# Patient Record
Sex: MALE | Race: WHITE
[De-identification: names, ages, dates, MRNs, and addresses within clinical notes are randomized per-mention and may not be internally consistent; named-entity substitution may affect disease eponyms.]

---

## 2017-01-20 ENCOUNTER — HOSPITAL ENCOUNTER (EMERGENCY)
Dept: HOSPITAL 47 - EC | Age: 72
Discharge: HOME | End: 2017-01-20
Payer: MEDICARE

## 2017-01-20 VITALS
HEART RATE: 79 BPM | RESPIRATION RATE: 18 BRPM | TEMPERATURE: 98.3 F | SYSTOLIC BLOOD PRESSURE: 143 MMHG | DIASTOLIC BLOOD PRESSURE: 65 MMHG

## 2017-01-20 DIAGNOSIS — J30.2: ICD-10-CM

## 2017-01-20 DIAGNOSIS — J06.9: Primary | ICD-10-CM

## 2017-01-20 DIAGNOSIS — I10: ICD-10-CM

## 2017-01-20 DIAGNOSIS — Z79.84: ICD-10-CM

## 2017-01-20 DIAGNOSIS — K21.9: ICD-10-CM

## 2017-01-20 DIAGNOSIS — Z79.51: ICD-10-CM

## 2017-01-20 DIAGNOSIS — E11.9: ICD-10-CM

## 2017-01-20 DIAGNOSIS — Z79.899: ICD-10-CM

## 2017-01-20 PROCEDURE — 99283 EMERGENCY DEPT VISIT LOW MDM: CPT

## 2017-01-20 NOTE — ED
URI HPI





- General


Chief Complaint: Upper Respiratory Infection


Stated Complaint: Cough,Headache


Time Seen by Provider: 17 13:06


Source: patient, RN notes reviewed


Mode of arrival: ambulatory


Limitations: no limitations





- History of Present Illness


Initial Comments: 





Patient is a 71-year-old male presents to the emergency room for evaluation of 

cough and congestion.  Patient states he's had symptoms since Monday.  Patient 

states that he has not been taking any over-the-counter medications for 

symptoms.  Patient states he has a dry cough.  Patient denies coughing up any 

mucus.  Patient states he has a slightly stuffy nose is having sinus congestion 

mostly on the left side.  Patient denies ear pain.  Patient states he only has 

a headache when he coughs.  Patient states she has throat pain after coughing.  

Patient denies nausea or vomiting, abdominal pain, diarrhea or constipation.  

Patient denies any fevers.  Patient denies smoking. 





- Related Data


 Home Medications











 Medication  Instructions  Recorded  Confirmed


 


Lansoprazole [Prevacid] 15 mg PO DAILY 10/30/14 11/07/16


 


Loratadine [Claritin] 10 mg PO DAILY 10/30/14 11/07/16


 


Lisinopril [Zestril] 2.5 mg PO DAILY 08/05/15 11/07/16


 


Fluticasone Propionate [Flonase 1 spray EA NOSTRIL DAILY PRN 16





Allergy Relief]   


 


Acetaminophen Tab [Tylenol] 650 mg PO Q6H PRN 16


 


Cholecalciferol [Vitamin D3] 5,000 unit PO DAILY 16


 


HYDROcodone/APAP 7.5-325MG [Norco 1 tab PO Q8H PRN 16





7.5-325]   


 


glipiZIDE [Glucotrol] 10 mg PO AC-SUPPER 16








 Previous Rx's











 Medication  Instructions  Recorded


 


Cefuroxime Axetil [Ceftin] 500 mg PO BID #20 tablet 11/10/16


 


Sodium Chloride 0.65% Nasal [Deep 2 spray NASAL QID PRN #0 spray 16





Sea]  


 


traMADol HCl [Ultram] 50 mg PO QID #20 tab 16


 


Fluticasone Nasal Spray [Flonase 2 spr EA NOSTRIL DAILY PRN #1 17





Nasal [place]] bottle 


 


guaiFENesin [Mucinex] 1,200 mg PO BID PRN 12 Days 17











 Allergies











Allergy/AdvReac Type Severity Reaction Status Date / Time


 


amoxicillin [From Augmentin] Allergy  Rash/Hives Verified 17 12:54


 


clavulanic acid Allergy  Rash/Hives Verified 17 12:54





[From Augmentin]     














Review of Systems


ROS Statement: 


Those systems with pertinent positive or pertinent negative responses have been 

documented in the HPI.





ROS Other: All systems not noted in ROS Statement are negative.





Past Medical History


Past Medical History: Cancer, Diabetes Mellitus, GERD/Reflux, Hypertension


Additional Past Medical History / Comment(s): NIDDM type II, 1999 LEUKEMIA (CLL-

remission), sinus problems, seasonal allergies, tinnitis bilaterally, OA hands, 

R wrist carpal tunnel, past fx with L elbow, R rotator cuff tendon problems, L 

rotator cuff tear.


History of Any Multi-Drug Resistant Organisms: None Reported


Past Surgical History: Orthopedic Surgery


Additional Past Surgical History / Comment(s): LEFT HAND thumb tendon repair.


Past Anesthesia/Blood Transfusion Reactions: No Reported Reaction


Past Psychological History: No Psychological Hx Reported


Additional Psychological History / Comment(s): Pt resides with his spouse.  He 

is independent.


Smoking Status: Never smoker


Past Alcohol Use History: Occasional


Past Drug Use History: None Reported





- Past Family History


  ** Father


Family Medical History: CVA/TIA


Additional Family Medical History / Comment(s): Father  at the age of 85yrs.





  ** Mother


Family Medical History: Diabetes Mellitus


Additional Family Medical History / Comment(s): Mother  at the age of 90yrs.





General Exam





- General Exam Comments


Initial Comments: 





Sitting in exam room in no acute distress.


Limitations: no limitations


General appearance: alert, in no apparent distress


Head exam: Present: atraumatic, normocephalic, normal inspection


Eye exam: Present: normal appearance, PERRL, EOMI


Pupils: Present: normal accommodation


ENT exam: Present: normal exam, normal oropharynx, mucous membranes moist, TM's 

normal bilaterally, normal external ear exam


Neck exam: Present: normal inspection, full ROM.  Absent: tenderness, 

lymphadenopathy


Respiratory exam: Present: normal lung sounds bilaterally.  Absent: respiratory 

distress


Cardiovascular Exam: Present: regular rate, normal rhythm, normal heart sounds


Extremities exam: Present: normal inspection


Back exam: Present: normal inspection


Neurological exam: Present: alert, oriented X3, CN II-XII intact, normal gait


Psychiatric exam: Present: normal affect, normal mood


Skin exam: Present: warm, dry, intact, normal color.  Absent: rash





Course


 Vital Signs











  17





  12:49


 


Temperature 98.3 F


 


Pulse Rate 79


 


Respiratory 18





Rate 


 


Blood Pressure 143/65


 


O2 Sat by Pulse 97





Oximetry 














Medical Decision Making





- Medical Decision Making





Patient is a 71-year-old male presents to the emergency room for evaluation 

sinus congestion since Monday.  Patient denies taking anything for his 

symptoms.  Advised patient to take Mucinex and Flonase for symptoms and if they 

do not improve within 1 week to follow-up with his primary care provider.  

Patient states he understands everything that was discussed with him.  Return 

parameters discussed.





Disposition


Clinical Impression: 


 Upper respiratory infection





Disposition: HOME SELF-CARE


Condition: Good


Instructions:  Upper Respiratory Infection (ED)


Additional Instructions: 


Take medications as directed.  Please follow up with primary care provider in 1-

2 days for reevaluation. If any new symptom arises, symptoms worsen or fever 

develops, return to ER as soon as possible.  


Prescriptions: 


Fluticasone Nasal Spray [Flonase Nasal Spray] 2 spr EA NOSTRIL DAILY PRN #1 

bottle


 PRN Reason: Congestion


guaiFENesin [Mucinex] 1,200 mg PO BID PRN 12 Days


 PRN Reason: Congestion


Referrals: 


None,Stated [Primary Care Provider] - 1-2 days


Time of Disposition: 13:13

## 2017-02-13 ENCOUNTER — HOSPITAL ENCOUNTER (EMERGENCY)
Dept: HOSPITAL 47 - EC | Age: 72
Discharge: HOME | End: 2017-02-13
Payer: MEDICARE

## 2017-02-13 VITALS — SYSTOLIC BLOOD PRESSURE: 172 MMHG | DIASTOLIC BLOOD PRESSURE: 77 MMHG

## 2017-02-13 VITALS — TEMPERATURE: 97.6 F | RESPIRATION RATE: 18 BRPM | HEART RATE: 63 BPM

## 2017-02-13 DIAGNOSIS — Z79.899: ICD-10-CM

## 2017-02-13 DIAGNOSIS — E11.9: ICD-10-CM

## 2017-02-13 DIAGNOSIS — I10: ICD-10-CM

## 2017-02-13 DIAGNOSIS — M19.049: ICD-10-CM

## 2017-02-13 DIAGNOSIS — K21.9: ICD-10-CM

## 2017-02-13 DIAGNOSIS — Z88.0: ICD-10-CM
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
## 2017-02-13 DIAGNOSIS — C95.91: ICD-10-CM

## 2017-02-13 DIAGNOSIS — S46.911A: Primary | ICD-10-CM
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
## 2017-02-13 PROCEDURE — 99283 EMERGENCY DEPT VISIT LOW MDM: CPT

## 2017-02-13 NOTE — ED
Extremity Problem HPI





- General


Chief complaint: Extremity Problem,Nontraumatic


Stated complaint: Rt Arm Weakness


Time Seen by Provider: 17 12:53


Source: patient, RN notes reviewed


Mode of arrival: ambulatory


Limitations: no limitations





- History of Present Illness


Initial comments: 





71-year-old male presents emergency Department with chief complaint of right 

shoulder pain.  Patient has chronic right shoulder pain but states that he was 

bowling on Friday and states that he was having increasing pain and states it 

felt very fatigued.  Patient states that is still bothering but states he has 

no weakness.  Patient denies any blurred vision, headache, dizziness, neck pain

, chest pain or shortness of breath.  Patient states he had no other associated 

symptoms.  He states he never lost function of it.  Patient states that he 

needs a work no because of his right shoulder.  Patient has seen a surgeon in 

the past and states that there is nothing that they can do for him.





- Related Data


 Home Medications











 Medication  Instructions  Recorded  Confirmed


 


Lansoprazole [Prevacid] 15 mg PO DAILY 10/30/14 11/07/16


 


Loratadine [Claritin] 10 mg PO DAILY 10/30/14 11/07/16


 


Lisinopril [Zestril] 2.5 mg PO DAILY 08/05/15 11/07/16


 


Fluticasone Propionate [Flonase 1 spray EA NOSTRIL DAILY PRN 16





Allergy Relief]   


 


Acetaminophen Tab [Tylenol] 650 mg PO Q6H PRN 16


 


Cholecalciferol [Vitamin D3] 5,000 unit PO DAILY 16


 


HYDROcodone/APAP 7.5-325MG [Norco 1 tab PO Q8H PRN 16





7.5-325]   


 


glipiZIDE [Glucotrol] 10 mg PO AC-SUPPER 16








 Previous Rx's











 Medication  Instructions  Recorded


 


Cefuroxime Axetil [Ceftin] 500 mg PO BID #20 tablet 11/10/16


 


Sodium Chloride 0.65% Nasal [Deep 2 spray NASAL QID PRN #0 spray 16





Sea]  


 


traMADol HCl [Ultram] 50 mg PO QID #20 tab 16


 


Fluticasone Nasal Spray [Flonase 2 spr EA NOSTRIL DAILY PRN #1 17





Nasal [place]] bottle 


 


guaiFENesin [Mucinex] 1,200 mg PO BID PRN 12 Days 17











 Allergies











Allergy/AdvReac Type Severity Reaction Status Date / Time


 


amoxicillin [From Augmentin] Allergy  Rash/Hives Verified 17 12:42


 


clavulanic acid Allergy  Rash/Hives Verified 17 12:42





[From Augmentin]     














Review of Systems


ROS Statement: 


Those systems with pertinent positive or pertinent negative responses have been 

documented in the HPI.





ROS Other: All systems not noted in ROS Statement are negative.





Past Medical History


Past Medical History: Cancer, Diabetes Mellitus, GERD/Reflux, Hypertension


Additional Past Medical History / Comment(s): NIDDM type II, 1999 LEUKEMIA (CLL-

remission), sinus problems, seasonal allergies, tinnitis bilaterally, OA hands, 

R wrist carpal tunnel, past fx with L elbow, R rotator cuff tendon problems, L 

rotator cuff tear.


History of Any Multi-Drug Resistant Organisms: None Reported


Past Surgical History: Orthopedic Surgery


Additional Past Surgical History / Comment(s): LEFT HAND thumb tendon repair.


Past Anesthesia/Blood Transfusion Reactions: No Reported Reaction


Past Psychological History: No Psychological Hx Reported


Additional Psychological History / Comment(s): Pt resides with his spouse.  He 

is independent.


Smoking Status: Never smoker


Past Alcohol Use History: Occasional


Past Drug Use History: None Reported





- Past Family History


  ** Father


Family Medical History: CVA/TIA


Additional Family Medical History / Comment(s): Father  at the age of 85yrs.





  ** Mother


Family Medical History: Diabetes Mellitus


Additional Family Medical History / Comment(s): Mother  at the age of 90yrs.





General Exam


Limitations: no limitations


General appearance: alert, in no apparent distress


Head exam: Present: atraumatic, normocephalic, normal inspection


Eye exam: Present: normal appearance, PERRL, EOMI.  Absent: scleral icterus, 

conjunctival injection, periorbital swelling


ENT exam: Present: normal exam, normal oropharynx, mucous membranes moist


Neck exam: Present: normal inspection, full ROM.  Absent: tenderness, 

meningismus, lymphadenopathy


Respiratory exam: Present: normal lung sounds bilaterally.  Absent: respiratory 

distress, wheezes, rales, rhonchi, stridor


Cardiovascular Exam: Present: regular rate, normal rhythm, normal heart sounds.

  Absent: systolic murmur, diastolic murmur, rubs, gallop, clicks


Extremities exam: Present: other (Right shoulder there is some limited range of 

motion with extension of the shoulder secondary to rotator cuff injury patient 

has equal  strength pulses are equal bilaterally +2 radial, Refill less 

than 2 seconds patient has normal bicep, tricep tenderness normal reflexes 

lower extremity strength equal bilaterally also)


Back exam: Present: full ROM.  Absent: tenderness


Neurological exam: Present: alert, oriented X3, CN II-XII intact, reflexes 

normal.  Absent: motor sensory deficit


Skin exam: Present: warm, dry, intact, normal color.  Absent: rash





Course


 Vital Signs











  17





  12:38


 


Temperature 97.6 F


 


Pulse Rate 63


 


Respiratory 18





Rate 


 


Blood Pressure 181/74


 


O2 Sat by Pulse 96





Oximetry 














Disposition


Clinical Impression: 


 Right shoulder strain





Disposition: HOME SELF-CARE


Condition: Stable


Instructions:  Shoulder Sprain (ED)


Additional Instructions: 


Please return to the Emergency Department if symptoms worsen or any other 

concerns.


Referrals: 


None,Stated [Primary Care Provider] - 1-2 days


Time of Disposition: 13:08

## 2017-07-13 ENCOUNTER — HOSPITAL ENCOUNTER (OUTPATIENT)
Dept: HOSPITAL 47 - RADCTMAIN | Age: 72
Discharge: HOME | End: 2017-07-13
Payer: MEDICARE

## 2017-07-13 DIAGNOSIS — J32.0: Primary | ICD-10-CM

## 2017-07-13 PROCEDURE — 70486 CT MAXILLOFACIAL W/O DYE: CPT

## 2017-07-13 NOTE — CT
EXAMINATION TYPE: CT sinus wo con

 

DATE OF EXAM: 7/13/2017

 

COMPARISON: 12/4/2009

 

HISTORY: Chronic sinus infections.

 

CT DLP: 599 mGycm.  Automated Exposure Control for Dose Reduction was Utilized.

 

TECHNIQUE: CT scan of the sinuses is performed without contrast, axial images are obtained, coronal r
eformatted images are also reviewed.

 

FINDINGS: The  paranasal sinuses including the frontal, ethmoid, sphenoid, and maxillary sinuses bila
terally are well-aerated with very mild mucosal thickening involving both maxillary sinuses. No air-f
luid levels..  The ostiomeatal complex is patent bilaterally on the coronal images. 

 

Visualized portion of mastoid air cells show no abnormal opacification.  The globes are intact bilate
rally.  

 

There are small aneta bullosa bilaterally.

 

IMPRESSION:

1. Very mild chronic maxillary sinusitis.

## 2017-08-18 ENCOUNTER — HOSPITAL ENCOUNTER (EMERGENCY)
Dept: HOSPITAL 47 - EC | Age: 72
Discharge: HOME | End: 2017-08-18
Payer: MEDICARE

## 2017-08-18 VITALS — DIASTOLIC BLOOD PRESSURE: 68 MMHG | RESPIRATION RATE: 16 BRPM | SYSTOLIC BLOOD PRESSURE: 132 MMHG | TEMPERATURE: 98 F

## 2017-08-18 VITALS — HEART RATE: 54 BPM

## 2017-08-18 DIAGNOSIS — E11.9: ICD-10-CM

## 2017-08-18 DIAGNOSIS — Z79.84: ICD-10-CM

## 2017-08-18 DIAGNOSIS — I10: ICD-10-CM

## 2017-08-18 DIAGNOSIS — Z85.6: ICD-10-CM

## 2017-08-18 DIAGNOSIS — R42: Primary | ICD-10-CM

## 2017-08-18 DIAGNOSIS — K21.9: ICD-10-CM

## 2017-08-18 DIAGNOSIS — Z79.899: ICD-10-CM

## 2017-08-18 DIAGNOSIS — Z88.0: ICD-10-CM

## 2017-08-18 LAB
ALP SERPL-CCNC: 122 U/L (ref 38–126)
ALT SERPL-CCNC: 27 U/L (ref 21–72)
ANION GAP SERPL CALC-SCNC: 11 MMOL/L
APTT BLD: 22.6 SEC (ref 22–30)
AST SERPL-CCNC: 17 U/L (ref 17–59)
BUN SERPL-SCNC: 23 MG/DL (ref 9–20)
CALCIUM SPEC-MCNC: 9.1 MG/DL (ref 8.4–10.2)
CELLS COUNTED: 200
CH: 32.1
CHCM: 36.1
CHLORIDE SERPL-SCNC: 102 MMOL/L (ref 98–107)
CK SERPL-CCNC: 101 U/L (ref 55–170)
CO2 SERPL-SCNC: 23 MMOL/L (ref 22–30)
ERYTHROCYTE [DISTWIDTH] IN BLOOD BY AUTOMATED COUNT: 4.42 M/UL (ref 4.3–5.9)
ERYTHROCYTE [DISTWIDTH] IN BLOOD: 13.6 % (ref 11.5–15.5)
GLUCOSE SERPL-MCNC: 306 MG/DL (ref 74–99)
HCT VFR BLD AUTO: 39.6 % (ref 39–53)
HDW: 3.17
HGB BLD-MCNC: 14.4 GM/DL (ref 13–17.5)
INR PPP: 1.1 (ref ?–1.2)
MAGNESIUM SPEC-SCNC: 1.7 MG/DL (ref 1.6–2.3)
MCH RBC QN AUTO: 32.5 PG (ref 25–35)
MCHC RBC AUTO-ENTMCNC: 36.3 G/DL (ref 31–37)
MCV RBC AUTO: 89.5 FL (ref 80–100)
NON-AFRICAN AMERICAN GFR(MDRD): >60
POTASSIUM SERPL-SCNC: 4.6 MMOL/L (ref 3.5–5.1)
PROT SERPL-MCNC: 6 G/DL (ref 6.3–8.2)
PT BLD: 11.2 SEC (ref 9–12)
SODIUM SERPL-SCNC: 136 MMOL/L (ref 137–145)
TROPONIN I SERPL-MCNC: <0.012 NG/ML (ref 0–0.03)
WBC # BLD AUTO: 37.3 K/UL (ref 3.8–10.6)
WBC (PEROX): 33.62

## 2017-08-18 PROCEDURE — 80053 COMPREHEN METABOLIC PANEL: CPT

## 2017-08-18 PROCEDURE — 84484 ASSAY OF TROPONIN QUANT: CPT

## 2017-08-18 PROCEDURE — 99284 EMERGENCY DEPT VISIT MOD MDM: CPT

## 2017-08-18 PROCEDURE — 85025 COMPLETE CBC W/AUTO DIFF WBC: CPT

## 2017-08-18 PROCEDURE — 83735 ASSAY OF MAGNESIUM: CPT

## 2017-08-18 PROCEDURE — 85730 THROMBOPLASTIN TIME PARTIAL: CPT

## 2017-08-18 PROCEDURE — 71020: CPT

## 2017-08-18 PROCEDURE — 85610 PROTHROMBIN TIME: CPT

## 2017-08-18 PROCEDURE — 70450 CT HEAD/BRAIN W/O DYE: CPT

## 2017-08-18 PROCEDURE — 96360 HYDRATION IV INFUSION INIT: CPT

## 2017-08-18 PROCEDURE — 82550 ASSAY OF CK (CPK): CPT

## 2017-08-18 PROCEDURE — 36415 COLL VENOUS BLD VENIPUNCTURE: CPT

## 2017-08-18 PROCEDURE — 82553 CREATINE MB FRACTION: CPT

## 2017-08-18 PROCEDURE — 93005 ELECTROCARDIOGRAM TRACING: CPT

## 2017-08-18 NOTE — CT
EXAMINATION TYPE: CT brain wo con

 

DATE OF EXAM: 8/18/2017

 

COMPARISON: 3/7/2017

 

INDICATION: Light headed

 

DLP: 1036 mGycm, Automated exposure control for dose reduction was used.

 

CONTRAST: None

 

CT of the brain is performed utilizing 3 mm thick sections through the posterior fossa and 3 mm thick
 sections through the remaining calvarium.  Study is performed within 24 hours of arrival to the hosp
ital. 

 

No abnormal hyperdensity is present to suggest an acute intracranial hemorrhage.

No mass lesion is evident.

No acute infarcts are evident.

Ventricles and sulci are appropriate for the patient age.  

Paranasal sinuses and mastoid air cells within the field-of-view are clear.

 

IMPRESSIONS:

1.   Normal CT Brain

2. Exam is stable from 3/7/2017.

## 2017-08-18 NOTE — ED
General Adult HPI





- General


Chief complaint: Dizziness


Stated complaint: Light headed


Time Seen by Provider: 17 07:05


Source: patient, RN notes reviewed


Mode of arrival: ambulatory


Limitations: no limitations





- History of Present Illness


Initial comments: 





5-year-old male presents emergency department stating that he's been a little 

bit dizzy the last few days.  Patient states she's been fighting some ALLERGIES 

and has been seeing an ENT for those ALLERGIES.  Patient states the last couple 

days he gets the dizziness which makes him feel like he is moving back and 

forth.  Patient states he woke up this morning started to work and got those 

feelings again and he was concerned so he came to the emergency department.  

Patient states his sugar was high yesterday and he recently was on steroids 

which she stopped because his sugars really high when he was on steroids.  

Patient states he got his sugar down last night he was concerned that maybe it 

was up again and he had run out of strips.  Patient denies any chest pain or 

palpitations.  Patient denies any shortness of breath or difficulty breathing.  

Patient denies any diaphoretic episodes.  Patient denies abdominal pain patient 

denies nausea vomiting diarrhea.  Patient denies any near syncopal episode.  

Patient states it's more of an off-balance feeling.  Patient states moving his 

head deftly makes it worse per patient denies any new ringing in his ears or 

new deafness.  Patient denies any recent injury or trauma.





- Related Data


 Home Medications











 Medication  Instructions  Recorded  Confirmed


 


Lansoprazole [Prevacid] 15 mg PO DAILY 10/30/14 08/18/17


 


glipiZIDE [Glucotrol] 10 mg PO BID 16


 


Cetirizine HCl [Zyrtec] 10 mg PO DAILY 17


 


Lisinopril [Zestril] 20 mg PO DAILY 17


 


Montelukast Sodium [Singulair] 10 mg PO HS 17


 


metFORMIN HCL [Glucophage] 500 mg PO DAILY 17








 Previous Rx's











 Medication  Instructions  Recorded


 


Fluticasone Nasal Spray [Flonase 2 spr EA NOSTRIL DAILY PRN #1 17





Nasal Spr[place]] bottle 


 


Meclizine [Antivert] 25 mg PO TID #20 tab 17











 Allergies











Allergy/AdvReac Type Severity Reaction Status Date / Time


 


amoxicillin [From Augmentin] Allergy  Rash/Hives Verified 17 08:13


 


clavulanic acid Allergy  Rash/Hives Verified 17 08:13





[From Augmentin]     














Review of Systems


ROS Statement: 


Those systems with pertinent positive or pertinent negative responses have been 

documented in the HPI.





ROS Other: All systems not noted in ROS Statement are negative.





Past Medical History


Past Medical History: Cancer, Diabetes Mellitus, GERD/Reflux, Hypertension


Additional Past Medical History / Comment(s): NIDDM type II, 1999 LEUKEMIA (CLL-

remission), sinus problems, seasonal allergies, tinnitis bilaterally, OA hands, 

R wrist carpal tunnel, past fx with L elbow, R rotator cuff tendon problems, L 

rotator cuff tear.


History of Any Multi-Drug Resistant Organisms: None Reported


Past Surgical History: Orthopedic Surgery


Additional Past Surgical History / Comment(s): LEFT HAND thumb tendon repair.


Past Anesthesia/Blood Transfusion Reactions: No Reported Reaction


Past Psychological History: No Psychological Hx Reported


Smoking Status: Never smoker


Past Alcohol Use History: Occasional


Past Drug Use History: None Reported





- Past Family History


  ** Father


Family Medical History: CVA/TIA


Additional Family Medical History / Comment(s): Father  at the age of 85yrs.





  ** Mother


Family Medical History: Diabetes Mellitus


Additional Family Medical History / Comment(s): Mother  at the age of 90yrs.





General Exam





- General Exam Comments


Initial Comments: 





GENERAL:


Patient is well-developed and well-nourished.  Patient is nontoxic and well-

hydrated and is in mild distress.





ENT:


Neck is soft and supple.  No significant lymphadenopathy is noted.  Oropharynx 

is clear.  Moist mucous membranes.  Neck has full range of motion without 

eliciting any pain.  





EYES:


The sclera were anicteric and conjunctiva were pink and moist.  Extraocular 

movements were intact and pupils were equal round and reactive to light.  

Eyelids were unremarkable.





PULMONARY:


Unlabored respirations.  Good breath sounds bilaterally.  No audible rales 

rhonchi or wheezing was noted.





CARDIOVASCULAR:


There is a regular rate and rhythm without any murmurs gallops or rubs.  





ABDOMEN:


Soft and nontender with normal bowel sounds.  No palpable organomegaly was 

noted.  There is no palpable pulsatile mass.





SKIN:


Skin is clear with no lesions or rashes and otherwise unremarkable.





NEUROLOGIC:


Patient is alert and oriented x3.  Cranial nerves II through XII are grossly 

intact.  Motor and sensory are also intact.  Normal speech, volume and content.

  Symmetrical smile.  Cerebellar testing finger to nose is normal.





MUSCULOSKELETAL:


Normal extremities with adequate strength and full range of motion.  No lower 

extremity swelling or edema.  No calf tenderness.





LYMPHATICS:


No significant lymphadenopathy is noted





PSYCHIATRIC:


Normal psychiatric evaluation.  Normal interpersonal interactions appears 

functionally intact in deals appropriately with others.  No signs of 

depression.  No signs of anxiety.  


Limitations: no limitations





Course


 Vital Signs











  17





  07:05 08:08 08:53


 


Temperature 97.8 F  97.7 F


 


Pulse Rate 62 56 L 54 L


 


Respiratory 18 20 18





Rate   


 


Blood Pressure 142/66 116/59 114/55


 


O2 Sat by Pulse 97 98 97





Oximetry   














Medical Decision Making





- Medical Decision Making





EKG shows normal sinus rhythm at 61 bpm DE interval is 126 QRS is 90 QT 

interval is 424 QTC is 426 per patient's EKG shows no ST segment elevation or 

depression or T wave normalities are noted. 





Chest x-ray shows no acute normalities.





CT of the brain shows no acute normalities.





Patient was ambulatory around the department he states he felt better but not 

quite to his baseline he still felt as though he had a little bit of dizziness.

  Patient states this is been ongoing intermittently for quite a while but over 

the last week has gotten considerably worse.





- Lab Data


Result diagrams: 


 17 07:45





 17 07:45


 Lab Results











  17 Range/Units





  07:45 07:45 07:45 


 


WBC   37.3 H*   (3.8-10.6)  k/uL


 


RBC   4.42   (4.30-5.90)  m/uL


 


Hgb   14.4   (13.0-17.5)  gm/dL


 


Hct   39.6   (39.0-53.0)  %


 


MCV   89.5   (80.0-100.0)  fL


 


MCH   32.5   (25.0-35.0)  pg


 


MCHC   36.3   (31.0-37.0)  g/dL


 


RDW   13.6   (11.5-15.5)  %


 


Plt Count   150   (150-450)  k/uL


 


Neutrophils % (Manual)   13   %


 


Lymphocytes % (Manual)   86   %


 


Monocytes % (Manual)   1   %


 


Eosinophils % (Manual)   1   %


 


Neutrophils # (Manual)   4.85   (1.3-7.7)  k/uL


 


Lymphocytes # (Manual)   32.08 H   (1.0-4.8)  k/uL


 


Monocytes # (Manual)   0.37   (0-1.0)  k/uL


 


Eosinophils # (Manual)   0.37   (0-0.7)  k/uL


 


Nucleated RBCs   0   (0-0)  /100 WBC


 


Poikilocytosis (manual   Present   


 


Anisocytosis (manual)   Present   


 


PT     (9.0-12.0)  sec


 


INR     (<1.2)  


 


APTT     (22.0-30.0)  sec


 


Sodium    136 L  (137-145)  mmol/L


 


Potassium    4.6  (3.5-5.1)  mmol/L


 


Chloride    102  ()  mmol/L


 


Carbon Dioxide    23  (22-30)  mmol/L


 


Anion Gap    11  mmol/L


 


BUN    23 H  (9-20)  mg/dL


 


Creatinine    0.99  (0.66-1.25)  mg/dL


 


Est GFR (MDRD) Af Amer    >60  (>60 ml/min/1.73 sqM)  


 


Est GFR (MDRD) Non-Af    >60  (>60 ml/min/1.73 sqM)  


 


Glucose    306 H  (74-99)  mg/dL


 


Calcium    9.1  (8.4-10.2)  mg/dL


 


Magnesium    1.7  (1.6-2.3)  mg/dL


 


Total Bilirubin    0.7  (0.2-1.3)  mg/dL


 


AST    17  (17-59)  U/L


 


ALT    27  (21-72)  U/L


 


Alkaline Phosphatase    122  ()  U/L


 


Total Creatine Kinase  101    ()  U/L


 


CK-MB (CK-2)  1.9    (0.0-2.4)  ng/mL


 


CK-MB (CK-2) Rel Index  1.9    


 


Troponin I  <0.012    (0.000-0.034)  ng/mL


 


Total Protein    6.0 L  (6.3-8.2)  g/dL


 


Albumin    3.8  (3.5-5.0)  g/dL














  17 Range/Units





  07:45 


 


WBC   (3.8-10.6)  k/uL


 


RBC   (4.30-5.90)  m/uL


 


Hgb   (13.0-17.5)  gm/dL


 


Hct   (39.0-53.0)  %


 


MCV   (80.0-100.0)  fL


 


MCH   (25.0-35.0)  pg


 


MCHC   (31.0-37.0)  g/dL


 


RDW   (11.5-15.5)  %


 


Plt Count   (150-450)  k/uL


 


Neutrophils % (Manual)   %


 


Lymphocytes % (Manual)   %


 


Monocytes % (Manual)   %


 


Eosinophils % (Manual)   %


 


Neutrophils # (Manual)   (1.3-7.7)  k/uL


 


Lymphocytes # (Manual)   (1.0-4.8)  k/uL


 


Monocytes # (Manual)   (0-1.0)  k/uL


 


Eosinophils # (Manual)   (0-0.7)  k/uL


 


Nucleated RBCs   (0-0)  /100 WBC


 


Poikilocytosis (manual   


 


Anisocytosis (manual)   


 


PT  11.2  (9.0-12.0)  sec


 


INR  1.1  (<1.2)  


 


APTT  22.6  (22.0-30.0)  sec


 


Sodium   (137-145)  mmol/L


 


Potassium   (3.5-5.1)  mmol/L


 


Chloride   ()  mmol/L


 


Carbon Dioxide   (22-30)  mmol/L


 


Anion Gap   mmol/L


 


BUN   (9-20)  mg/dL


 


Creatinine   (0.66-1.25)  mg/dL


 


Est GFR (MDRD) Af Amer   (>60 ml/min/1.73 sqM)  


 


Est GFR (MDRD) Non-Af   (>60 ml/min/1.73 sqM)  


 


Glucose   (74-99)  mg/dL


 


Calcium   (8.4-10.2)  mg/dL


 


Magnesium   (1.6-2.3)  mg/dL


 


Total Bilirubin   (0.2-1.3)  mg/dL


 


AST   (17-59)  U/L


 


ALT   (21-72)  U/L


 


Alkaline Phosphatase   ()  U/L


 


Total Creatine Kinase   ()  U/L


 


CK-MB (CK-2)   (0.0-2.4)  ng/mL


 


CK-MB (CK-2) Rel Index   


 


Troponin I   (0.000-0.034)  ng/mL


 


Total Protein   (6.3-8.2)  g/dL


 


Albumin   (3.5-5.0)  g/dL














Disposition


Clinical Impression: 


 Vertigo





Disposition: HOME SELF-CARE


Condition: Good


Instructions:  Vertigo (ED)


Prescriptions: 


Meclizine [Antivert] 25 mg PO TID #20 tab


Referrals: 


Smith Figueroa DO [Primary Care Provider] - 1-2 days


Time of Disposition: 10:01

## 2017-08-18 NOTE — XR
EXAMINATION TYPE: XR chest 2V

 

DATE OF EXAM: 8/18/2017

 

COMPARISON: NONE

 

HISTORY: Lightheadedness

 

TECHNIQUE:  Frontal and lateral views of the chest are obtained.

 

FINDINGS:  There is no focal air space opacity, pleural effusion, or pneumothorax seen.  The cardiac 
silhouette size is within normal limits.   The osseous structures are intact.

 

IMPRESSION:  No acute cardiopulmonary process.

## 2017-11-13 ENCOUNTER — HOSPITAL ENCOUNTER (EMERGENCY)
Dept: HOSPITAL 47 - EC | Age: 72
Discharge: HOME | End: 2017-11-13
Payer: MEDICARE

## 2017-11-13 VITALS — RESPIRATION RATE: 18 BRPM

## 2017-11-13 VITALS — SYSTOLIC BLOOD PRESSURE: 147 MMHG | HEART RATE: 62 BPM | TEMPERATURE: 97.6 F | DIASTOLIC BLOOD PRESSURE: 72 MMHG

## 2017-11-13 DIAGNOSIS — I10: ICD-10-CM

## 2017-11-13 DIAGNOSIS — S29.012A: Primary | ICD-10-CM
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
## 2017-11-13 DIAGNOSIS — E11.9: ICD-10-CM

## 2017-11-13 DIAGNOSIS — Z85.6: ICD-10-CM

## 2017-11-13 DIAGNOSIS — Z79.899: ICD-10-CM

## 2017-11-13 DIAGNOSIS — Z88.0: ICD-10-CM
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
## 2017-11-13 DIAGNOSIS — J06.9: ICD-10-CM

## 2017-11-13 DIAGNOSIS — Z79.84: ICD-10-CM

## 2017-11-13 DIAGNOSIS — K21.9: ICD-10-CM

## 2017-11-13 DIAGNOSIS — M19.049: ICD-10-CM

## 2017-11-13 PROCEDURE — 99284 EMERGENCY DEPT VISIT MOD MDM: CPT

## 2017-11-13 PROCEDURE — 71020: CPT

## 2017-11-13 NOTE — XR
EXAMINATION TYPE: XR chest 2V

 

DATE OF EXAM: 11/13/2017

 

COMPARISON: 8/18/2017

 

HISTORY: 72-year-old male with pain after fall 4 days ago

 

TECHNIQUE:  PA and lateral views

 

FINDINGS:  

Heart is normal size. Mild elongation of the thoracic aorta. No consolidation, pneumothorax, or pleur
al effusion.

 

 

IMPRESSION:  

No acute cardiopulmonary process.

## 2017-11-13 NOTE — ED
URI HPI





- General


Chief Complaint: Upper Respiratory Infection


Stated Complaint: Fall


Time Seen by Provider: 17 11:16


Source: patient, RN notes reviewed, old records reviewed


Mode of arrival: ambulatory


Limitations: no limitations





- History of Present Illness


Initial Comments: 





72-year-old male presents emergency Department chief complaint of some right-

sided rib pain after he fell while moving his furnace.  He reports that he's 

also had a minor cough.  It's been a dry cough.  No productive sputum.  No 

fevers or chills.  He also reports sinuses been congested.   He states that all 

this occurred after his furnace or not in his house.  He reports he try to fix 

furnace when he fell on his back. He states that the changes in weather has 

made him sick. Denies difficulty breathing, denies chest pain. Denies nausea, 

vomiting, abdominal pain.   





- Related Data


 Home Medications











 Medication  Instructions  Recorded  Confirmed


 


Lansoprazole [Prevacid] 15 mg PO DAILY 10/30/14 11/13/17


 


glipiZIDE [Glucotrol] 10 mg PO BID 16


 


Cetirizine HCl [Zyrtec] 10 mg PO DAILY 17


 


Lisinopril [Zestril] 20 mg PO DAILY 17


 


Montelukast Sodium [Singulair] 10 mg PO HS 17


 


metFORMIN HCL [Glucophage] 500 mg PO DAILY 17








 Previous Rx's











 Medication  Instructions  Recorded


 


Fluticasone Nasal Spray [Flonase 2 spr EA NOSTRIL DAILY PRN #1 17





Nasal [place]] bottle 


 


Azithromycin [Zithromax Z-pack] 250 mg PO AS DIRECTED #6 tab 17


 


Fluticasone Nasal Spray [Flonase 2 spr EA NOSTRIL DAILY #1 bottle 17





Nasal Spray]  


 


Ibuprofen [Motrin] 600 mg PO Q8HR PRN #15 tab 17











 Allergies











Allergy/AdvReac Type Severity Reaction Status Date / Time


 


amoxicillin [From Augmentin] Allergy  Rash/Hives Verified 17 11:36


 


clavulanic acid Allergy  Rash/Hives Verified 17 11:36





[From Augmentin]     














Review of Systems


ROS Statement: 


Those systems with pertinent positive or pertinent negative responses have been 

documented in the HPI.





ROS Other: All systems not noted in ROS Statement are negative.





Past Medical History


Past Medical History: Cancer, Diabetes Mellitus, GERD/Reflux, Hypertension, 

Osteoarthritis (OA)


Additional Past Medical History / Comment(s): NIDDM type II, 1999 LEUKEMIA (CLL-

remission), sinus problems, seasonal allergies, tinnitis bilaterally, OA hands, 

R wrist carpal tunnel, past fx with L elbow, R rotator cuff tendon problems, L 

rotator cuff tear.


History of Any Multi-Drug Resistant Organisms: None Reported


Past Surgical History: Orthopedic Surgery


Additional Past Surgical History / Comment(s): LEFT HAND thumb tendon repair.


Past Anesthesia/Blood Transfusion Reactions: No Reported Reaction


Past Psychological History: No Psychological Hx Reported


Smoking Status: Never smoker


Past Alcohol Use History: Occasional


Past Drug Use History: None Reported





- Past Family History


  ** Father


Family Medical History: CVA/TIA


Additional Family Medical History / Comment(s): Father  at the age of 85yrs.





  ** Mother


Family Medical History: Diabetes Mellitus


Additional Family Medical History / Comment(s): Mother  at the age of 90yrs.





General Exam





- General Exam Comments


Initial Comments: 





72-year-old male.  No distress.


Limitations: no limitations


General appearance: alert, in no apparent distress


Head exam: Present: atraumatic, normocephalic, normal inspection


Eye exam: Present: normal appearance, PERRL, EOMI.  Absent: scleral icterus, 

conjunctival injection, periorbital swelling


ENT exam: Present: normal exam, mucous membranes moist


Neck exam: Present: normal inspection.  Absent: tenderness, meningismus, 

lymphadenopathy


Respiratory exam: Present: normal lung sounds bilaterally, chest wall 

tenderness (Right-sided lower rib tenderness).  Absent: respiratory distress, 

wheezes, rales, rhonchi, stridor


Cardiovascular Exam: Present: regular rate, normal rhythm, normal heart sounds.

  Absent: systolic murmur, diastolic murmur, rubs, gallop, clicks


GI/Abdominal exam: Present: soft, normal bowel sounds.  Absent: distended, 

tenderness, guarding, rebound, rigid


Extremities exam: Present: normal inspection, full ROM, normal capillary 

refill.  Absent: tenderness, pedal edema, joint swelling, calf tenderness


Back exam: Present: normal inspection


Neurological exam: Present: alert, oriented X3, CN II-XII intact


Psychiatric exam: Present: normal affect, normal mood


Skin exam: Present: warm, dry, intact, normal color.  Absent: rash





Course


 Vital Signs











  17





  11:11 11:57 12:38


 


Temperature 97.0 F L  97.6 F


 


Pulse Rate 61  62


 


Respiratory 18 18 18





Rate   


 


Blood Pressure 157/70  147/72


 


O2 Sat by Pulse 98  96





Oximetry   














Medical Decision Making





- Medical Decision Making


72-year-old male presents emergency Department chief complaint of some right-

sided rib pain after he fell while moving his furnace.  He reports that he's 

also had a minor cough.  It's been a dry cough.  Patient has right rib pain. 

Lungs are clear, no distress. does have sinus drainage and pressure. Patient 

CXR is negative for acute process. Patient will be discharged with motrin and 

tyelnol for pain and advised to apply heat and ice to lower rib for contusion 

and muscle pain. Patient also discharged with flonase for sinusitis. Return 

parameters discussed. 








- Radiology Data


Radiology results: report reviewed


Chest x-ray was reviewed and negative for any acute process.





Disposition


Clinical Impression: 


 Upper respiratory infection with cough and congestion, Muscle strain of right 

upper back





Disposition: HOME SELF-CARE


Condition: Good


Instructions:  Upper Respiratory Infection (ED)


Additional Instructions: 


Advised to apply heat and ice to her back.  Patient should take Motrin for 

pain.  Antibiotic prescription as directed.  Also recommended using Nasal 

saline spray.  An over-the-counter decongestions.


Prescriptions: 


Azithromycin [Zithromax Z-pack] 250 mg PO AS DIRECTED #6 tab


Fluticasone Nasal Spray [Flonase Nasal Spray] 2 spr EA NOSTRIL DAILY #1 bottle


Ibuprofen [Motrin] 600 mg PO Q8HR PRN #15 tab


 PRN Reason: Pain


Referrals: 


Smith Figueroa DO [Primary Care Provider] - 1-2 days


Time of Disposition: 12:34

## 2018-02-01 ENCOUNTER — HOSPITAL ENCOUNTER (EMERGENCY)
Dept: HOSPITAL 47 - EC | Age: 73
Discharge: HOME | End: 2018-02-01
Payer: MEDICARE

## 2018-02-01 VITALS
HEART RATE: 67 BPM | DIASTOLIC BLOOD PRESSURE: 71 MMHG | TEMPERATURE: 97.3 F | SYSTOLIC BLOOD PRESSURE: 160 MMHG | RESPIRATION RATE: 16 BRPM

## 2018-02-01 DIAGNOSIS — I10: ICD-10-CM

## 2018-02-01 DIAGNOSIS — C91.11: Primary | ICD-10-CM

## 2018-02-01 DIAGNOSIS — E11.9: ICD-10-CM

## 2018-02-01 DIAGNOSIS — Z79.84: ICD-10-CM

## 2018-02-01 DIAGNOSIS — Z79.899: ICD-10-CM

## 2018-02-01 DIAGNOSIS — Z88.0: ICD-10-CM

## 2018-02-01 DIAGNOSIS — D72.829: ICD-10-CM

## 2018-02-01 LAB
ALBUMIN SERPL-MCNC: 4.5 G/DL (ref 3.5–5)
ALP SERPL-CCNC: 127 U/L (ref 38–126)
ALT SERPL-CCNC: 32 U/L (ref 21–72)
ANION GAP SERPL CALC-SCNC: 13 MMOL/L
AST SERPL-CCNC: 20 U/L (ref 17–59)
BUN SERPL-SCNC: 23 MG/DL (ref 9–20)
CALCIUM SPEC-MCNC: 10 MG/DL (ref 8.4–10.2)
CELLS COUNTED: 200
CHLORIDE SERPL-SCNC: 102 MMOL/L (ref 98–107)
CO2 SERPL-SCNC: 24 MMOL/L (ref 22–30)
EOSINOPHIL # BLD MANUAL: 0.38 K/UL (ref 0–0.7)
ERYTHROCYTE [DISTWIDTH] IN BLOOD BY AUTOMATED COUNT: 4.76 M/UL (ref 4.3–5.9)
ERYTHROCYTE [DISTWIDTH] IN BLOOD: 13.6 % (ref 11.5–15.5)
GLUCOSE SERPL-MCNC: 348 MG/DL (ref 74–99)
GLUCOSE UR QL: (no result)
HCT VFR BLD AUTO: 43 % (ref 39–53)
HGB BLD-MCNC: 14.8 GM/DL (ref 13–17.5)
LYMPHOCYTES # BLD MANUAL: 33.15 K/UL (ref 1–4.8)
MCH RBC QN AUTO: 31.1 PG (ref 25–35)
MCHC RBC AUTO-ENTMCNC: 34.4 G/DL (ref 31–37)
MCV RBC AUTO: 90.4 FL (ref 80–100)
MONOCYTES # BLD MANUAL: 0.76 K/UL (ref 0–1)
NEUTROPHILS NFR BLD MANUAL: 12 %
NEUTS SEG # BLD MANUAL: 4.57 K/UL (ref 1.3–7.7)
PH UR: 5 [PH] (ref 5–8)
PLATELET # BLD AUTO: 137 K/UL (ref 150–450)
POTASSIUM SERPL-SCNC: 5.1 MMOL/L (ref 3.5–5.1)
PROT SERPL-MCNC: 6.7 G/DL (ref 6.3–8.2)
SODIUM SERPL-SCNC: 139 MMOL/L (ref 137–145)
SP GR UR: 1.03 (ref 1–1.03)
UROBILINOGEN UR QL STRIP: <2 MG/DL (ref ?–2)
WBC # BLD AUTO: 38.1 K/UL (ref 3.8–10.6)

## 2018-02-01 PROCEDURE — 86901 BLOOD TYPING SEROLOGIC RH(D): CPT

## 2018-02-01 PROCEDURE — 80053 COMPREHEN METABOLIC PANEL: CPT

## 2018-02-01 PROCEDURE — 36415 COLL VENOUS BLD VENIPUNCTURE: CPT

## 2018-02-01 PROCEDURE — 99284 EMERGENCY DEPT VISIT MOD MDM: CPT

## 2018-02-01 PROCEDURE — 71046 X-RAY EXAM CHEST 2 VIEWS: CPT

## 2018-02-01 PROCEDURE — 81003 URINALYSIS AUTO W/O SCOPE: CPT

## 2018-02-01 PROCEDURE — 85025 COMPLETE CBC W/AUTO DIFF WBC: CPT

## 2018-02-01 PROCEDURE — 86900 BLOOD TYPING SEROLOGIC ABO: CPT

## 2018-02-01 PROCEDURE — 87502 INFLUENZA DNA AMP PROBE: CPT

## 2018-02-01 PROCEDURE — 86850 RBC ANTIBODY SCREEN: CPT

## 2018-02-01 PROCEDURE — 93005 ELECTROCARDIOGRAM TRACING: CPT

## 2018-02-01 PROCEDURE — 87086 URINE CULTURE/COLONY COUNT: CPT

## 2018-02-01 NOTE — XR
EXAMINATION TYPE: XR chest 2V

 

DATE OF EXAM: 2/1/2018

 

COMPARISON: Prior chest x-ray 11/13/2017

 

HISTORY: Pain

 

TECHNIQUE:  Frontal and lateral views of the chest are obtained.

 

FINDINGS:  There is no focal air space opacity, pleural effusion, or pneumothorax seen.  The cardiac 
silhouette size is within normal limits. Arthropathy noted at the acromioclavicular joints.   The oss
eous structures are intact.

 

IMPRESSION:  No acute cardiopulmonary process.

## 2018-02-01 NOTE — ED
General Adult HPI





- General


Chief complaint: Headache


Stated complaint: Headache


Time Seen by Provider: 18 14:00


Source: patient


Mode of arrival: ambulatory


Limitations: no limitations





- History of Present Illness


Initial comments: 





This is a 72-year-old male with a history of diabetes and CLL who presents him 

in [facility] for generalized fatigue, mild headache, cough, generalized body 

aches.  He states is been going on for the last week however seem to worsen 

last night.  He states he felt febrile last night however not today.  He still 

sees felt very fatigued and he states that his legs ache after he gets and 

walking.  He denies any nausea, vomiting, or diarrhea.  No abdominal pain.  He 

states that he's been having intermittent headaches on the top of his head.  It 

sounds like this is been going on for quite some time and is around an area 

where he had something removed from his scalp.  The patient has had this 

evaluated in the past and etiology is unclear.  There is been no dysuria or 

hematuria.  No urinary frequency.  No URI symptoms.  No other acute complaints.





- Related Data


 Home Medications











 Medication  Instructions  Recorded  Confirmed


 


Lansoprazole [Prevacid] 15 mg PO DAILY 10/30/14 02/01/18


 


Cetirizine HCl [Zyrtec] 10 mg PO DAILY 17


 


Lisinopril [Zestril] 20 mg PO DAILY 17


 


Montelukast Sodium [Singulair] 10 mg PO HS 17


 


glipiZIDE [Glucotrol] 5 mg PO AC-BID 18








 Previous Rx's











 Medication  Instructions  Recorded


 


Fluticasone Nasal Spray [Flonase 2 spr EA NOSTRIL DAILY PRN #1 17





Nasal Spray] bottle 











 Allergies











Allergy/AdvReac Type Severity Reaction Status Date / Time


 


amoxicillin [From Augmentin] Allergy  Rash/Hives Verified 18 14:20


 


clavulanic acid Allergy  Rash/Hives Verified 18 14:20





[From Augmentin]     














Review of Systems


ROS Statement: 


Those systems with pertinent positive or pertinent negative responses have been 

documented in the HPI.





ROS Other: All systems not noted in ROS Statement are negative.





Past Medical History


Past Medical History: Cancer, Diabetes Mellitus, GERD/Reflux, Hypertension, 

Osteoarthritis (OA)


Additional Past Medical History / Comment(s): NIDDM type II,  LEUKEMIA (CLL-

remission), sinus problems, seasonal allergies, tinnitis bilaterally, OA hands, 

R wrist carpal tunnel, past fx with L elbow, R rotator cuff tendon problems, L 

rotator cuff tear.


History of Any Multi-Drug Resistant Organisms: None Reported


Past Surgical History: Orthopedic Surgery


Additional Past Surgical History / Comment(s): LEFT HAND thumb tendon repair.


Past Anesthesia/Blood Transfusion Reactions: No Reported Reaction


Past Psychological History: No Psychological Hx Reported


Smoking Status: Never smoker


Past Alcohol Use History: Occasional


Past Drug Use History: None Reported





- Past Family History


  ** Father


Family Medical History: CVA/TIA


Additional Family Medical History / Comment(s): Father  at the age of 85yrs.





  ** Mother


Family Medical History: Diabetes Mellitus


Additional Family Medical History / Comment(s): Mother  at the age of 90yrs.





General Exam





- General Exam Comments


Initial Comments: 





Constitutional: Awake alert Appears comfortable


Head: Normocephalic atraumatic 


Eyes: no conjunctival injection No scleral icterus EOMI, pupils are 4 mm 

reactive bilaterally


ENT: Oropharynx is nonerythematous, TMs clear bilaterally


Neck: No JVD Supple


Heart: Regular rate rhythm normal S1-S2 no murmurs


Lungs: Clear to auscultation bilaterally No wheezing No rales


Abdomen: Soft nondistended nontender


Extremities: Non edematous DP pulses intact Radial pulses intact


Neuro: A&Ox3, cranial nerves II through XII are grossly intact, 5 out of 5 

strength in upper and lower extremities bilaterally, sensation intact to light 

touch in all extremities, normal finger nose and heel to shin testing.  No 

focal neurologic deficits


Psych: Appropriate mood and affect





Limitations: no limitations





Course


 Vital Signs











  18





  12:21 14:33


 


Temperature 97.6 F 


 


Pulse Rate 68 60


 


Respiratory 16 18





Rate  


 


Blood Pressure 175/77 133/62


 


O2 Sat by Pulse 96 96





Oximetry  














EKG Findings





- EKG Comments:


EKG Findings:: EKG showing normal sinus rhythm with a rate of 60.  Normal ST 

segment changes or T-wave inversions.  QTC is 408.  Other vitals normal.  No 

ectopy.





Medical Decision Making





- Medical Decision Making





This is a 72-year-old male came in for generalized weakness and vague 

complaints.  He denied a headache while in the emergency department.  Labwork 

was reviewed and showed a worsening leukocytosis.  He does have a history of 

CLL however he stated he used to be in remission however this appears to be 

that it's been slowly increasing.  I advised him to follow-up with his primary 

doctor to have this evaluated further.  He does have a mild mild achy eye 

however nothing severe.  He needs to drink plenty of fluids and follow-up his 

primary doctor.  He otherwise feels well.  Skin no focal neurologic deficits.  

Is not ill-appearing at all.  At this time feel these okay to go home however 

needs very close follow-up with his primary doctor.  If he develops any 

worsening or new symptoms he needs return the emergency Department.  All 

questions were answered.





- Lab Data


Result diagrams: 


 18 14:20





 18 14:20


 Lab Results











  18 Range/Units





  14:20 14:20 14:20 


 


WBC  38.1 H*    (3.8-10.6)  k/uL


 


RBC  4.76    (4.30-5.90)  m/uL


 


Hgb  14.8    (13.0-17.5)  gm/dL


 


Hct  43.0    (39.0-53.0)  %


 


MCV  90.4    (80.0-100.0)  fL


 


MCH  31.1    (25.0-35.0)  pg


 


MCHC  34.4    (31.0-37.0)  g/dL


 


RDW  13.6    (11.5-15.5)  %


 


Plt Count  137 L    (150-450)  k/uL


 


Neutrophils % (Manual)  12    %


 


Lymphocytes % (Manual)  87    %


 


Monocytes % (Manual)  2    %


 


Eosinophils % (Manual)  1    %


 


Neutrophils # (Manual)  4.57    (1.3-7.7)  k/uL


 


Lymphocytes # (Manual)  33.15 H    (1.0-4.8)  k/uL


 


Monocytes # (Manual)  0.76    (0-1.0)  k/uL


 


Eosinophils # (Manual)  0.38    (0-0.7)  k/uL


 


Nucleated RBCs  0    (0-0)  /100 WBC


 


Poikilocytosis (manual  Present    


 


Anisocytosis (manual)  Present    


 


Sodium   139   (137-145)  mmol/L


 


Potassium   5.1   (3.5-5.1)  mmol/L


 


Chloride   102   ()  mmol/L


 


Carbon Dioxide   24   (22-30)  mmol/L


 


Anion Gap   13   mmol/L


 


BUN   23 H   (9-20)  mg/dL


 


Creatinine   1.27 H   (0.66-1.25)  mg/dL


 


Est GFR (MDRD) Af Amer   >60   (>60 ml/min/1.73 sqM)  


 


Est GFR (MDRD) Non-Af   56   (>60 ml/min/1.73 sqM)  


 


Glucose   348 H   (74-99)  mg/dL


 


Calcium   10.0   (8.4-10.2)  mg/dL


 


Total Bilirubin   0.7   (0.2-1.3)  mg/dL


 


AST   20   (17-59)  U/L


 


ALT   32   (21-72)  U/L


 


Alkaline Phosphatase   127 H   ()  U/L


 


Total Protein   6.7   (6.3-8.2)  g/dL


 


Albumin   4.5   (3.5-5.0)  g/dL


 


Urine Color     


 


Urine Appearance     (Clear)  


 


Urine pH     (5.0-8.0)  


 


Ur Specific Gravity     (1.001-1.035)  


 


Urine Protein     (Negative)  


 


Urine Glucose (UA)     (Negative)  


 


Urine Ketones     (Negative)  


 


Urine Blood     (Negative)  


 


Urine Nitrite     (Negative)  


 


Urine Bilirubin     (Negative)  


 


Urine Urobilinogen     (<2.0)  mg/dL


 


Ur Leukocyte Esterase     (Negative)  


 


Influenza Type A RNA    Not Detected  (Not Detectd)  


 


Influenza Type B (PCR)    Not Detected  (Not Detectd)  


 


Blood Type     


 


Blood Type Recheck     


 


Antibody Screen     


 


Spec Expiration Date     














  18 Range/Units





  14:20 15:10 


 


WBC    (3.8-10.6)  k/uL


 


RBC    (4.30-5.90)  m/uL


 


Hgb    (13.0-17.5)  gm/dL


 


Hct    (39.0-53.0)  %


 


MCV    (80.0-100.0)  fL


 


MCH    (25.0-35.0)  pg


 


MCHC    (31.0-37.0)  g/dL


 


RDW    (11.5-15.5)  %


 


Plt Count    (150-450)  k/uL


 


Neutrophils % (Manual)    %


 


Lymphocytes % (Manual)    %


 


Monocytes % (Manual)    %


 


Eosinophils % (Manual)    %


 


Neutrophils # (Manual)    (1.3-7.7)  k/uL


 


Lymphocytes # (Manual)    (1.0-4.8)  k/uL


 


Monocytes # (Manual)    (0-1.0)  k/uL


 


Eosinophils # (Manual)    (0-0.7)  k/uL


 


Nucleated RBCs    (0-0)  /100 WBC


 


Poikilocytosis (manual    


 


Anisocytosis (manual)    


 


Sodium    (137-145)  mmol/L


 


Potassium    (3.5-5.1)  mmol/L


 


Chloride    ()  mmol/L


 


Carbon Dioxide    (22-30)  mmol/L


 


Anion Gap    mmol/L


 


BUN    (9-20)  mg/dL


 


Creatinine    (0.66-1.25)  mg/dL


 


Est GFR (MDRD) Af Amer    (>60 ml/min/1.73 sqM)  


 


Est GFR (MDRD) Non-Af    (>60 ml/min/1.73 sqM)  


 


Glucose    (74-99)  mg/dL


 


Calcium    (8.4-10.2)  mg/dL


 


Total Bilirubin    (0.2-1.3)  mg/dL


 


AST    (17-59)  U/L


 


ALT    (21-72)  U/L


 


Alkaline Phosphatase    ()  U/L


 


Total Protein    (6.3-8.2)  g/dL


 


Albumin    (3.5-5.0)  g/dL


 


Urine Color   Yellow  


 


Urine Appearance   Clear  (Clear)  


 


Urine pH   5.0  (5.0-8.0)  


 


Ur Specific Gravity   1.026  (1.001-1.035)  


 


Urine Protein   Negative  (Negative)  


 


Urine Glucose (UA)   4+ H  (Negative)  


 


Urine Ketones   Negative  (Negative)  


 


Urine Blood   Negative  (Negative)  


 


Urine Nitrite   Negative  (Negative)  


 


Urine Bilirubin   Negative  (Negative)  


 


Urine Urobilinogen   <2.0  (<2.0)  mg/dL


 


Ur Leukocyte Esterase   Negative  (Negative)  


 


Influenza Type A RNA    (Not Detectd)  


 


Influenza Type B (PCR)    (Not Detectd)  


 


Blood Type  O Negative   


 


Blood Type Recheck  No   


 


Antibody Screen  NEGATIVE   


 


Spec Expiration Date  2018   














Disposition


Clinical Impression: 


 Leukocytosis, CLL (chronic lymphocytic leukemia)





Disposition: HOME SELF-CARE


Condition: Stable


Instructions:  Leukocytosis (ED)


Additional Instructions: 


Call your PCP to make appointment to have your elevated WBC checking into 

further. Return to ED if you develop new or worsening symptoms.


Referrals: 


Smith Figueroa DO [Primary Care Provider] - 1-2 days

## 2018-11-06 ENCOUNTER — HOSPITAL ENCOUNTER (OUTPATIENT)
Dept: HOSPITAL 47 - PROCWHC3 | Age: 73
Discharge: HOME | End: 2018-11-06
Attending: INTERNAL MEDICINE
Payer: MEDICARE

## 2018-11-06 VITALS — TEMPERATURE: 97.6 F | RESPIRATION RATE: 16 BRPM

## 2018-11-06 VITALS — HEART RATE: 54 BPM | DIASTOLIC BLOOD PRESSURE: 73 MMHG | SYSTOLIC BLOOD PRESSURE: 130 MMHG

## 2018-11-06 DIAGNOSIS — Z51.12: Primary | ICD-10-CM

## 2018-11-06 DIAGNOSIS — C91.10: ICD-10-CM

## 2018-11-06 PROCEDURE — 96366 THER/PROPH/DIAG IV INF ADDON: CPT

## 2018-11-06 PROCEDURE — 96365 THER/PROPH/DIAG IV INF INIT: CPT

## 2018-11-06 PROCEDURE — 96375 TX/PRO/DX INJ NEW DRUG ADDON: CPT

## 2020-03-08 ENCOUNTER — HOSPITAL ENCOUNTER (EMERGENCY)
Dept: HOSPITAL 47 - EC | Age: 75
Discharge: HOME | End: 2020-03-08
Payer: MEDICARE

## 2020-03-08 VITALS — HEART RATE: 66 BPM | DIASTOLIC BLOOD PRESSURE: 85 MMHG | SYSTOLIC BLOOD PRESSURE: 169 MMHG

## 2020-03-08 VITALS — RESPIRATION RATE: 18 BRPM

## 2020-03-08 VITALS — TEMPERATURE: 97.5 F

## 2020-03-08 DIAGNOSIS — M19.041: ICD-10-CM

## 2020-03-08 DIAGNOSIS — R53.1: ICD-10-CM

## 2020-03-08 DIAGNOSIS — I10: ICD-10-CM

## 2020-03-08 DIAGNOSIS — Z79.84: ICD-10-CM

## 2020-03-08 DIAGNOSIS — Z79.1: ICD-10-CM

## 2020-03-08 DIAGNOSIS — Z91.048: ICD-10-CM

## 2020-03-08 DIAGNOSIS — M19.042: ICD-10-CM

## 2020-03-08 DIAGNOSIS — C91.11: ICD-10-CM

## 2020-03-08 DIAGNOSIS — Z79.899: ICD-10-CM

## 2020-03-08 DIAGNOSIS — Z88.0: ICD-10-CM

## 2020-03-08 DIAGNOSIS — E86.0: Primary | ICD-10-CM

## 2020-03-08 DIAGNOSIS — K21.9: ICD-10-CM

## 2020-03-08 DIAGNOSIS — E11.9: ICD-10-CM

## 2020-03-08 LAB
ALBUMIN SERPL-MCNC: 4.5 G/DL (ref 3.5–5)
ALP SERPL-CCNC: 82 U/L (ref 38–126)
ALT SERPL-CCNC: 21 U/L (ref 4–49)
ANION GAP SERPL CALC-SCNC: 9 MMOL/L
APTT BLD: 22.2 SEC (ref 22–30)
AST SERPL-CCNC: 20 U/L (ref 17–59)
BASOPHILS # BLD AUTO: 0.2 K/UL (ref 0–0.2)
BASOPHILS NFR BLD AUTO: 1 %
BUN SERPL-SCNC: 22 MG/DL (ref 9–20)
CALCIUM SPEC-MCNC: 9.6 MG/DL (ref 8.4–10.2)
CHLORIDE SERPL-SCNC: 104 MMOL/L (ref 98–107)
CO2 SERPL-SCNC: 25 MMOL/L (ref 22–30)
EOSINOPHIL # BLD AUTO: 0.1 K/UL (ref 0–0.7)
EOSINOPHIL NFR BLD AUTO: 0 %
ERYTHROCYTE [DISTWIDTH] IN BLOOD BY AUTOMATED COUNT: 4.72 M/UL (ref 4.3–5.9)
ERYTHROCYTE [DISTWIDTH] IN BLOOD: 13.3 % (ref 11.5–15.5)
GLUCOSE SERPL-MCNC: 230 MG/DL (ref 74–99)
GLUCOSE UR QL: (no result)
HCT VFR BLD AUTO: 43.5 % (ref 39–53)
HGB BLD-MCNC: 15.1 GM/DL (ref 13–17.5)
INR PPP: 1 (ref ?–1.2)
LYMPHOCYTES # SPEC AUTO: 21.8 K/UL (ref 1–4.8)
LYMPHOCYTES NFR SPEC AUTO: 81 %
MAGNESIUM SPEC-SCNC: 2 MG/DL (ref 1.6–2.3)
MCH RBC QN AUTO: 31.9 PG (ref 25–35)
MCHC RBC AUTO-ENTMCNC: 34.7 G/DL (ref 31–37)
MCV RBC AUTO: 92.1 FL (ref 80–100)
MONOCYTES # BLD AUTO: 0.5 K/UL (ref 0–1)
MONOCYTES NFR BLD AUTO: 2 %
NEUTROPHILS # BLD AUTO: 3.3 K/UL (ref 1.3–7.7)
NEUTROPHILS NFR BLD AUTO: 12 %
PH UR: 5.5 [PH] (ref 5–8)
PLATELET # BLD AUTO: 137 K/UL (ref 150–450)
POTASSIUM SERPL-SCNC: 4.3 MMOL/L (ref 3.5–5.1)
PROT SERPL-MCNC: 7 G/DL (ref 6.3–8.2)
PT BLD: 10.6 SEC (ref 9–12)
SODIUM SERPL-SCNC: 138 MMOL/L (ref 137–145)
SP GR UR: 1.03 (ref 1–1.03)
UROBILINOGEN UR QL STRIP: 2 MG/DL (ref ?–2)
WBC # BLD AUTO: 26.8 K/UL (ref 3.8–10.6)

## 2020-03-08 PROCEDURE — 93005 ELECTROCARDIOGRAM TRACING: CPT

## 2020-03-08 PROCEDURE — 71046 X-RAY EXAM CHEST 2 VIEWS: CPT

## 2020-03-08 PROCEDURE — 83735 ASSAY OF MAGNESIUM: CPT

## 2020-03-08 PROCEDURE — 81003 URINALYSIS AUTO W/O SCOPE: CPT

## 2020-03-08 PROCEDURE — 99285 EMERGENCY DEPT VISIT HI MDM: CPT

## 2020-03-08 PROCEDURE — 85730 THROMBOPLASTIN TIME PARTIAL: CPT

## 2020-03-08 PROCEDURE — 83605 ASSAY OF LACTIC ACID: CPT

## 2020-03-08 PROCEDURE — 36415 COLL VENOUS BLD VENIPUNCTURE: CPT

## 2020-03-08 PROCEDURE — 84443 ASSAY THYROID STIM HORMONE: CPT

## 2020-03-08 PROCEDURE — 85610 PROTHROMBIN TIME: CPT

## 2020-03-08 PROCEDURE — 85025 COMPLETE CBC W/AUTO DIFF WBC: CPT

## 2020-03-08 PROCEDURE — 80053 COMPREHEN METABOLIC PANEL: CPT

## 2020-03-08 PROCEDURE — 96360 HYDRATION IV INFUSION INIT: CPT

## 2020-03-08 PROCEDURE — 84484 ASSAY OF TROPONIN QUANT: CPT

## 2020-03-08 NOTE — ED
General Adult HPI





- General


Chief complaint: Shortness of Breath


Stated complaint: Weakness


Time Seen by Provider: 20 13:00


Source: patient, RN notes reviewed, old records reviewed


Mode of arrival: ambulatory


Limitations: no limitations





- History of Present Illness


Initial comments: 





This is a 74-year-old male with a past medical history significant for CLL.  

Patient states he comes in today because he has a week of being generally weak. 

Patient states he has had occasional where he short of breath but not today.  

Patient denies any dizziness or lightheadedness.  Patient denies any near 

syncopal episode.  Patient denies any chest pain or palpitations.  Patient 

states he had a little nausea patient is not nauseous currently.  Patient denies

any abdominal pain.  Patient denies any fever chills.  Patient states he thinks 

he might be dehydrated because he states seems to be weaker he has been eating 

and drinking normally 





- Related Data


                                Home Medications











 Medication  Instructions  Recorded  Confirmed


 


Lansoprazole [Prevacid] 15 mg PO DAILY 10/30/14 11/06/18


 


Cetirizine HCl [Zyrtec] 10 mg PO DAILY 17


 


Lisinopril [Zestril] 40 mg PO DAILY 17


 


Montelukast Sodium [Singulair] 10 mg PO HS 17


 


Ibuprofen [Motrin] 800 mg PO DAILY PRN 18


 


Pioglitazone [Actos] 45 mg PO DAILY 18








                                  Previous Rx's











 Medication  Instructions  Recorded


 


Fluticasone Nasal Spray [Flonase 2 spr EA NOSTRIL DAILY PRN #1 17





Nasal Spray] bottle 











                                    Allergies











Allergy/AdvReac Type Severity Reaction Status Date / Time


 


amoxicillin [From Augmentin] Allergy  Rash/Hives Verified 20 13:05


 


clavulanic acid Allergy  Rash/Hives Verified 20 13:05





[From Augmentin]     














Review of Systems


ROS Statement: 


Those systems with pertinent positive or pertinent negative responses have been 

documented in the HPI.





ROS Other: All systems not noted in ROS Statement are negative.





Past Medical History


Past Medical History: Cancer, Diabetes Mellitus, GERD/Reflux, Hypertension, 

Osteoarthritis (OA)


Additional Past Medical History / Comment(s): NIDDM type II, 1999 LEUKEMIA (CLL-

remission), sinus problems, seasonal allergies, tinnitis bilaterally, OA hands, 

R wrist carpal tunnel, past fx with L elbow, R rotator cuff tendon problems, L 

rotator cuff tear.


History of Any Multi-Drug Resistant Organisms: None Reported


Past Surgical History: Orthopedic Surgery


Additional Past Surgical History / Comment(s): LEFT HAND thumb tendon repair.


Past Anesthesia/Blood Transfusion Reactions: No Reported Reaction


Past Psychological History: No Psychological Hx Reported


Smoking Status: Never smoker


Past Alcohol Use History: None Reported


Past Drug Use History: None Reported





- Past Family History


  ** Father


Family Medical History: CVA/TIA


Additional Family Medical History / Comment(s): Father  at the age of 85yrs.





  ** Mother


Family Medical History: Diabetes Mellitus


Additional Family Medical History / Comment(s): Mother  at the age of 90yrs.





General Exam





- General Exam Comments


Initial Comments: 





GENERAL:


Patient is well-developed and well-nourished.  Patient is nontoxic and well-

hydrated and is in no acute distress.





ENT:


Neck is soft and supple.  No significant lymphadenopathy is noted.  Oropharynx 

is clear.  Moist mucous membranes.  Neck has full range of motion without 

eliciting any pain.  





EYES:


The sclera were anicteric and conjunctiva were pink and moist.  Extraocular 

movements were intact and pupils were equal round and reactive to light.  Eyelid

s were unremarkable.





PULMONARY:


Unlabored respirations.  Good breath sounds bilaterally.  No audible rales 

rhonchi or wheezing was noted.





CARDIOVASCULAR:


There is a regular rate and rhythm without any murmurs gallops or rubs.  





ABDOMEN:


Soft and nontender with normal bowel sounds.  





SKIN:


Skin is clear with no lesions or rashes and otherwise unremarkable.





NEUROLOGIC:


Patient is alert and oriented x3.  Cranial nerves II through XII are grossly 

intact.  Motor and sensory are also intact.  Normal speech, volume and content. 

Symmetrical smile. 





MUSCULOSKELETAL:


Normal extremities with adequate strength and full range of motion.  No lower 

extremity swelling or edema.  No calf tenderness.





LYMPHATICS:


No significant lymphadenopathy is noted





PSYCHIATRIC:


Normal psychiatric evaluation. 


Limitations: no limitations





Course


                                   Vital Signs











  20





  13:02 13:40 13:42


 


Temperature 97.5 F L  


 


Pulse Rate 80  70


 


Respiratory 22 18 18





Rate   


 


Blood Pressure 172/70  130/76


 


O2 Sat by Pulse 98  99





Oximetry   














  20





  14:10 14:57


 


Temperature  


 


Pulse Rate 71 66


 


Respiratory 18 18





Rate  


 


Blood Pressure 138/72 169/85


 


O2 Sat by Pulse 98 99





Oximetry  














Medical Decision Making





- Medical Decision Making





EKG shows normal sinus rhythm at 74 bpm.  Was 144 QRS is 84 QT interval 384 QTC 

is 426.  EKG shows no ST segment elevation or depression.





Patient got 500 mL of normal saline and is feeling considerably better.  Patient

states he feels good enough to go home at this time.  Patient has no symptoms 

currently.





- Lab Data


Result diagrams: 


                                 20 13:35





                                 20 13:35


                                   Lab Results











  20 Range/Units





  13:35 13:35 13:35 


 


WBC  26.8 H    (3.8-10.6)  k/uL


 


RBC  4.72    (4.30-5.90)  m/uL


 


Hgb  15.1    (13.0-17.5)  gm/dL


 


Hct  43.5    (39.0-53.0)  %


 


MCV  92.1  D    (80.0-100.0)  fL


 


MCH  31.9    (25.0-35.0)  pg


 


MCHC  34.7    (31.0-37.0)  g/dL


 


RDW  13.3    (11.5-15.5)  %


 


Plt Count  137 L    (150-450)  k/uL


 


Neutrophils %  12    %


 


Lymphocytes %  81    %


 


Monocytes %  2    %


 


Eosinophils %  0    %


 


Basophils %  1    %


 


Neutrophils #  3.3    (1.3-7.7)  k/uL


 


Lymphocytes #  21.8 H    (1.0-4.8)  k/uL


 


Monocytes #  0.5    (0-1.0)  k/uL


 


Eosinophils #  0.1    (0-0.7)  k/uL


 


Basophils #  0.2    (0-0.2)  k/uL


 


Manual Slide Review  Performed    


 


PT   10.6   (9.0-12.0)  sec


 


INR   1.0   (<1.2)  


 


APTT   22.2   (22.0-30.0)  sec


 


Sodium    138  (137-145)  mmol/L


 


Potassium    4.3  (3.5-5.1)  mmol/L


 


Chloride    104  ()  mmol/L


 


Carbon Dioxide    25  (22-30)  mmol/L


 


Anion Gap    9  mmol/L


 


BUN    22 H  (9-20)  mg/dL


 


Creatinine    0.79  (0.66-1.25)  mg/dL


 


Est GFR (CKD-EPI)AfAm    >90  (>60 ml/min/1.73 sqM)  


 


Est GFR (CKD-EPI)NonAf    >90  (>60 ml/min/1.73 sqM)  


 


Glucose    230 H  (74-99)  mg/dL


 


Plasma Lactic Acid Robert     (0.7-2.0)  mmol/L


 


Calcium    9.6  (8.4-10.2)  mg/dL


 


Magnesium    2.0  (1.6-2.3)  mg/dL


 


Total Bilirubin    0.7  (0.2-1.3)  mg/dL


 


AST    20  (17-59)  U/L


 


ALT    21  (4-49)  U/L


 


Alkaline Phosphatase    82  ()  U/L


 


Troponin I     (0.000-0.034)  ng/mL


 


Total Protein    7.0  (6.3-8.2)  g/dL


 


Albumin    4.5  (3.5-5.0)  g/dL


 


TSH    1.440  (0.465-4.680)  mIU/L


 


Urine Color     


 


Urine Appearance     (Clear)  


 


Urine pH     (5.0-8.0)  


 


Ur Specific Gravity     (1.001-1.035)  


 


Urine Protein     (Negative)  


 


Urine Glucose (UA)     (Negative)  


 


Urine Ketones     (Negative)  


 


Urine Blood     (Negative)  


 


Urine Nitrite     (Negative)  


 


Urine Bilirubin     (Negative)  


 


Urine Urobilinogen     (<2.0)  mg/dL


 


Ur Leukocyte Esterase     (Negative)  














  20 Range/Units





  13:35 13:35 Unknown 


 


WBC     (3.8-10.6)  k/uL


 


RBC     (4.30-5.90)  m/uL


 


Hgb     (13.0-17.5)  gm/dL


 


Hct     (39.0-53.0)  %


 


MCV     (80.0-100.0)  fL


 


MCH     (25.0-35.0)  pg


 


MCHC     (31.0-37.0)  g/dL


 


RDW     (11.5-15.5)  %


 


Plt Count     (150-450)  k/uL


 


Neutrophils %     %


 


Lymphocytes %     %


 


Monocytes %     %


 


Eosinophils %     %


 


Basophils %     %


 


Neutrophils #     (1.3-7.7)  k/uL


 


Lymphocytes #     (1.0-4.8)  k/uL


 


Monocytes #     (0-1.0)  k/uL


 


Eosinophils #     (0-0.7)  k/uL


 


Basophils #     (0-0.2)  k/uL


 


Manual Slide Review     


 


PT     (9.0-12.0)  sec


 


INR     (<1.2)  


 


APTT     (22.0-30.0)  sec


 


Sodium     (137-145)  mmol/L


 


Potassium     (3.5-5.1)  mmol/L


 


Chloride     ()  mmol/L


 


Carbon Dioxide     (22-30)  mmol/L


 


Anion Gap     mmol/L


 


BUN     (9-20)  mg/dL


 


Creatinine     (0.66-1.25)  mg/dL


 


Est GFR (CKD-EPI)AfAm     (>60 ml/min/1.73 sqM)  


 


Est GFR (CKD-EPI)NonAf     (>60 ml/min/1.73 sqM)  


 


Glucose     (74-99)  mg/dL


 


Plasma Lactic Acid Robert  1.3    (0.7-2.0)  mmol/L


 


Calcium     (8.4-10.2)  mg/dL


 


Magnesium     (1.6-2.3)  mg/dL


 


Total Bilirubin     (0.2-1.3)  mg/dL


 


AST     (17-59)  U/L


 


ALT     (4-49)  U/L


 


Alkaline Phosphatase     ()  U/L


 


Troponin I   <0.012   (0.000-0.034)  ng/mL


 


Total Protein     (6.3-8.2)  g/dL


 


Albumin     (3.5-5.0)  g/dL


 


TSH     (0.465-4.680)  mIU/L


 


Urine Color    Yellow  


 


Urine Appearance    Clear  (Clear)  


 


Urine pH    5.5  (5.0-8.0)  


 


Ur Specific Gravity    1.028  (1.001-1.035)  


 


Urine Protein    Trace H  (Negative)  


 


Urine Glucose (UA)    4+ H  (Negative)  


 


Urine Ketones    Negative  (Negative)  


 


Urine Blood    Negative  (Negative)  


 


Urine Nitrite    Negative  (Negative)  


 


Urine Bilirubin    Negative  (Negative)  


 


Urine Urobilinogen    2.0  (<2.0)  mg/dL


 


Ur Leukocyte Esterase    Negative  (Negative)  














Disposition


Clinical Impression: 


 Weakness generalized, Dehydration





Disposition: HOME SELF-CARE


Condition: Good


Instructions (If sedation given, give patient instructions):  Dehydration (ED), 

Weakness (ED)


Is patient prescribed a controlled substance at d/c from ED?: No


Referrals: 


Smith Figueroa DO [Primary Care Provider] - 1-2 days


Time of Disposition: 15:15

## 2020-03-08 NOTE — XR
EXAMINATION TYPE: XR chest 2V

 

DATE OF EXAM: 3/8/2020

 

COMPARISON: 2/1/2018

 

HISTORY: 74-year-old male weakness

 

TECHNIQUE:  PA and lateral views

 

FINDINGS:  

Heart normal size. Aorta and pulmonary vasculature within normal limits. Mild interstitial prominence
 is unchanged. No consolidation or pleural effusion.

 

 

IMPRESSION:  

Chronic changes without acute cardiopulmonary process.

## 2020-05-28 ENCOUNTER — HOSPITAL ENCOUNTER (OUTPATIENT)
Dept: HOSPITAL 47 - PROCWHC3 | Age: 75
Discharge: HOME | End: 2020-05-28
Attending: INTERNAL MEDICINE
Payer: MEDICARE

## 2020-05-28 VITALS — DIASTOLIC BLOOD PRESSURE: 75 MMHG | HEART RATE: 74 BPM | SYSTOLIC BLOOD PRESSURE: 148 MMHG

## 2020-05-28 VITALS — RESPIRATION RATE: 16 BRPM | TEMPERATURE: 98 F

## 2020-05-28 DIAGNOSIS — D80.1: Primary | ICD-10-CM

## 2020-05-28 PROCEDURE — 96375 TX/PRO/DX INJ NEW DRUG ADDON: CPT

## 2020-05-28 PROCEDURE — 96366 THER/PROPH/DIAG IV INF ADDON: CPT

## 2020-05-28 PROCEDURE — 96365 THER/PROPH/DIAG IV INF INIT: CPT

## 2020-05-29 ENCOUNTER — HOSPITAL ENCOUNTER (OUTPATIENT)
Dept: HOSPITAL 47 - EC | Age: 75
Setting detail: OBSERVATION
LOS: 3 days | Discharge: NURSING, REHAB OR LONG-TERM CARE | End: 2020-06-01
Attending: INTERNAL MEDICINE | Admitting: INTERNAL MEDICINE
Payer: MEDICARE

## 2020-05-29 DIAGNOSIS — R26.9: ICD-10-CM

## 2020-05-29 DIAGNOSIS — M25.562: Primary | ICD-10-CM

## 2020-05-29 DIAGNOSIS — Z88.0: ICD-10-CM
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
## 2020-05-29 DIAGNOSIS — Z91.81: ICD-10-CM

## 2020-05-29 DIAGNOSIS — M19.041: ICD-10-CM

## 2020-05-29 DIAGNOSIS — D69.6: ICD-10-CM

## 2020-05-29 DIAGNOSIS — M16.12: ICD-10-CM

## 2020-05-29 DIAGNOSIS — Z91.048: ICD-10-CM

## 2020-05-29 DIAGNOSIS — M19.042: ICD-10-CM

## 2020-05-29 DIAGNOSIS — H93.13: ICD-10-CM

## 2020-05-29 DIAGNOSIS — E11.65: ICD-10-CM

## 2020-05-29 DIAGNOSIS — M62.562: ICD-10-CM

## 2020-05-29 DIAGNOSIS — Z79.4: ICD-10-CM

## 2020-05-29 DIAGNOSIS — E87.6: ICD-10-CM

## 2020-05-29 DIAGNOSIS — E86.0: ICD-10-CM

## 2020-05-29 DIAGNOSIS — M17.12: ICD-10-CM

## 2020-05-29 DIAGNOSIS — Z79.899: ICD-10-CM

## 2020-05-29 DIAGNOSIS — Z82.3: ICD-10-CM

## 2020-05-29 DIAGNOSIS — I10: ICD-10-CM

## 2020-05-29 DIAGNOSIS — K21.9: ICD-10-CM

## 2020-05-29 DIAGNOSIS — C91.11: ICD-10-CM

## 2020-05-29 DIAGNOSIS — M79.652: ICD-10-CM

## 2020-05-29 DIAGNOSIS — Z03.818: ICD-10-CM

## 2020-05-29 DIAGNOSIS — D63.0: ICD-10-CM

## 2020-05-29 DIAGNOSIS — J30.2: ICD-10-CM

## 2020-05-29 DIAGNOSIS — Z98.890: ICD-10-CM

## 2020-05-29 DIAGNOSIS — Z83.3: ICD-10-CM

## 2020-05-29 DIAGNOSIS — M11.262: ICD-10-CM

## 2020-05-29 LAB
ALBUMIN SERPL-MCNC: 4.1 G/DL (ref 3.5–5)
ALP SERPL-CCNC: 82 U/L (ref 38–126)
ALT SERPL-CCNC: 18 U/L (ref 4–49)
ANION GAP SERPL CALC-SCNC: 7 MMOL/L
AST SERPL-CCNC: 21 U/L (ref 17–59)
BUN SERPL-SCNC: 21 MG/DL (ref 9–20)
CALCIUM SPEC-MCNC: 9.7 MG/DL (ref 8.4–10.2)
CELLS COUNTED: 100
CHLORIDE SERPL-SCNC: 103 MMOL/L (ref 98–107)
CO2 SERPL-SCNC: 27 MMOL/L (ref 22–30)
ERYTHROCYTE [DISTWIDTH] IN BLOOD BY AUTOMATED COUNT: 3.67 M/UL (ref 4.3–5.9)
ERYTHROCYTE [DISTWIDTH] IN BLOOD: 13.9 % (ref 11.5–15.5)
GLUCOSE BLD-MCNC: 104 MG/DL (ref 75–99)
GLUCOSE BLD-MCNC: 139 MG/DL (ref 75–99)
GLUCOSE SERPL-MCNC: 118 MG/DL (ref 74–99)
HCT VFR BLD AUTO: 34.7 % (ref 39–53)
HGB BLD-MCNC: 12.4 GM/DL (ref 13–17.5)
LYMPHOCYTES # BLD MANUAL: 29.41 K/UL (ref 1–4.8)
MCH RBC QN AUTO: 33.8 PG (ref 25–35)
MCHC RBC AUTO-ENTMCNC: 35.8 G/DL (ref 31–37)
MCV RBC AUTO: 94.5 FL (ref 80–100)
MONOCYTES # BLD MANUAL: 0.68 K/UL (ref 0–1)
NEUTROPHILS NFR BLD MANUAL: 12 %
NEUTS SEG # BLD MANUAL: 4.1 K/UL (ref 1.3–7.7)
PLATELET # BLD AUTO: 172 K/UL (ref 150–450)
POTASSIUM SERPL-SCNC: 3.4 MMOL/L (ref 3.5–5.1)
PROT SERPL-MCNC: 7.8 G/DL (ref 6.3–8.2)
SODIUM SERPL-SCNC: 137 MMOL/L (ref 137–145)
URATE SERPL-MCNC: 4.2 MG/DL (ref 3.5–8.5)
WBC # BLD AUTO: 34.2 K/UL (ref 3.8–10.6)

## 2020-05-29 PROCEDURE — 96376 TX/PRO/DX INJ SAME DRUG ADON: CPT

## 2020-05-29 PROCEDURE — 97166 OT EVAL MOD COMPLEX 45 MIN: CPT

## 2020-05-29 PROCEDURE — 72170 X-RAY EXAM OF PELVIS: CPT

## 2020-05-29 PROCEDURE — 96372 THER/PROPH/DIAG INJ SC/IM: CPT

## 2020-05-29 PROCEDURE — 83735 ASSAY OF MAGNESIUM: CPT

## 2020-05-29 PROCEDURE — 85025 COMPLETE CBC W/AUTO DIFF WBC: CPT

## 2020-05-29 PROCEDURE — 96361 HYDRATE IV INFUSION ADD-ON: CPT

## 2020-05-29 PROCEDURE — 80053 COMPREHEN METABOLIC PANEL: CPT

## 2020-05-29 PROCEDURE — 96366 THER/PROPH/DIAG IV INF ADDON: CPT

## 2020-05-29 PROCEDURE — 85652 RBC SED RATE AUTOMATED: CPT

## 2020-05-29 PROCEDURE — 99285 EMERGENCY DEPT VISIT HI MDM: CPT

## 2020-05-29 PROCEDURE — 36415 COLL VENOUS BLD VENIPUNCTURE: CPT

## 2020-05-29 PROCEDURE — 83036 HEMOGLOBIN GLYCOSYLATED A1C: CPT

## 2020-05-29 PROCEDURE — 97161 PT EVAL LOW COMPLEX 20 MIN: CPT

## 2020-05-29 PROCEDURE — 73564 X-RAY EXAM KNEE 4 OR MORE: CPT

## 2020-05-29 PROCEDURE — 96375 TX/PRO/DX INJ NEW DRUG ADDON: CPT

## 2020-05-29 PROCEDURE — 84550 ASSAY OF BLOOD/URIC ACID: CPT

## 2020-05-29 PROCEDURE — 96365 THER/PROPH/DIAG IV INF INIT: CPT

## 2020-05-29 PROCEDURE — 93971 EXTREMITY STUDY: CPT

## 2020-05-29 PROCEDURE — 73700 CT LOWER EXTREMITY W/O DYE: CPT

## 2020-05-29 PROCEDURE — 73552 X-RAY EXAM OF FEMUR 2/>: CPT

## 2020-05-29 PROCEDURE — 80048 BASIC METABOLIC PNL TOTAL CA: CPT

## 2020-05-29 PROCEDURE — 86140 C-REACTIVE PROTEIN: CPT

## 2020-05-29 PROCEDURE — 96374 THER/PROPH/DIAG INJ IV PUSH: CPT

## 2020-05-29 RX ADMIN — METHYLPREDNISOLONE SODIUM SUCCINATE SCH MG: 125 INJECTION, POWDER, FOR SOLUTION INTRAMUSCULAR; INTRAVENOUS at 23:59

## 2020-05-29 RX ADMIN — HEPARIN SODIUM SCH UNIT: 5000 INJECTION, SOLUTION INTRAVENOUS; SUBCUTANEOUS at 20:10

## 2020-05-29 RX ADMIN — METHYLPREDNISOLONE SODIUM SUCCINATE SCH MG: 125 INJECTION, POWDER, FOR SOLUTION INTRAMUSCULAR; INTRAVENOUS at 18:21

## 2020-05-29 RX ADMIN — INSULIN ASPART SCH UNIT: 100 INJECTION, SOLUTION INTRAVENOUS; SUBCUTANEOUS at 20:10

## 2020-05-29 RX ADMIN — CEFAZOLIN SCH MLS/HR: 330 INJECTION, POWDER, FOR SOLUTION INTRAMUSCULAR; INTRAVENOUS at 17:05

## 2020-05-29 RX ADMIN — MONTELUKAST SODIUM SCH MG: 10 TABLET, FILM COATED ORAL at 20:10

## 2020-05-29 NOTE — CT
EXAMINATION TYPE: CT knee LT wo con

 

DATE OF EXAM: 5/29/2020

 

COMPARISON: 1 5/9/2020

 

HISTORY: Fall, pain above left knee.

 

CT DLP: 765.8 mGycm

Automated exposure control for dose reduction was used.

 

TECHNIQUE: Unenhanced CT of the left knee was performed without intravenous contrast, limiting evalua
tion of the vasculature. Three-D reformatted images were obtained in bone algorithm and submitted for
 review. These are created on a separate workstation.

 

FINDINGS: 

There is increased density in the posterior left medial femoral condyle and opposing surface of the m
edial tibial plateau. Small osteophyte is seen of the weightbearing surface of the lateral femoral co
ndyle on coronal image 30. No acute fracture of the left knee is seen. No patellar dislocation. Sever
e atherosclerosis. No lipohemarthrosis seen to suggest fracture. Small popliteal cyst identified.

 

IMPRESSION: 

NO ACUTE FRACTURE OR DISLOCATION OF THE LEFT KNEE HOWEVER THERE IS OPPOSING SURFACE SCLEROSIS OF THE 
MEDIAL FEMORAL CONDYLE AND MEDIAL TIBIAL PLATEAU. OSSEOUS CONTUSION SHOULD BE CONSIDERED AND COULD BE
 CONFIRMED WITH MRI OF THE LEFT KNEE.

## 2020-05-29 NOTE — ED
General Adult HPI





- General


Chief complaint: Extremity Problem,Nontraumatic


Stated complaint: Fall, Leg Pain


Source: family, EMS, RN notes reviewed


Mode of arrival: EMS


Limitations: no limitations





- History of Present Illness


Initial comments: 





74-year-old male with a past medical history of cancer, diabetes mellitus, GERD,

hypertension presents to the emergency department for a chief complaint of fall.

 Patient states about a week ago he fell on the left knee and hit has been 

painful in his leg since that time.  However today patient was going to get in 

his car in his left leg gave out because of the pain and patient slowly lowered 

himself down.  Patient did not hit his head.  Patient is complaining about 8 out

of 10 pain in the left hip femur area.states he cannot walk on it.  Patient 

states he is actually had some weakness of the left leg since February around 

Castillo's Day. Patient has no other complaints at this time including 

shortness of breath, chest pain, abdominal pain, nausea or vomiting, headache, 

or visual changes.





- Related Data


                                Home Medications











 Medication  Instructions  Recorded  Confirmed


 


Lansoprazole [Prevacid] 15 mg PO DAILY 10/30/14 05/28/20


 


Cetirizine HCl [Zyrtec] 10 mg PO DAILY 17


 


Lisinopril [Zestril] 40 mg PO DAILY 17


 


Montelukast Sodium [Singulair] 10 mg PO HS 17


 


Ibuprofen [Motrin] 800 mg PO DAILY PRN 18


 


Pioglitazone [Actos] 45 mg PO DAILY 18








                                  Previous Rx's











 Medication  Instructions  Recorded


 


Fluticasone Nasal Spray [Flonase 2 spr EA NOSTRIL DAILY PRN #1 17





Nasal Spray] bottle 











                                    Allergies











Allergy/AdvReac Type Severity Reaction Status Date / Time


 


amoxicillin [From Augmentin] Allergy  Rash/Hives Verified 20 10:29


 


clavulanic acid Allergy  Rash/Hives Verified 20 10:29





[From Augmentin]     














Review of Systems


ROS Statement: 


Those systems with pertinent positive or pertinent negative responses have been 

documented in the HPI.





ROS Other: All systems not noted in ROS Statement are negative.





Past Medical History


Past Medical History: Cancer, Diabetes Mellitus, GERD/Reflux, Hypertension, 

Osteoarthritis (OA)


Additional Past Medical History / Comment(s): NIDDM type II, 1999 LEUKEMIA 

(CLL-remission), sinus problems, seasonal allergies, tinnitis bilaterally, OA 

hands, R wrist carpal tunnel, past fx with L elbow, R rotator cuff tendon 

problems, L rotator cuff tear.


History of Any Multi-Drug Resistant Organisms: None Reported


Past Surgical History: Orthopedic Surgery


Additional Past Surgical History / Comment(s): LEFT HAND thumb tendon repair.


Past Anesthesia/Blood Transfusion Reactions: No Reported Reaction


Past Psychological History: No Psychological Hx Reported


Smoking Status: Never smoker


Past Alcohol Use History: None Reported


Past Drug Use History: None Reported





- Past Family History


  ** Father


Family Medical History: CVA/TIA


Additional Family Medical History / Comment(s): Father  at the age of 85yrs.





  ** Mother


Family Medical History: Diabetes Mellitus


Additional Family Medical History / Comment(s): Mother  at the age of 90yrs.





General Exam


Limitations: no limitations


General appearance: alert, in no apparent distress


Head exam: Present: atraumatic, normocephalic, normal inspection


Eye exam: Present: normal appearance, PERRL, EOMI.  Absent: scleral icterus, 

conjunctival injection, periorbital swelling


ENT exam: Present: normal exam, mucous membranes moist


Neck exam: Present: normal inspection, full ROM.  Absent: tenderness, 

meningismus, lymphadenopathy


Respiratory exam: Present: normal lung sounds bilaterally.  Absent: respiratory 

distress, wheezes, rales, rhonchi, stridor


Cardiovascular Exam: Present: regular rate, normal rhythm, normal heart sounds. 

Absent: systolic murmur, diastolic murmur, rubs, gallop, clicks


GI/Abdominal exam: Present: soft, normal bowel sounds.  Absent: distended, 

tenderness, guarding, rebound, rigid


Extremities exam: Present: tenderness (Tenderness to the lateral proximal left 

leg.  There is no ecchymosis confusion.  Skin exam is normal.), normal capillary

refill (Capillary refill is less than 2 seconds, PT pulses 2+ on exam.  Feet are

warm bilaterally), other (Sensation is intact and left lower extremity).  

Absent: full ROM (Pain with movement of the left hip.  Range of motion is 

limited by this.), pedal edema, joint swelling, calf tenderness





Course


                                   Vital Signs











  20





  10:22


 


Temperature 98.0 F


 


Pulse Rate 76


 


Respiratory 18





Rate 


 


Blood Pressure 173/76


 


O2 Sat by Pulse 100





Oximetry 














- Reevaluation(s)


Reevaluation #1: 





20 10:59


Patient was placed in a c-collar however this was removed by [facility] criteria.





Medical Decision Making





- Medical Decision Making





X-rays of the pelvis and femur and knee show no fracture or dislocation. CT of 

the left hip showed severe arthropathy without acute fracture.  Patient has 

significant tenderness to the lateral aspect of the leg.  He has pain with 

lifting the leg.  He does have some weakness although I believe this is 

secondary to pain.  He was also evaluated by Dr. Guzman at bedside who agrees. NV 

Assessment is intact in the left lower extremity.  I did attempt initially 

patient twice after 3 different kinds of pain medication and he is unable to him

today because the pain is causing him to feel like his leg is going to give out.

 Patient has a high fall risk.  I discussed this case with Dr. Middleton now who 

will accept this admission and requests ultrasound of the left leg to rule out 

DVT.  [facility] was consulted as patient did have a surgery of the 

shoulder by Dr. Lam in the past couple years.





- Lab Data


Result diagrams: 


                                 20 10:33





                                 20 10:33


                                   Lab Results











  20 Range/Units





  10:33 10:33 10:33 


 


WBC  34.2 H    (3.8-10.6)  k/uL


 


RBC  3.67 L    (4.30-5.90)  m/uL


 


Hgb  12.4 L    (13.0-17.5)  gm/dL


 


Hct  34.7 L    (39.0-53.0)  %


 


MCV  94.5    (80.0-100.0)  fL


 


MCH  33.8    (25.0-35.0)  pg


 


MCHC  35.8    (31.0-37.0)  g/dL


 


RDW  13.9    (11.5-15.5)  %


 


Plt Count  172    (150-450)  k/uL


 


Neutrophils % (Manual)  12    %


 


Lymphocytes % (Manual)  86    %


 


Monocytes % (Manual)  2    %


 


Neutrophils # (Manual)  4.10    (1.3-7.7)  k/uL


 


Lymphocytes # (Manual)  29.41 H    (1.0-4.8)  k/uL


 


Monocytes # (Manual)  0.68    (0-1.0)  k/uL


 


Nucleated RBCs  0    (0-0)  /100 WBC


 


Manual Slide Review  Performed    


 


Poikilocytosis (manual  Present    


 


Sodium   137   (137-145)  mmol/L


 


Potassium   3.4 L   (3.5-5.1)  mmol/L


 


Chloride   103   ()  mmol/L


 


Carbon Dioxide   27   (22-30)  mmol/L


 


Anion Gap   7   mmol/L


 


BUN   21 H   (9-20)  mg/dL


 


Creatinine   0.84   (0.66-1.25)  mg/dL


 


Est GFR (CKD-EPI)AfAm   >90   (>60 ml/min/1.73 sqM)  


 


Est GFR (CKD-EPI)NonAf   88   (>60 ml/min/1.73 sqM)  


 


Glucose   118 H   (74-99)  mg/dL


 


Calcium   9.7   (8.4-10.2)  mg/dL


 


Magnesium    2.0  (1.6-2.3)  mg/dL


 


Total Bilirubin   0.8   (0.2-1.3)  mg/dL


 


AST   21   (17-59)  U/L


 


ALT   18   (4-49)  U/L


 


Alkaline Phosphatase   82   ()  U/L


 


Total Protein   7.8   (6.3-8.2)  g/dL


 


Albumin   4.1   (3.5-5.0)  g/dL














Disposition


Clinical Impression: 


 Left leg pain, Unable to ambulate





Disposition: ADMITTED AS IP TO THIS [facility]


Condition: Fair


Is patient prescribed a controlled substance at d/c from ED?: No


Referrals: 


Smith Figueroa DO [Primary Care Provider] - 1-2 days


Time of Disposition: 15:11

## 2020-05-29 NOTE — CT
EXAMINATION TYPE: CT hip LT wo con

 

DATE OF EXAM: 5/29/2020

 

COMPARISON: X-ray 5/29/2020

 

HISTORY: Fall, Lt hip pain

 

CT DLP: 459.4 mGycm

Automated exposure control for dose reduction was used.

 

FINDINGS: 

There is severe arthropathy of the hip joint. Hypertrophic spurring noted. Cystic geodes are seen inv
olving the femoral head and acetabulum. Correlate for femoral acetabular impingement.

No acute fracture or dislocation. Vascular calcifications are noted.

Prostate gland is enlarged.

 

IMPRESSION: 

SEVERE ARTHROPATHY. NO ACUTE FRACTURE.

## 2020-05-29 NOTE — HP
HISTORY AND PHYSICAL



DATE OF SERVICE:

05/29/2020



CHIEF COMPLAINTS:

Fall and left leg pain and inability to ambulate. Gait dysfunction.



HISTORY OF PRESENT ILLNESS:

This 74-year-old gentleman with a past medical history of diabetes, GERD, 
hypertension,

history of DJD, history of leukemia, CLL in remission, seasonal allergies, being

followed by Dr. iFgueroa in the outpatient setting, apparently fell and  the 
left

knee.  The patient was complaining of pain ever since on the knee and in the 
lower part

of the thigh.  The patient apparently avoided hitting his head on the concrete. 
The

patient came to [facility] and was admitted for further evaluation and

treatment.  The patient has had multiple x-rays, including femur x-ray, and also
hip CT

scan that showed severe arthropathy but no fracture. The femur x-rays also 
showed no

fracture or dislocation.  The patient is being closely monitored.  Orthopedic

evaluation is in progress at this time. There is no history of any fever, rigor 
or

chills. No history of headache, loss of consciousness, seizures.



The patient was recently started on insulin in the outpatient setting.



PAST MEDICAL HISTORY:

History of diabetes mellitus, history of GERD, hypertension, DJD, history of 
leukemia.



HOME MEDICATIONS:

1. Zyrtec 10 daily.

2. Singulair 10 mg daily.

3. Zestril.

4. Fluticasone nasal spray.

5. Insulin.



ALLERGIES:

AUGMENTIN, CLAVULANIC ACID.



FAMILY HISTORY:

History of CVA, TIA in the family.



SOCIAL HISTORY:

No history of smoking.  No history of alcohol intake.



REVIEW OF SYSTEMS:

ENT: No diminished hearing. No diminished vision.

CARDIOVASCULAR SYSTEM: No angina, palpitations.

RESPIRATORY SYSTEM: No cough, hemoptysis.

GI: No nausea, vomiting.

: No dysuria or retention.

NERVOUS SYSTEM: No numbness, weakness.

ALLERGY/IMMUNOLOGY: No asthma, hayfever.

MUSCULOSKELETAL: As mentioned earlier.

HEMATOLOGY/ONCOLOGY: No history of anemia.

ENDOCRINE: As mentioned earlier.

CONSTITUTIONAL: As mentioned earlier.

DERMATOLOGY: Negative.

RHEUMATOLOGY: Negative.

PSYCHIATRY: As mentioned earlier.



PHYSICAL EXAMINATION:

The patient is alert and oriented x3. Pulse 63, blood pressure 160/74, 
respiration 18,

temperature 97.8, pulse ox 97% on room air.

HEENT: Conjunctivae normal.

NECK: No jugular venous distention.

CARDIOVASCULAR SYSTEM:  S1, S2 muffled.

RESPIRATORY SYSTEM: Breath sounds diminished at the bases.  No rhonchi. No 
crackles.

ABDOMEN: Soft, non-tender. No mass palpable.

LEGS: Status post fall as well as some tenderness in the right knee joint as 
well as

right lower part of the thigh.

NERVOUS SYSTEM: Higher functions as mentioned earlier. Moves all 4 limbs. No 
focal

motor or sensory deficit.

LYMPHATICS: No lymph node palpable in neck, axillae or groin.

SKIN: No ulcer, rash, bleeding.

JOINTS: As noted.



LAB:

WBC 34.2, hemoglobin 12.4. Sodium 137, potassium 3.4.



ASSESSMENT:

1. Fall and significant right lower thigh and right knee joint pain with severe 
gait

    dysfunction.

2. 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

3. 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

4. Hypokalemia.

5. Diabetes mellitus, type 2.

6. Gastroesophageal reflux disease.

7. Hypertension.

8. History of degenerative joint disease.

9. FULL CODE.



RECOMMENDATIONS AND DISCUSSION:

In this 74-year-old gentleman who presented with multiple complex medical 
issues, we

will monitor the patient closely, continue the current medications. Initiate  
the home

medications.  Symptomatic treatment for the pain.  Monitor creatinine closely. 
IV

fluids for dehydration correction.  Orthopedic evaluation.  I would also 
recommend a CT

scan of the lower part of the thigh and knee. We will follow the patient 
closely.  A

copy of this dictation is being forwarded to Dr. Figueroa, who is the primary

physician.





JASON / BRANDYN: [id number] / Job#: [id number]

MTDD

## 2020-05-29 NOTE — XR
AP pelvis, left femur, left knee

 

HISTORY: Trauma and pain

 

Single frontal view the pelvis, 4 views of the knee, frontal and lateral views of the left femur on 6
 images

 

Pelvis: Degenerative disc changes are noted in the lower lumbar spine. Bilateral hip show osteoarthri
tic change with joint space loss and marginal spurring. Subchondral sclerosis is noted. There is domi
deling the femoral heads. There are vascular calcifications within the pelvis.

 

The left femur shows no fracture or dislocation. Atherosclerotic vascular calcifications are present 
within the left lower extremity.

 

The left knee shows chondrocalcinosis. Mild spurring present in the medial compartment, there is mild
 tricompartmental joint space loss. No evident joint effusion.

 

IMPRESSION: No fracture or dislocation.

## 2020-05-29 NOTE — US
EXAMINATION TYPE: US venous doppler duplex LE LT

 

DATE OF EXAM: 5/29/2020 3:12 PM

 

COMPARISON: NONE

 

CLINICAL HISTORY: 74-year-old male pain. Left leg pain 

 

SIDE PERFORMED: Left  

 

TECHNIQUE:  The lower extremity deep venous system is examined utilizing real time linear array sonog
efrain with graded compression, doppler sonography and color-flow sonography.

 

FINDINGS:

 

VESSELS IMAGED:

External Iliac Vein (EIV)

Common Femoral Vein

Deep Femoral Vein

Greater Saphenous Vein *

Femoral Vein

Popliteal Vein

Small Saphenous Vein *

Proximal Calf Veins

(* superficial vessels)

 

 

 

Left Leg:  Negative for DVT

 

 

 

IMPRESSION:

No evidence for DVT within the left lower extremity imaged from the groin to the upper calf.

## 2020-05-30 LAB
ANION GAP SERPL CALC-SCNC: 8 MMOL/L
BUN SERPL-SCNC: 20 MG/DL (ref 9–20)
CALCIUM SPEC-MCNC: 8.8 MG/DL (ref 8.4–10.2)
CELLS COUNTED: 100
CHLORIDE SERPL-SCNC: 106 MMOL/L (ref 98–107)
CO2 SERPL-SCNC: 23 MMOL/L (ref 22–30)
ERYTHROCYTE [DISTWIDTH] IN BLOOD BY AUTOMATED COUNT: 3.51 M/UL (ref 4.3–5.9)
ERYTHROCYTE [DISTWIDTH] IN BLOOD: 13.7 % (ref 11.5–15.5)
GLUCOSE BLD-MCNC: 252 MG/DL (ref 75–99)
GLUCOSE BLD-MCNC: 342 MG/DL (ref 75–99)
GLUCOSE BLD-MCNC: 346 MG/DL (ref 75–99)
GLUCOSE BLD-MCNC: 347 MG/DL (ref 75–99)
GLUCOSE SERPL-MCNC: 257 MG/DL (ref 74–99)
HBA1C MFR BLD: 6.1 % (ref 4–6)
HCT VFR BLD AUTO: 34.5 % (ref 39–53)
HGB BLD-MCNC: 11.9 GM/DL (ref 13–17.5)
LYMPHOCYTES # BLD MANUAL: 21.03 K/UL (ref 1–4.8)
MCH RBC QN AUTO: 34 PG (ref 25–35)
MCHC RBC AUTO-ENTMCNC: 34.6 G/DL (ref 31–37)
MCV RBC AUTO: 98.2 FL (ref 80–100)
NEUTROPHILS NFR BLD MANUAL: 12 %
NEUTS SEG # BLD MANUAL: 2.87 K/UL (ref 1.3–7.7)
PLATELET # BLD AUTO: 131 K/UL (ref 150–450)
POTASSIUM SERPL-SCNC: 4.5 MMOL/L (ref 3.5–5.1)
SODIUM SERPL-SCNC: 137 MMOL/L (ref 137–145)
WBC # BLD AUTO: 23.9 K/UL (ref 3.8–10.6)

## 2020-05-30 RX ADMIN — METHYLPREDNISOLONE SODIUM SUCCINATE SCH MG: 125 INJECTION, POWDER, FOR SOLUTION INTRAMUSCULAR; INTRAVENOUS at 12:03

## 2020-05-30 RX ADMIN — CEFAZOLIN SCH MLS/HR: 330 INJECTION, POWDER, FOR SOLUTION INTRAMUSCULAR; INTRAVENOUS at 12:04

## 2020-05-30 RX ADMIN — LISINOPRIL SCH MG: 20 TABLET ORAL at 07:20

## 2020-05-30 RX ADMIN — INSULIN ASPART SCH UNIT: 100 INJECTION, SOLUTION INTRAVENOUS; SUBCUTANEOUS at 17:43

## 2020-05-30 RX ADMIN — METHYLPREDNISOLONE SODIUM SUCCINATE SCH MG: 125 INJECTION, POWDER, FOR SOLUTION INTRAMUSCULAR; INTRAVENOUS at 05:44

## 2020-05-30 RX ADMIN — PANTOPRAZOLE SODIUM SCH MG: 40 TABLET, DELAYED RELEASE ORAL at 07:20

## 2020-05-30 RX ADMIN — CEFAZOLIN SCH: 330 INJECTION, POWDER, FOR SOLUTION INTRAMUSCULAR; INTRAVENOUS at 01:44

## 2020-05-30 RX ADMIN — INSULIN ASPART SCH UNIT: 100 INJECTION, SOLUTION INTRAVENOUS; SUBCUTANEOUS at 20:31

## 2020-05-30 RX ADMIN — HEPARIN SODIUM SCH UNIT: 5000 INJECTION, SOLUTION INTRAVENOUS; SUBCUTANEOUS at 20:30

## 2020-05-30 RX ADMIN — CEFAZOLIN SCH: 330 INJECTION, POWDER, FOR SOLUTION INTRAMUSCULAR; INTRAVENOUS at 10:12

## 2020-05-30 RX ADMIN — INSULIN ASPART SCH UNIT: 100 INJECTION, SOLUTION INTRAVENOUS; SUBCUTANEOUS at 12:04

## 2020-05-30 RX ADMIN — METHYLPREDNISOLONE SODIUM SUCCINATE SCH MG: 125 INJECTION, POWDER, FOR SOLUTION INTRAMUSCULAR; INTRAVENOUS at 17:44

## 2020-05-30 RX ADMIN — HEPARIN SODIUM SCH UNIT: 5000 INJECTION, SOLUTION INTRAVENOUS; SUBCUTANEOUS at 07:19

## 2020-05-30 RX ADMIN — INSULIN ASPART SCH UNIT: 100 INJECTION, SOLUTION INTRAVENOUS; SUBCUTANEOUS at 07:19

## 2020-05-30 RX ADMIN — LORATADINE SCH MG: 10 TABLET ORAL at 07:20

## 2020-05-30 RX ADMIN — INSULIN ASPART SCH UNIT: 100 INJECTION, SUSPENSION SUBCUTANEOUS at 17:42

## 2020-05-30 RX ADMIN — MONTELUKAST SODIUM SCH MG: 10 TABLET, FILM COATED ORAL at 20:32

## 2020-05-30 NOTE — P.CNOR
History of Present Illness





- [facility]


Consult date: 20


Consult reason: joint pain


History of present illness: 





Patient is a 74-year-old male who presented to [facility] 

yesterday with regards to left lower extremity pain and weakness.  Apparently 

the patient had a fall about 2 weeks ago which involved his left knee and left 

hip.  He also had a fall around 's Day to the same side.  Since his 

most recent fall he's noticed discomfort in the left thigh region, also around 

his knee and hip area.  He noticed had a very difficult time getting into his 

car today lifting that leg.





He denies any previous surgery involving the left lower extremity. He has seen 

Dr. Lam in the past with regards to his right shoulder.  He does have a 

history of CLL which is in remission. He statesHe has felt slightly dehydrated 

over the last few days also.  He normally utilizes knee braces when he is 

working around the house.





Currently he denies any headaches, lightheadedness, chest pain, shortness of 

breath, fever or chills, lower extremity paresthesias.  He has no other 

orthopedic complaints.





Review of Systems


Constitutional: Reports as per HPI





Past Medical History


Past Medical History: Cancer, Diabetes Mellitus, GERD/Reflux, Hypertension, 

Osteoarthritis (OA)


Additional Past Medical History / Comment(s): NIDDM type II, 1999 LEUKEMIA (CLL-

remission), sinus problems, seasonal allergies, tinnitis bilaterally, OA hands, 

R wrist carpal tunnel, past fx with L elbow, R rotator cuff tendon problems, L 

rotator cuff tear.


History of Any Multi-Drug Resistant Organisms: None Reported


Past Surgical History: Orthopedic Surgery


Additional Past Surgical History / Comment(s): LEFT HAND thumb tendon repair.


Past Anesthesia/Blood Transfusion Reactions: No Reported Reaction


Past Psychological History: No Psychological Hx Reported


Additional Psychological History / Comment(s): Pt resides with his spouse.  He 

is independent.


Smoking Status: Never smoker


Past Alcohol Use History: None Reported


Past Drug Use History: None Reported





- Past Family History


  ** Father


Family Medical History: CVA/TIA


Additional Family Medical History / Comment(s): Father  at the age of 85yrs.





  ** Mother


Family Medical History: Diabetes Mellitus


Additional Family Medical History / Comment(s): Mother  at the age of 90yrs.





Medications and Allergies


                                Home Medications











 Medication  Instructions  Recorded  Confirmed  Type


 


Fluticasone Nasal Spray [Flonase 2 spr EA NOSTRIL DAILY PRN #1 17

 Rx





Nasal [place]] bottle   


 


Cetirizine HCl [Zyrtec] 10 mg PO DAILY 17 History


 


Lisinopril [Zestril] 40 mg PO DAILY 17 History


 


Montelukast Sodium [Singulair] 10 mg PO HS 17 History


 


ALPRAZolam [Xanax] 0.25 mg PO BID PRN 20 History


 


Ibuprofen 600 mg PO Q8H PRN 20 History


 


Insulin NPH Hum/Reg Insulin Hm 15 units SQ BID 20 History





[Relion Novolin 70-30 Flexpen]    


 


Omeprazole Magnesium [PriLOSEC OTC] 20 mg PO DAILY PRN 20 History








                                    Allergies











Allergy/AdvReac Type Severity Reaction Status Date / Time


 


amoxicillin [From Augmentin] Allergy  Rash/Hives Verified 20 20:06


 


clavulanic acid Allergy  Rash/Hives Verified 20 20:06





[From Augmentin]     














Physical Examination





Left lower extremity:





No obvious effusion surrounding the knee, there is no lesions or areas of erythe

ma or soft tissue swelling


He is able to extend fully and flex past 90 minimal discomfort


No pain with palpation surrounding the medial and lateral joint line


No tenderness with palpation over the patella


Calf is soft, no tenderness with palpation





Logroll maneuver of the hip reproduces no pain, internal and external rotation 

of the hip when flexed reproduces no groin pain.  His range of motion is limited


No significant tenderness with palpation over the greater trochanter





Plantar flexion, dorsiflexion, EHL, FHL are intact


Sensory exam to light touch throughout the extremity is intact





Results





- Labs


Labs: 


                  Abnormal Lab Results - Last 24 Hours (Table)











  20 Range/Units





  10:33 10:33 17:48 


 


WBC     (3.8-10.6)  k/uL


 


RBC     (4.30-5.90)  m/uL


 


Hgb     (13.0-17.5)  gm/dL


 


Hct     (39.0-53.0)  %


 


Plt Count     (150-450)  k/uL


 


Lymphocytes # (Manual)  29.41 H    (1.0-4.8)  k/uL


 


Glucose     (74-99)  mg/dL


 


POC Glucose (mg/dL)    104 H  (75-99)  mg/dL


 


Hemoglobin A1c   6.1 H   (4.0-6.0)  %














  20 Range/Units





  19:54 05:39 05:39 


 


WBC   23.9 H   (3.8-10.6)  k/uL


 


RBC   3.51 L   (4.30-5.90)  m/uL


 


Hgb   11.9 L   (13.0-17.5)  gm/dL


 


Hct   34.5 L   (39.0-53.0)  %


 


Plt Count   131 L   (150-450)  k/uL


 


Lymphocytes # (Manual)   21.03 H   (1.0-4.8)  k/uL


 


Glucose    257 H  (74-99)  mg/dL


 


POC Glucose (mg/dL)  139 H    (75-99)  mg/dL


 


Hemoglobin A1c     (4.0-6.0)  %














  20 Range/Units





  07:08 


 


WBC   (3.8-10.6)  k/uL


 


RBC   (4.30-5.90)  m/uL


 


Hgb   (13.0-17.5)  gm/dL


 


Hct   (39.0-53.0)  %


 


Plt Count   (150-450)  k/uL


 


Lymphocytes # (Manual)   (1.0-4.8)  k/uL


 


Glucose   (74-99)  mg/dL


 


POC Glucose (mg/dL)  252 H  (75-99)  mg/dL


 


Hemoglobin A1c   (4.0-6.0)  %








                                      H & H











  20 Range/Units





  10:33 05:39 


 


Hgb  12.4 L  11.9 L  (13.0-17.5)  gm/dL


 


Hct  34.7 L  34.5 L  (39.0-53.0)  %











Result Diagrams: 


                                 20 05:39





                                 20 05:39





- Diagnostic results


Hip x-ray: report reviewed, image reviewed


Hip CT: report reviewed, image reviewed


Knee x-ray: report reviewed, image reviewed


Knee CT: report reviewed, image reviewed





Assessment and Plan


Assessment: 





Left knee pain





Left knee contusion





Left knee mild osteoarthritis





Left hip severe osteoarthritis











Plan: 





Multiple x-rays and CT images were reviewed along with the reports.  Images of 

the knee and hip demonstrated no acute fractures or dislocations.  There is 

evidence of osteoarthritis mainly along the medial compartment of the knee.  

Severe osteoarthritic changes are noted both on the acetabular and femoral head 

of the left hip.





Plan:





I was able to discuss the case, including with physical exam findings and fang

ging studies might attending Dr. Lam.  No orthopedic surgical intervention

recommended at this time





Likely referred pain in the thigh due to both his hip arthritis and his knee 

arthritic symptoms, most likely both being aggravated from the recent falls





I believe his limited motion and weakness in the left lower extremity is due to 

the severe hip arthritis





Recommended over-the-counter Tylenol and anti-inflammatories as needed





Patient will follow-up in the outpatient setting in the next 2 weeks if symptoms

continue to discuss further treatment options





We'll be available for any further questions regarding this patient

## 2020-05-30 NOTE — PN
PROGRESS NOTE



DATE OF SERVICE:

05/30/2020



This 74-year-old gentleman admitted with significant fall and gait dysfunction also

complained of pain in the hip and knee pain at this time.  Orthopedics evaluated the

patient closely.  No fractures are determined.  CT scan of the knee was also done which

showed some contusion.  Otherwise patient being closely monitored at this time.  No

chest pain.  No palpitations.  No fever.



REVIEW OF SYSTEMS:

CARDIOVASCULAR SYSTEM: No angina or palpitations.

RESPIRATORY SYSTEM: As mentioned earlier.

GI: As mentioned earlier.

 no dysuria.

MUSCULOSKELETAL: As mentioned earlier.



CURRENT MEDICATIONS:

1. Tylenol p.r.n.

2. Norco 5 mg q.6 p.r.n.

3. Heparin.

4. Dilaudid.

5. Zestril.

6. Claritin.

7. Solu-Medrol 60 IV q.6 hours.

8. Singular.

9. Narcan.

10.Protonix.



PHYSICAL EXAM:

Alert and oriented times three.  Pulse 69.  Blood pressure 138/60, respirations 16,

temperature 98 degrees.

HEENT is conjunctivae normal.  Oral mucosa moist.

NECK is no jugular venous distention.  No carotid bruit. No lymph node enlargement.

CARDIOVASCULAR:  S1, S2.

RESPIRATION: Breath sounds diminished in the bases.  Scattered rhonchi.  No crackles.

ABDOMEN:  Soft, nontender.

LEGS:  Significant pain and swelling and limitation on the left hip and left knee

joints.

Some significant wasting of the left thigh muscles also present.

Otherwise no other nervous system abnormality.

SKIN: No ulcer, rash or bleeding.



LABS:

WBC 23.9, hemoglobin 11.9, platelets are 131, glucose 259.



ASSESSMENT:

1. Fall and significant left lower thigh and left knee joint pain as well as severe

    gait dysfunction.

2. Increased WBC secondary to chronic lymphatic leukemia.

3. 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

4. Hypokalemia.

5. Diabetes mellitus type 2.

6. Gastroesophageal reflux disease.

7. Hypertension.

8. Gait dysfunction.

9. History of degenerative joint disease.

10.Anemia.

11.Mild thrombocytopenia.

12.Diabetes mellitus type 2 with hyperglycemia.



RECOMMENDATIONS AND DISCUSSION:

This 74-year-old gentleman who presented with multiple complex medical issues, we will

monitor the patient closely.  Continue the current medications, symptomatic treatment.

Otherwise, at this time, I recommend a small dose of Lantus at this time. Otherwise,

continue the rest of medications.  Guarded prognosis because of multiple complex

medical issues.  Further recommendations to follow.





MMODL / IJN: [id number] / Job#: [id number]

## 2020-05-31 LAB
ANION GAP SERPL CALC-SCNC: 10 MMOL/L
BUN SERPL-SCNC: 21 MG/DL (ref 9–20)
CALCIUM SPEC-MCNC: 9.1 MG/DL (ref 8.4–10.2)
CELLS COUNTED: 100
CHLORIDE SERPL-SCNC: 104 MMOL/L (ref 98–107)
CO2 SERPL-SCNC: 22 MMOL/L (ref 22–30)
ERYTHROCYTE [DISTWIDTH] IN BLOOD BY AUTOMATED COUNT: 3.72 M/UL (ref 4.3–5.9)
ERYTHROCYTE [DISTWIDTH] IN BLOOD: 13.6 % (ref 11.5–15.5)
GLUCOSE BLD-MCNC: 279 MG/DL (ref 75–99)
GLUCOSE BLD-MCNC: 284 MG/DL (ref 75–99)
GLUCOSE BLD-MCNC: 284 MG/DL (ref 75–99)
GLUCOSE BLD-MCNC: 305 MG/DL (ref 75–99)
GLUCOSE SERPL-MCNC: 270 MG/DL (ref 74–99)
HCT VFR BLD AUTO: 36.5 % (ref 39–53)
HGB BLD-MCNC: 12.6 GM/DL (ref 13–17.5)
LYMPHOCYTES # BLD MANUAL: 26.64 K/UL (ref 1–4.8)
MCH RBC QN AUTO: 33.8 PG (ref 25–35)
MCHC RBC AUTO-ENTMCNC: 34.4 G/DL (ref 31–37)
MCV RBC AUTO: 98.2 FL (ref 80–100)
NEUTROPHILS NFR BLD MANUAL: 20 %
NEUTS SEG # BLD MANUAL: 6.66 K/UL (ref 1.3–7.7)
PLATELET # BLD AUTO: 135 K/UL (ref 150–450)
POTASSIUM SERPL-SCNC: 4.1 MMOL/L (ref 3.5–5.1)
SODIUM SERPL-SCNC: 136 MMOL/L (ref 137–145)
WBC # BLD AUTO: 33.3 K/UL (ref 3.8–10.6)

## 2020-05-31 RX ADMIN — MONTELUKAST SODIUM SCH MG: 10 TABLET, FILM COATED ORAL at 20:15

## 2020-05-31 RX ADMIN — INSULIN ASPART SCH UNIT: 100 INJECTION, SOLUTION INTRAVENOUS; SUBCUTANEOUS at 07:45

## 2020-05-31 RX ADMIN — HEPARIN SODIUM SCH UNIT: 5000 INJECTION, SOLUTION INTRAVENOUS; SUBCUTANEOUS at 07:45

## 2020-05-31 RX ADMIN — PANTOPRAZOLE SODIUM SCH MG: 40 TABLET, DELAYED RELEASE ORAL at 07:45

## 2020-05-31 RX ADMIN — METHYLPREDNISOLONE SODIUM SUCCINATE SCH MG: 125 INJECTION, POWDER, FOR SOLUTION INTRAMUSCULAR; INTRAVENOUS at 13:27

## 2020-05-31 RX ADMIN — METHYLPREDNISOLONE SODIUM SUCCINATE SCH MG: 125 INJECTION, POWDER, FOR SOLUTION INTRAMUSCULAR; INTRAVENOUS at 06:15

## 2020-05-31 RX ADMIN — HYDROCODONE BITARTRATE AND ACETAMINOPHEN PRN EACH: 5; 325 TABLET ORAL at 20:14

## 2020-05-31 RX ADMIN — HYDROCODONE BITARTRATE AND ACETAMINOPHEN PRN EACH: 5; 325 TABLET ORAL at 07:54

## 2020-05-31 RX ADMIN — INSULIN ASPART SCH UNIT: 100 INJECTION, SUSPENSION SUBCUTANEOUS at 07:46

## 2020-05-31 RX ADMIN — LISINOPRIL SCH MG: 20 TABLET ORAL at 07:45

## 2020-05-31 RX ADMIN — INSULIN ASPART SCH UNIT: 100 INJECTION, SOLUTION INTRAVENOUS; SUBCUTANEOUS at 17:38

## 2020-05-31 RX ADMIN — LORATADINE SCH MG: 10 TABLET ORAL at 07:45

## 2020-05-31 RX ADMIN — INSULIN ASPART SCH UNIT: 100 INJECTION, SOLUTION INTRAVENOUS; SUBCUTANEOUS at 21:26

## 2020-05-31 RX ADMIN — METHYLPREDNISOLONE SODIUM SUCCINATE SCH MG: 125 INJECTION, POWDER, FOR SOLUTION INTRAMUSCULAR; INTRAVENOUS at 23:29

## 2020-05-31 RX ADMIN — METHYLPREDNISOLONE SODIUM SUCCINATE SCH MG: 125 INJECTION, POWDER, FOR SOLUTION INTRAMUSCULAR; INTRAVENOUS at 00:50

## 2020-05-31 RX ADMIN — HEPARIN SODIUM SCH UNIT: 5000 INJECTION, SOLUTION INTRAVENOUS; SUBCUTANEOUS at 20:14

## 2020-05-31 RX ADMIN — METHYLPREDNISOLONE SODIUM SUCCINATE SCH MG: 125 INJECTION, POWDER, FOR SOLUTION INTRAMUSCULAR; INTRAVENOUS at 17:38

## 2020-05-31 RX ADMIN — INSULIN ASPART SCH UNIT: 100 INJECTION, SOLUTION INTRAVENOUS; SUBCUTANEOUS at 13:26

## 2020-05-31 RX ADMIN — INSULIN ASPART SCH UNIT: 100 INJECTION, SUSPENSION SUBCUTANEOUS at 17:39

## 2020-05-31 NOTE — PN
PROGRESS NOTE



DATE OF SERVICE:

05/31/2020



This 74-year-old gentleman who was admitted with severe gait dysfunction, also had knee

joint difficulties on the left side.  The knee CAT scan did not show any fractures.

Orthopedic Surgery is following the patient closely.  No chest pain.  No palpitations.

No fever.



PHYSICAL EXAM:

Alert and oriented x3.  Pulse 55, blood pressure 140/70, respirations 16, temperature

97.9, pulse ox 96% on room air.

HEENT is conjunctivae normal.

NECK: No JVD.

CARDIOVASCULAR: S1,  S2 muffled.

RESPIRATIONS: Breath sounds diminished in the bases.  No rhonchi.  No crackles.

ABDOMEN:  Soft, nontender.

LEGS:  Left knee joint movements are painful, wasted.

NERVOUS SYSTEM: No focal deficits.



LABS:

WBC 13.2, hemoglobin 12.6.  Uric acid 4.2.



ASSESSMENT:

1. Fall and significant left lower thigh and left knee joint pain as well as severe

    gait dysfunction and wasting.

2. Increased WBC secondary to chronic lymphocytic leukemia.

3. 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

4. Hypokalemia.

5. Diabetes mellitus type 2.

6. History of gastroesophageal reflux disease.

7. Hypertension..

8. History of gait dysfunction.

9. History of degenerative joint disease.

10.History of anemia.

11.History of mild thrombocytopenia.

12.Diabetes mellitus type 2 with hyperglycemia.



RECOMMENDATIONS AND DISCUSSION:

Recommend to continue current medications, symptomatic treatment. PT/OT evaluation.

Guarded prognosis.  Possible ECF rehab.  Further recommendations to follow.





MMODL / IJN: [id number] / Job#: [id number]

## 2020-06-01 VITALS
RESPIRATION RATE: 20 BRPM | HEART RATE: 57 BPM | TEMPERATURE: 97.8 F | SYSTOLIC BLOOD PRESSURE: 132 MMHG | DIASTOLIC BLOOD PRESSURE: 70 MMHG

## 2020-06-01 LAB
ANION GAP SERPL CALC-SCNC: 8 MMOL/L
BUN SERPL-SCNC: 23 MG/DL (ref 9–20)
CALCIUM SPEC-MCNC: 8.8 MG/DL (ref 8.4–10.2)
CELLS COUNTED: 200
CHLORIDE SERPL-SCNC: 103 MMOL/L (ref 98–107)
CO2 SERPL-SCNC: 24 MMOL/L (ref 22–30)
ERYTHROCYTE [DISTWIDTH] IN BLOOD BY AUTOMATED COUNT: 3.77 M/UL (ref 4.3–5.9)
ERYTHROCYTE [DISTWIDTH] IN BLOOD: 13.3 % (ref 11.5–15.5)
GLUCOSE BLD-MCNC: 252 MG/DL (ref 75–99)
GLUCOSE BLD-MCNC: 282 MG/DL (ref 75–99)
GLUCOSE BLD-MCNC: 309 MG/DL (ref 75–99)
GLUCOSE SERPL-MCNC: 288 MG/DL (ref 74–99)
HCT VFR BLD AUTO: 36.9 % (ref 39–53)
HGB BLD-MCNC: 12.2 GM/DL (ref 13–17.5)
LYMPHOCYTES # BLD MANUAL: 24.46 K/UL (ref 1–4.8)
MCH RBC QN AUTO: 32.4 PG (ref 25–35)
MCHC RBC AUTO-ENTMCNC: 33.1 G/DL (ref 31–37)
MCV RBC AUTO: 97.7 FL (ref 80–100)
NEUTROPHILS NFR BLD MANUAL: 19 %
NEUTS SEG # BLD MANUAL: 5.74 K/UL (ref 1.3–7.7)
PLATELET # BLD AUTO: 121 K/UL (ref 150–450)
POTASSIUM SERPL-SCNC: 4.4 MMOL/L (ref 3.5–5.1)
SODIUM SERPL-SCNC: 135 MMOL/L (ref 137–145)
WBC # BLD AUTO: 30.2 K/UL (ref 3.8–10.6)

## 2020-06-01 RX ADMIN — LORATADINE SCH MG: 10 TABLET ORAL at 07:50

## 2020-06-01 RX ADMIN — METHYLPREDNISOLONE SODIUM SUCCINATE SCH MG: 125 INJECTION, POWDER, FOR SOLUTION INTRAMUSCULAR; INTRAVENOUS at 12:45

## 2020-06-01 RX ADMIN — INSULIN ASPART SCH UNIT: 100 INJECTION, SUSPENSION SUBCUTANEOUS at 07:52

## 2020-06-01 RX ADMIN — HEPARIN SODIUM SCH UNIT: 5000 INJECTION, SOLUTION INTRAVENOUS; SUBCUTANEOUS at 07:50

## 2020-06-01 RX ADMIN — INSULIN ASPART SCH UNIT: 100 INJECTION, SOLUTION INTRAVENOUS; SUBCUTANEOUS at 07:52

## 2020-06-01 RX ADMIN — HYDROCODONE BITARTRATE AND ACETAMINOPHEN PRN EACH: 5; 325 TABLET ORAL at 07:57

## 2020-06-01 RX ADMIN — METHYLPREDNISOLONE SODIUM SUCCINATE SCH MG: 125 INJECTION, POWDER, FOR SOLUTION INTRAMUSCULAR; INTRAVENOUS at 05:58

## 2020-06-01 RX ADMIN — PANTOPRAZOLE SODIUM SCH MG: 40 TABLET, DELAYED RELEASE ORAL at 07:50

## 2020-06-01 RX ADMIN — INSULIN ASPART SCH UNIT: 100 INJECTION, SUSPENSION SUBCUTANEOUS at 12:45

## 2020-06-01 RX ADMIN — LISINOPRIL SCH MG: 20 TABLET ORAL at 07:50

## 2020-06-01 RX ADMIN — INSULIN ASPART SCH UNIT: 100 INJECTION, SOLUTION INTRAVENOUS; SUBCUTANEOUS at 12:45

## 2020-06-01 NOTE — P.DS
Providers


Date of admission: 


05/29/20 15:10





Expected date of discharge: 06/01/20


Attending physician: 


Dixie Middleton





Consults: 





                                        





05/29/20 15:08


Consult Physician Routine 


   Consulting Provider: Garry Lam


   Consult Reason/Comments: L Leg pain


   Do you want consulting provider notified?: Yes











Primary care physician: 


[facility]





[facility] Course: 





Final diagnosis


Fall and significant left lower thigh and left knee joint pain as well as severe

gait dysfunction and wasting


Increased WBC secondary to chronic lymphocytic leukemia


Anemia, normocytic secondary leukemia


Hypokalemia


Diabetes mellitus type 2 with hyperglycemia


History of gastroesophageal reflux disease


Hypertension


History of gait dysfunction


History of degenerative joint disease


history of anemia


history of mild thrombocytopenia


Full code





Discharge disposition


Patient is being discharged in a stable condition with guarded prognosis to 

[facility] for continued PT/OT therapy.  Patient will follow-up 

with Dr. Figueroa in the outpatient setting upon discharge.  Patient instructed

follow-up with Dr. Lam in the outpatient setting in 2-3 weeks as needed as

well.  Patient will continue on a prednisone taper upon discharge.  Total time 

taken is 35 minutes.





History of present illness


This is a 74-year-old male who was recently admitted with severe gait 

dysfunction also had knee joint difficulties of the left side and was being 

closely monitored.  Patient underwent CAT scan of the knee which did not show 

any fractures and orthopedic surgery evaluated the patient recommending no belén

gical interventions at this time and to continue with pain management along with

physical therapy.  Patient instructed to follow-up with Dr. Lam in the 

outpatient setting in 2-3 weeks as needed.  Patient will continue on a 

prednisone taper along with pain management in the outpatient setting.  Patient 

does have a history of diabetes mellitus and will need continued blood sugar 

monitoring before meals at bedtime and treat accordingly with sliding scale. 

Recommended repeat labs in 2-3 days.  Patient will be going to [facility] for continued

PT/OT therapy today.  Currently no reports of chest pain, shortness of breath, 

or palpitations.  Patient is afebrile.  No reports of nausea vomiting and 

patient is tolerating diet.





On exam vital signs are stable.  Temp is 97.8F, pulse is 57, respirations are 

20, blood pressure is 132/70, oxygen saturation is 97% on room air.  Cardio S1, 

S2 are muffled.  Respiratory system shows diminished breath sounds at the bases 

with no wheezing or rhonchi noted.  Abdomen is soft and nontender.  Nervous 

system shows diffuse weakness.





Please refer to medication reconciliation sheet for a list of medications.


Patient Condition at Discharge: Stable





Plan - Discharge Summary


Discharge Rx Participant: No


New Discharge Prescriptions: 


New


   INSULIN ASPART (NovoLOG) [NovoLOG (formulary)] 0 unit SQ ACHS  vial


   Acetaminophen Tab [Tylenol] 650 mg PO Q6HR PRN  tab


     PRN Reason: Mild Pain Or Fever > 100.5


   HYDROcodone/APAP 5-325MG [Norco 5-325] 1 each PO Q6HR PRN #6 tab


     PRN Reason: Pain


   predniSONE 10 mg PO AS DIRECTED #30 tab





Continue


   Fluticasone Nasal Spray [Flonase Nasal Spray] 2 spr EA NOSTRIL DAILY PRN #1 

bottle


     PRN Reason: Congestion


   Montelukast Sodium [Singulair] 10 mg PO HS


   Lisinopril [Zestril] 40 mg PO DAILY


   Cetirizine HCl [Zyrtec] 10 mg PO DAILY


   Insulin NPH Hum/Reg Insulin Hm [Relion Novolin 70-30 Flexpen] 15 units SQ BID


   Omeprazole Magnesium [PriLOSEC OTC] 20 mg PO DAILY PRN


     PRN Reason: acid reflux


   ALPRAZolam [Xanax] 0.25 mg PO BID PRN #6 tab


     PRN Reason: Anxiety





Discontinued


   Ibuprofen 600 mg PO Q8H PRN


     PRN Reason: Pain


Discharge Medication List





Fluticasone Nasal Spray [Flonase Nasal Spray] 2 spr EA NOSTRIL DAILY PRN #1 

bottle 01/20/17 [Rx]


Cetirizine HCl [Zyrtec] 10 mg PO DAILY 08/18/17 [History]


Lisinopril [Zestril] 40 mg PO DAILY 08/18/17 [History]


Montelukast Sodium [Singulair] 10 mg PO HS 08/18/17 [History]


Insulin NPH Hum/Reg Insulin Hm [Relion Novolin 70-30 Flexpen] 15 units SQ BID 

05/29/20 [History]


Omeprazole Magnesium [PriLOSEC OTC] 20 mg PO DAILY PRN 05/29/20 [History]


ALPRAZolam [Xanax] 0.25 mg PO BID PRN #6 tab 06/01/20 [Rx]


Acetaminophen Tab [Tylenol] 650 mg PO Q6HR PRN  tab 06/01/20 [Rx]


HYDROcodone/APAP 5-325MG [[place] 5-325] 1 each PO Q6HR PRN #6 tab 06/01/20 [Rx]


INSULIN ASPART (NovoLOG) [NovoLOG (formulary)] 0 unit SQ ACHS  vial 06/01/20 

[Rx]


predniSONE 10 mg PO AS DIRECTED #30 tab 06/01/20 [Rx]








Follow up Appointment(s)/Referral(s): 


Garry Lam DO [Doctor of Osteopathic Medicine] - 2 Weeks


Smith Figueroa DO [Primary Care Provider] - 1-2 days


Ambulatory/Diagnostic Orders: 


Basic Metabolic Panel [LAB.AMB] Time Frame: 2 Days, Location: None Selected


Complete Blood Count w/diff [LAB.AMB] Time Frame: 2 Days, Location: None 

Selected


Patient Instructions/Handouts:  Type 2 Diabetes in Adults: New Diagnosis (DC)


Activity/Diet/Wound Care/Special Instructions: 


Patient is going to [facility]


Activity as tolerated


Continue monitoring blood sugars before meals at bedtime and treat accordingly 

with sliding scale


Continue current diet


Continue working with PT/OT therapy


Follow-up with orthopedic surgery in the outpatient setting


Follow-up with primary care provider upon discharge











Discharge Disposition: TRANSFER TO SNF/F

## 2020-06-13 ENCOUNTER — HOSPITAL ENCOUNTER (EMERGENCY)
Dept: HOSPITAL 47 - EC | Age: 75
LOS: 1 days | Discharge: HOME | End: 2020-06-14
Payer: MEDICARE

## 2020-06-13 VITALS — TEMPERATURE: 98 F | RESPIRATION RATE: 18 BRPM

## 2020-06-13 DIAGNOSIS — Z79.4: ICD-10-CM

## 2020-06-13 DIAGNOSIS — Z98.890: ICD-10-CM

## 2020-06-13 DIAGNOSIS — M19.041: ICD-10-CM

## 2020-06-13 DIAGNOSIS — K21.9: ICD-10-CM

## 2020-06-13 DIAGNOSIS — Z88.1: ICD-10-CM

## 2020-06-13 DIAGNOSIS — Z79.899: ICD-10-CM

## 2020-06-13 DIAGNOSIS — I10: ICD-10-CM

## 2020-06-13 DIAGNOSIS — M25.562: Primary | ICD-10-CM

## 2020-06-13 DIAGNOSIS — E11.9: ICD-10-CM

## 2020-06-13 DIAGNOSIS — Z85.6: ICD-10-CM

## 2020-06-13 DIAGNOSIS — M19.042: ICD-10-CM

## 2020-06-13 PROCEDURE — 99284 EMERGENCY DEPT VISIT MOD MDM: CPT

## 2020-06-13 NOTE — ED
General Adult HPI





- General


Chief complaint: Extremity Problem,Nontraumatic


Stated complaint: Knee Pain


Time Seen by Provider: 20 21:59


Source: patient, EMS, RN notes reviewed, old records reviewed


Mode of arrival: EMS


Limitations: no limitations





- History of Present Illness


Initial comments: 


74-year-old male patient presents with chief complaint of left knee pain.  

Patient reports that he was admitted to the hospital she had a fall 

approximately 2 weeks ago.  Reports a history of having pain in his left knee.  

States that he does have some sinus pain on the proximal aspect of the tibia.  

Reports otherwise in the hospital he did have a fall where he landed on his left

knee.  Patient states that he was discharged from the patient revealed patient 

history of did not give him a prescription for pain medication.  Denies any 

trauma to head or neck.  Denies any other complaints.  States that the pain is 

very front of the knee.





Systemic: Pt denies fatigue, fever/chills, rash. Pt denies weakness, night 

sweats, weight loss. 


Neuro: Pt denies headache, visual disturbances, syncope or pre-syncope.


HEENT: Pt denies ocular discharge or irritation, otalgia, rhinorrhea, 

pharyngitis or notable lymphadenopathy. 


Cardiopulmonary: Pt denies chest pain, SOB, heart palpitations, dyspnea on 

exertion.  


Abdominal/GI: Pt denies abdominal pain, n/v/d. 


: Pt denies dysuria, burning w/ urination, frequency/urgency. Denies new onset

urinary or bowel incontinence.  


MSK: Pt denies loss of strength or function in extremities. 


Neuro: Pt denies new onset weakness, paresthesias. 








- Related Data


                                Home Medications











 Medication  Instructions  Recorded  Confirmed


 


Cetirizine HCl [Zyrtec] 10 mg PO DAILY 17


 


Lisinopril [Zestril] 40 mg PO DAILY 17


 


Montelukast Sodium [Singulair] 10 mg PO HS 17


 


Insulin NPH Hum/Reg Insulin Hm 15 units SQ BID 20





[Relion Novolin 70-30 Flexpen]   


 


Omeprazole Magnesium [PriLOSEC OTC] 20 mg PO DAILY PRN 20








                                  Previous Rx's











 Medication  Instructions  Recorded


 


Fluticasone Nasal Spray [Flonase 2 spr EA NOSTRIL DAILY PRN #1 17





Nasal [place]] bottle 


 


ALPRAZolam [Xanax] 0.25 mg PO BID PRN #6 tab 20


 


Acetaminophen Tab [Tylenol] 650 mg PO Q6HR PRN  tab 20


 


HYDROcodone/APAP 5-325MG [Norco 1 each PO Q6HR PRN #6 tab 20





5-325]  


 


INSULIN ASPART (NovoLOG) [NovoLOG 0 unit SQ ACHS  vial 20





(formulary)]  


 


predniSONE 10 mg PO AS DIRECTED #30 tab 20











                                    Allergies











Allergy/AdvReac Type Severity Reaction Status Date / Time


 


amoxicillin [From Augmentin] Allergy  Rash/Hives Verified 20 20:06


 


clavulanic acid Allergy  Rash/Hives Verified 20 20:06





[From Augmentin]     














Review of Systems


ROS Statement: 


Those systems with pertinent positive or pertinent negative responses have been 

documented in the HPI.





ROS Other: All systems not noted in ROS Statement are negative.





Past Medical History


Past Medical History: Cancer, Diabetes Mellitus, GERD/Reflux, Hypertension, 

Osteoarthritis (OA)


Additional Past Medical History / Comment(s): NIDDM type II, 1999 LEUKEMIA (CLL-

remission), sinus problems, seasonal allergies, tinnitis bilaterally, OA hands, 

R wrist carpal tunnel, past fx with L elbow, R rotator cuff tendon problems, L 

rotator cuff tear, left knee pain


History of Any Multi-Drug Resistant Organisms: None Reported


Past Surgical History: Orthopedic Surgery


Additional Past Surgical History / Comment(s): LEFT HAND thumb tendon repair.


Past Anesthesia/Blood Transfusion Reactions: No Reported Reaction


Past Psychological History: No Psychological Hx Reported


Smoking Status: Never smoker


Past Alcohol Use History: None Reported


Past Drug Use History: None Reported





- Past Family History


  ** Father


Family Medical History: CVA/TIA


Additional Family Medical History / Comment(s): Father  at the age of 85yrs.





  ** Mother


Family Medical History: Diabetes Mellitus


Additional Family Medical History / Comment(s): Mother  at the age of 90yrs.





General Exam





- General Exam Comments


Initial Comments: 








Constitutional: NAD, AOX3, Pt has pleasant affect. 


HEENT: NC/AT, trachea midline, neck supple, no lymphadenopathy. Posterior 

pharynx non erythematous, without exudates. External ears appear normal, without

discharge. Mucous membranes moist. Eyes PERRLA, EOM intact. There is no scleral 

icterus. No pallor noted. 


Cardiopulmonary: RRR, no murmurs, rubs or gallops, no JVD noted. Lungs CTAB in 

anterior and posterior fields. No peripheral edema. 


Abdominal exam: Abdomen soft and non-distended. Abdomen non-tender to palpation 

in all 4 quadrants. Bowel sounds active in LLQ. No hepatosplenomegaly. No 

ecchymosis


Neuro: CN II-XII grossly intact. No nuchal rigidity. No raccon eyes, no desai 

sign, no hemotympanum. No cervical spinal tenderness. 


MSK: Mild amount of tenderness to left anterior knee region.  No posterior 

tenderness.  Full active range of motion intact.  Neurovascularly intact.  No 

skin changes.  No tibial tenderness is noted.  No posterior calf tenderness.  

Homans sign negative.  Bilaterally.  Posterior tibialis pulses intact bilat

erally.  +2.








Limitations: no limitations





Course


                                   Vital Signs











  20





  21:46 00:37


 


Temperature 98 F 


 


Pulse Rate 67 60


 


Respiratory 18 18





Rate  


 


Blood Pressure 152/70 155/75


 


O2 Sat by Pulse 99 100





Oximetry  














Medical Decision Making





- Medical Decision Making





74-year-old male patient presents ED for evaluation of knee pain.  Denies any 

posterior pain.  Denies any other complaints.  Patient will signs are stable, 

afebrile.  Physical exam displayed mild mild tenderness to left anterior knee.  

Plain film of the knee was performed as negative.  Plain film of left tibia-

fibula was obtained did not display any acute fracture.  Discussed with patient 

possibility for mobilization.  Patient believes that if he were immobilized he 

would be at high risk for fall.  Patient be discharged as appointment with 

outpatient orthopedic surgeon and will return to ER if condition worsens.  Case 

discussed with Dr. Aaron. 








Disposition


Clinical Impression: 


 Knee pain





Disposition: HOME SELF-CARE


Condition: Stable


Additional Instructions: 


Follow-up with primary care provider and orthopedic consult tomorrow. Call Dr. Lam and see if you can be seen earlier. If not you may try to call Dr. Beavers who is on call at the hospital today. Return to ER if condition worsens in

any way.


Is patient prescribed a controlled substance at d/c from ED?: No


Referrals: 


Smith Figueroa DO [Primary Care Provider] - 1-2 days


Mikael Beavers MD [STAFF PHYSICIAN] - 1-2 days

## 2020-06-14 VITALS — SYSTOLIC BLOOD PRESSURE: 155 MMHG | HEART RATE: 60 BPM | DIASTOLIC BLOOD PRESSURE: 75 MMHG

## 2020-07-13 ENCOUNTER — HOSPITAL ENCOUNTER (EMERGENCY)
Dept: HOSPITAL 47 - EC | Age: 75
Discharge: HOME | End: 2020-07-13
Payer: MEDICARE

## 2020-07-13 VITALS — SYSTOLIC BLOOD PRESSURE: 167 MMHG | HEART RATE: 66 BPM | TEMPERATURE: 98 F | DIASTOLIC BLOOD PRESSURE: 70 MMHG

## 2020-07-13 VITALS — RESPIRATION RATE: 18 BRPM

## 2020-07-13 DIAGNOSIS — Z88.0: ICD-10-CM

## 2020-07-13 DIAGNOSIS — M79.605: Primary | ICD-10-CM

## 2020-07-13 DIAGNOSIS — E11.9: ICD-10-CM

## 2020-07-13 DIAGNOSIS — Z88.1: ICD-10-CM

## 2020-07-13 DIAGNOSIS — I10: ICD-10-CM

## 2020-07-13 DIAGNOSIS — Z79.899: ICD-10-CM

## 2020-07-13 DIAGNOSIS — M79.89: ICD-10-CM

## 2020-07-13 DIAGNOSIS — K21.9: ICD-10-CM

## 2020-07-13 DIAGNOSIS — Z79.4: ICD-10-CM

## 2020-07-13 PROCEDURE — 99283 EMERGENCY DEPT VISIT LOW MDM: CPT

## 2020-07-13 NOTE — ED
Lower Extremity Injury HPI





- General


Chief Complaint: Extremity Injury, Lower


Stated Complaint: Leg swelling


Time Seen by Provider: 20 13:01


Source: patient, family


Mode of arrival: wheelchair


Limitations: no limitations





- History of Present Illness


Initial Comments: 





Patient is a 75-year-old male presenting to the emergency Department with 

complaints of the left lower extremity pain that has been ongoing for 2 months 

now.  Patient states he had a slip and fall back in early  and has had been 

having pain in his lower extremity since then.  He has had multiple imaging on 

his left knee which has no acute findings.  He states he has been following up 

with orthopedics and is set to have an MRI of his lower back in a few weeks.  He

has been taking pain medicine at home.  He denies recent fever or chills.  He 

denies any history of blood clots.  He is not on a blood thinner.  He has no 

further complaints at this time.  Upon arrival to the ER, his vitals are stable.





- Related Data


                                Home Medications











 Medication  Instructions  Recorded  Confirmed


 


Cetirizine HCl [Zyrtec] 10 mg PO DAILY 17


 


Lisinopril [Zestril] 40 mg PO DAILY 17


 


Montelukast Sodium [Singulair] 10 mg PO HS 17


 


Insulin NPH Hum/Reg Insulin Hm 15 units SQ BID 20





[Relion Novolin 70-30 Flexpen]   


 


Omeprazole Magnesium [PriLOSEC OTC] 20 mg PO DAILY PRN 20








                                  Previous Rx's











 Medication  Instructions  Recorded


 


Fluticasone Nasal Spray [Flonase 2 spr EA NOSTRIL DAILY PRN #1 17





Nasal [place]] bottle 


 


ALPRAZolam [Xanax] 0.25 mg PO BID PRN #6 tab 20


 


Acetaminophen Tab [Tylenol] 650 mg PO Q6HR PRN  tab 20


 


HYDROcodone/APAP 5-325MG [Norco 1 each PO Q6HR PRN #6 tab 20





5-325]  


 


INSULIN ASPART (NovoLOG) [NovoLOG 0 unit SQ ACHS  vial 20





(formulary)]  


 


predniSONE 10 mg PO AS DIRECTED #30 tab 20











                                    Allergies











Allergy/AdvReac Type Severity Reaction Status Date / Time


 


amoxicillin [From Augmentin] Allergy  Rash/Hives Verified 20 20:06


 


clavulanic acid Allergy  Rash/Hives Verified 20 20:06





[From Augmentin]     














Review of Systems


ROS Statement: 


Those systems with pertinent positive or pertinent negative responses have been 

documented in the HPI.





ROS Other: All systems not noted in ROS Statement are negative.





Past Medical History


Past Medical History: Cancer, Diabetes Mellitus, GERD/Reflux, Hypertension, 

Osteoarthritis (OA)


Additional Past Medical History / Comment(s): NIDDM type II,  LEUKEMIA 

(CLL-remission), sinus problems, seasonal allergies, tinnitis bilaterally, OA 

hands, R wrist carpal tunnel, past fx with L elbow, R rotator cuff tendon 

problems, L rotator cuff tear, left knee pain


History of Any Multi-Drug Resistant Organisms: None Reported


Past Surgical History: Orthopedic Surgery


Additional Past Surgical History / Comment(s): LEFT HAND thumb tendon repair.


Past Anesthesia/Blood Transfusion Reactions: No Reported Reaction


Past Psychological History: No Psychological Hx Reported


Smoking Status: Never smoker


Past Alcohol Use History: None Reported


Past Drug Use History: None Reported





- Past Family History


  ** Father


Family Medical History: CVA/TIA


Additional Family Medical History / Comment(s): Father  at the age of 85yrs.





  ** Mother


Family Medical History: Diabetes Mellitus


Additional Family Medical History / Comment(s): Mother  at the age of 90yrs.





General Exam





- General Exam Comments


Initial Comments: 





GENERAL: 


Well-appearing, well-nourished and in no acute distress.





HEAD: 


Atraumatic, normocephalic.





EYES:


Pupils equal round and reactive to light, extraocular movements intact, sclera 

anicteric, conjunctiva are normal.





ENT: 


TMs normal, nares patent, oropharynx clear without exudates.  Moist mucous 

membranes.





NECK: 


Normal range of motion, supple without lymphadenopathy or JVD.





LUNGS:


 Breath sounds clear to auscultation bilaterally and equal.  No wheezes rales or

rhonchi.





HEART:


Regular rate and rhythm without murmurs, rubs or gallops.





ABDOMEN: 


Soft, nontender, normoactive bowel sounds.  No guarding, no rebound.  No masses 

appreciated.





: Deferred 





EXTREMITIES: 


Normal range of motion of bilateral lower extremities.  Sensation is equal and 

bilateral.  Patient is 5 out of 5 strength.  Patient does have some mild 

bilateral lower ankle swelling.  He is neurovascular intact bilaterally.  There 

is no overlying erythema or signs of infection.  No clubbing or cyanosis.





NEUROLOGICAL: 


Cranial nerves II through XII grossly intact.  Normal speech, normal gait.





PSYCH:


Normal mood, normal affect.





SKIN:


 Warm, Dry, normal turgor, no rashes or lesions noted.


Limitations: no limitations





Course


                                   Vital Signs











  20





  12:56


 


Temperature 98.1 F


 


Pulse Rate 62


 


Respiratory 18





Rate 


 


Blood Pressure 174/69


 


O2 Sat by Pulse 99





Oximetry 














Medical Decision Making





- Medical Decision Making





Patient is a 75-year-old male here for left leg pain and is chronic in nature.  

He has slip and fall approximately 2 months ago.  He has had multiple imaging 

done.  He denies history of blood clots.  He continues to have pain.  His vitals

are stable here.  His exam is unremarkable except for some mild bilateral ankle 

swelling.  He states he was a very active person before he fell 2 months ago, 

and now has not been moving around very much.  I did do an ultrasound of his 

left lower extremity which revealed no evidence of DVT.  I discussed with 

patient that the swelling is most likely due to his activity.  I recommended to 

try elevation of his legs above his heart level as well as some ankle pumps 

throughout the day.  Patient is stable for discharge.  He will follow with his 

orthopedic doctor.  Patient is in agreement with this plan of care.  Return 

parameters were discussed with the patient he verbalizes understanding.  Case 

discussed with Dr. Arevalo. 





Disposition


Clinical Impression: 


 Left leg pain, Swelling of both ankles





Disposition: HOME SELF-CARE


Condition: Stable


Instructions (If sedation given, give patient instructions):  Leg Pain (ED)


Additional Instructions: 


Please return to the Emergency Department if symptoms worsen or any other 

concerns.


Follow-up with orthopedic doctor as discussed.  Continue with already prescribed

medications.  


Recommend pillow in between knees when sleeping on the side or rolled up 

underneath the knees is sleeping on the back.  


Try heating pad on the area as well.


Is patient prescribed a controlled substance at d/c from ED?: No


Referrals: 


Smith Figueroa DO [Primary Care Provider] - 1-2 days

## 2020-07-13 NOTE — US
EXAMINATION TYPE: US venous doppler duplex LE LT

 

DATE OF EXAM: 7/13/2020 2:08 PM

 

COMPARISON: NONE

 

CLINICAL HISTORY: pain, swelling. Edema

 

SIDE PERFORMED: Left  

 

TECHNIQUE:  The lower extremity deep venous system is examined utilizing real time linear array sonog
efrain with graded compression, doppler sonography and color-flow sonography.

 

VESSELS IMAGED:

External Iliac Vein (EIV)

Common Femoral Vein

Deep Femoral Vein

Greater Saphenous Vein *

Femoral Vein

Popliteal Vein

Small Saphenous Vein *

Proximal Calf Veins

(* superficial vessels

 

Left Leg:  Negative for DVT

 

 

 

IMPRESSION: No evidence for DVT.

## 2020-07-20 ENCOUNTER — HOSPITAL ENCOUNTER (OUTPATIENT)
Dept: HOSPITAL 47 - EC | Age: 75
Setting detail: OBSERVATION
LOS: 3 days | Discharge: NURSING, REHAB OR LONG-TERM CARE | End: 2020-07-23
Attending: INTERNAL MEDICINE | Admitting: INTERNAL MEDICINE
Payer: MEDICARE

## 2020-07-20 DIAGNOSIS — Z88.1: ICD-10-CM

## 2020-07-20 DIAGNOSIS — Z88.0: ICD-10-CM

## 2020-07-20 DIAGNOSIS — Z79.891: ICD-10-CM

## 2020-07-20 DIAGNOSIS — G56.01: ICD-10-CM

## 2020-07-20 DIAGNOSIS — Z83.3: ICD-10-CM

## 2020-07-20 DIAGNOSIS — R26.2: Primary | ICD-10-CM

## 2020-07-20 DIAGNOSIS — R53.1: ICD-10-CM

## 2020-07-20 DIAGNOSIS — M19.042: ICD-10-CM

## 2020-07-20 DIAGNOSIS — R29.6: ICD-10-CM

## 2020-07-20 DIAGNOSIS — M19.012: ICD-10-CM

## 2020-07-20 DIAGNOSIS — Z79.4: ICD-10-CM

## 2020-07-20 DIAGNOSIS — I10: ICD-10-CM

## 2020-07-20 DIAGNOSIS — M75.102: ICD-10-CM

## 2020-07-20 DIAGNOSIS — M17.12: ICD-10-CM

## 2020-07-20 DIAGNOSIS — K21.9: ICD-10-CM

## 2020-07-20 DIAGNOSIS — Z82.3: ICD-10-CM

## 2020-07-20 DIAGNOSIS — M16.12: ICD-10-CM

## 2020-07-20 DIAGNOSIS — Z98.890: ICD-10-CM

## 2020-07-20 DIAGNOSIS — H93.13: ICD-10-CM

## 2020-07-20 DIAGNOSIS — M19.041: ICD-10-CM

## 2020-07-20 DIAGNOSIS — Z79.899: ICD-10-CM

## 2020-07-20 DIAGNOSIS — M48.061: ICD-10-CM

## 2020-07-20 DIAGNOSIS — J30.2: ICD-10-CM

## 2020-07-20 DIAGNOSIS — Z87.81: ICD-10-CM

## 2020-07-20 DIAGNOSIS — C91.11: ICD-10-CM

## 2020-07-20 DIAGNOSIS — D69.6: ICD-10-CM

## 2020-07-20 DIAGNOSIS — E87.1: ICD-10-CM

## 2020-07-20 DIAGNOSIS — E11.9: ICD-10-CM

## 2020-07-20 DIAGNOSIS — D64.9: ICD-10-CM

## 2020-07-20 DIAGNOSIS — Z79.1: ICD-10-CM

## 2020-07-20 DIAGNOSIS — Z03.818: ICD-10-CM

## 2020-07-20 LAB
ALBUMIN SERPL-MCNC: 4.1 G/DL (ref 3.5–5)
ALP SERPL-CCNC: 69 U/L (ref 38–126)
ALT SERPL-CCNC: 13 U/L (ref 4–49)
ANION GAP SERPL CALC-SCNC: 9 MMOL/L
APTT BLD: 22.2 SEC (ref 22–30)
AST SERPL-CCNC: 19 U/L (ref 17–59)
BUN SERPL-SCNC: 15 MG/DL (ref 9–20)
CALCIUM SPEC-MCNC: 9.3 MG/DL (ref 8.4–10.2)
CELLS COUNTED: 100
CHLORIDE SERPL-SCNC: 102 MMOL/L (ref 98–107)
CK SERPL-CCNC: 50 U/L (ref 55–170)
CO2 SERPL-SCNC: 24 MMOL/L (ref 22–30)
EOSINOPHIL # BLD MANUAL: 0.52 K/UL (ref 0–0.7)
ERYTHROCYTE [DISTWIDTH] IN BLOOD BY AUTOMATED COUNT: 3.86 M/UL (ref 4.3–5.9)
ERYTHROCYTE [DISTWIDTH] IN BLOOD: 13.1 % (ref 11.5–15.5)
GLUCOSE SERPL-MCNC: 170 MG/DL (ref 74–99)
HCT VFR BLD AUTO: 36 % (ref 39–53)
HGB BLD-MCNC: 12.8 GM/DL (ref 13–17.5)
INR PPP: 1.1 (ref ?–1.2)
LYMPHOCYTES # BLD MANUAL: 19.5 K/UL (ref 1–4.8)
MAGNESIUM SPEC-SCNC: 1.9 MG/DL (ref 1.6–2.3)
MCH RBC QN AUTO: 33.2 PG (ref 25–35)
MCHC RBC AUTO-ENTMCNC: 35.5 G/DL (ref 31–37)
MCV RBC AUTO: 93.3 FL (ref 80–100)
MONOCYTES # BLD MANUAL: 0.26 K/UL (ref 0–1)
NEUTROPHILS NFR BLD MANUAL: 22 %
NEUTS SEG # BLD MANUAL: 5.72 K/UL (ref 1.3–7.7)
PLATELET # BLD AUTO: 105 K/UL (ref 150–450)
POTASSIUM SERPL-SCNC: 4.1 MMOL/L (ref 3.5–5.1)
PROT SERPL-MCNC: 6 G/DL (ref 6.3–8.2)
PT BLD: 11.1 SEC (ref 9–12)
SODIUM SERPL-SCNC: 135 MMOL/L (ref 137–145)
WBC # BLD AUTO: 26 K/UL (ref 3.8–10.6)

## 2020-07-20 PROCEDURE — 97166 OT EVAL MOD COMPLEX 45 MIN: CPT

## 2020-07-20 PROCEDURE — 81003 URINALYSIS AUTO W/O SCOPE: CPT

## 2020-07-20 PROCEDURE — 74176 CT ABD & PELVIS W/O CONTRAST: CPT

## 2020-07-20 PROCEDURE — 84153 ASSAY OF PSA TOTAL: CPT

## 2020-07-20 PROCEDURE — 97530 THERAPEUTIC ACTIVITIES: CPT

## 2020-07-20 PROCEDURE — 73030 X-RAY EXAM OF SHOULDER: CPT

## 2020-07-20 PROCEDURE — 93005 ELECTROCARDIOGRAM TRACING: CPT

## 2020-07-20 PROCEDURE — 85652 RBC SED RATE AUTOMATED: CPT

## 2020-07-20 PROCEDURE — 82550 ASSAY OF CK (CPK): CPT

## 2020-07-20 PROCEDURE — 70450 CT HEAD/BRAIN W/O DYE: CPT

## 2020-07-20 PROCEDURE — 36415 COLL VENOUS BLD VENIPUNCTURE: CPT

## 2020-07-20 PROCEDURE — 72131 CT LUMBAR SPINE W/O DYE: CPT

## 2020-07-20 PROCEDURE — 72128 CT CHEST SPINE W/O DYE: CPT

## 2020-07-20 PROCEDURE — 97535 SELF CARE MNGMENT TRAINING: CPT

## 2020-07-20 PROCEDURE — 83735 ASSAY OF MAGNESIUM: CPT

## 2020-07-20 PROCEDURE — 85730 THROMBOPLASTIN TIME PARTIAL: CPT

## 2020-07-20 PROCEDURE — 96376 TX/PRO/DX INJ SAME DRUG ADON: CPT

## 2020-07-20 PROCEDURE — 85025 COMPLETE CBC W/AUTO DIFF WBC: CPT

## 2020-07-20 PROCEDURE — 71250 CT THORAX DX C-: CPT

## 2020-07-20 PROCEDURE — 96374 THER/PROPH/DIAG INJ IV PUSH: CPT

## 2020-07-20 PROCEDURE — 96375 TX/PRO/DX INJ NEW DRUG ADDON: CPT

## 2020-07-20 PROCEDURE — 80053 COMPREHEN METABOLIC PANEL: CPT

## 2020-07-20 PROCEDURE — 86140 C-REACTIVE PROTEIN: CPT

## 2020-07-20 PROCEDURE — 80048 BASIC METABOLIC PNL TOTAL CA: CPT

## 2020-07-20 PROCEDURE — 85610 PROTHROMBIN TIME: CPT

## 2020-07-20 PROCEDURE — 71045 X-RAY EXAM CHEST 1 VIEW: CPT

## 2020-07-20 PROCEDURE — 78306 BONE IMAGING WHOLE BODY: CPT

## 2020-07-20 PROCEDURE — 97162 PT EVAL MOD COMPLEX 30 MIN: CPT

## 2020-07-20 PROCEDURE — 99285 EMERGENCY DEPT VISIT HI MDM: CPT

## 2020-07-20 NOTE — CT
EXAMINATION TYPE: CT thor lumbar spine wo con

 

DATE OF EXAM: 7/20/2020

 

COMPARISON: None

 

HISTORY: Bilateral leg pain and weakness.

 

CT DLP: 807.3 mGycm

Automated exposure control for dose reduction was used.

 

Multiple axial sections were obtained from the level of T1-S3 vertebra without contrast.

 

FINDINGS:

Thoracic and lumbar vertebra have normal alignment. I see no compression fracture. There is mild spur
ring of the endplates. There is no evidence of thoracic or lumbar paraspinal mass. The sacroiliac nelson
nts are intact. There is hypertrophic mild facet arthropathy in the lower lumbar spine. There is no e
vidence of focal bone destruction.

 

There is posterior disc bulging and ligamentum flavum thickening with moderately severe spinal stenos
is at L3-4. There is mild spinal stenosis at L4-5. There is mild lateral recess stenosis at L2-3. The
re is lateral recess stenosis at L1-2.

 

IMPRESSION:

Multilevel spondylotic changes. Moderately severe spinal stenosis at L3-4. Mild relative spinal steno
sis in the remainder of the lumbar spine. No fracture seen.

## 2020-07-20 NOTE — XR
EXAMINATION TYPE: XR shoulder complete LT

 

DATE OF EXAM: 7/20/2020

 

COMPARISON: None

 

HISTORY: Shoulder pain

 

TECHNIQUE: 3 views

 

FINDINGS: There is some spurring at the glenohumeral joint. There is spurring at the AC joint. I see 
no fracture nor dislocation.

 

IMPRESSION: There is some osteoarthritis. No fracture seen.

## 2020-07-20 NOTE — CT
EXAMINATION TYPE: CT brain wo con

 

DATE OF EXAM: 7/20/2020

 

COMPARISON: 8/18/2017

 

HISTORY: Bilateral leg pain and weakness.

 

CT DLP: 1007 mGycm

Automated exposure control for dose reduction was used.

 

Multiple axial sections were obtained of the brain without contrast. There is diffuse cerebral atroph
y. There is no mass effect nor midline shift. There is no sign of intracranial hemorrhage. The calvar
ium is intact. There is no evidence of cerebral edema.

 

IMPRESSION:

Cerebral atrophy. No acute intracranial abnormality. No change.

## 2020-07-21 LAB
GLUCOSE BLD-MCNC: 102 MG/DL (ref 75–99)
GLUCOSE BLD-MCNC: 177 MG/DL (ref 75–99)
GLUCOSE BLD-MCNC: 95 MG/DL (ref 75–99)
PH UR: 7 [PH] (ref 5–8)
SP GR UR: 1.01 (ref 1–1.03)
UROBILINOGEN UR QL STRIP: <2 MG/DL (ref ?–2)

## 2020-07-21 RX ADMIN — THERA TABS SCH EACH: TAB at 14:16

## 2020-07-21 RX ADMIN — PANTOPRAZOLE SODIUM PRN MG: 40 TABLET, DELAYED RELEASE ORAL at 20:05

## 2020-07-21 RX ADMIN — MORPHINE SULFATE PRN MG: 4 INJECTION, SOLUTION INTRAMUSCULAR; INTRAVENOUS at 09:33

## 2020-07-21 RX ADMIN — INSULIN ASPART SCH: 100 INJECTION, SOLUTION INTRAVENOUS; SUBCUTANEOUS at 20:07

## 2020-07-21 RX ADMIN — INSULIN ASPART SCH UNIT: 100 INJECTION, SUSPENSION SUBCUTANEOUS at 14:16

## 2020-07-21 RX ADMIN — MORPHINE SULFATE PRN MG: 4 INJECTION, SOLUTION INTRAMUSCULAR; INTRAVENOUS at 03:39

## 2020-07-21 RX ADMIN — Medication SCH MG: at 14:15

## 2020-07-21 RX ADMIN — IOPAMIDOL PRN ML: 612 INJECTION, SOLUTION INTRAVENOUS at 16:11

## 2020-07-21 RX ADMIN — HYDROCODONE BITARTRATE AND ACETAMINOPHEN PRN EACH: 5; 325 TABLET ORAL at 20:05

## 2020-07-21 RX ADMIN — IOPAMIDOL PRN ML: 612 INJECTION, SOLUTION INTRAVENOUS at 15:05

## 2020-07-21 RX ADMIN — INSULIN ASPART SCH UNIT: 100 INJECTION, SOLUTION INTRAVENOUS; SUBCUTANEOUS at 14:16

## 2020-07-21 RX ADMIN — MONTELUKAST SODIUM SCH MG: 10 TABLET, FILM COATED ORAL at 20:05

## 2020-07-21 RX ADMIN — INSULIN ASPART SCH: 100 INJECTION, SOLUTION INTRAVENOUS; SUBCUTANEOUS at 18:18

## 2020-07-21 RX ADMIN — FOLIC ACID SCH MG: 1 TABLET ORAL at 14:16

## 2020-07-21 NOTE — P.CNOR
History of Present Illness





- hospitals


Consult date: 20


Consult reason: other


History of present illness: 





Patient is a 75-year-old male who was admitted to [facility] yesterday evening with regards to her recent fall and bilateral lower 

extremity weakness.  Patient was evaluated by our orthopedic service 2 months 

ago with regards to the same issue, he was also having some worsening left knee 

and left thigh pain at that time.  X-rays did demonstrate severe arthritis 

involving his left hip and mild arthritis involving his left knee.  Dr. Lam has provided a cortisone shot in his knee, this was done about 3 weeks

ago.  He also has an MRI ordered for his lumbar spine, this is scheduled for 

.  There was concern over some lumbar issues which were leading to it is

likely weakness.





Upon arrival to the hospital, computed tomography scan of the lumbar spine did 

demonstrate severe spinal canal stenosis at L3/L4.  There is also other levels 

of mild spondylitic changes along with mild stenosis present.  He was admitted 

under internal medicine for further workup, orthopedic team was consulted.





At his last admission, patient did end up spending 5 days at my would per rehab.

 He felt he was getting stronger at that time.  He states that his legs have 

continued to be very weak.  He has no groin pain he demonstrates at this time.  

Most of discomfort when he gets it is down the posterior aspects of the legs and

the anterior thighs of both legs.  The left leg seems to be more symptomatic.  

He has no other orthopedic complaints at this time.





Review of Systems


Constitutional: Reports as per hospitals





Past Medical History


Past Medical History: Cancer, Diabetes Mellitus, GERD/Reflux, Hypertension, 

Osteoarthritis (OA)


Additional Past Medical History / Comment(s): NIDDM type II, 1999 LEUKEMIA (CLL-

remission), sinus problems, seasonal allergies, tinnitis bilaterally, OA hands, 

R wrist carpal tunnel, past fx with L elbow, R rotator cuff tendon problems, L 

rotator cuff tear, left knee pain


History of Any Multi-Drug Resistant Organisms: None Reported


Past Surgical History: Orthopedic Surgery


Additional Past Surgical History / Comment(s): LEFT HAND thumb tendon repair.


Past Anesthesia/Blood Transfusion Reactions: No Reported Reaction


Past Psychological History: No Psychological Hx Reported


Additional Psychological History / Comment(s): Pt resides with his spouse.  He 

is independent.


Smoking Status: Never smoker


Past Alcohol Use History: None Reported


Past Drug Use History: None Reported





- Past Family History


  ** Father


Family Medical History: CVA/TIA


Additional Family Medical History / Comment(s): Father  at the age of 85yrs.





  ** Mother


Family Medical History: Diabetes Mellitus


Additional Family Medical History / Comment(s): Mother  at the age of 90yrs.





Medications and Allergies


                                Home Medications











 Medication  Instructions  Recorded  Confirmed  Type


 


Fluticasone Nasal Spray [Flonase 2 spr EA NOSTRIL DAILY PRN #1 17

 Rx





Nasal [place]] bottle   


 


Cetirizine HCl [Zyrtec] 10 mg PO DAILY 17 History


 


Lisinopril [Zestril] 40 mg PO HS 17 History


 


Montelukast Sodium [Singulair] 10 mg PO HS 17 History


 


Insulin NPH Hum/Reg Insulin Hm 17 units SQ QAM 20 History





[Relion Novolin 70-30 Flexpen]    


 


Omeprazole Magnesium [PriLOSEC OTC] 20 mg PO DAILY PRN 20 History


 


HYDROcodone/APAP 5-325MG [Norco 1 each PO Q6HR PRN #6 tab 20 Rx





5-325]    


 


ALPRAZolam [Xanax] 0.25 mg PO BID PRN 20 History


 


Ibuprofen [Advil] 800 mg PO Q4H PRN 20 History








                                    Allergies











Allergy/AdvReac Type Severity Reaction Status Date / Time


 


amoxicillin [From Augmentin] Allergy  Rash/Hives Verified 20 23:19


 


clavulanic acid Allergy  Rash/Hives Verified 20 23:19





[From Augmentin]     














Physical Examination





Left lower extremity:





No open lesions or sores present, no significant areas of erythema or soft 

tissue swelling


Walker the hip reproduces minimal discomfort, there is stiffness noted with 

range of motion


Minimal tenderness with palpation along the medial joint line of the knee, there

is no palpable effusion.  He's stable to varus and valgus


Calf is soft, no tenderness with palpation


Plantar flexion, dorsiflexion, EHL, FHL are intact





There is notable weakness with hip flexion on the left side, also with extension

and flexion at the knee, there is minimal strength deficit with plantar flexion 

and dorsiflexion


Sensation to light touch throughout the extremity is intact











Results





- Labs


Labs: 


                  Abnormal Lab Results - Last 24 Hours (Table)











  20 Range/Units





  22:36 22:36 11:16 


 


WBC  26.0 H    (3.8-10.6)  k/uL


 


RBC  3.86 L    (4.30-5.90)  m/uL


 


Hgb  12.8 L    (13.0-17.5)  gm/dL


 


Hct  36.0 L    (39.0-53.0)  %


 


Plt Count  105 L    (150-450)  k/uL


 


Lymphocytes # (Manual)  19.50 H    (1.0-4.8)  k/uL


 


Sodium   135 L   (137-145)  mmol/L


 


Glucose   170 H   (74-99)  mg/dL


 


POC Glucose (mg/dL)    177 H  (75-99)  mg/dL


 


Creatine Kinase   50 L   ()  U/L


 


Total Protein   6.0 L   (6.3-8.2)  g/dL








                                      H & H











  20 Range/Units





  22:36 


 


Hgb  12.8 L  (13.0-17.5)  gm/dL


 


Hct  36.0 L  (39.0-53.0)  %








                                   Coagulation











  20 Range/Units





  22:36 


 


INR  1.1  (<1.2)  











Result Diagrams: 


                                 20 22:36





                                 20 22:36





- Diagnostic results


Shoulder x-ray: report reviewed, image reviewed


CT Scan - lumbar: report reviewed, image reviewed (Images and reports were 

reviewed of both lumbar spine and the left shoulder.  Images left shoulder 

demonstrate no fractures or dislocations, evidence of AC joint arthritis and 

glenohumeral joint arthritis.  Please see report of lumbar spine CT for further 

description)





Assessment and Plan


Assessment: 





Left hip osteoarthritis





Left knee osteoarthritis





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





Multiple level spinal canal stenosis,  moderate to severe L3-L4











Plan: 





I was able to discuss the case, including with physical exam findings and 

imaging studies my attending Dr. Lam.  No orthopedic surgical intervention

recommended at this time.  We will continue with conservative measures





These measures to include a dose of IV steroids with an oral taper after 

discharge.  Physical therapy evaluation





Discussed with the patient to continue utilizing a walker  into be very careful 

when ambulating





Our plan is to proceed with the MRI of the spine on , he will then be 

scheduled to see our orthopedic spine surgeon and later August when he is 

available.





We'll be available for any further questions regarding this patient, please 

contact us with any questions


Time with Patient: Less than 30

## 2020-07-21 NOTE — HP
HISTORY AND PHYSICAL



CHIEF COMPLAINTS:

Weakness and pain of the left hip as well as left shoulder.



HISTORY OF PRESENT ILLNESS:

This 75-year-old gentleman with a past medical history of multiple medical problems,

including history of diabetes, history of GERD, hypertension, history of DJD, history

of chronic lymphoid leukemia, apparently in remission, history of DJD, being followed

by Dr. Smith Figueroa in the outpatient setting, was recently admitted to [facility] with complaints of severe left lower thigh pain and knee pain.  The patient

had severe gait dysfunction.  The patient was extensively evaluated, including a CT

scan.  Orthopedics saw the patient and no fracture was visualized.  The patient

subsequently went to [facility]. After rehab the patient apparently had multiple ER

visits because of significant pain.  The patient apparently fell, also. The patient

again had difficulty in walking as well as significant pain and difficulty in

ambulation.  The patient also reports that he is unable to care for his wife, which he

was able to do. The patient was admitted for further evaluation and treatment.  There

is no history of any fever, rigor or chills. No history of headache, loss of

consciousness, seizures.



PAST MEDICAL HISTORY:

History of diabetes mellitus, type 2, history of GERD, hypertension, history of DJD,

history of chronic lymphoid leukemia.



HOME MEDICATIONS:

1. Prilosec OTC 20 mg daily p.r.n.

2. Singulair 10 mg at bedtime.

3. Zestril 40 mg at bedtime.

4. Advil 800 mg q.4 p.r.n.

5. Flonase 2 sprays daily.

6. Zyrtec 10 mg daily.

7. Insulin NPH 70/30, 17 units subcutaneously each morning.

8. Xanax 0.25 b.i.d. p.r.n.

9. Norco 5 mg q.6 p.r.n.



ALLERGIES:

AUGMENTIN, CLAVULANIC ACID.



FAMILY HISTORY:

History of CVA, TIA.



SOCIAL HISTORY:

No history of smoking. No history of alcohol intake.



REVIEW OF SYSTEMS:

ENT: Diminished hearing. Diminished vision.

CARDIOVASCULAR SYSTEM: No angina, palpitations.

RESPIRATORY SYSTEM: As mentioned earlier.

GI: No nausea, vomiting.

: No dysuria or retention.

NERVOUS SYSTEM: As mentioned earlier.

ALLERGY/IMMUNOLOGY: No asthma, hayfever.

MUSCULOSKELETAL: As mentioned earlier.

HEMATOLOGY/ONCOLOGY: No history of anemia.

ENDOCRINE: Diabetes mellitus.

CONSTITUTIONAL: As mentioned earlier.

DERMATOLOGY: Negative.

RHEUMATOLOGY: Negative.

PSYCHIATRY: As mentioned earlier.



PHYSICAL EXAMINATION:

Patient alert and oriented x3. Pulse 67, blood pressure 123/72, respirations 16,

temperature 97.9, pulse ox 96% on room air.

HEENT: Conjunctivae normal. Oral mucosa moist.

NECK: No jugular venous distention. No carotid bruit. No lymph node enlargement.

CARDIOVASCULAR SYSTEM: S1, S2 muffled. No S3. No S4.

RESPIRATORY SYSTEM: Breath sounds diminished at the bases.  No rhonchi. No crackles.

ABDOMEN: Soft, non-tender. No mass palpable.

LEGS: Movement of the left leg is painful. Left hip movement is also painful. Left

shoulder movements are also painful. Patient is keeping the left upper limb adducted.

NERVOUS SYSTEM: Higher functions as mentioned earlier. Moves all 4 limbs. No focal

motor or sensory deficit.

LYMPHATICS: No lymph node palpable in neck, axillae or groin.

SKIN: No ulcer, rash, bleeding.

JOINTS: No active deforming arthropathy.



LABS:

WBC 26, hemoglobin 12.8, platelets 105.  Sodium 135.



ASSESSMENT:

1. Severe gait dysfunction, possibly secondary to degenerative joint disease.

2. Severe pain of the left hip and left shoulder.

3. Elevated white count, possibly secondary to chronic lymphoid leukemia.

4. Anemia.

5. Thrombocytopenia.

6. Hyponatremia.

7. Gait dysfunction.

8. History of diabetes mellitus, type 2.

9. Gastroesophageal reflux disease.

10.Hypertension.

11.History of degenerative joint disease.

12.History of seasonal allergies.

13.Gait dysfunction.

14.FULL CODE.



RECOMMENDATIONS AND DISCUSSION:

In this 75-year-old gentleman who presented with multiple complex medical issues, we

will monitor the patient closely, continue the current medications, continue with

symptomatic treatment. Otherwise at this time I recommend to continue with symptomatic

treatment of the pain.  Otherwise, monitor blood sugars closely.  I would also

recommend a sedimentation rate, CRP, and also a bone scan. CT scan of the chest,

abdomen and pelvis also will be ordered to complete the workup as well as PSA testing.

The overall prognosis is guarded because of multiple complex medical issues. Underlying

malignancy is also a possibility. Prognosis guarded.  Will monitor blood sugars

closely.  Further recommendations to follow. A copy of this dictation is being

forwarded to Dr. Figueroa, who is the primary physician.





MMODL / IJN: [id number] / Job#: [id number]

## 2020-07-21 NOTE — CT
EXAMINATION TYPE: CT ChestAbdPelvis wo con

 

DATE OF EXAM: 7/21/2020

 

COMPARISON: None

 

HISTORY: Left hip pain and weakness. History of leukemia.

 

CT DLP: 925.1 mGycm

Automated exposure control for dose reduction was used.

 

Local axial sections were obtained from the thoracic inlet to the floor the pelvis without contrast.

 

FINDINGS:

The lungs are clear of consolidation. There is mild subsegmental atelectasis right lung base. Heart s
ize is normal. There is no pericardial effusion. There is no mediastinal adenopathy. There are no hil
ar masses. There is mild coronary artery calcification.

 

There is normal contrast opacification of the stomach and proximal small bowel. Liver shows no focal 
defect. Spleen is intact. There is no pancreatic mass. Gallbladder has normal size and contour. There
 is no retroperitoneal adenopathy. Kidneys have normal size and contour. There is no hydronephrosis. 
Ureters are not dilated. There is normal-appearing urinary bladder. Bladder distends smoothly. There 
is no inguinal hernia.

 

There is no evidence of a pelvic mass. There is no mesenteric edema. There is no ascites or free air.
 There is no sign of a bowel obstruction.

 

Thoracic and lumbar vertebra have normal alignment. There is no compression fracture. There is spondy
lotic changes in the lumbar spine with vacuum disc and spur formation. The bony pelvis is intact. The
re are some mild degenerative cysts in the acetabulum bilaterally. I see no focal bone destruction. T
here is no evidence of hip fracture. The femoral heads are intact. There is spurring on the femoral h
eduardo. The pelvic ring is intact. Sacroiliac joints appear intact. Shoulder joints appear intact. The 
ribs appear intact.

 

IMPRESSION:

No acute abnormality of the chest abdomen pelvis. Mild osteoarthritic changes in the hip joints. No f
racture. No focal bone destruction. No evidence of osseous metastatic disease.

 

Atherosclerotic vascular disease.

## 2020-07-21 NOTE — XR
EXAMINATION TYPE: XR chest 1V portable

 

DATE OF EXAM: 7/21/2020

 

Comparison: 3/8/2020

 

Clinical History: 75-year-old male with CHF

 

Findings:

Heart upper limits of normal in size. Mild tortuosity of the thoracic aorta. Bony vasculature within 
normal limits. No consolidation or pleural effusion.

 

 

Impression:

 No acute process seen.

## 2020-07-22 LAB
ANION GAP SERPL CALC-SCNC: 8 MMOL/L
BUN SERPL-SCNC: 13 MG/DL (ref 9–20)
CALCIUM SPEC-MCNC: 9.1 MG/DL (ref 8.4–10.2)
CELLS COUNTED: 100
CHLORIDE SERPL-SCNC: 101 MMOL/L (ref 98–107)
CO2 SERPL-SCNC: 27 MMOL/L (ref 22–30)
EOSINOPHIL # BLD MANUAL: 0.24 K/UL (ref 0–0.7)
ERYTHROCYTE [DISTWIDTH] IN BLOOD BY AUTOMATED COUNT: 3.92 M/UL (ref 4.3–5.9)
ERYTHROCYTE [DISTWIDTH] IN BLOOD: 13.1 % (ref 11.5–15.5)
GLUCOSE BLD-MCNC: 124 MG/DL (ref 75–99)
GLUCOSE BLD-MCNC: 133 MG/DL (ref 75–99)
GLUCOSE BLD-MCNC: 133 MG/DL (ref 75–99)
GLUCOSE BLD-MCNC: 199 MG/DL (ref 75–99)
GLUCOSE SERPL-MCNC: 118 MG/DL (ref 74–99)
HCT VFR BLD AUTO: 37.7 % (ref 39–53)
HGB BLD-MCNC: 12.7 GM/DL (ref 13–17.5)
LYMPHOCYTES # BLD MANUAL: 20.9 K/UL (ref 1–4.8)
MCH RBC QN AUTO: 32.5 PG (ref 25–35)
MCHC RBC AUTO-ENTMCNC: 33.7 G/DL (ref 31–37)
MCV RBC AUTO: 96.3 FL (ref 80–100)
MONOCYTES # BLD MANUAL: 0.49 K/UL (ref 0–1)
NEUTROPHILS NFR BLD MANUAL: 11 %
NEUTS SEG # BLD MANUAL: 2.67 K/UL (ref 1.3–7.7)
PLATELET # BLD AUTO: 110 K/UL (ref 150–450)
POTASSIUM SERPL-SCNC: 3.9 MMOL/L (ref 3.5–5.1)
SODIUM SERPL-SCNC: 136 MMOL/L (ref 137–145)
WBC # BLD AUTO: 24.3 K/UL (ref 3.8–10.6)

## 2020-07-22 RX ADMIN — LORATADINE SCH MG: 10 TABLET ORAL at 08:24

## 2020-07-22 RX ADMIN — HYDROCODONE BITARTRATE AND ACETAMINOPHEN PRN EACH: 5; 325 TABLET ORAL at 08:26

## 2020-07-22 RX ADMIN — INSULIN ASPART SCH: 100 INJECTION, SOLUTION INTRAVENOUS; SUBCUTANEOUS at 12:40

## 2020-07-22 RX ADMIN — INSULIN ASPART SCH: 100 INJECTION, SOLUTION INTRAVENOUS; SUBCUTANEOUS at 08:11

## 2020-07-22 RX ADMIN — INSULIN ASPART SCH UNIT: 100 INJECTION, SOLUTION INTRAVENOUS; SUBCUTANEOUS at 20:29

## 2020-07-22 RX ADMIN — METHYLPREDNISOLONE SODIUM SUCCINATE SCH MG: 40 INJECTION, POWDER, FOR SOLUTION INTRAMUSCULAR; INTRAVENOUS at 16:41

## 2020-07-22 RX ADMIN — HYDROCODONE BITARTRATE AND ACETAMINOPHEN PRN EACH: 5; 325 TABLET ORAL at 21:40

## 2020-07-22 RX ADMIN — HYDROCODONE BITARTRATE AND ACETAMINOPHEN PRN EACH: 5; 325 TABLET ORAL at 02:23

## 2020-07-22 RX ADMIN — FOLIC ACID SCH MG: 1 TABLET ORAL at 12:02

## 2020-07-22 RX ADMIN — HYDROCODONE BITARTRATE AND ACETAMINOPHEN PRN EACH: 5; 325 TABLET ORAL at 15:00

## 2020-07-22 RX ADMIN — Medication SCH MG: at 12:03

## 2020-07-22 RX ADMIN — MONTELUKAST SODIUM SCH MG: 10 TABLET, FILM COATED ORAL at 20:29

## 2020-07-22 RX ADMIN — INSULIN ASPART SCH UNIT: 100 INJECTION, SOLUTION INTRAVENOUS; SUBCUTANEOUS at 17:34

## 2020-07-22 RX ADMIN — INSULIN ASPART SCH UNIT: 100 INJECTION, SUSPENSION SUBCUTANEOUS at 08:26

## 2020-07-22 RX ADMIN — THERA TABS SCH EACH: TAB at 12:02

## 2020-07-22 NOTE — NM
EXAMINATION TYPE: NM bone scan whole body

 

DATE OF EXAM: 7/22/2020

 

COMPARISON: CT scan 7/21/2020

 

HISTORY: Leukemia

 

Delayed whole-body scanning was performed following the injection of 22.6 mCi Tc 99m MDP.  Images acq
uired 4 hours post injection.

 

FINDINGS:

Abnormal uptake involving the knees, feet and shoulders likely post arthritic.

 

Faint uptake seen throughout the lower cervical, thoracic spine and lower lumbar spine is nonspecific
 but given the in faint intensity most likely degenerative.

Intense abnormal uptake moderate degree mid sternum nonspecific.

Faint abnormal uptake involving the hip joints bilaterally

 

 

IMPRESSION:

1. No diagnostic evidence of metastases.

2. Abnormal uptake throughout the vertebral, appears to correspond with hypertrophic and degenerative
 changes seen by CT scan.

3. Abnormal uptake involving the sternum appears to correspond with hypertrophic arthropathy of the s
ternal manubrial joint on CT scan.

4. Faint abnormal uptake involving the hips appear to correspond to arthropathy of the hip joints wit
h small subchondral cysts or geodes involving the acetabulum and femoral head bilaterally.

## 2020-07-22 NOTE — PN
PROGRESS NOTE



DATE OF SERVICE:

07/22/2020



This 75-year-old gentleman who was admitted with severe gait dysfunction, possibly

secondary to DJD, is complaining of severe pain. The patient has had multiple

evaluations.  The sodium was 136. The ESR is 7 and the CRP is also negative.  UA is

unremarkable and COVID-19 is also negative.  The patient had multiple evaluations,

including orthopedic evaluation and a CT scan of the chest, abdomen and pelvis which

showed no acute abnormality.  Mild DJD changes were noted.  Other arthrosclerotic

disease was also noted.  A bone scan was also done for the possibility of any focal

metastases, which showed abnormal uptake through the vertebrae, indicating hypertrophic

degenerative changes, and no abnormal uptake suggestive of metastasis. Past medical

history reviewed.



REVIEW OF SYSTEMS:

CARDIOVASCULAR SYSTEM: No angina, palpitations.

RESPIRATORY SYSTEM: As mentioned earlier.

GI: As mentioned earlier.

: No dysuria or retention.

NERVOUS SYSTEM: No numbness, weakness.



CURRENT MEDICATIONS:

Reviewed. They include:

1. Norco 5 mg.

2. Xanax.

3. Flonase.

4. Folic acid.

5. Dilaudid.

6. Advil.

7. NovoLog.

8. Zestril.

9. Claritin.

10.Solu-Medrol.

11.Narcan.

12.Protonix.

13.Vitamin B1.



PHYSICAL EXAMINATION:

Patient alert and oriented x3. Pulse 61, blood pressure 101/63, respirations 18,

temperature 98.1, pulse ox 96% on room air.  No orthostatic changes.

HEENT: Conjunctivae normal. Oral mucosa moist.

NECK: No jugular venous distention. No carotid bruit. No lymph node enlargement.

CARDIOVASCULAR SYSTEM:  S1, S2 muffled.

RESPIRATORY SYSTEM: Breath sounds diminished at the bases.  No rhonchi. No crackles.

ABDOMEN: Soft, non-tender.

LEGS: No edema. No swelling.

NERVOUS SYSTEM: No focal deficit.

Movements of the left shoulder and left hip are painful.



LABS:

WBC 24.3, hemoglobin 12.7. Sodium is 136. Glucose is 138.



ASSESSMENT:

1. Severe gait dysfunction, possibly secondary to severe degenerative joint disease of

    the hip and left shoulder.

2. Severe pain of the left hip and left shoulder.

3. Elevated white count, possibly secondary to chronic lymphoid leukemia.

4. Anemia.

5. Thrombocytopenia.

6. Hyponatremia.

7. Gait dysfunction.

8. History of diabetes mellitus, type 2.

9. Gastroesophageal reflux disease.

10.Hypertension.

11.History of degenerative joint disease.

12.History of seasonal allergies.

13.Gait dysfunction.

14.FULL CODE.



RECOMMENDATIONS AND DISCUSSION:

In this 75-year-old gentleman who presented with multiple complex medical issues, at

this time I recommend continuing with current medications, continue with symptomatic

treatment.  Otherwise, I would recommend empiric steroids.  Monitor blood sugars

closely.  If blood sugars are more than 300, I would  recommend IV insulin drip at this

time. Otherwise, PT/OT evaluation and possible ECF rehab. Orthopedic input appreciated.

Prognosis guarded because of multiple complex medical issues. Further recommendations

to follow.





MMODL / IJN: [id number] / Job#: [id number]

## 2020-07-23 VITALS — RESPIRATION RATE: 16 BRPM | TEMPERATURE: 98 F

## 2020-07-23 VITALS — SYSTOLIC BLOOD PRESSURE: 79 MMHG | DIASTOLIC BLOOD PRESSURE: 56 MMHG | HEART RATE: 104 BPM

## 2020-07-23 LAB
ANION GAP SERPL CALC-SCNC: 10 MMOL/L
BUN SERPL-SCNC: 17 MG/DL (ref 9–20)
CALCIUM SPEC-MCNC: 9.6 MG/DL (ref 8.4–10.2)
CELLS COUNTED: 200
CHLORIDE SERPL-SCNC: 99 MMOL/L (ref 98–107)
CO2 SERPL-SCNC: 24 MMOL/L (ref 22–30)
ERYTHROCYTE [DISTWIDTH] IN BLOOD BY AUTOMATED COUNT: 4.24 M/UL (ref 4.3–5.9)
ERYTHROCYTE [DISTWIDTH] IN BLOOD: 13 % (ref 11.5–15.5)
GLUCOSE BLD-MCNC: 223 MG/DL (ref 75–99)
GLUCOSE BLD-MCNC: 257 MG/DL (ref 75–99)
GLUCOSE SERPL-MCNC: 256 MG/DL (ref 74–99)
HCT VFR BLD AUTO: 40.3 % (ref 39–53)
HGB BLD-MCNC: 13.7 GM/DL (ref 13–17.5)
LYMPHOCYTES # BLD MANUAL: 28.1 K/UL (ref 1–4.8)
MCH RBC QN AUTO: 32.3 PG (ref 25–35)
MCHC RBC AUTO-ENTMCNC: 34 G/DL (ref 31–37)
MCV RBC AUTO: 95 FL (ref 80–100)
MONOCYTES # BLD MANUAL: 0.32 K/UL (ref 0–1)
NEUTROPHILS NFR BLD MANUAL: 13 %
NEUTS SEG # BLD MANUAL: 4.2 K/UL (ref 1.3–7.7)
PLATELET # BLD AUTO: 146 K/UL (ref 150–450)
POTASSIUM SERPL-SCNC: 5.2 MMOL/L (ref 3.5–5.1)
SODIUM SERPL-SCNC: 133 MMOL/L (ref 137–145)
WBC # BLD AUTO: 32.3 K/UL (ref 3.8–10.6)

## 2020-07-23 RX ADMIN — INSULIN ASPART SCH UNIT: 100 INJECTION, SOLUTION INTRAVENOUS; SUBCUTANEOUS at 12:27

## 2020-07-23 RX ADMIN — METHYLPREDNISOLONE SODIUM SUCCINATE SCH MG: 40 INJECTION, POWDER, FOR SOLUTION INTRAMUSCULAR; INTRAVENOUS at 00:07

## 2020-07-23 RX ADMIN — Medication SCH MG: at 12:27

## 2020-07-23 RX ADMIN — FOLIC ACID SCH MG: 1 TABLET ORAL at 12:27

## 2020-07-23 RX ADMIN — PANTOPRAZOLE SODIUM PRN MG: 40 TABLET, DELAYED RELEASE ORAL at 08:15

## 2020-07-23 RX ADMIN — HYDROCODONE BITARTRATE AND ACETAMINOPHEN PRN EACH: 5; 325 TABLET ORAL at 08:04

## 2020-07-23 RX ADMIN — LORATADINE SCH MG: 10 TABLET ORAL at 08:06

## 2020-07-23 RX ADMIN — THERA TABS SCH EACH: TAB at 12:27

## 2020-07-23 RX ADMIN — METHYLPREDNISOLONE SODIUM SUCCINATE SCH MG: 40 INJECTION, POWDER, FOR SOLUTION INTRAMUSCULAR; INTRAVENOUS at 08:04

## 2020-07-23 RX ADMIN — INSULIN ASPART SCH UNIT: 100 INJECTION, SUSPENSION SUBCUTANEOUS at 08:06

## 2020-07-23 RX ADMIN — HYDROCODONE BITARTRATE AND ACETAMINOPHEN PRN EACH: 5; 325 TABLET ORAL at 14:17

## 2020-07-23 RX ADMIN — INSULIN ASPART SCH UNIT: 100 INJECTION, SOLUTION INTRAVENOUS; SUBCUTANEOUS at 08:05

## 2020-07-23 NOTE — P.DS
Providers


Date of admission: 


07/21/20 08:21





Expected date of discharge: 07/23/20


Attending physician: 


Dixie Middleton





Consults: 





                                        





07/20/20 23:57


Consult Physician Urgent 


   Consulting Provider: Garry Lam


   Consult Reason/Comments: left leg weakness, left shoulder pain


   Do you want consulting provider notified?: Yes











Primary care physician: 


Smith Whitmore





[facility] Course: 





Final diagnosis


Severe gait dysfunction, possibly secondary to severe degenerative joint disease

of the hip and left shoulder


Severe pain of the left hip and left shoulder


Covid 19 ruled out, testing was negative


Elevated white count, possibly secondary to chronic lymphoid leukemia


Anemia


Thrombocytopenia


Gait dysfunction


Hyponatremia


History of diabetes mellitus type 2


History of degenerative joint disease


History of seasonal ALLERGIES


gastroesophageal reflux disease


Full code





Discharge disposition


Patient is being Discharged in a stable condition with  guarded prognosis to 

[facility] for continued PT/OT therapy.  Patient will follow-up 

with Dr. Whitmore upon discharge. Patient also instructed to follow-up with Dr. Lam in the outpatient setting. Patient will be continued on prednisone in 

the outpatient setting. Patient has an MRI scheduled and instructed to keep this

appointment. Total time taken is greater than 35 minutes.





History of present illness


This is an 38-year-old male who was recently admitted with Severe gait 

dysfunction, possibly secondary to DJD and was being closely monitored. Patient 

was evaluated by orthopedic surgery and had multiple testings including his CAT 

scan of the chest and abdomen and pelvis which showed no acute abnormality with 

mild degenerative joint disease changes noted and other arthrosclerotic disease 

was noted.  A bone scan was also performed for the possibility of any focal 

metastasis, showing abnormal uptake to the vertebra indicating hypertrophic 

degenerative changes and no abnormal uptake suggestive of metastasis.  Patient 

does follow-up with orthopedic in the outpatient setting along with his primary 

care provider and is scheduled for an MRI of his knee and instructed to keep 

this appointment.  Patient continued to be weak with gait dysfunction and PT/OT 

evaluated the patient recommending subacute rehab.  Patient will be going to [facility]

for rehab today.  During hospitalization patient had covid 19 testing which was 

Negative. Currently no reports of chest pain, shortness of breath, or 

palpitations.  Patient is afebrile.  No reports of nausea or vomiting and 

patient is tolerating diet.  Patient will be transferred to [facility] today for 

continued PT/OT therapy.





On exam vital signs are stable.  Temp is 97.6F, pulse is 104, respirations are 

20, blood pressure is 126/76, oxygen saturation is 97% on Room air.  Cardio S1, 

S2 are muffled.  Respiratory shows diminished breath sounds at the bases with No

wheezing or rhonchi noted.  Abdomen is soft and nontender.  Nervous system shows

mild diffuse weakness.





Please refer to medication reconciliation sheet for a list of medications.





Patient Condition at Discharge: Stable





Plan - Discharge Summary


Discharge Rx Participant: No


New Discharge Prescriptions: 


New


   predniSONE 5 mg PO DAILY 13 Days #36 tab


   Folic Acid 1 mg PO DAILY@1200  tab


   Multivitamins, Thera [Multivitamin (formulary)] 1 each PO DAILY@1200  tab


   INSULIN ASPART (NovoLOG) [NovoLOG (formulary)] 0 unit SQ ACHS  vial


   Thiamine [Vitamin B-1] 100 mg PO DAILY@1200  tab





Continue


   Fluticasone Nasal Spray [Flonase Nasal Spray] 2 spr EA NOSTRIL DAILY PRN #1 

bottle


     PRN Reason: Congestion


   Montelukast Sodium [Singulair] 10 mg PO HS


   lisinopriL [Zestril] 40 mg PO HS


   Cetirizine HCl [Zyrtec] 10 mg PO DAILY


   Insulin NPH Hum/Reg Insulin Hm [Relion Novolin 70-30 Flexpen] 17 units SQ QAM


   Omeprazole Magnesium [PriLOSEC OTC] 20 mg PO DAILY PRN


     PRN Reason: acid reflux


   Ibuprofen [Advil] 800 mg PO Q4H PRN


     PRN Reason: Pain


   HYDROcodone/APAP 5-325MG [Norco 5-325] 1 each PO Q6HR PRN #6 tab


     PRN Reason: Pain


   ALPRAZolam [Xanax] 0.25 mg PO BID PRN #6 tab


     PRN Reason: Anxiety


Discharge Medication List





Fluticasone Nasal Spray [Flonase Nasal Spray] 2 spr EA NOSTRIL DAILY PRN #1 

bottle 01/20/17 [Rx]


Cetirizine HCl [Zyrtec] 10 mg PO DAILY 08/18/17 [History]


Montelukast Sodium [Singulair] 10 mg PO HS 08/18/17 [History]


lisinopriL [Zestril] 40 mg PO HS 08/18/17 [History]


Insulin NPH Hum/Reg Insulin Hm [Relion Novolin 70-30 Flexpen] 17 units SQ QAM 

05/29/20 [History]


Omeprazole Magnesium [PriLOSEC OTC] 20 mg PO DAILY PRN 05/29/20 [History]


Ibuprofen [Advil] 800 mg PO Q4H PRN 07/20/20 [History]


predniSONE 5 mg PO DAILY 13 Days #36 tab 07/21/20 [Rx]


ALPRAZolam [Xanax] 0.25 mg PO BID PRN #6 tab 07/23/20 [Rx]


Folic Acid 1 mg PO DAILY@1200  tab 07/23/20 [Rx]


HYDROcodone/APAP 5-325MG [[place] 5-325] 1 each PO Q6HR PRN #6 tab 07/23/20 [Rx]


INSULIN ASPART (NovoLOG) [NovoLOG (formulary)] 0 unit SQ ACHS  vial 07/23/20 

[Rx]


Multivitamins, Thera [Multivitamin (formulary)] 1 each PO DAILY@1200  tab 

07/23/20 [Rx]


Thiamine [Vitamin B-1] 100 mg PO DAILY@1200  tab 07/23/20 [Rx]








Follow up Appointment(s)/Referral(s): 


Garry Lam DO [Doctor of Osteopathic Medicine] - As Needed


Smith Whitmore DO [Primary Care Provider] - 1-2 days


Activity/Diet/Wound Care/Special Instructions: 


Patient is going to [facility]





Activity as tolerated


Continue current diet


Follow-up with primary care provider upon discharge


Keep your MRI appointment as scheduled


Continue working with physical therapy


Continue monitoring blood sugars before meals at bedtime and treat accordingly 

with sliding scale





Discharge Disposition: TRANSFER TO SNF/ECF

## 2020-08-31 ENCOUNTER — HOSPITAL ENCOUNTER (OUTPATIENT)
Dept: HOSPITAL 47 - RADMRIMAIN | Age: 75
Discharge: HOME | End: 2020-08-31
Attending: FAMILY MEDICINE
Payer: MEDICARE

## 2020-08-31 DIAGNOSIS — M47.816: ICD-10-CM

## 2020-08-31 DIAGNOSIS — M47.817: ICD-10-CM

## 2020-08-31 DIAGNOSIS — M48.061: Primary | ICD-10-CM

## 2020-08-31 DIAGNOSIS — E11.42: ICD-10-CM

## 2020-08-31 DIAGNOSIS — C91.11: ICD-10-CM

## 2020-08-31 DIAGNOSIS — I10: ICD-10-CM

## 2020-08-31 DIAGNOSIS — E11.65: ICD-10-CM

## 2020-08-31 PROCEDURE — 72158 MRI LUMBAR SPINE W/O & W/DYE: CPT

## 2020-09-01 NOTE — MR
EXAMINATION TYPE: MR lumbar spine wo/w con

 

DATE OF EXAM: 8/31/2020

 

COMPARISON:

 

HISTORY: Low back pain into left leg

 

CONTRAST: 

Standard multiplanar, multisequence MRI departmental protocol utilizing 7.5 mL intravenous Gadavist g
adolinium contrast.  

 

Lumbar vertebra have normal alignment. There is degenerative disc space narrowing throughout the lumb
ar spine. There is no compression fracture. There is spurring of the endplates. There is hypertrophic
 facet arthropathy with disc bulging and ligament thickening with resultant moderately severe spinal 
stenosis at L3-4. There is no lumbar paraspinal mass.

 

The posterior elements are intact. The sacroiliac joints appear intact. Lumbar nerve roots appear nor
mal. There is mild narrowing of the lumbar neural foramina bilaterally from L3 to S1 due to disc spac
e narrowing and facet arthropathy.

 

Contrast images show no pathologic enhancement.

 

IMPRESSION:

Multilevel spondylotic changes. Moderately severe spinal stenosis at L3-4 as above. No fracture. Mild
 multilevel neural foraminal impingement.

## 2021-03-26 ENCOUNTER — HOSPITAL ENCOUNTER (OUTPATIENT)
Dept: HOSPITAL 47 - RADMRIMAIN | Age: 76
Discharge: HOME | End: 2021-03-26
Attending: ORTHOPAEDIC SURGERY
Payer: MEDICARE

## 2021-03-26 DIAGNOSIS — M71.22: ICD-10-CM

## 2021-03-26 DIAGNOSIS — S83.242A: Primary | ICD-10-CM

## 2021-03-26 NOTE — MR
EXAMINATION TYPE: MR knee LT wo con

 

DATE OF EXAM: 3/26/2021

 

COMPARISON: None

 

HISTORY: Lt knee pain

 

TECHNIQUE: 

Multiplanar, multisequence images of the knee is performed without IV contrast.

 

FINDINGS:

 

MEDIAL MENISCUS: Macerated tear posterior horn medial meniscus. Anterior horn is intact.

 

LATERAL MENISCUS: Anterior and posterior horns are intact without tear.

 

CRUCIATE LIGAMENTS: The anterior and posterior cruciate ligaments are intact and unremarkable.

 

COLLATERAL LIGAMENTS: The medial collateral ligament and lateral collateral ligament complex are inta
ct and unremarkable. 

 

EXTENSOR MECHANISM: Visualized quadriceps and patellar tendons are intact.

 

EFFUSION:  No significant suprapatellar joint effusion.

 

POPLITEAL CYST:  Popliteal cyst measuring 6.4cm

 

TRICOMPARTMENT SPACES: Mild multicompartment degenerative disc space narrowing.

 

CARTILAGE: Intact

 

BONE MARROW SIGNAL: No focal abnormal marrow signal is appreciated.

 

OTHER:  No additional significant abnormality is appreciated.

 

IMPRESSION:

 

1.Macerated tear posterior horn medial meniscus. 

2.Popliteal cyst measuring 6.4cm

## 2021-04-19 ENCOUNTER — HOSPITAL ENCOUNTER (INPATIENT)
Dept: HOSPITAL 47 - EC | Age: 76
LOS: 3 days | Discharge: HOME | DRG: 287 | End: 2021-04-22
Attending: HOSPITALIST | Admitting: HOSPITALIST
Payer: MEDICARE

## 2021-04-19 DIAGNOSIS — Z20.822: ICD-10-CM

## 2021-04-19 DIAGNOSIS — I10: ICD-10-CM

## 2021-04-19 DIAGNOSIS — Z79.899: ICD-10-CM

## 2021-04-19 DIAGNOSIS — I11.9: ICD-10-CM

## 2021-04-19 DIAGNOSIS — K21.9: ICD-10-CM

## 2021-04-19 DIAGNOSIS — Z82.3: ICD-10-CM

## 2021-04-19 DIAGNOSIS — C91.11: ICD-10-CM

## 2021-04-19 DIAGNOSIS — H93.13: ICD-10-CM

## 2021-04-19 DIAGNOSIS — J98.11: ICD-10-CM

## 2021-04-19 DIAGNOSIS — E87.1: ICD-10-CM

## 2021-04-19 DIAGNOSIS — I25.110: Primary | ICD-10-CM

## 2021-04-19 DIAGNOSIS — J30.2: ICD-10-CM

## 2021-04-19 DIAGNOSIS — M25.562: ICD-10-CM

## 2021-04-19 DIAGNOSIS — Z79.4: ICD-10-CM

## 2021-04-19 DIAGNOSIS — G56.01: ICD-10-CM

## 2021-04-19 DIAGNOSIS — Z83.3: ICD-10-CM

## 2021-04-19 DIAGNOSIS — M19.041: ICD-10-CM

## 2021-04-19 DIAGNOSIS — Z79.82: ICD-10-CM

## 2021-04-19 DIAGNOSIS — M19.042: ICD-10-CM

## 2021-04-19 DIAGNOSIS — Z88.8: ICD-10-CM

## 2021-04-19 DIAGNOSIS — E11.9: ICD-10-CM

## 2021-04-19 DIAGNOSIS — Z88.0: ICD-10-CM

## 2021-04-19 LAB
ALBUMIN SERPL-MCNC: 3.9 G/DL (ref 3.5–5)
ALP SERPL-CCNC: 81 U/L (ref 38–126)
ALT SERPL-CCNC: 21 U/L (ref 4–49)
ANION GAP SERPL CALC-SCNC: 7 MMOL/L
APTT BLD: 22.4 SEC (ref 22–30)
AST SERPL-CCNC: 23 U/L (ref 17–59)
BASOPHILS # BLD MANUAL: 0.28 K/UL (ref 0–0.2)
BUN SERPL-SCNC: 15 MG/DL (ref 9–20)
CALCIUM SPEC-MCNC: 9.1 MG/DL (ref 8.4–10.2)
CELLS COUNTED: 200
CHLORIDE SERPL-SCNC: 99 MMOL/L (ref 98–107)
CO2 SERPL-SCNC: 27 MMOL/L (ref 22–30)
D DIMER PPP FEU-MCNC: 0.43 MG/L FEU (ref ?–0.6)
ERYTHROCYTE [DISTWIDTH] IN BLOOD BY AUTOMATED COUNT: 4.19 M/UL (ref 4.3–5.9)
ERYTHROCYTE [DISTWIDTH] IN BLOOD: 13 % (ref 11.5–15.5)
GLUCOSE SERPL-MCNC: 237 MG/DL (ref 74–99)
HCT VFR BLD AUTO: 37.7 % (ref 39–53)
HGB BLD-MCNC: 13.5 GM/DL (ref 13–17.5)
INR PPP: 1 (ref ?–1.2)
LIPASE SERPL-CCNC: 72 U/L (ref 23–300)
LYMPHOCYTES # BLD MANUAL: 23.16 K/UL (ref 1–4.8)
MAGNESIUM SPEC-SCNC: 1.7 MG/DL (ref 1.6–2.3)
MCH RBC QN AUTO: 32.3 PG (ref 25–35)
MCHC RBC AUTO-ENTMCNC: 35.9 G/DL (ref 31–37)
MCV RBC AUTO: 90 FL (ref 80–100)
MONOCYTES # BLD MANUAL: 0.28 K/UL (ref 0–1)
NEUTROPHILS NFR BLD MANUAL: 16 %
NEUTS SEG # BLD MANUAL: 4.46 K/UL (ref 1.3–7.7)
PLATELET # BLD AUTO: 119 K/UL (ref 150–450)
POTASSIUM SERPL-SCNC: 3.9 MMOL/L (ref 3.5–5.1)
PROT SERPL-MCNC: 5.9 G/DL (ref 6.3–8.2)
PT BLD: 10.8 SEC (ref 9–12)
SODIUM SERPL-SCNC: 133 MMOL/L (ref 137–145)
WBC # BLD AUTO: 27.9 K/UL (ref 3.8–10.6)

## 2021-04-19 PROCEDURE — 93005 ELECTROCARDIOGRAM TRACING: CPT

## 2021-04-19 PROCEDURE — 71046 X-RAY EXAM CHEST 2 VIEWS: CPT

## 2021-04-19 PROCEDURE — 86140 C-REACTIVE PROTEIN: CPT

## 2021-04-19 PROCEDURE — 85025 COMPLETE CBC W/AUTO DIFF WBC: CPT

## 2021-04-19 PROCEDURE — 93017 CV STRESS TEST TRACING ONLY: CPT

## 2021-04-19 PROCEDURE — 84484 ASSAY OF TROPONIN QUANT: CPT

## 2021-04-19 PROCEDURE — 83735 ASSAY OF MAGNESIUM: CPT

## 2021-04-19 PROCEDURE — 96375 TX/PRO/DX INJ NEW DRUG ADDON: CPT

## 2021-04-19 PROCEDURE — 71275 CT ANGIOGRAPHY CHEST: CPT

## 2021-04-19 PROCEDURE — 93458 L HRT ARTERY/VENTRICLE ANGIO: CPT

## 2021-04-19 PROCEDURE — 96372 THER/PROPH/DIAG INJ SC/IM: CPT

## 2021-04-19 PROCEDURE — 85610 PROTHROMBIN TIME: CPT

## 2021-04-19 PROCEDURE — 83690 ASSAY OF LIPASE: CPT

## 2021-04-19 PROCEDURE — 80061 LIPID PANEL: CPT

## 2021-04-19 PROCEDURE — 96374 THER/PROPH/DIAG INJ IV PUSH: CPT

## 2021-04-19 PROCEDURE — 80048 BASIC METABOLIC PNL TOTAL CA: CPT

## 2021-04-19 PROCEDURE — 85730 THROMBOPLASTIN TIME PARTIAL: CPT

## 2021-04-19 PROCEDURE — 83615 LACTATE (LD) (LDH) ENZYME: CPT

## 2021-04-19 PROCEDURE — 83880 ASSAY OF NATRIURETIC PEPTIDE: CPT

## 2021-04-19 PROCEDURE — 93306 TTE W/DOPPLER COMPLETE: CPT

## 2021-04-19 PROCEDURE — 36415 COLL VENOUS BLD VENIPUNCTURE: CPT

## 2021-04-19 PROCEDURE — 81003 URINALYSIS AUTO W/O SCOPE: CPT

## 2021-04-19 PROCEDURE — 80053 COMPREHEN METABOLIC PANEL: CPT

## 2021-04-19 PROCEDURE — 78452 HT MUSCLE IMAGE SPECT MULT: CPT

## 2021-04-19 PROCEDURE — 85379 FIBRIN DEGRADATION QUANT: CPT

## 2021-04-19 PROCEDURE — 99285 EMERGENCY DEPT VISIT HI MDM: CPT

## 2021-04-19 PROCEDURE — 84145 PROCALCITONIN (PCT): CPT

## 2021-04-19 PROCEDURE — 82728 ASSAY OF FERRITIN: CPT

## 2021-04-19 PROCEDURE — 87635 SARS-COV-2 COVID-19 AMP PRB: CPT

## 2021-04-19 RX ADMIN — KETOROLAC TROMETHAMINE SCH DROPS: 5 SOLUTION OPHTHALMIC at 21:57

## 2021-04-19 RX ADMIN — CEFAZOLIN SCH MLS/HR: 330 INJECTION, POWDER, FOR SOLUTION INTRAMUSCULAR; INTRAVENOUS at 15:56

## 2021-04-19 RX ADMIN — PANTOPRAZOLE SODIUM SCH MG: 40 INJECTION, POWDER, FOR SOLUTION INTRAVENOUS at 20:11

## 2021-04-19 RX ADMIN — HYDROCODONE BITARTRATE AND ACETAMINOPHEN PRN EACH: 5; 325 TABLET ORAL at 22:06

## 2021-04-19 RX ADMIN — HEPARIN SODIUM SCH UNIT: 5000 INJECTION INTRAVENOUS; SUBCUTANEOUS at 22:07

## 2021-04-19 NOTE — CT
EXAMINATION TYPE: CT angio chest

 

DATE OF EXAM: 4/19/2021

 

COMPARISON: None

 

HISTORY: SOB

 

CT DLP: 673.9 mGycm

Automated exposure control for dose reduction was used.

 

CONTRAST: 

Performed with IV Contrast, patient injected with 85cc mL of Isovue 370.

 

Images are obtained from the thoracic inlet to the diaphragm with IV contrast. There are 3-D post pro
cessed images.

 

There is posterior pulmonary interstitial infiltrates and subsegmental atelectasis in the mid and low
er lung fields. Heart appears slightly enlarged. There is no pericardial effusion. There is no pleura
l effusion.

 

There are no hilar masses. There is no mediastinal adenopathy. Thoracic aorta is intact. There is no 
dissection. There is borderline aneurysm of the ascending aorta which measures 4 cm.

 

There is normal contrast opacification of the pulmonary arteries. There are no filling defects. There
 is some spurring in the thoracic spine. I see no bony destructive process. Sternum is intact.

 

IMPRESSION:

No evidence of pulmonary embolism.

 

Cardiomegaly with mild pulmonary interstitial infiltrates and atelectasis in the lower lung fields.

## 2021-04-19 NOTE — ED
General Adult HPI





- General


Chief complaint: Chest Pain


Stated complaint: Chest pain


Time Seen by Provider: 21 13:13


Source: patient, EMS, RN notes reviewed, old records reviewed


Mode of arrival: EMS





- History of Present Illness


Initial comments: 





Summary 5-year-old male presenting for evaluation of substernal chest pain.  

Pain is been intermittent over the past several days.  He does report that it is

worse with exertion and relieved by rest.  He has no previous history of CAD.  

He has a history of hypertension.  He is a nonsmoker.  He states that this 

morning the pain became more severe and longer lasting.  He states it is mostly 

resolved at this time.  He denies vomiting.  This is lower chest nonradiating.





- Related Data


                                Home Medications











 Medication  Instructions  Recorded  Confirmed


 


Cetirizine HCl [Zyrtec] 10 mg PO DAILY 17


 


Montelukast Sodium [Singulair] 10 mg PO HS 17


 


lisinopriL [Zestril] 40 mg PO HS 17


 


Insulin NPH Hum/Reg Insulin Hm 17 units SQ QAM 20





[Relion Novolin 70-30 Flexpen]   


 


Omeprazole Magnesium [PriLOSEC OTC] 20 mg PO DAILY PRN 20


 


Ibuprofen [Advil] 800 mg PO Q4H PRN 20








                                  Previous Rx's











 Medication  Instructions  Recorded


 


Fluticasone Nasal Spray [Flonase 2 spr EA NOSTRIL DAILY PRN #1 17





Nasal [place]] bottle 


 


predniSONE 5 mg PO DAILY 13 Days #36 tab 20


 


ALPRAZolam [Xanax] 0.25 mg PO BID PRN #6 tab 20


 


Folic Acid 1 mg PO DAILY@1200  tab 20


 


HYDROcodone/APAP 5-325MG [Norco 1 each PO Q6HR PRN #6 tab 20





5-325]  


 


INSULIN ASPART (NovoLOG) [NovoLOG 0 unit SQ ACHS  vial 20





(formulary)]  


 


Multivitamins, Thera [Multivitamin 1 each PO DAILY@1200  tab 20





(formulary)]  


 


Thiamine [Vitamin B-1] 100 mg PO DAILY@1200  tab 20











                                    Allergies











Allergy/AdvReac Type Severity Reaction Status Date / Time


 


amoxicillin [From Augmentin] Allergy  Rash/Hives Verified 20 23:19


 


clavulanic acid Allergy  Rash/Hives Verified 20 23:19





[From Augmentin]     














Review of Systems


ROS Statement: 


Those systems with pertinent positive or pertinent negative responses have been 

documented in the HPI.





ROS Other: All systems not noted in ROS Statement are negative.





Past Medical History


Past Medical History: Cancer, Diabetes Mellitus, GERD/Reflux, Hypertension, 

Osteoarthritis (OA)


Additional Past Medical History / Comment(s): NIDDM type II, 1999 LEUKEMIA 

(CLL-remission), sinus problems, seasonal allergies, tinnitis bilaterally, OA 

hands, R wrist carpal tunnel, past fx with L elbow, R rotator cuff tendon 

problems, L rotator cuff tear, left knee pain


History of Any Multi-Drug Resistant Organisms: None Reported


Past Surgical History: Orthopedic Surgery


Additional Past Surgical History / Comment(s): LEFT HAND thumb tendon repair.


Past Anesthesia/Blood Transfusion Reactions: No Reported Reaction


Past Psychological History: No Psychological Hx Reported


Smoking Status: Never smoker


Past Alcohol Use History: None Reported


Past Drug Use History: None Reported





- Past Family History


  ** Father


Family Medical History: CVA/TIA


Additional Family Medical History / Comment(s): Father  at the age of 85yrs.





  ** Mother


Family Medical History: Diabetes Mellitus


Additional Family Medical History / Comment(s): Mother  at the age of 90yrs.





General Exam


General appearance: alert, in no apparent distress


Head exam: Present: atraumatic, normocephalic


Eye exam: Present: normal appearance, PERRL


ENT exam: Present: normal exam


Neck exam: Present: normal inspection.  Absent: tenderness, meningismus


Respiratory exam: Present: normal lung sounds bilaterally.  Absent: respiratory 

distress, wheezes


Cardiovascular Exam: Present: regular rate, normal rhythm


GI/Abdominal exam: Present: soft.  Absent: distended, tenderness, guarding


Extremities exam: Present: normal inspection, normal capillary refill.  Absent: 

pedal edema, calf tenderness


Neurological exam: Present: alert, oriented X3, CN II-XII intact.  Absent: motor

sensory deficit


Psychiatric exam: Present: normal affect, normal mood


Skin exam: Present: warm, dry, intact.  Absent: cyanosis, diaphoretic





Course


                                   Vital Signs











  21





  13:27


 


Temperature 97.8 F


 


Pulse Rate 77


 


Respiratory 18





Rate 


 


Blood Pressure 159/98


 


O2 Sat by Pulse 99





Oximetry 














EKG Findings





- EKG Comments:


EKG Findings:: EKG: Sinus rhythm no ST segment elevation, LVH, rate of 88, VA 

interval 154, QRS duration 90, 





Medical Decision Making





- Medical Decision Making





76 yo male presenting with substernal chest pain, intermittent over the past 

several days.  Patient has EKG showing sinus rhythm without ST segment elevation

per chest x-ray negative for focal pneumonia, no acute findings.  Patient has a 

CBC showing cytosis with a history of CLL.  Patient has negative d-dimer, 

negative initial troponin.  He will be kept in observation for serial cardiac 

enzymes, telemetry, cardiology consultation.  Case discussed with Dr. Germain who 

will admit.





- Lab Data


Result diagrams: 


                                 21 13:26





                                 21 13:26


                                   Lab Results











  21 Range/Units





  13:26 13:26 13:26 


 


WBC  27.9 H    (3.8-10.6)  k/uL


 


RBC  4.19 L    (4.30-5.90)  m/uL


 


Hgb  13.5    (13.0-17.5)  gm/dL


 


Hct  37.7 L    (39.0-53.0)  %


 


MCV  90.0    (80.0-100.0)  fL


 


MCH  32.3    (25.0-35.0)  pg


 


MCHC  35.9    (31.0-37.0)  g/dL


 


RDW  13.0    (11.5-15.5)  %


 


Plt Count  119 L    (150-450)  k/uL


 


MPV  7.3    


 


PT   10.8   (9.0-12.0)  sec


 


INR   1.0   (<1.2)  


 


APTT   22.4   (22.0-30.0)  sec


 


D-Dimer   0.43   (<0.60)  mg/L FEU


 


Sodium    133 L  (137-145)  mmol/L


 


Potassium    3.9  (3.5-5.1)  mmol/L


 


Chloride    99  ()  mmol/L


 


Carbon Dioxide    27  (22-30)  mmol/L


 


Anion Gap    7  mmol/L


 


BUN    15  (9-20)  mg/dL


 


Creatinine    0.91  (0.66-1.25)  mg/dL


 


Est GFR (CKD-EPI)AfAm    >90  (>60 ml/min/1.73 sqM)  


 


Est GFR (CKD-EPI)NonAf    83  (>60 ml/min/1.73 sqM)  


 


Glucose    237 H  (74-99)  mg/dL


 


Calcium    9.1  (8.4-10.2)  mg/dL


 


Magnesium    1.7  (1.6-2.3)  mg/dL


 


Total Bilirubin    0.6  (0.2-1.3)  mg/dL


 


AST    23  (17-59)  U/L


 


ALT    21  (4-49)  U/L


 


Alkaline Phosphatase    81  ()  U/L


 


Troponin I     (0.000-0.034)  ng/mL


 


NT-Pro-B Natriuret Pep     pg/mL


 


Total Protein    5.9 L  (6.3-8.2)  g/dL


 


Albumin    3.9  (3.5-5.0)  g/dL


 


Lipase    72  ()  U/L














  21 Range/Units





  13:26 13:26 


 


WBC    (3.8-10.6)  k/uL


 


RBC    (4.30-5.90)  m/uL


 


Hgb    (13.0-17.5)  gm/dL


 


Hct    (39.0-53.0)  %


 


MCV    (80.0-100.0)  fL


 


MCH    (25.0-35.0)  pg


 


MCHC    (31.0-37.0)  g/dL


 


RDW    (11.5-15.5)  %


 


Plt Count    (150-450)  k/uL


 


MPV    


 


PT    (9.0-12.0)  sec


 


INR    (<1.2)  


 


APTT    (22.0-30.0)  sec


 


D-Dimer    (<0.60)  mg/L FEU


 


Sodium    (137-145)  mmol/L


 


Potassium    (3.5-5.1)  mmol/L


 


Chloride    ()  mmol/L


 


Carbon Dioxide    (22-30)  mmol/L


 


Anion Gap    mmol/L


 


BUN    (9-20)  mg/dL


 


Creatinine    (0.66-1.25)  mg/dL


 


Est GFR (CKD-EPI)AfAm    (>60 ml/min/1.73 sqM)  


 


Est GFR (CKD-EPI)NonAf    (>60 ml/min/1.73 sqM)  


 


Glucose    (74-99)  mg/dL


 


Calcium    (8.4-10.2)  mg/dL


 


Magnesium    (1.6-2.3)  mg/dL


 


Total Bilirubin    (0.2-1.3)  mg/dL


 


AST    (17-59)  U/L


 


ALT    (4-49)  U/L


 


Alkaline Phosphatase    ()  U/L


 


Troponin I  <0.012   (0.000-0.034)  ng/mL


 


NT-Pro-B Natriuret Pep   260  pg/mL


 


Total Protein    (6.3-8.2)  g/dL


 


Albumin    (3.5-5.0)  g/dL


 


Lipase    ()  U/L














Disposition


Clinical Impression: 


 Chest pain





Disposition: HOME SELF-CARE


Condition: Stable


Is patient prescribed a controlled substance at d/c from ED?: No


Referrals: 


Smith Figueroa DO [Primary Care Provider] - 1-2 days


Decision to Admit Reason: Admit from EC


Decision Date: 21


Decision Time: 15:00

## 2021-04-19 NOTE — HP
HISTORY AND PHYSICAL



DATE OF SERVICE:

04/19/2021



CHIEF COMPLAINT:

Chest pain.



HISTORY OF PRESENT ILLNESS:

This 75-year-old gentleman with a past medical history of diabetes mellitus, GERD,

hypertension, history of DJD, history of CLL, in remission, being followed by Dr. Fgiueroa and Dr. Finch in the outpatient setting, was complaining of chest pain which

is mostly situated in the substernal area on the left side of the chest. The patient

also had some shortness of breath.  The patient has had intermittent pain over the last

several days.  Patient also points to an area in the epigastrium to the right side of

the stomach as also having some pain previously. The patient's D-dimer is negative.

Patient is being closely monitored at this time.  There is no history of any fever,

rigor or chills. No history of headache, loss of consciousness, seizures.



PAST MEDICAL HISTORY:

Diabetes mellitus, GERD, hypertension, DJD, history of chronic lymphoid leukemia.



HOME MEDICATIONS:

Omeprazole, prednisone, ketorolac, Zestril, Singulair, insulin NPH, hydrocodone,

fluticasone, Zyrtec, Xanax. Doses are reviewed.



ALLERGIES:

AMOXICILLIN.



FAMILY HISTORY:

History of CVA, TIA.



SOCIAL HISTORY:

No history of smoking. No history of alcohol intake.



REVIEW OF SYSTEMS:

ENT: Diminished hearing. Diminished vision.

CARDIOVASCULAR SYSTEM: As mentioned earlier.

RESPIRATORY SYSTEM: As mentioned earlier.

GI: As mentioned earlier.

: No dysuria or retention.

NERVOUS SYSTEM: No numbness, weakness.

ALLERGY/IMMUNOLOGY: As mentioned earlier.

HEMATOLOGY/ONCOLOGY: As mentioned earlier.

ENDOCRINE: As mentioned earlier.

CONSTITUTIONAL: As mentioned earlier.

DERMATOLOGY: Negative.

RHEUMATOLOGY: Negative.

PSYCHIATRY: As mentioned earlier.



PHYSICAL EXAMINATION:

Patient alert and oriented x3. Pulse 59, blood pressure 166/80, respiration 18,

temperature normal, pulse ox 94% on room air.

HEENT: Conjunctivae normal.

NECK: No jugular venous distention.

CARDIOVASCULAR SYSTEM:  S1, S2 muffled.

RESPIRATORY SYSTEM: Breath sounds diminished at the bases.  No rhonchi. No crackles.

ABDOMEN: Soft, non-tender. No mass palpable.

LEGS: No edema. No swelling.

NERVOUS SYSTEM: Higher functions as mentioned earlier. Moves all 4 limbs. No focal

motor or sensory deficit.

LYMPHATICS: No lymph node palpable in neck, axillae or groin.

SKIN: No ulcer, rash, bleeding.

JOINTS: No active deforming arthropathy.



LABS:

WBC 27.9, hemoglobin 13.5. Sodium 133, glucose 237. EKG, which I personally reviewed,

showed nonspecific ST-T changes.



ASSESSMENT:

1. Chest pain, possible unstable angina.

2. Rule out pulmonary embolism. Rule out peripheral opacities in the chest x-ray.

3. Diabetes mellitus, type 2.

4. Gastroesophageal reflux disease.

5. Hypertension.

6. History of degenerative joint disease.

7. History of chronic lymphoid leukemia, in remission.

8. FULL CODE.



RECOMMENDATIONS AND DISCUSSION:

In this 75-year-old gentleman who presented with multiple complex medical issues, we

will monitor the patient closely, continue the current medications.  Continue

symptomatic treatment. Resume the home medications.  CT angio of the chest.  Cardiology

consultation.  Two-D echo with Doppler. Guarded prognosis because of multiple complex

medical issues. Further recommendations to follow. A copy of this dictation is being

forwarded to Dr. Figueroa, who is the primary physician.



MMODL / IJN: [id number] / Job#: [id number]

## 2021-04-19 NOTE — XR
EXAMINATION TYPE: XR chest 2V

 

DATE OF EXAM: 4/19/2021

 

COMPARISON: 7/21/2020

 

HISTORY: 75-year-old male with chest pain and shortness of breath

 

TECHNIQUE:  AP and lateral views

 

FINDINGS:  

The heart is borderline in size. Limitations due to large body habitus. Unable to exclude subtle harman
y peripheral interstitial changes in the lungs. Otherwise, no other consolidation or pleural effusion
.

 

 

 

 

 

 

IMPRESSION:  

 

1. The heart size is mildly accentuated compared to prior exam.

2. Some subtle peripheral densities in both sides may be technical due to large patient body habitus.
 Correlate to exclude subtle early interstitial infiltrates/atypical pneumonia.

## 2021-04-20 LAB
ALBUMIN SERPL-MCNC: 3.5 G/DL (ref 3.5–5)
ALBUMIN/GLOB SERPL: 1.7 {RATIO}
ALP SERPL-CCNC: 73 U/L (ref 38–126)
ALT SERPL-CCNC: 19 U/L (ref 4–49)
ANION GAP SERPL CALC-SCNC: 6 MMOL/L
AST SERPL-CCNC: 24 U/L (ref 17–59)
BASOPHILS # BLD AUTO: 0.2 K/UL (ref 0–0.2)
BASOPHILS # BLD MANUAL: 0.27 K/UL (ref 0–0.2)
BASOPHILS NFR BLD AUTO: 1 %
BUN SERPL-SCNC: 14 MG/DL (ref 9–20)
CALCIUM SPEC-MCNC: 9 MG/DL (ref 8.4–10.2)
CELLS COUNTED: 200
CHLORIDE SERPL-SCNC: 101 MMOL/L (ref 98–107)
CHOLEST SERPL-MCNC: 137 MG/DL (ref 0–200)
CO2 SERPL-SCNC: 29 MMOL/L (ref 22–30)
EOSINOPHIL # BLD AUTO: 0.1 K/UL (ref 0–0.7)
EOSINOPHIL # BLD MANUAL: 0.27 K/UL (ref 0–0.7)
EOSINOPHIL NFR BLD AUTO: 1 %
ERYTHROCYTE [DISTWIDTH] IN BLOOD BY AUTOMATED COUNT: 4.2 M/UL (ref 4.3–5.9)
ERYTHROCYTE [DISTWIDTH] IN BLOOD: 14 % (ref 11.5–15.5)
GLOBULIN SER CALC-MCNC: 2.1 G/DL
GLUCOSE BLD-MCNC: 131 MG/DL (ref 75–99)
GLUCOSE BLD-MCNC: 192 MG/DL (ref 75–99)
GLUCOSE BLD-MCNC: 194 MG/DL (ref 75–99)
GLUCOSE BLD-MCNC: 197 MG/DL (ref 75–99)
GLUCOSE SERPL-MCNC: 134 MG/DL (ref 74–99)
HCT VFR BLD AUTO: 39.1 % (ref 39–53)
HDLC SERPL-MCNC: 28 MG/DL (ref 40–60)
HGB BLD-MCNC: 12.7 GM/DL (ref 13–17.5)
LDH SPEC-CCNC: 474 U/L (ref 313–618)
LDLC SERPL CALC-MCNC: 86.2 MG/DL (ref 0–131)
LYMPHOCYTES # BLD MANUAL: 24.3 K/UL (ref 1–4.8)
LYMPHOCYTES # SPEC AUTO: 21.6 K/UL (ref 1–4.8)
LYMPHOCYTES NFR SPEC AUTO: 81 %
MCH RBC QN AUTO: 30.3 PG (ref 25–35)
MCHC RBC AUTO-ENTMCNC: 32.6 G/DL (ref 31–37)
MCV RBC AUTO: 93 FL (ref 80–100)
MONOCYTES # BLD AUTO: 0.4 K/UL (ref 0–1)
MONOCYTES # BLD MANUAL: 0.27 K/UL (ref 0–1)
MONOCYTES NFR BLD AUTO: 2 %
NEUTROPHILS # BLD AUTO: 3.2 K/UL (ref 1.3–7.7)
NEUTROPHILS NFR BLD AUTO: 12 %
NEUTROPHILS NFR BLD MANUAL: 7 %
NEUTS SEG # BLD MANUAL: 1.87 K/UL (ref 1.3–7.7)
PH UR: 6 [PH] (ref 5–8)
PLATELET # BLD AUTO: 101 K/UL (ref 150–450)
POTASSIUM SERPL-SCNC: 4.5 MMOL/L (ref 3.5–5.1)
PROT SERPL-MCNC: 5.6 G/DL (ref 6.3–8.2)
SODIUM SERPL-SCNC: 136 MMOL/L (ref 137–145)
SP GR UR: 1.01 (ref 1–1.03)
TRIGL SERPL-MCNC: 114 MG/DL (ref 0–149)
UROBILINOGEN UR QL STRIP: <2 MG/DL (ref ?–2)
VLDLC SERPL CALC-MCNC: 22.8 MG/DL (ref 5–40)
WBC # BLD AUTO: 26.7 K/UL (ref 3.8–10.6)

## 2021-04-20 RX ADMIN — MONTELUKAST SODIUM SCH MG: 10 TABLET, FILM COATED ORAL at 09:54

## 2021-04-20 RX ADMIN — INSULIN ASPART SCH UNIT: 100 INJECTION, SUSPENSION SUBCUTANEOUS at 21:14

## 2021-04-20 RX ADMIN — LORATADINE SCH MG: 10 TABLET ORAL at 09:54

## 2021-04-20 RX ADMIN — KETOROLAC TROMETHAMINE SCH DROPS: 5 SOLUTION OPHTHALMIC at 09:56

## 2021-04-20 RX ADMIN — HYDROCODONE BITARTRATE AND ACETAMINOPHEN PRN EACH: 5; 325 TABLET ORAL at 21:15

## 2021-04-20 RX ADMIN — INSULIN ASPART SCH UNIT: 100 INJECTION, SOLUTION INTRAVENOUS; SUBCUTANEOUS at 21:13

## 2021-04-20 RX ADMIN — HYDROCODONE BITARTRATE AND ACETAMINOPHEN PRN EACH: 5; 325 TABLET ORAL at 12:35

## 2021-04-20 RX ADMIN — PREDNISOLONE ACETATE SCH DROPS: 10 SUSPENSION/ DROPS OPHTHALMIC at 09:55

## 2021-04-20 RX ADMIN — HEPARIN SODIUM SCH UNIT: 5000 INJECTION INTRAVENOUS; SUBCUTANEOUS at 21:13

## 2021-04-20 RX ADMIN — PANTOPRAZOLE SODIUM SCH MG: 40 TABLET, DELAYED RELEASE ORAL at 21:13

## 2021-04-20 RX ADMIN — PREDNISOLONE ACETATE SCH: 10 SUSPENSION/ DROPS OPHTHALMIC at 09:56

## 2021-04-20 RX ADMIN — HYDROCODONE BITARTRATE AND ACETAMINOPHEN PRN EACH: 5; 325 TABLET ORAL at 06:33

## 2021-04-20 RX ADMIN — PANTOPRAZOLE SODIUM SCH MG: 40 INJECTION, POWDER, FOR SOLUTION INTRAVENOUS at 09:53

## 2021-04-20 RX ADMIN — KETOROLAC TROMETHAMINE SCH DROPS: 5 SOLUTION OPHTHALMIC at 21:14

## 2021-04-20 RX ADMIN — CEFAZOLIN SCH MLS/HR: 330 INJECTION, POWDER, FOR SOLUTION INTRAMUSCULAR; INTRAVENOUS at 16:35

## 2021-04-20 RX ADMIN — HEPARIN SODIUM SCH UNIT: 5000 INJECTION INTRAVENOUS; SUBCUTANEOUS at 09:54

## 2021-04-20 RX ADMIN — PREDNISOLONE ACETATE SCH DROPS: 10 SUSPENSION/ DROPS OPHTHALMIC at 21:14

## 2021-04-20 RX ADMIN — ATORVASTATIN CALCIUM SCH MG: 40 TABLET, FILM COATED ORAL at 21:12

## 2021-04-20 NOTE — ECHOF
Referral Reason:CP



MEASUREMENTS

--------

HEIGHT: 182.9 cm

WEIGHT: 95.3 kg

BP: 159/98

RVIDd:   3.4 cm     (< 3.3)

IVSd:   1.4 cm     (0.6 - 1.1)

LVIDd:   4.9 cm     (3.9 - 5.3)

LVPWd:   1.7 cm     (0.6 - 1.1)

IVSs:   1.8 cm

LVIDs:   3.4 cm

LVPWs:   2.0 cm

LAESV Index (A-L):   25.24 ml/m

Ao Diam:   3.6 cm     (2.0 - 3.7)

AV Cusp:   2.3 cm     (1.5 - 2.6)

MV EXCURSION:   24.642 mm     (> 18.000)

MV EF SLOPE:   124 mm/s     (70 - 150)

EPSS:   1.1 cm

MV E Olegario:   0.45 m/s

MV DecT:   302 ms

MV A Olegario:   0.91 m/s

MV E/A Ratio:   0.49 

RAP:   5.00 mmHg

RVSP:   24.39 mmHg







FINDINGS

--------

Sinus rhythm.

This was a technically difficult study with suboptimal apical views.

The left ventricular size is normal.   There is moderate concentric left ventricular hypertrophy.   O
verall left ventricular systolic function is normal with, an EF between 55 - 60 %.   The diastolic fi
lling pattern is normal for the age of the patient 8.69.

The right ventricle is mildly enlarged.

Normal LA  size by volume 22+/-6 ml/m2.

The right atrium was not well visualized.

xx ml of Lumason was utilized for enhancement of images.

Interatrial and interventricular septum intact.

The aortic valve is trileaflet and appears structurally normal.   There is no evidence of aortic regu
rgitation.   There is no evidence of aortic stenosis.

There is trace mitral regurgitation.

Mild tricuspid regurgitation present.   There is no evidence of pulmonary hypertension.   The right v
entricular systolic pressure, as measured by Doppler, is 24.39mmHg.

There is no pulmonic regurgitation present.

The aortic root size is normal.

IVC Not well visulized.

There is no pericardial effusion.



CONCLUSIONS

--------

1. The left ventricular size is normal.

2. There is moderate concentric left ventricular hypertrophy.

3. Overall left ventricular systolic function is normal with, an EF between 55 - 60 %.

4. The right ventricle is mildly enlarged.

5. There is trace mitral regurgitation.

6. Mild tricuspid regurgitation present.





SONOGRAPHER: Richa Gross RDCS

## 2021-04-20 NOTE — P.CRDCN
History of Present Illness


History of present illness: 





HISTORY OF PRESENTING ILLNESS


This is a pleasant 75-year-old  male past medical history significant 

for CLL since , diabetes mellitus, hypertension, osteoarthritis and poor 

functional capacity.  He denies prior history of coronary artery disease and 

does not follow in the office with a cardiologist. We have been asked to see in 

consultation for chest pain.  He states the first episode occurred a couple of 

weeks ago while he was up doing work around the kitchen and he felt the 

discomfort in the left precordial region.  Described as a heavy sensation.  The 

episode was brief and subsided on its own.  Then yesterday morning while he was 

having a heated discussion with the family member regarding some family issues 

he had another episode of chest discomfort that radiated to the left shoulder 

minimally.  This was associated with mild shortness of breath.  He denies palpi

tations, nausea, vomiting or diaphoresis.  There is no old cardiac testing to 

review.





DIAGNOSTICS


EKG reveals sinus mechanism with nonspecific T wave abnormalities noted in lead 

V1.


Telemetry tracings indicate sinus mechanism.


Chest xray subtle peripheral densities bilaterally.  Could be early interstitial

infiltrate/atypical pneumonia.


CTA is negative for pulmonary embolism.


Laboratory reviewed, revealed BC 26.7, hemoglobin 12.7, platelets 101, sodium 

136, potassium 4.5, magnesium 1.7, creatinine 0.8, cardiac enzymes negative 3.


Current cardiac medications include lisinopril 40 mg daily. 





REVIEW OF SYSTEMS


At the time of my exam:


CONSTITUTIONAL: Denies fever or chills.


CARDIOVASCULAR: Denies chest pain, shortness of breath, orthopnea, PND or 

palpitations.


RESPIRATORY: Denies cough. 


GASTROINTESTINAL: Denies abdominal pain, diarrhea, constipation, nausea or 

vomiting.


MUSCULOSKELETAL: Denies myalgias.


NEUROLOGIC: Denies numbness, tingling, headacbe or weakness.


ENDOCRINE: Denies fatigue, weight change,  polydipsia or polyurina.


GENITOURINARY: Denies burning, hematuria or urgency with micturation.


HEMATOLOGIC: Denies history of anemia or bleeding. 





PHYSICAL EXAMINATION


Blood pressure 159/60 heart rate 55 afebrile and maintaining oxygen saturation 

on nasal cannula.


CONSTITUTIONAL: No apparent distress. 


HEENT: Head is normocephalic. Pupils are equal, round. Sclerae anicteric. Mucous

membranes of the mouth are moist.  No JVD. No carotid bruit.


CHEST EXAMINATION: Lungs are clear to auscultation. No chest wall tenderness is 

noted on palpation or with deep breathing. 


HEART EXAMINATION: Regular rate and rhythm. S1, S2 heard. No murmurs, gallops or

rub.


ABDOMEN: Soft, nontender. Positive bowel sounds.


EXTREMITIES: 2+ peripheral pulses, no lower extremity edema and no calf t

enderness.


NEUROLOGIC EXAMINATION: Patient is awake, alert and oriented x3. 





ASSESSMENT


Chest pain, atypical. 


Hypertension


Diabetes mellitus


CLL, follows with Dr. Finch


Poor functional capacity due to arthritis





PLAN


An acute coronary event has been ruled out. 


Decrease aspirin to 81 mg daily.


Check lipid profile. 


Echocardiogram has been obtained and will be reviewed. 


Due to his history of diabetes mellitus recommend initiation of atorvastatin 40 

mg daily. Continue lisinopril as previously ordered. 


He has eaten breakfast, so we will order a Lexiscan stress test tomorrow 

morning. NPO after midnight tonight. 


Thank you kindly for this consultation. 








Nurse Practitioner note has been reviewed, I agree with a documented findings 

and plan of care.  Patient was seen and examined.











Past Medical History


Past Medical History: Cancer, Diabetes Mellitus, GERD/Reflux, Hypertension, 

Osteoarthritis (OA)


Additional Past Medical History / Comment(s): NIDDM type II,  LEUKEMIA (CLL-

remission), sinus problems, seasonal allergies, tinnitis bilaterally, OA hands, 

R wrist carpal tunnel, past fx with L elbow, R rotator cuff tendon problems, L 

rotator cuff tear, left knee pain


History of Any Multi-Drug Resistant Organisms: None Reported


Past Surgical History: Orthopedic Surgery


Additional Past Surgical History / Comment(s): LEFT HAND thumb tendon repair.


Past Anesthesia/Blood Transfusion Reactions: No Reported Reaction


Past Psychological History: No Psychological Hx Reported


Smoking Status: Never smoker


Past Alcohol Use History: None Reported


Past Drug Use History: None Reported





- Past Family History


  ** Father


Family Medical History: CVA/TIA


Additional Family Medical History / Comment(s): Father  at the age of 85yrs.





  ** Mother


Family Medical History: Diabetes Mellitus


Additional Family Medical History / Comment(s): Mother  at the age of 90yrs.





Medications and Allergies


                                Home Medications











 Medication  Instructions  Recorded  Confirmed  Type


 


Cetirizine HCl [Zyrtec] 10 mg PO DAILY 17 History


 


Montelukast Sodium [Singulair] 10 mg PO DAILY 17 History


 


lisinopriL [Zestril] 40 mg PO DAILY 17 History


 


Insulin NPH Hum/Reg Insulin Hm 17 units SQ TID PRN 20 History





[Relion Novolin 70-30 Flexpen]    


 


ALPRAZolam [Xanax] 0.25 mg PO BID PRN #6 tab 20 Rx


 


Fluticasone Nasal Spray [Flonase 1 spr EA NOSTRIL DAILY PRN 21 

History





Nasal Erath]    


 


HYDROcodone/APAP 5-325MG [Norco 1 tab PO Q6HR PRN 21 History





5-325]    


 


Ketorolac 0.5% Ophth Soln [Acular] 1 drop BOTH EYES BID 21 

History


 


Omeprazole 20 mg PO BID PRN 21 History


 


Prednisolone Acetate/Pf 1 drop BOTH EYES BID 21 History





[Prednisolone Acet 1% Eye Drop]    








                                    Allergies











Allergy/AdvReac Type Severity Reaction Status Date / Time


 


amoxicillin [From Augmentin] Allergy  Rash/Hives Verified 21 15:57


 


clavulanic acid Allergy  Rash/Hives Verified 21 15:57





[From Augmentin]     














Physical Exam


Vitals: 


                                   Vital Signs











  Temp Pulse Resp BP Pulse Ox


 


 21 06:47      99


 


 21 05:27   74  17  161/73  100


 


 21 04:00   54 L  16  162/98  100


 


 21 21:56   52 L  18  170/82  97


 


 21 18:57   62  18  151/86  96


 


 21 17:31   56 L  18  151/90  97


 


 21 15:48   59 L  18  166/80  95


 


 21 13:27  97.8 F  77  18  159/98  99














Results





                                 21 03:30





                                 21 03:30


                                 Cardiac Enzymes











  21 Range/Units





  13:26 13:26 16:44 


 


AST  23    (17-59)  U/L


 


Troponin I   <0.012  <0.012  (0.000-0.034)  ng/mL














  21 Range/Units





  19:31 03:30 


 


AST   24  (17-59)  U/L


 


Troponin I  <0.012   (0.000-0.034)  ng/mL








                                   Coagulation











  21 Range/Units





  13:26 


 


PT  10.8  (9.0-12.0)  sec


 


APTT  22.4  (22.0-30.0)  sec








                                       CBC











  21 Range/Units





  13:26 03:30 


 


WBC  27.9 H  26.7 H  (3.8-10.6)  k/uL


 


RBC  4.19 L  4.20 L  (4.30-5.90)  m/uL


 


Hgb  13.5  12.7 L  (13.0-17.5)  gm/dL


 


Hct  37.7 L  39.1  (39.0-53.0)  %


 


Plt Count  119 L  101 L  (150-450)  k/uL








                          Comprehensive Metabolic Panel











  21 Range/Units





  13:26 03:30 


 


Sodium  133 L  136 L  (137-145)  mmol/L


 


Potassium  3.9  4.5  (3.5-5.1)  mmol/L


 


Chloride  99  101  ()  mmol/L


 


Carbon Dioxide  27  29  (22-30)  mmol/L


 


BUN  15  14  (9-20)  mg/dL


 


Creatinine  0.91  0.80  (0.66-1.25)  mg/dL


 


Glucose  237 H  134 H  (74-99)  mg/dL


 


Calcium  9.1  9.0  (8.4-10.2)  mg/dL


 


AST  23  24  (17-59)  U/L


 


ALT  21  19  (4-49)  U/L


 


Alkaline Phosphatase  81  73  ()  U/L


 


Total Protein  5.9 L  5.6 L  (6.3-8.2)  g/dL


 


Albumin  3.9  3.5  (3.5-5.0)  g/dL








                               Current Medications











Generic Name Dose Route Start Last Admin





  Trade Name Freq  PRN Reason Stop Dose Admin


 


Hydrocodone Bitart/Acetaminophen  1 each  21 19:47  21 06:33





  Hydrocodone/Apap 5-325mg 1 Each Tab  PO   1 each





  Q6HR PRN   Administration





  Pain  


 


Alprazolam  0.25 mg  21 19:47  21 22:07





  Alprazolam 0.25 Mg Tab  PO   0.25 mg





  BID PRN   Administration





  Anxiety  


 


Aspirin  325 mg  21 09:00 





  Aspirin 325 Mg Tab  PO  





  DAILY [facility]  


 


Fluticasone Propionate  1 spray  21 19:47 





  Fluticasone 50mcg/Spray Nasal 16gm  EA NOSTRIL  





  DAILY PRN  





  Congestion  


 


Heparin Sodium (Porcine)  5,000 unit  21 21:00  21 22:07





  Heparin Sodium,Porcine/Pf 5,000 Unit/0.5 Ml Syringe  SQ   5,000 unit





  Q12HR MAYRA   Administration


 


Sodium Chloride  1,000 mls @ 20 mls/hr  21 15:00  21 15:56





  Saline 0.9%  IV   20 mls/hr





  .Q24H MAYRA   Administration


 


Insulin Aspart  17 unit  21 19:47 





  Insuln Asp Prt/Insulin Aspart 100 Unit/Ml 10 Ml Vial  SQ  





  TID PRN  





  HIGH BLOOD SUGAR  


 


Ketorolac Tromethamine  1 drops  21 21:00  21 21:57





  Ketorolac 0.5% Ophth Drops 5 Ml Btl  BOTH EYES   1 drops





  BID MAYRA   Administration


 


Lisinopril  40 mg  21 09:00 





  Lisinopril 20 Mg Tab  PO  





  DAILY [facility]  


 


Loratadine  10 mg  21 09:00 





  Loratadine 10 Mg Tab  PO  





  DAILY [facility]  


 


Montelukast Sodium  10 mg  21 09:00 





  Montelukast 10 Mg Tab  PO  





  DAILY [facility]  


 


Morphine Sulfate  4 mg  21 14:53  21 15:57





  Morphine Sulfate 4 Mg/Ml Syringe  IV   4 mg





  Q4HR PRN   Administration





  Severe Pain  


 


Naloxone HCl  0.2 mg  21 14:53 





  Naloxone 0.4 Mg/Ml 1 Ml Vial  IV  





  Q2M PRN  





  Opioid Reversal  


 


Pantoprazole Sodium  40 mg  21 21:00  21 20:11





  Pantoprazole 40 Mg/10 Ml Vial  IVP   40 mg





  BID MAYRA   Administration


 


Prednisolone Acetate  1 drops  21 21:00 





  Prednisolone Acetate 1% Ophth Drops 5 Ml Btl  BOTH EYES  





  BID [facility]  


 


Temazepam  15 mg  21 19:48 





  Temazepam 15 Mg Cap  PO  





  HS PRN  





  Insomnia  








                                        





                                 21 03:30 





                                 21 03:30

## 2021-04-20 NOTE — PN
PROGRESS NOTE



DATE OF SERVICE:

04/20/2021



This 75-year-old gentleman was admitted with chest pain, scheduled for stress test

tomorrow by Cardiology. A chest CTA was done to rule out the possibility of acute

pulmonary embolism. CTA showed cardiomegaly with mild pulmonary interstitial

infiltrates and atelectasis.



PHYSICAL EXAMINATION:

On exam, alert and oriented x3.  Pulse is 55, blood pressure 159/60, respiration 16,

temperature 97.8, pulse ox 100% on 2 L.

HEENT:  Conjunctivae normal.

NECK: No jugular venous distention.

CARDIOVASCULAR: S1, S2 muffled.

RESPIRATORY:  Breath sounds diminished at the bases. A few scattered rhonchi and

crackles.

ABDOMEN:  Soft, nontender.

NERVOUS SYSTEM: No focal deficits.



LABS:

WBC 26.7 secondary to leukemia.  Sodium 136.



ASSESSMENT:

1. Chest pain, possible unstable angina.

2. Rule out bilateral interstitial infiltrate.

3. Pulmonary embolism, unlikely per CT scan.

4. Diabetes mellitus type 2.

5. Gastroesophageal reflux disease.

6. History of degenerative joint disease.

7. History of chronic lymphoid leukemia, in remission.

8. Hyponatremia.

9. FULL CODE.



RECOMMENDATIONS AND DISCUSSION:

Recommend to continue current medications, continue symptomatic treatment.  Otherwise

follow closely with Cardiology, stress test.  Otherwise, COVID-19 was negative also.

Recommend inflammatory markers also at this time.  Continue to monitor.





MMODL / IJN: [id number] / Job#: [id number]

## 2021-04-21 VITALS — RESPIRATION RATE: 16 BRPM

## 2021-04-21 LAB
FERRITIN SERPL-MCNC: 61.9 NG/ML (ref 22–322)
GLUCOSE BLD-MCNC: 110 MG/DL (ref 75–99)
GLUCOSE BLD-MCNC: 111 MG/DL (ref 75–99)
GLUCOSE BLD-MCNC: 135 MG/DL (ref 75–99)
GLUCOSE BLD-MCNC: 142 MG/DL (ref 75–99)
GLUCOSE BLD-MCNC: 99 MG/DL (ref 75–99)

## 2021-04-21 PROCEDURE — 4A023N7 MEASUREMENT OF CARDIAC SAMPLING AND PRESSURE, LEFT HEART, PERCUTANEOUS APPROACH: ICD-10-PCS

## 2021-04-21 PROCEDURE — B2111ZZ FLUOROSCOPY OF MULTIPLE CORONARY ARTERIES USING LOW OSMOLAR CONTRAST: ICD-10-PCS

## 2021-04-21 RX ADMIN — MONTELUKAST SODIUM SCH MG: 10 TABLET, FILM COATED ORAL at 07:35

## 2021-04-21 RX ADMIN — CEFAZOLIN SCH: 330 INJECTION, POWDER, FOR SOLUTION INTRAMUSCULAR; INTRAVENOUS at 15:53

## 2021-04-21 RX ADMIN — INSULIN ASPART SCH: 100 INJECTION, SOLUTION INTRAVENOUS; SUBCUTANEOUS at 17:27

## 2021-04-21 RX ADMIN — LORATADINE SCH MG: 10 TABLET ORAL at 07:34

## 2021-04-21 RX ADMIN — HYDROCODONE BITARTRATE AND ACETAMINOPHEN PRN EACH: 5; 325 TABLET ORAL at 20:41

## 2021-04-21 RX ADMIN — PREDNISOLONE ACETATE SCH DROPS: 10 SUSPENSION/ DROPS OPHTHALMIC at 07:35

## 2021-04-21 RX ADMIN — ATORVASTATIN CALCIUM SCH MG: 40 TABLET, FILM COATED ORAL at 20:41

## 2021-04-21 RX ADMIN — HEPARIN SODIUM SCH UNIT: 5000 INJECTION INTRAVENOUS; SUBCUTANEOUS at 20:41

## 2021-04-21 RX ADMIN — PANTOPRAZOLE SODIUM SCH MG: 40 TABLET, DELAYED RELEASE ORAL at 20:41

## 2021-04-21 RX ADMIN — INSULIN ASPART SCH UNIT: 100 INJECTION, SOLUTION INTRAVENOUS; SUBCUTANEOUS at 20:41

## 2021-04-21 RX ADMIN — KETOROLAC TROMETHAMINE SCH DROPS: 5 SOLUTION OPHTHALMIC at 07:35

## 2021-04-21 RX ADMIN — INSULIN ASPART SCH: 100 INJECTION, SOLUTION INTRAVENOUS; SUBCUTANEOUS at 06:50

## 2021-04-21 RX ADMIN — HEPARIN SODIUM SCH UNIT: 5000 INJECTION INTRAVENOUS; SUBCUTANEOUS at 07:34

## 2021-04-21 RX ADMIN — INSULIN ASPART SCH: 100 INJECTION, SUSPENSION SUBCUTANEOUS at 13:25

## 2021-04-21 RX ADMIN — ASPIRIN 81 MG CHEWABLE TABLET SCH MG: 81 TABLET CHEWABLE at 07:34

## 2021-04-21 RX ADMIN — PREDNISOLONE ACETATE SCH DROPS: 10 SUSPENSION/ DROPS OPHTHALMIC at 20:42

## 2021-04-21 RX ADMIN — INSULIN ASPART SCH UNIT: 100 INJECTION, SUSPENSION SUBCUTANEOUS at 17:25

## 2021-04-21 RX ADMIN — HYDROCODONE BITARTRATE AND ACETAMINOPHEN PRN EACH: 5; 325 TABLET ORAL at 06:13

## 2021-04-21 RX ADMIN — INSULIN ASPART SCH: 100 INJECTION, SOLUTION INTRAVENOUS; SUBCUTANEOUS at 13:25

## 2021-04-21 RX ADMIN — KETOROLAC TROMETHAMINE SCH DROPS: 5 SOLUTION OPHTHALMIC at 20:42

## 2021-04-21 RX ADMIN — PANTOPRAZOLE SODIUM SCH MG: 40 TABLET, DELAYED RELEASE ORAL at 07:34

## 2021-04-21 NOTE — P.PCN
Date of Procedure: 04/21/21


Operative Findings: 








CARDIAC CATHETERIZATION





PERFORMING PHYSICIAN: 


Panfilo Diamond MD, RPVI





PROCEDURE PERFORMED:


1.  Selective right and left coronary angiogram


2.  Left heart catheterization





INDICATION:


Chest discomfort in this 75-year-old gentleman who underwent myocardial 

perfusion imaging stress is and that came in to be abnormal





COMPLICATION:


None





APPROACH:


Right radial artery





LEVEL OF SEDATION:


Moderate with a sedation length of 13 minutes





PROCEDURE DESCRIPTION:


After obtaining an informed consent, the patient was brought to cardiac cath 

lab.  Local anesthesia was performed using lidocaine subcutaneously.  The right 

radial artery was cannulated using Seldinger technique, the guidewire passed 

easily, following that we advanced a 5-Montserratian sheath dilator assembly, the wire 

and dilator were removed and sheath was flushed.  





Following that, 2 mg of verapamil along with [id number] unit heparin were given.





Selective right and left coronary angiogram using a 6-Montserratian JR4 and JL 3.5 

catheters.  





Following that we did left heart catheterization using 6-Montserratian pigtail 

catheter.  





The procedure was completed there was no complication.





SELECTIVE CORONARY ANGIOGRAM:


The right coronary artery:


Is a large caliber vessel and a dominant vessel.  The mid RCA has a lesion 

appeared to be in the range of 50%.  Distally bifurcates into PDA and PLV 

branches both appeared to be angiographically normal





Left main:


Is angiographically normal.  Bifurcates into LCx and LAD





The left circumflex:


Is a large caliber vessel and on dominant vessel.  The LCx in the midportion has

mild disease only.





The left anterior descending artery:


Is a large caliber vessel.  The proximal LAD appears to be normal and gives 

rises into the first and second diagonal branches both appeared to be 

angiographically normal in the mid LAD is normal as well and gives rises into 

third diagonal branch which seems to be normal and the LAD distally appears to 

have mild disease only.





HEMODYNAMICS:


The LVEDP was 4 mmHg without significant gradient across aortic valve





CONCLUSION:


1. Intermediate disease involving the mid RCA


2. Mild disease involving the left coronary system





POSTPROCEDURE MANAGEMENT:


Medical treatment

## 2021-04-21 NOTE — PN
PROGRESS NOTE



DATE OF SERVICE:

04/21/2021



This 75-year-old gentleman who was admitted with chest pain scheduled to have a 
stress

test today and the stress test showed findings cannot exclude stress-induced 
ischemia.

Cardiology recommending cardiac catheterization at this time.  The chest x-ray 
also

showed vague infiltrate/atelectasis also.  No chest pain.  No palpitations.  No 
fever.

Inflammatory markers are not elevated at this time.  No chest pain.  No 
palpitations.

No fever.



PHYSICAL EXAMINATION:

Alert and oriented x3. Pulse 53, blood pressure 137/76, respirations 16, 
temperature

97.7, pulse ox 99% on 2 L.

HEENT:  Conjunctivae normal.

NECK: No jugular venous distention.

CARDIOVASCULAR:  S1, S2 muffled.

RESPIRATORY: Breath sounds diminished at the bases. A few scattered rhonchi.

ABDOMEN:  Soft, nontender.

LEGS:  No edema. No swelling.

NERVOUS SYSTEM:  No focal deficits.



LABS:

WBC 26.7. Sodium 136.



ASSESSMENT:

1. Chest pain, possible unstable angina.

2. Indeterminate stress test.

3. Rule out bilateral interstitial infiltrate.

4. Pulmonary embolism unlikely per CAT scan.

5. Diabetes mellitus type 2.

6. Gastroesophageal reflux disease history.

7. History of degenerative joint disease.

8. History of chronic lymphoid leukemia in remission.

9. Hyponatremia.

10.FULL CODE.



RECOMMENDATIONS AND DISCUSSION:

Recommend to continue current medications. Continue symptomatic treatment. 
Otherwise at

this time I recommend procalcitonin. Cardiac Cardiology.  Guarded prognosis.

Continue rest of medications.  Prognosis guarded.  Further recommendations to 
follow.





MMODL / IJN: [id number] / Job#: [id number]

MTDD

## 2021-04-21 NOTE — NM
EXAMINATION TYPE: NM stress lexiscan cardiolite

 

DATE OF EXAM: 4/21/2021

 

COMPARISON: NONE

 

HISTORY: Precordial chest pain and abnormal EKG

 

TECHNIQUE:  After the intravenous administration of 10.2 mCi Tc 99m Sestamibi - Cardiolite resting SP
ECT images acquired 45 minutes post injection. 

 

The patient received 0.4mg Lexiscan, 25.0 mCi Tc 99m Sestamibi - Stress images obtained 60 minutes po
st injection 

 

FINDINGS:  

 

Review of stress and rest SPECT images demonstrates decreased perfusion on stress images along the in
ferior wall. Stress-induced ischemia is difficult Gated analysis shows hypokinesia with an estimated 
left ventricular ejection fraction of 44 %.

 

 

 

IMPRESSION:  

 

I cannot exclude stress-induced ischemia.

## 2021-04-21 NOTE — EST
EXERCISE STRESS



DATE OF SERVICE:



INDICATION:

Chest pain.



AGE:

75



SEX:

M



HT:

6 ft.



WT:

209 lbs.



PROTOCOL:

Lexiscan



STAGE:

N/A



DURATION OF EXERCISE:

N/A



HEART RATE REST:

64



BLOOD PRESSURE REST:

154/83



MAXIMUM HEART RATE ACHIEVED:

83



MAXIMUM BLOOD PRESSURE:

154/83



85% MPHR:

125



100% MPHR:

147



METS:

N/A



STRESS DATA:

Heart rate is 64, pressure is 154/83 mmHg.  Baseline EKG showed sinus mechanism. 0.4 mg

of Lexiscan given over 15 seconds per protocol.  Max heart rate was 83 beats per

minute.  Maximum pressure was 139/76 mmHg. Clinically, the patient did not have any

symptoms and the EKG did not show any significant ST or T-wave abnormalities concerning

for ischemia.



CONCLUSION:

1. Nondiagnostic electrocardiogram stress testing in response to Lexiscan.

2. Please follow up on the Cardiolite portion on a separate report from Radiology

    Department.





MMODL / IJN: [id number] / Job#: [id number]

## 2021-04-21 NOTE — P.PN
Subjective


This is a pleasant 75-year-old  male past medical history significant 

for CLL since 1999, diabetes mellitus, hypertension, osteoarthritis and poor 

functional capacity.  He denies prior history of coronary artery disease and 

does not follow in the office with a cardiologist. We have been asked to see in 

consultation for chest pain.  EKG reveals sinus mechanism with nonspecific T 

wave abnormalities noted in lead V1. Telemetry tracings indicate sinus 

mechanism. Chest xray subtle peripheral densities bilaterally.  Could be early 

interstitial infiltrate/atypical pneumonia. CTA is negative for pulmonary 

embolism.Current cardiac medications include lisinopril 40 mg daily.  

Echocardiogram on 4/19/21 revealed an EF between 5560 percent, RV is mildly 

enlarged, trace MR, mild TR.





4/21/21:


Patient scheduled for Lexiscan stress test today. Patient alert and oriented x 

3. Continues to have chest discomfort radiating to the left shoulder, but it has

improved from yesterday. Results from the stress test revealed decreased 

perfusion stress images along the inferior wall, cannot exclude stress-induced 

ischemia.





PHYSICAL EXAMINATION


Blood pressure 137/76 heart rate 53 afebrile and maintaining oxygen saturation 

on nasal cannula.


CONSTITUTIONAL: No apparent distress. 


HEENT: Head is normocephalic. Pupils are equal, round. Sclerae anicteric. Mucous

membranes of the mouth are moist.  No JVD. No carotid bruit.


CHEST EXAMINATION: Lungs are clear to auscultation. No chest wall tenderness is 

noted on palpation or with deep breathing. 


HEART EXAMINATION: Regular rate and rhythm. S1, S2 heard. No murmurs, gallops or

rub.


ABDOMEN: Soft, nontender. Positive bowel sounds.


EXTREMITIES: 2+ peripheral pulses, no lower extremity edema and no calf 

tenderness.


NEUROLOGIC EXAMINATION: Patient is awake, alert and oriented x3. 





ASSESSMENT


Chest pain, atypical, An acute coronary event has been ruled out. 


Hypertension


Diabetes mellitus


CLL, follows with Dr. Finch


Poor functional capacity due to arthritis





PLAN


We will proceed with Cardiac catheterization with Dr. Diamond today. I have 

discussed the risks, benefits and alternative therapies for the above-mentioned 

procedure and for both sedation/analgesia as well as necessary blood product ad

ministration, if indicated, as they pertain to this patient.  The patient has 

indicated understanding and acceptance of the risks and procedures discussed.  

Questions have been answered appropriately and he is agreeable to move forward 

with the above-stated procedure. 


Due to his history of diabetes mellitus recommend to continue atorvastatin 40 mg

daily. 


Continue lisinopril as previously ordered. 





Nurse Practitioner note has been reviewed, I agree with a documented findings 

and plan of care.  Patient was seen and examined.








Objective





- Vital Signs


Vital signs: 


                                   Vital Signs











Temp  97.7 F   04/21/21 07:00


 


Pulse  53 L  04/21/21 08:00


 


Resp  16   04/21/21 08:00


 


BP  137/76   04/21/21 07:00


 


Pulse Ox  99   04/21/21 07:00








                                 Intake & Output











 04/20/21 04/21/21 04/21/21





 18:59 06:59 18:59


 


Output Total  600 


 


Balance  -600 


 


Weight 95.254 kg  95.25 kg


 


Output:   


 


  Urine  600 


 


Other:   


 


  Voiding Method  Urinal 


 


  # Voids 1  














- Labs


CBC & Chem 7: 


                                 04/20/21 03:30





                                 04/20/21 03:30


Labs: 


                  Abnormal Lab Results - Last 24 Hours (Table)











  04/20/21 04/20/21 04/20/21 Range/Units





  03:30 14:52 15:28 


 


POC Glucose (mg/dL)   192 H   (75-99)  mg/dL


 


HDL Cholesterol  28.0 L    (40.0-60.0)  mg/dL


 


Urine Glucose (UA)    Trace H  (Negative)  














  04/20/21 04/20/21 04/21/21 Range/Units





  16:55 21:00 07:18 


 


POC Glucose (mg/dL)  197 H  194 H  142 H  (75-99)  mg/dL


 


HDL Cholesterol     (40.0-60.0)  mg/dL


 


Urine Glucose (UA)     (Negative)

## 2021-04-22 VITALS — TEMPERATURE: 98 F | HEART RATE: 70 BPM | SYSTOLIC BLOOD PRESSURE: 134 MMHG | DIASTOLIC BLOOD PRESSURE: 75 MMHG

## 2021-04-22 LAB
ANION GAP SERPL CALC-SCNC: 7 MMOL/L
BASOPHILS # BLD AUTO: 0.1 K/UL (ref 0–0.2)
BASOPHILS NFR BLD AUTO: 1 %
BUN SERPL-SCNC: 20 MG/DL (ref 9–20)
CALCIUM SPEC-MCNC: 8.8 MG/DL (ref 8.4–10.2)
CHLORIDE SERPL-SCNC: 102 MMOL/L (ref 98–107)
CO2 SERPL-SCNC: 25 MMOL/L (ref 22–30)
EOSINOPHIL # BLD AUTO: 0.1 K/UL (ref 0–0.7)
EOSINOPHIL NFR BLD AUTO: 0 %
ERYTHROCYTE [DISTWIDTH] IN BLOOD BY AUTOMATED COUNT: 4.07 M/UL (ref 4.3–5.9)
ERYTHROCYTE [DISTWIDTH] IN BLOOD: 13.1 % (ref 11.5–15.5)
GLUCOSE BLD-MCNC: 121 MG/DL (ref 75–99)
GLUCOSE BLD-MCNC: 160 MG/DL (ref 75–99)
GLUCOSE BLD-MCNC: 161 MG/DL (ref 75–99)
GLUCOSE SERPL-MCNC: 112 MG/DL (ref 74–99)
HCT VFR BLD AUTO: 36.7 % (ref 39–53)
HGB BLD-MCNC: 12.9 GM/DL (ref 13–17.5)
LYMPHOCYTES # SPEC AUTO: 17 K/UL (ref 1–4.8)
LYMPHOCYTES NFR SPEC AUTO: 80 %
MCH RBC QN AUTO: 31.7 PG (ref 25–35)
MCHC RBC AUTO-ENTMCNC: 35.2 G/DL (ref 31–37)
MCV RBC AUTO: 90.3 FL (ref 80–100)
MONOCYTES # BLD AUTO: 0.4 K/UL (ref 0–1)
MONOCYTES NFR BLD AUTO: 2 %
NEUTROPHILS # BLD AUTO: 3 K/UL (ref 1.3–7.7)
NEUTROPHILS NFR BLD AUTO: 14 %
PLATELET # BLD AUTO: 95 K/UL (ref 150–450)
POTASSIUM SERPL-SCNC: 4 MMOL/L (ref 3.5–5.1)
SODIUM SERPL-SCNC: 134 MMOL/L (ref 137–145)
WBC # BLD AUTO: 21.2 K/UL (ref 3.8–10.6)

## 2021-04-22 RX ADMIN — MONTELUKAST SODIUM SCH MG: 10 TABLET, FILM COATED ORAL at 08:04

## 2021-04-22 RX ADMIN — KETOROLAC TROMETHAMINE SCH DROPS: 5 SOLUTION OPHTHALMIC at 08:07

## 2021-04-22 RX ADMIN — INSULIN ASPART SCH UNIT: 100 INJECTION, SUSPENSION SUBCUTANEOUS at 00:01

## 2021-04-22 RX ADMIN — PANTOPRAZOLE SODIUM SCH MG: 40 TABLET, DELAYED RELEASE ORAL at 08:04

## 2021-04-22 RX ADMIN — LORATADINE SCH MG: 10 TABLET ORAL at 08:04

## 2021-04-22 RX ADMIN — INSULIN ASPART SCH UNIT: 100 INJECTION, SUSPENSION SUBCUTANEOUS at 09:52

## 2021-04-22 RX ADMIN — HEPARIN SODIUM SCH UNIT: 5000 INJECTION INTRAVENOUS; SUBCUTANEOUS at 08:07

## 2021-04-22 RX ADMIN — INSULIN ASPART SCH: 100 INJECTION, SOLUTION INTRAVENOUS; SUBCUTANEOUS at 08:03

## 2021-04-22 RX ADMIN — ASPIRIN 81 MG CHEWABLE TABLET SCH MG: 81 TABLET CHEWABLE at 08:04

## 2021-04-22 RX ADMIN — HYDROCODONE BITARTRATE AND ACETAMINOPHEN PRN EACH: 5; 325 TABLET ORAL at 01:38

## 2021-04-22 RX ADMIN — HYDROCODONE BITARTRATE AND ACETAMINOPHEN PRN EACH: 5; 325 TABLET ORAL at 08:04

## 2021-04-22 RX ADMIN — PREDNISOLONE ACETATE SCH DROPS: 10 SUSPENSION/ DROPS OPHTHALMIC at 08:06

## 2021-04-22 NOTE — P.PN
Subjective


This is a pleasant 75-year-old  male past medical history significant 

for CLL since 1999, diabetes mellitus, hypertension, osteoarthritis and poor 

functional capacity.  He denies prior history of coronary artery disease and 

does not follow in the office with a cardiologist. We have been asked to see in 

consultation for chest pain.  EKG reveals sinus mechanism with nonspecific T 

wave abnormalities noted in lead V1. Telemetry tracings indicate sinus 

mechanism. Chest xray subtle peripheral densities bilaterally.  Could be early 

interstitial infiltrate/atypical pneumonia. CTA is negative for pulmonary 

embolism.Current cardiac medications include lisinopril 40 mg daily.  

Echocardiogram on 4/19/21 revealed an EF between 5560 percent, RV is mildly 

enlarged, trace MR, mild TR.





4/21/21:  Lexiscan stress test- revealed decreased perfusion stress images along

the inferior wall, cannot exclude stress-induced ischemia.  Patient underwent 

cardiac catheterization with Dr. Diamond which revealed intermediate disease 

involving the mid RCA and mild disease involving the left coronary system 

recommended medical therapy.





4/22/21:


Patient seen and examined at bedside, sitting up in bedside chair.  No apparent 

distress.  Patient denies chest pain shortness of breath palpitations 

lightheadedness or dizziness.  Right radial SITE is clean, dry, open to air, no 

hematoma, 2+ pulses blood pressure 134/75, heart rate 70, afebrile, maintaining 

oxygen saturations 94% on room air laboratory data reviewed sodium 134, 

potassium 4.0, serum creatinine 0.85, BUN 20





PHYSICAL EXAMINATION


CONSTITUTIONAL: No apparent distress. 


HEENT: Head is normocephalic. No JVD. No carotid bruit.


CHEST EXAMINATION: Lungs are clear to auscultation. 


HEART EXAMINATION: Regular rate and rhythm. S1, S2 heard. No murmurs, gallops or

rub.


ABDOMEN: Soft, nontender. Positive bowel sounds.


EXTREMITIES: 2+ peripheral pulses, no lower extremity edema and no calf tenderne

ss.


NEUROLOGIC EXAMINATION: Patient is awake, alert and oriented x3. 





ASSESSMENT


Chest pain, atypical, An acute coronary event has been ruled out. 


Nonobstructive coronary artery disease. 


Hypertension


Diabetes mellitus


CLL, follows with Dr. Finch


Poor functional capacity due to arthritis





PLAN


From cardiology perspective patient is stable for discharge home.  We'll 

continue aspirin 81 mg daily, atorvastatin 40 mg daily and lisinopril 40 mg 

daily


Patient to follow up with Dr. Diamond in the office in one week


Thank you kindly for this consultation





Nurse Practitioner note has been reviewed, I agree with a documented findings 

and plan of care.  Patient was seen and examined.








Objective





- Vital Signs


Vital signs: 


                                   Vital Signs











Temp  98.0 F   04/22/21 07:00


 


Pulse  70   04/22/21 07:00


 


Resp  16   04/22/21 07:00


 


BP  134/75   04/22/21 07:00


 


Pulse Ox  94 L  04/22/21 07:00








                                 Intake & Output











 04/21/21 04/22/21 04/22/21





 18:59 06:59 18:59


 


Intake Total 200  


 


Balance 200  


 


Weight 95.25 kg  


 


Intake:   


 


    


 


Other:   


 


  Voiding Method  Urinal Urinal


 


  # Voids 1 2 














- Labs


CBC & Chem 7: 


                                 04/22/21 06:01





                                 04/22/21 06:01


Labs: 


                  Abnormal Lab Results - Last 24 Hours (Table)











  04/21/21 04/21/21 04/21/21 Range/Units





  11:47 13:22 20:22 


 


WBC     (3.8-10.6)  k/uL


 


RBC     (4.30-5.90)  m/uL


 


Hgb     (13.0-17.5)  gm/dL


 


Hct     (39.0-53.0)  %


 


Plt Count     (150-450)  k/uL


 


Lymphocytes #     (1.0-4.8)  k/uL


 


Sodium     (137-145)  mmol/L


 


Glucose     (74-99)  mg/dL


 


POC Glucose (mg/dL)  111 H  110 H  135 H  (75-99)  mg/dL














  04/22/21 04/22/21 04/22/21 Range/Units





  06:01 06:01 07:15 


 


WBC  21.2 H    (3.8-10.6)  k/uL


 


RBC  4.07 L    (4.30-5.90)  m/uL


 


Hgb  12.9 L    (13.0-17.5)  gm/dL


 


Hct  36.7 L    (39.0-53.0)  %


 


Plt Count  95 L    (150-450)  k/uL


 


Lymphocytes #  17.0 H    (1.0-4.8)  k/uL


 


Sodium   134 L   (137-145)  mmol/L


 


Glucose   112 H   (74-99)  mg/dL


 


POC Glucose (mg/dL)    121 H  (75-99)  mg/dL














  04/22/21 Range/Units





  08:47 


 


WBC   (3.8-10.6)  k/uL


 


RBC   (4.30-5.90)  m/uL


 


Hgb   (13.0-17.5)  gm/dL


 


Hct   (39.0-53.0)  %


 


Plt Count   (150-450)  k/uL


 


Lymphocytes #   (1.0-4.8)  k/uL


 


Sodium   (137-145)  mmol/L


 


Glucose   (74-99)  mg/dL


 


POC Glucose (mg/dL)  160 H  (75-99)  mg/dL

## 2021-05-09 ENCOUNTER — HOSPITAL ENCOUNTER (OUTPATIENT)
Dept: HOSPITAL 47 - EC | Age: 76
Setting detail: OBSERVATION
LOS: 1 days | Discharge: HOME | End: 2021-05-10
Attending: INTERNAL MEDICINE | Admitting: INTERNAL MEDICINE
Payer: MEDICARE

## 2021-05-09 VITALS — RESPIRATION RATE: 18 BRPM

## 2021-05-09 DIAGNOSIS — E66.9: ICD-10-CM

## 2021-05-09 DIAGNOSIS — K21.9: ICD-10-CM

## 2021-05-09 DIAGNOSIS — I11.9: ICD-10-CM

## 2021-05-09 DIAGNOSIS — D69.6: ICD-10-CM

## 2021-05-09 DIAGNOSIS — G56.01: ICD-10-CM

## 2021-05-09 DIAGNOSIS — C91.11: ICD-10-CM

## 2021-05-09 DIAGNOSIS — E11.9: ICD-10-CM

## 2021-05-09 DIAGNOSIS — R00.1: ICD-10-CM

## 2021-05-09 DIAGNOSIS — R07.89: Primary | ICD-10-CM

## 2021-05-09 DIAGNOSIS — Z88.0: ICD-10-CM

## 2021-05-09 DIAGNOSIS — J30.2: ICD-10-CM

## 2021-05-09 DIAGNOSIS — M75.102: ICD-10-CM

## 2021-05-09 DIAGNOSIS — M19.042: ICD-10-CM

## 2021-05-09 DIAGNOSIS — Z79.82: ICD-10-CM

## 2021-05-09 DIAGNOSIS — Z83.3: ICD-10-CM

## 2021-05-09 DIAGNOSIS — E78.5: ICD-10-CM

## 2021-05-09 DIAGNOSIS — H93.13: ICD-10-CM

## 2021-05-09 DIAGNOSIS — Z87.81: ICD-10-CM

## 2021-05-09 DIAGNOSIS — M19.041: ICD-10-CM

## 2021-05-09 DIAGNOSIS — Z79.899: ICD-10-CM

## 2021-05-09 DIAGNOSIS — Z88.8: ICD-10-CM

## 2021-05-09 DIAGNOSIS — Z20.822: ICD-10-CM

## 2021-05-09 DIAGNOSIS — I25.10: ICD-10-CM

## 2021-05-09 DIAGNOSIS — Z79.4: ICD-10-CM

## 2021-05-09 DIAGNOSIS — Z98.890: ICD-10-CM

## 2021-05-09 DIAGNOSIS — Z82.3: ICD-10-CM

## 2021-05-09 DIAGNOSIS — Z79.891: ICD-10-CM

## 2021-05-09 LAB
ALBUMIN SERPL-MCNC: 4.1 G/DL (ref 3.5–5)
ALP SERPL-CCNC: 80 U/L (ref 38–126)
ALT SERPL-CCNC: 20 U/L (ref 4–49)
ANION GAP SERPL CALC-SCNC: 7 MMOL/L
APTT BLD: 22.4 SEC (ref 22–30)
AST SERPL-CCNC: 25 U/L (ref 17–59)
BASOPHILS # BLD AUTO: 0.2 K/UL (ref 0–0.2)
BASOPHILS NFR BLD AUTO: 1 %
BUN SERPL-SCNC: 13 MG/DL (ref 9–20)
CALCIUM SPEC-MCNC: 9.3 MG/DL (ref 8.4–10.2)
CHLORIDE SERPL-SCNC: 101 MMOL/L (ref 98–107)
CO2 SERPL-SCNC: 28 MMOL/L (ref 22–30)
EOSINOPHIL # BLD AUTO: 0.2 K/UL (ref 0–0.7)
EOSINOPHIL NFR BLD AUTO: 1 %
ERYTHROCYTE [DISTWIDTH] IN BLOOD BY AUTOMATED COUNT: 4.35 M/UL (ref 4.3–5.9)
ERYTHROCYTE [DISTWIDTH] IN BLOOD: 13.8 % (ref 11.5–15.5)
GLUCOSE BLD-MCNC: 191 MG/DL (ref 75–99)
GLUCOSE BLD-MCNC: 270 MG/DL (ref 75–99)
GLUCOSE SERPL-MCNC: 240 MG/DL (ref 74–99)
HCT VFR BLD AUTO: 39.6 % (ref 39–53)
HGB BLD-MCNC: 14.1 GM/DL (ref 13–17.5)
INR PPP: 1 (ref ?–1.2)
LYMPHOCYTES # SPEC AUTO: 23.9 K/UL (ref 1–4.8)
LYMPHOCYTES NFR SPEC AUTO: 76 %
MAGNESIUM SPEC-SCNC: 1.9 MG/DL (ref 1.6–2.3)
MCH RBC QN AUTO: 32.4 PG (ref 25–35)
MCHC RBC AUTO-ENTMCNC: 35.6 G/DL (ref 31–37)
MCV RBC AUTO: 90.8 FL (ref 80–100)
MONOCYTES # BLD AUTO: 0.5 K/UL (ref 0–1)
MONOCYTES NFR BLD AUTO: 2 %
NEUTROPHILS # BLD AUTO: 5.4 K/UL (ref 1.3–7.7)
NEUTROPHILS NFR BLD AUTO: 17 %
PLATELET # BLD AUTO: 126 K/UL (ref 150–450)
POTASSIUM SERPL-SCNC: 4.4 MMOL/L (ref 3.5–5.1)
PROT SERPL-MCNC: 6.2 G/DL (ref 6.3–8.2)
PT BLD: 10.6 SEC (ref 9–12)
SODIUM SERPL-SCNC: 136 MMOL/L (ref 137–145)
WBC # BLD AUTO: 31.5 K/UL (ref 3.8–10.6)

## 2021-05-09 PROCEDURE — 85730 THROMBOPLASTIN TIME PARTIAL: CPT

## 2021-05-09 PROCEDURE — 93005 ELECTROCARDIOGRAM TRACING: CPT

## 2021-05-09 PROCEDURE — 80053 COMPREHEN METABOLIC PANEL: CPT

## 2021-05-09 PROCEDURE — 99291 CRITICAL CARE FIRST HOUR: CPT

## 2021-05-09 PROCEDURE — 96366 THER/PROPH/DIAG IV INF ADDON: CPT

## 2021-05-09 PROCEDURE — 83735 ASSAY OF MAGNESIUM: CPT

## 2021-05-09 PROCEDURE — 80048 BASIC METABOLIC PNL TOTAL CA: CPT

## 2021-05-09 PROCEDURE — 85610 PROTHROMBIN TIME: CPT

## 2021-05-09 PROCEDURE — 85025 COMPLETE CBC W/AUTO DIFF WBC: CPT

## 2021-05-09 PROCEDURE — 96365 THER/PROPH/DIAG IV INF INIT: CPT

## 2021-05-09 PROCEDURE — 36415 COLL VENOUS BLD VENIPUNCTURE: CPT

## 2021-05-09 PROCEDURE — 96361 HYDRATE IV INFUSION ADD-ON: CPT

## 2021-05-09 PROCEDURE — 71046 X-RAY EXAM CHEST 2 VIEWS: CPT

## 2021-05-09 PROCEDURE — 87635 SARS-COV-2 COVID-19 AMP PRB: CPT

## 2021-05-09 PROCEDURE — 80061 LIPID PANEL: CPT

## 2021-05-09 PROCEDURE — 84484 ASSAY OF TROPONIN QUANT: CPT

## 2021-05-09 RX ADMIN — NITROGLYCERIN PRN MG: 0.4 TABLET SUBLINGUAL at 15:27

## 2021-05-09 RX ADMIN — NITROGLYCERIN PRN MG: 0.4 TABLET SUBLINGUAL at 15:38

## 2021-05-09 RX ADMIN — NITROGLYCERIN PRN MG: 0.4 TABLET SUBLINGUAL at 15:21

## 2021-05-09 RX ADMIN — INSULIN ASPART SCH UNIT: 100 INJECTION, SOLUTION INTRAVENOUS; SUBCUTANEOUS at 21:30

## 2021-05-09 RX ADMIN — KETOROLAC TROMETHAMINE SCH DROPS: 5 SOLUTION OPHTHALMIC at 21:30

## 2021-05-09 RX ADMIN — PREDNISOLONE ACETATE SCH DROPS: 10 SUSPENSION/ DROPS OPHTHALMIC at 21:30

## 2021-05-09 RX ADMIN — NITROGLYCERIN SCH INCH: 20 OINTMENT TOPICAL at 18:18

## 2021-05-09 RX ADMIN — HYDROCODONE BITARTRATE AND ACETAMINOPHEN PRN EACH: 5; 325 TABLET ORAL at 18:22

## 2021-05-09 NOTE — P.HPIM
History of Present Illness


H&P Date: 21


Chief Complaint: Chest pain 





Mr. Wagoner is a 75-year-old male with a past medical history of coronary artery

disease, hypertension, hyperlipidemia, diabetes mellitus, osteoarthritis, CLL in

remission, who follows with Dr. Figueroa, coming in with a chief complaint of 

chest pain.  Patient states that he had chest pain substernal in nature.  He 

states that he was having an argument with his wife when he started to notice 

that he had the substernal pain associated with some difficulty in breathing.  

Patient denied having any nausea vomiting or diaphoresis with it.  Patient 

denies having any recent travel.  No fever chills or rigors.  No cough or 

difficulty breathing.  Patient was recently admitted 2 weeks back for the same 

complaint and underwent cardiac catheterization which showed intermediate 

disease involving the mid RCA and mild disease involving the left coronary 

system, he was advised to continue with medical management.





In the ER at the time of admission patient's blood pressure was 195/88, 

temperature 97.5, heart rate between 50s to 60s, saturating at 95% on room air. 

On reviewing the labs he had a white count of 31.5, hemoglobin 14.1, platelets 

126.  Sodium 136, potassium 4.4, chloride 101, bicarb 28, BUN 13, creatinine 

0.82.  Troponin less than 0.012.  He had an EKG done showing sinus bradycardia 

with left ventricular hypertrophy and the chest x-ray showing no acute pulmonary

process.  So the patient was admitted for further management.





Review of Systems





REVIEW OF SYSTEMS: 


CONSTITUTIONAL: No weakness and fatigue


HEENT: No recent visual problems or hearing problems. Denied any sore throat. 


CARDIOVASCULAR: as per HPI 


PULMONARY:  no hemoptysis. 


GASTROINTESTINAL: No diarrhea, no nausea, no vomiting, no abdominal pain. 


NEUROLOGICAL: No weakness of the extremities or  slurring of speech  


HEMATOLOGICAL: Denies any bleeding or petechiae. 


GENITOURINARY: Denies any burning micturition, frequency, or urgency. 


MUSCULOSKELETAL/RHEUMATOLOGICAL: Denies any joint pain, swelling, or any muscle 

pain. 


ENDOCRINE: Denies any polyuria or polydipsia. 





The rest of the 14-point review of systems is negative.





Past Medical History


Past Medical History: Coronary Artery Disease (CAD), Cancer, Diabetes Mellitus, 

GERD/Reflux, Hypertension, Osteoarthritis (OA)


Additional Past Medical History / Comment(s): NIDDM type II, 1999 LEUKEMIA (CLL-

remission), sinus problems, seasonal allergies, tinnitis bilaterally, OA hands, 

R wrist carpal tunnel, past fx with L elbow, R rotator cuff tendon problems, L 

rotator cuff tear, left knee pain


History of Any Multi-Drug Resistant Organisms: None Reported


Past Surgical History: Heart Catheterization, Orthopedic Surgery


Additional Past Surgical History / Comment(s): LEFT HAND thumb tendon repair.


Past Anesthesia/Blood Transfusion Reactions: No Reported Reaction


Smoking Status: Never smoker





- Past Family History


  ** Father


Family Medical History: CVA/TIA


Additional Family Medical History / Comment(s): Father  at the age of 85yrs.





  ** Mother


Family Medical History: Diabetes Mellitus


Additional Family Medical History / Comment(s): Mother  at the age of 90yrs.





Medications and Allergies


                                Home Medications











 Medication  Instructions  Recorded  Confirmed  Type


 


Cetirizine HCl [Zyrtec] 10 mg PO DAILY 17 History


 


Montelukast Sodium [Singulair] 10 mg PO DAILY 17 History


 


lisinopriL [Zestril] 40 mg PO DAILY 17 History


 


Insulin NPH Hum/Reg Insulin Hm 17 units SQ BID PRN 20 History





[Relion Novolin 70-30 Flexpen]    


 


Fluticasone Nasal Spray [Flonase 1 spr EA NOSTRIL DAILY PRN 21 

History





Nasal [place]]    


 


HYDROcodone/APAP 5-325MG [Norco 1 tab PO Q6HR PRN 21 History





5-325]    


 


Ketorolac 0.5% Ophth Soln [Acular 1 drop BOTH EYES BID 21 History





0.5%]    


 


Omeprazole 20 mg PO BID PRN 21 History


 


Prednisolone Acetate/Pf 1 drop BOTH EYES BID 21 History





[Prednisolone Acet 1% Eye Drop]    


 


Aspirin 81 mg PO DAILY 30 Days #30 chew 21 Rx


 


Atorvastatin [Lipitor] 40 mg PO HS 30 Days #30 tab 21 Rx


 


ALPRAZolam [Xanax] 0.25 mg PO DAILY PRN 21 History


 


Metoprolol Succinate (ER) [Toprol 25 mg PO DAILY 21 History





Xl]    








                                    Allergies











Allergy/AdvReac Type Severity Reaction Status Date / Time


 


amoxicillin [From Augmentin] Allergy  Rash/Hives Verified 21 12:56


 


clavulanic acid Allergy  Rash/Hives Verified 21 12:56





[From Augmentin]     














Physical Exam


Vitals: 


                                   Vital Signs











  Temp Pulse Pulse Resp BP BP Pulse Ox


 


 21 15:41    61    136/72  97


 


 21 15:29    60    129/67  97


 


 21 15:25    57 L    137/65  98


 


 21 14:11  97.8 F   52 L  18   166/79  98


 


 21 14:00    52 L    


 


 21 13:49  97.8 F      


 


 21 13:42   58 L   16  165/66   97


 


 21 11:46   55 L   20  163/74   95


 


 21 11:37  97.5 F L  59 L   20  195/88   95








                                Intake and Output











 21





 06:59 14:59 22:59


 


Output Total   425


 


Balance   -425


 


Output:   


 


  Urine   425


 


Other:   


 


  Weight  95.254 kg 95.254 kg














PHYSICAL EXAMINATION: 





GENERAL: Elderly male lying in bed appears to be in no acute distress.


HEENT: Pupils are round and equally reacting to light. EOMI. No scleral icterus.

No conjunctival pallor. Normocephalic, atraumatic. No pharyngeal erythema. No 

thyromegaly. 


CARDIOVASCULAR: S1 and S2 present. 


PULMONARY: Bilateral breath sounds are positive.  Few basal crackles.


ABDOMEN: Soft, nontender, nondistended, normoactive bowel sounds. No palpable 

organomegaly. 


MUSCULOSKELETAL: No joint swelling or deformity.


EXTREMITIES: No cyanosis or clubbing, mild  pedal edema. 


NEUROLOGICAL:  patient is alert awake oriented 3 .Gross neurological 

examination did not reveal any focal deficits. 


SKIN: No rashes.





Results


CBC & Chem 7: 


                                 21 11:39





                                 21 11:39


Labs: 


                  Abnormal Lab Results - Last 24 Hours (Table)











  21 Range/Units





  11:39 11:39 16:33 


 


WBC  31.5 H    (3.8-10.6)  k/uL


 


Plt Count  126 L    (150-450)  k/uL


 


Lymphocytes #  23.9 H    (1.0-4.8)  k/uL


 


Sodium   136 L   (137-145)  mmol/L


 


Glucose   240 H   (74-99)  mg/dL


 


POC Glucose (mg/dL)    191 H  (75-99)  mg/dL


 


Total Protein   6.2 L   (6.3-8.2)  g/dL














Thrombosis Risk Factor Assmnt





- Choose All That Apply


Any of the Below Risk Factors Present?: Yes


Each Factor Represents 1 point: Obesity (BMI >25)


Other Risk Factors: Yes


Each Risk Factor Represents 2 Points: Age 61-74 years, Malignancy


Thrombosis Risk Factor Assessment Total Risk Factor Score: 5


Thrombosis Risk Factor Assessment Level: High Risk





Assessment and Plan


Assessment: 





ASSESSMENT 





Chest pain possible unstable angina


Type 2 diabetes mellitus


Coronary artery disease


GERD


Hypertension


Multiple joint osteoarthritis


History of CLL in remission


Thrombocytopenia





PLAN: Patient recently cardiac catheterization done by Dr. Rodriguez on 2021 

showing intermediate disease involving the mid RCA and mild disease involving 

the left coronary system.  Patient coming in with chest pain, will have serial 

troponins and EKGs.  Patient was started on heparin drip which will be 

continued.  Cardiology has been consulted.  Patient's home medications have been

resumed.  Further recommendations to follow depending on the progress of the 

patient.

## 2021-05-09 NOTE — XR
EXAMINATION TYPE: XR chest 2V

 

DATE OF EXAM: 5/9/2021

 

COMPARISON: 4/19/2021

 

INDICATION: Chest pain

 

TECHNIQUE:  Frontal and lateral views of the chest are obtained.

 

FINDINGS:  

The heart size is normal.  

The pulmonary vasculature is normal.

The lungs are clear.

 

IMPRESSION:  

1. No acute pulmonary process.

## 2021-05-09 NOTE — ED
General Adult HPI





- General


Stated complaint: Chest Pain


Time Seen by Provider: 21 11:17


Source: patient, RN notes reviewed, old records reviewed





- History of Present Illness


Initial comments: 





This is a 75-year-old male with past medical history significant for diabetes 

hypertension high cholesterol.  Patient states that he had a cardiac 

catheterization about 3 weeks ago because he was having chest pain.  Patient had

a 50% occlusion on the right coronary artery.  Patient comes in today because 

since last He's been having some shortness of breath and occasional slight chest

pain however last night he started having significant chest pain that radiates 

down the arm and up into the jaw he was very short of breath and diaphoretic and

mildly nauseated.  Patient states that the pain was continuous since last night 

until today and his symptoms EMS gave nitroglycerin the pain was considerably 

better.  Patient denies any recent fever chills or cough.  Patient denies 

abdominal pain patient has nausea vomiting diarrhea.  Patient denies any calf 

pain or leg swelling.





- Related Data


                                Home Medications











 Medication  Instructions  Recorded  Confirmed


 


Cetirizine HCl [Zyrtec] 10 mg PO DAILY 17


 


Montelukast Sodium [Singulair] 10 mg PO DAILY 17


 


lisinopriL [Zestril] 40 mg PO DAILY 17


 


Insulin NPH Hum/Reg Insulin Hm 17 units SQ TID PRN 20





[Relion Novolin 70-30 Flexpen]   


 


Fluticasone Nasal Spray [Flonase 1 spr EA NOSTRIL DAILY PRN 21





Nasal [place]]   


 


HYDROcodone/APAP 5-325MG [Norco 1 tab PO Q6HR PRN 21





5-325]   


 


Ketorolac 0.5% Ophth Soln [Acular 1 drop BOTH EYES BID 21





0.5%]   


 


Omeprazole 20 mg PO BID PRN 21


 


Prednisolone Acetate/Pf 1 drop BOTH EYES BID 21





[Prednisolone Acet 1% Eye Drop]   








                                  Previous Rx's











 Medication  Instructions  Recorded


 


ALPRAZolam [Xanax] 0.25 mg PO BID PRN #6 tab 20


 


Aspirin 81 mg PO DAILY 30 Days #30 chew 21


 


Atorvastatin [Lipitor] 40 mg PO HS 30 Days #30 tab 21











                                    Allergies











Allergy/AdvReac Type Severity Reaction Status Date / Time


 


amoxicillin [From Augmentin] Allergy  Rash/Hives Verified 21 11:41


 


clavulanic acid Allergy  Rash/Hives Verified 21 11:41





[From Augmentin]     














Review of Systems


ROS Statement: 


Those systems with pertinent positive or pertinent negative responses have been 

documented in the HPI.





ROS Other: All systems not noted in ROS Statement are negative.





Past Medical History


Past Medical History: Cancer, Diabetes Mellitus, GERD/Reflux, Hypertension, 

Osteoarthritis (OA)


Additional Past Medical History / Comment(s): NIDDM type II, 1999 LEUKEMIA 

(CLL-remission), sinus problems, seasonal allergies, tinnitis bilaterally, OA 

hands, R wrist carpal tunnel, past fx with L elbow, R rotator cuff tendon 

problems, L rotator cuff tear, left knee pain


History of Any Multi-Drug Resistant Organisms: None Reported


Past Surgical History: Orthopedic Surgery


Additional Past Surgical History / Comment(s): LEFT HAND thumb tendon repair.


Past Anesthesia/Blood Transfusion Reactions: No Reported Reaction


Smoking Status: Never smoker





- Past Family History


  ** Father


Family Medical History: CVA/TIA


Additional Family Medical History / Comment(s): Father  at the age of 85yrs.





  ** Mother


Family Medical History: Diabetes Mellitus


Additional Family Medical History / Comment(s): Mother  at the age of 90yrs.





General Exam





- General Exam Comments


Initial Comments: 





GENERAL:


Patient is well-developed and well-nourished.  Patient is nontoxic and well-

hydrated and is in mild distress.





ENT:


Neck is soft and supple.  No significant lymphadenopathy is noted.  Oropharynx 

is clear.  Moist mucous membranes.  Neck has full range of motion without 

eliciting any pain.  





EYES:


The sclera were anicteric and conjunctiva were pink and moist.  Extraocular 

movements were intact and pupils were equal round and reactive to light.  

Eyelids were unremarkable.





PULMONARY:


Unlabored respirations.  Good breath sounds bilaterally.  No audible rales rhonc

hi or wheezing was noted.





CARDIOVASCULAR:


There is a regular rate and rhythm without any murmurs gallops or rubs.





ABDOMEN:


Soft and nontender with normal bowel sounds.





SKIN:


Skin is clear with no lesions or rashes and otherwise unremarkable.





NEUROLOGIC:


Patient is alert and oriented x3.  Cranial nerves II through XII are grossly 

intact.  Motor and sensory are also intact.  Normal speech, volume and content. 

Symmetrical smile.  





MUSCULOSKELETAL:


Normal extremities with adequate strength and full range of motion.  No lower 

extremity swelling or edema.  No calf tenderness.





LYMPHATICS:


No significant lymphadenopathy is noted





PSYCHIATRIC:


Normal psychiatric evaluation.  





Course


                                   Vital Signs











  21





  11:37 11:46


 


Temperature 97.5 F L 


 


Pulse Rate 59 L 55 L


 


Respiratory 20 20





Rate  


 


Blood Pressure 195/88 163/74


 


O2 Sat by Pulse 95 95





Oximetry  














Medical Decision Making





- Medical Decision Making





EKG shows sinus bradycardia 55 bpm [place] interval is 174 QRS is 94 QT interval 422 

QTC is 43.  Patient's EKG shows no ST segment elevation or depression.





Chest x-ray shows no acute abnormality.





Patient was started on heparin because of the unstable angina picture.





I spoke with [facility] agreed to admit the patient admitted 

the patient wrote admitting orders.  I consulted cardiology and I continued 

heparin Nitropaste and heparin for





- Lab Data


Result diagrams: 


                                 21 11:39





                                 21 11:39


                                   Lab Results











  21 Range/Units





  11:39 11:39 11:39 


 


WBC  31.5 H    (3.8-10.6)  k/uL


 


RBC  4.35    (4.30-5.90)  m/uL


 


Hgb  14.1    (13.0-17.5)  gm/dL


 


Hct  39.6    (39.0-53.0)  %


 


MCV  90.8    (80.0-100.0)  fL


 


MCH  32.4    (25.0-35.0)  pg


 


MCHC  35.6    (31.0-37.0)  g/dL


 


RDW  13.8    (11.5-15.5)  %


 


Plt Count  126 L    (150-450)  k/uL


 


MPV  7.7    


 


Neutrophils %  17    %


 


Lymphocytes %  76    %


 


Monocytes %  2    %


 


Eosinophils %  1    %


 


Basophils %  1    %


 


Neutrophils #  5.4    (1.3-7.7)  k/uL


 


Lymphocytes #  23.9 H    (1.0-4.8)  k/uL


 


Monocytes #  0.5    (0-1.0)  k/uL


 


Eosinophils #  0.2    (0-0.7)  k/uL


 


Basophils #  0.2    (0-0.2)  k/uL


 


Manual Slide Review  Performed    


 


RBC Morphology  Normal    


 


PT   10.6   (9.0-12.0)  sec


 


INR   1.0   (<1.2)  


 


APTT   22.4   (22.0-30.0)  sec


 


Sodium    136 L  (137-145)  mmol/L


 


Potassium    4.4  (3.5-5.1)  mmol/L


 


Chloride    101  ()  mmol/L


 


Carbon Dioxide    28  (22-30)  mmol/L


 


Anion Gap    7  mmol/L


 


BUN    13  (9-20)  mg/dL


 


Creatinine    0.82  (0.66-1.25)  mg/dL


 


Est GFR (CKD-EPI)AfAm    >90  (>60 ml/min/1.73 sqM)  


 


Est GFR (CKD-EPI)NonAf    88  (>60 ml/min/1.73 sqM)  


 


Glucose    240 H  (74-99)  mg/dL


 


Calcium    9.3  (8.4-10.2)  mg/dL


 


Magnesium    1.9  (1.6-2.3)  mg/dL


 


Total Bilirubin    1.0  (0.2-1.3)  mg/dL


 


AST    25  (17-59)  U/L


 


ALT    20  (4-49)  U/L


 


Alkaline Phosphatase    80  ()  U/L


 


Troponin I     (0.000-0.034)  ng/mL


 


Total Protein    6.2 L  (6.3-8.2)  g/dL


 


Albumin    4.1  (3.5-5.0)  g/dL


 


Coronavirus (PCR)     (Not Detectd)  














  21 Range/Units





  11:39 12:04 


 


WBC    (3.8-10.6)  k/uL


 


RBC    (4.30-5.90)  m/uL


 


Hgb    (13.0-17.5)  gm/dL


 


Hct    (39.0-53.0)  %


 


MCV    (80.0-100.0)  fL


 


MCH    (25.0-35.0)  pg


 


MCHC    (31.0-37.0)  g/dL


 


RDW    (11.5-15.5)  %


 


Plt Count    (150-450)  k/uL


 


MPV    


 


Neutrophils %    %


 


Lymphocytes %    %


 


Monocytes %    %


 


Eosinophils %    %


 


Basophils %    %


 


Neutrophils #    (1.3-7.7)  k/uL


 


Lymphocytes #    (1.0-4.8)  k/uL


 


Monocytes #    (0-1.0)  k/uL


 


Eosinophils #    (0-0.7)  k/uL


 


Basophils #    (0-0.2)  k/uL


 


Manual Slide Review    


 


RBC Morphology    


 


PT    (9.0-12.0)  sec


 


INR    (<1.2)  


 


APTT    (22.0-30.0)  sec


 


Sodium    (137-145)  mmol/L


 


Potassium    (3.5-5.1)  mmol/L


 


Chloride    ()  mmol/L


 


Carbon Dioxide    (22-30)  mmol/L


 


Anion Gap    mmol/L


 


BUN    (9-20)  mg/dL


 


Creatinine    (0.66-1.25)  mg/dL


 


Est GFR (CKD-EPI)AfAm    (>60 ml/min/1.73 sqM)  


 


Est GFR (CKD-EPI)NonAf    (>60 ml/min/1.73 sqM)  


 


Glucose    (74-99)  mg/dL


 


Calcium    (8.4-10.2)  mg/dL


 


Magnesium    (1.6-2.3)  mg/dL


 


Total Bilirubin    (0.2-1.3)  mg/dL


 


AST    (17-59)  U/L


 


ALT    (4-49)  U/L


 


Alkaline Phosphatase    ()  U/L


 


Troponin I  <0.012   (0.000-0.034)  ng/mL


 


Total Protein    (6.3-8.2)  g/dL


 


Albumin    (3.5-5.0)  g/dL


 


Coronavirus (PCR)   Not Detected  (Not Detectd)  














Critical Care Time


Critical Care Time: Yes


Total Critical Care Time: 35





Disposition


Clinical Impression: 


 Unstable angina pectoris





Disposition: ADMITTED AS IP TO THIS HOSP


Referrals: 


Smith Figueroa DO [Primary Care Provider] - 1-2 days


Time of Disposition: 12:45

## 2021-05-10 VITALS — SYSTOLIC BLOOD PRESSURE: 157 MMHG | DIASTOLIC BLOOD PRESSURE: 73 MMHG | HEART RATE: 57 BPM | TEMPERATURE: 97.3 F

## 2021-05-10 LAB
ANION GAP SERPL CALC-SCNC: 4 MMOL/L
BASOPHILS # BLD AUTO: 0.2 K/UL (ref 0–0.2)
BASOPHILS NFR BLD AUTO: 1 %
BUN SERPL-SCNC: 15 MG/DL (ref 9–20)
CALCIUM SPEC-MCNC: 8.7 MG/DL (ref 8.4–10.2)
CHLORIDE SERPL-SCNC: 105 MMOL/L (ref 98–107)
CHOLEST SERPL-MCNC: 91 MG/DL (ref ?–200)
CO2 SERPL-SCNC: 27 MMOL/L (ref 22–30)
EOSINOPHIL # BLD AUTO: 0.2 K/UL (ref 0–0.7)
EOSINOPHIL NFR BLD AUTO: 1 %
ERYTHROCYTE [DISTWIDTH] IN BLOOD BY AUTOMATED COUNT: 3.89 M/UL (ref 4.3–5.9)
ERYTHROCYTE [DISTWIDTH] IN BLOOD: 13.8 % (ref 11.5–15.5)
GLUCOSE BLD-MCNC: 194 MG/DL (ref 75–99)
GLUCOSE BLD-MCNC: 239 MG/DL (ref 75–99)
GLUCOSE SERPL-MCNC: 161 MG/DL (ref 74–99)
HCT VFR BLD AUTO: 35.3 % (ref 39–53)
HDLC SERPL-MCNC: 32 MG/DL (ref 40–60)
HGB BLD-MCNC: 12.8 GM/DL (ref 13–17.5)
LDLC SERPL CALC-MCNC: 36 MG/DL (ref 0–99)
LYMPHOCYTES # SPEC AUTO: 21.6 K/UL (ref 1–4.8)
LYMPHOCYTES NFR SPEC AUTO: 77 %
MCH RBC QN AUTO: 32.8 PG (ref 25–35)
MCHC RBC AUTO-ENTMCNC: 36.2 G/DL (ref 31–37)
MCV RBC AUTO: 90.7 FL (ref 80–100)
MONOCYTES # BLD AUTO: 0.4 K/UL (ref 0–1)
MONOCYTES NFR BLD AUTO: 2 %
NEUTROPHILS # BLD AUTO: 4.5 K/UL (ref 1.3–7.7)
NEUTROPHILS NFR BLD AUTO: 16 %
PLATELET # BLD AUTO: 105 K/UL (ref 150–450)
POTASSIUM SERPL-SCNC: 3.9 MMOL/L (ref 3.5–5.1)
SODIUM SERPL-SCNC: 136 MMOL/L (ref 137–145)
TRIGL SERPL-MCNC: 113 MG/DL (ref ?–150)
WBC # BLD AUTO: 28 K/UL (ref 3.8–10.6)

## 2021-05-10 RX ADMIN — NITROGLYCERIN SCH INCH: 20 OINTMENT TOPICAL at 07:05

## 2021-05-10 RX ADMIN — NITROGLYCERIN SCH: 20 OINTMENT TOPICAL at 03:45

## 2021-05-10 RX ADMIN — KETOROLAC TROMETHAMINE SCH DROPS: 5 SOLUTION OPHTHALMIC at 09:16

## 2021-05-10 RX ADMIN — PREDNISOLONE ACETATE SCH DROPS: 10 SUSPENSION/ DROPS OPHTHALMIC at 10:51

## 2021-05-10 RX ADMIN — INSULIN ASPART SCH UNIT: 100 INJECTION, SOLUTION INTRAVENOUS; SUBCUTANEOUS at 07:11

## 2021-05-10 RX ADMIN — HYDROCODONE BITARTRATE AND ACETAMINOPHEN PRN EACH: 5; 325 TABLET ORAL at 03:42

## 2021-05-10 RX ADMIN — INSULIN ASPART SCH UNIT: 100 INJECTION, SOLUTION INTRAVENOUS; SUBCUTANEOUS at 12:46

## 2021-05-10 NOTE — P.DS
Providers


Date of admission: 


05/09/21 13:15





Expected date of discharge: 05/10/21


Attending physician: 


Fabiola Bateman





Consults: 





                                        





05/09/21 13:15


Consult Physician Urgent 


   Consulting Provider: [facility]


   Consult Reason/Comments: Unstable angina


   Do you want consulting provider notified?: Yes











Primary care physician: 


Smith Figueroa





[facility] Course: 





Mr. Wagoner is a 75-year-old male with a past medical history of coronary artery

disease, hypertension, hyperlipidemia, diabetes mellitus, osteoarthritis, CLL in

remission, who follows with Dr. Figueroa, coming in with a chief complaint of 

chest pain.  Patient states that he had chest pain substernal in nature.  He 

states that he was having an argument with his wife when he started to notice 

that he had the substernal pain associated with some difficulty in breathing.  

Patient denied having any nausea vomiting or diaphoresis with it.  Patient 

denies having any recent travel.  No fever chills or rigors.  No cough or 

difficulty breathing.  Patient was recently admitted 2 weeks back for the same 

complaint and underwent cardiac catheterization which showed intermediate 

disease involving the mid RCA and mild disease involving the left coronary 

system, he was advised to continue with medical management.


In the ER at the time of admission patient's blood pressure was 195/88, 

temperature 97.5, heart rate between 50s to 60s, saturating at 95% on room air. 

On reviewing the labs he had a white count of 31.5, hemoglobin 14.1, platelets 

126.  Sodium 136, potassium 4.4, chloride 101, bicarb 28, BUN 13, creatinine 

0.82.  Troponin less than 0.012.  He had an EKG done showing sinus bradycardia 

with left ventricular hypertrophy and the chest x-ray showing no acute pulmonary

process.  So the patient was admitted for further management.





Hospital course - patient was started on a heparin drip and cardiology was 

consulted.  Patient had serial troponins and EKGs that are within normal limits.

 Patient's chest discomfort has resolved.  He was cleared by cardiology for 

discharge.  So patient is being discharged home in a stable condition and 

advised to follow-up with his primary care physician in 1-2 days.  Also advised 

follow-up with cardiology in 1-2 weeks.








                                   Vital Signs











  05/10/21 05/10/21 05/10/21





  08:05 11:30 13:15


 


Temperature 97.8 F 97.3 F L 


 


Pulse Rate [ 96 57 L 





Pulse Oximetery   





]   


 


Respiratory 18 18 





Rate   


 


Blood Pressure 165/73 157/73 





[Right Arm]   


 


O2 Sat by Pulse 96 97 97





Oximetry   











PHYSICAL EXAMINATION: 





GENERAL: Elderly male lying in bed appears to be in no acute distress.


HEENT: Pupils are round and equally reacting to light. EOMI. No scleral icterus.

No conjunctival pallor. Normocephalic, atraumatic. No pharyngeal erythema. No 

thyromegaly. 


CARDIOVASCULAR: S1 and S2 present. 


PULMONARY: Bilateral breath sounds are positive.  Few basal crackles.


ABDOMEN: Soft, nontender, nondistended, normoactive bowel sounds. No palpable 

organomegaly. 


MUSCULOSKELETAL: No joint swelling or deformity.


EXTREMITIES: No cyanosis or clubbing, mild  pedal edema. 


NEUROLOGICAL:  patient is alert awake oriented 3 .Gross neurological 

examination did not reveal any focal deficits. 


SKIN: No rashes.





DISCHARGE DIAGNOSIS








Atypical chest pain


Type 2 diabetes mellitus


Coronary artery disease


GERD


Hypertension


Multiple joint osteoarthritis


History of CLL in remission


Thrombocytopenia








Follow-up: Patient is advised to follow up with his care physician and 2-3 days 

and cardiology 2-3 weeks.





Plan - Discharge Summary


Discharge Rx Participant: No


New Discharge Prescriptions: 


Continue


   Montelukast Sodium [Singulair] 10 mg PO DAILY


   lisinopriL [Zestril] 40 mg PO DAILY


   Cetirizine HCl [Zyrtec] 10 mg PO DAILY


   Insulin NPH Hum/Reg Insulin Hm [Relion Novolin 70-30 Flexpen] 17 units SQ BID

PRN


     PRN Reason: HIGH BLOOD SUGAR


   Prednisolone Acetate/Pf [Prednisolone Acet 1% Eye Drop] 1 drop BOTH EYES BID


   Fluticasone Nasal Spray [Flonase Nasal Spray] 1 spr EA NOSTRIL DAILY PRN


     PRN Reason: Congestion


   Aspirin 81 mg PO DAILY 30 Days #30 chew


   ALPRAZolam [Xanax] 0.25 mg PO DAILY PRN


     PRN Reason: Anxiety


   Ketorolac 0.5% Ophth Soln [Acular 0.5%] 1 drop BOTH EYES BID


   HYDROcodone/APAP 5-325MG [Norco 5-325] 1 tab PO Q6HR PRN


     PRN Reason: Pain


   Omeprazole 20 mg PO BID PRN


     PRN Reason: GERDS


   Atorvastatin [Lipitor] 40 mg PO HS 30 Days #30 tab


   Metoprolol Succinate (ER) [Toprol XL] 25 mg PO DAILY


Discharge Medication List





Cetirizine HCl [Zyrtec] 10 mg PO DAILY 08/18/17 [History]


Montelukast Sodium [Singulair] 10 mg PO DAILY 08/18/17 [History]


lisinopriL [Zestril] 40 mg PO DAILY 08/18/17 [History]


Insulin NPH Hum/Reg Insulin Hm [Relion Novolin 70-30 Flexpen] 17 units SQ BID 

PRN 05/29/20 [History]


Fluticasone Nasal Spray [Flonase Nasal Spray] 1 spr EA NOSTRIL DAILY PRN 

04/19/21 [History]


HYDROcodone/APAP 5-325MG [Norco 5-325] 1 tab PO Q6HR PRN 04/19/21 [History]


Ketorolac 0.5% Ophth Soln [Acular 0.5%] 1 drop BOTH EYES BID 04/19/21 [History]


Omeprazole 20 mg PO BID PRN 04/19/21 [History]


Prednisolone Acetate/Pf [Prednisolone Acet 1% Eye Drop] 1 drop BOTH EYES BID 

04/19/21 [History]


Aspirin 81 mg PO DAILY 30 Days #30 chew 04/21/21 [Rx]


Atorvastatin [Lipitor] 40 mg PO HS 30 Days #30 tab 04/21/21 [Rx]


ALPRAZolam [Xanax] 0.25 mg PO DAILY PRN 05/09/21 [History]


Metoprolol Succinate (ER) [Toprol XL] 25 mg PO DAILY 05/09/21 [History]








Follow up Appointment(s)/Referral(s): 


Panfilo Diamond MD [STAFF PHYSICIAN] - 05/25/21 3:30 pm (TUESDAY)


Smith Figueroa DO [Primary Care Provider] - 05/19/21 2:20 pm


(WEDNESDAY-


IN [place] OFFICE


PLEASE CALL OFFICE WHEN YOU ARE HOME THEY HAVE SOME QUESTIONS)


Patient Instructions/Handouts:  Angina (DC)


Discharge Disposition: HOME SELF-CARE

## 2021-05-10 NOTE — P.CRDCN
History of Present Illness


History of present illness: 





HISTORY OF PRESENTING ILLNESS


This is a pleasant 75-year-old  male past medical history significant 

for CLL since , nonobstructive coronary artery disease with a 50% lesion in 

the mid RCA, diabetes mellitus, hypertension, arthritis and poor functional 

capacity.  He follows in the office with Dr. Diamond. We have been asked to see in 

consultation for chest pain.  He states over the weekend he has been 

experiencing pain in his left arm that were radiating up into the left neck. The

pain was sharp and tingly. It was intermittent and not related to activity or 

exertion. He had the pain when he went to bed Saturday night. When he woke up 

 the pain was still there in his left arm and neck but had also radiated 

into his chest. He has some associated shortness of breath, productive cough and

nausea. He states the pain is worse when he takes a deep breath. The pain is si

milar to how he felt 3 weeks ago but less intense. Three weeks ago when he came 

in he had an abnormal Lexiscan and underwent cardiac catheterization with Dr. Diamond revealing a 50% lesion in the mid-RCA. His medical therapy was optimized at

that time. Echocardiogram obtained at that time revealed preserved LV systolic 

function with EF 55-60%. 





DIAGNOSTICS


EKG reveals sinus bradycardia heart rate 55 with no acute ST or T-wave 

abnormalities. 


Telemetry tracings indicate sinus mechanism with no acute arrhythmia.


Chest xray negative for an acute cardiopulmonary process.


Laboratory reviewed, react enzymes negative 3, WBC 28, hemoglobin 12.8, 

platelets 105, sodium 136, potassium 3.9, creatinine 0.85, LDL 36 and HDL 32.


Current cardiac medications include aspirin 81 mg daily, atorvastatin 40 mg d

aily, lisinopril 40 mg daily and Toprol 25 mg daily. 





REVIEW OF SYSTEMS


At the time of my exam:


CONSTITUTIONAL: Denies fever or chills.


CARDIOVASCULAR: Denies chest pain, shortness of breath, orthopnea, PND or 

palpitations.


RESPIRATORY: Denies cough. 


GASTROINTESTINAL: Denies abdominal pain, diarrhea, constipation, nausea or 

vomiting.


MUSCULOSKELETAL: Denies myalgias.


NEUROLOGIC: Denies numbness, tingling, headacbe or weakness.


ENDOCRINE: Denies fatigue, weight change,  polydipsia or polyurina.


GENITOURINARY: Denies burning, hematuria or urgency with micturation.


HEMATOLOGIC: Denies history of anemia or bleeding. 





PHYSICAL EXAMINATION


Blood pressure 127/73 heart rate 60 afebrile and maintaining oxygen saturation 

on nasal cannula.


CONSTITUTIONAL: No apparent distress. 


HEENT: Head is normocephalic. Pupils are equal, round. Sclerae anicteric. Mucous

membranes of the mouth are moist.  No JVD. No carotid bruit.


CHEST EXAMINATION: Lungs are clear to auscultation. No chest wall tenderness is 

noted on palpation or with deep breathing. 


HEART EXAMINATION: Regular rate and rhythm. S1, S2 heard. No murmurs, gallops or

rub.


ABDOMEN: Soft, nontender. Positive bowel sounds.


EXTREMITIES: 2+ peripheral pulses, no lower extremity edema and no calf 

tenderness.


NEUROLOGIC EXAMINATION: Patient is awake, alert and oriented x3. 





ASSESSMENT


Chest pain, atypical


Nonobstructive coronary artery disease


Hypertension


Dyslipidemia


Arthritis


CLL





PLAN


Pain is atypical for angina. Likely related to some cervical strain. He states 

he had a neck strain injury a year ago and it does act up from time to time. 


An acute coronary event has been ruled out. 


Discontinue IV heparin infusion. 


No further cardiac work-up, can be discharged home. 


Thank you kindly for this consultation.





Nurse Practitioner note has been reviewed, I agree with a documented findings 

and plan of care.  Patient was seen and examined.











Past Medical History


Past Medical History: Coronary Artery Disease (CAD), Cancer, Diabetes Mellitus, 

GERD/Reflux, Hypertension, Osteoarthritis (OA)


Additional Past Medical History / Comment(s): NIDDM type II, 1999 LEUKEMIA (CLL-

remission), sinus problems, seasonal allergies, tinnitis bilaterally, OA hands, 

R wrist carpal tunnel, past fx with L elbow, R rotator cuff tendon problems, L 

rotator cuff tear, left knee pain


History of Any Multi-Drug Resistant Organisms: None Reported


Past Surgical History: Heart Catheterization, Orthopedic Surgery


Additional Past Surgical History / Comment(s): LEFT HAND thumb tendon repair.


Past Anesthesia/Blood Transfusion Reactions: No Reported Reaction


Smoking Status: Never smoker





- Past Family History


  ** Father


Family Medical History: CVA/TIA


Additional Family Medical History / Comment(s): Father  at the age of 85yrs.





  ** Mother


Family Medical History: Diabetes Mellitus


Additional Family Medical History / Comment(s): Mother  at the age of 90yrs.





Medications and Allergies


                                Home Medications











 Medication  Instructions  Recorded  Confirmed  Type


 


Cetirizine HCl [Zyrtec] 10 mg PO DAILY 17 History


 


Montelukast Sodium [Singulair] 10 mg PO DAILY 17 History


 


lisinopriL [Zestril] 40 mg PO DAILY 17 History


 


Insulin NPH Hum/Reg Insulin Hm 17 units SQ BID PRN 20 History





[Relion Novolin 70-30 Flexpen]    


 


Fluticasone Nasal Spray [Flonase 1 spr EA NOSTRIL DAILY PRN 21 

History





Nasal [place]]    


 


HYDROcodone/APAP 5-325MG [Norco 1 tab PO Q6HR PRN 21 History





5-325]    


 


Ketorolac 0.5% Ophth Soln [Acular 1 drop BOTH EYES BID 21 History





0.5%]    


 


Omeprazole 20 mg PO BID PRN 21 History


 


Prednisolone Acetate/Pf 1 drop BOTH EYES BID 21 History





[Prednisolone Acet 1% Eye Drop]    


 


Aspirin 81 mg PO DAILY 30 Days #30 chew 21 Rx


 


Atorvastatin [Lipitor] 40 mg PO HS 30 Days #30 tab 21 Rx


 


ALPRAZolam [Xanax] 0.25 mg PO DAILY PRN 21 History


 


Metoprolol Succinate (ER) [Toprol 25 mg PO DAILY 21 History





Xl]    








                                    Allergies











Allergy/AdvReac Type Severity Reaction Status Date / Time


 


amoxicillin [From Augmentin] Allergy  Rash/Hives Verified 21 12:56


 


clavulanic acid Allergy  Rash/Hives Verified 21 12:56





[From Augmentin]     














Physical Exam


Vitals: 


                                   Vital Signs











  Temp Pulse Pulse Resp BP BP Pulse Ox


 


 05/10/21 04:00  97.7 F   60  18   127/73  97


 


 05/10/21 02:00    51 L  18   


 


 05/10/21 00:00  97.8 F   51 L  18   160/71  97


 


 21 20:00  97.8 F   57 L  18   174/74  97


 


 21 15:41    61    136/72  97


 


 21 15:29    60    129/67  97


 


 21 15:25    57 L    137/65  98


 


 21 14:11  97.8 F   52 L  18   166/79  98


 


 21 14:00    52 L    


 


 21 13:49  97.8 F      


 


 21 13:42   58 L   16  165/66   97


 


 21 11:46   55 L   20  163/74   95


 


 21 11:37  97.5 F L  59 L   20  195/88   95








                                Intake and Output











 05/09/21 05/10/21 05/10/21





 22:59 06:59 14:59


 


Intake Total 304  


 


Output Total 425  


 


Balance -121  


 


Intake:   


 


  Intake, IV Titration 74  





  Amount   


 


    Heparin Sod,Pork in 0.45% 74  





    NaCl 25,000 unit In 0.45   





    % NaCl 1 250ml.bag @ 10.   





    498 UNITS/KG/HR 10 mls/hr   





    IV .Q24H [facility] Rx#:   





    [id number]   


 


  Oral 230  


 


Output:   


 


  Urine 425  


 


Other:   


 


  Weight 95.254 kg 99.7 kg 














Results





                                 05/10/21 03:49





                                 05/10/21 03:49


                                 Cardiac Enzymes











  21 Range/Units





  11:39 11:39 14:39 


 


AST  25    (17-59)  U/L


 


Troponin I   <0.012  <0.012  (0.000-0.034)  ng/mL














  21 Range/Units





  19:42 


 


AST   (17-59)  U/L


 


Troponin I  <0.012  (0.000-0.034)  ng/mL








                                   Coagulation











  05/09/21 05/09/21 05/10/21 Range/Units





  11:39 19:42 03:49 


 


PT  10.6    (9.0-12.0)  sec


 


APTT  22.4  38.8 H  58.7 H  (22.0-30.0)  sec








                                     Lipids











  05/10/21 Range/Units





  03:49 


 


Triglycerides  113  (<150)  mg/dL


 


Cholesterol  91  (<200)  mg/dL


 


HDL Cholesterol  32 L  (40-60)  mg/dL








                                       CBC











  05/09/21 05/10/21 Range/Units





  11:39 03:49 


 


WBC  31.5 H  28.0 H  (3.8-10.6)  k/uL


 


RBC  4.35  3.89 L  (4.30-5.90)  m/uL


 


Hgb  14.1  12.8 L  (13.0-17.5)  gm/dL


 


Hct  39.6  35.3 L  (39.0-53.0)  %


 


Plt Count  126 L  105 L  (150-450)  k/uL








                          Comprehensive Metabolic Panel











  05/09/21 05/10/21 Range/Units





  11:39 03:49 


 


Sodium  136 L  136 L  (137-145)  mmol/L


 


Potassium  4.4  3.9  (3.5-5.1)  mmol/L


 


Chloride  101  105  ()  mmol/L


 


Carbon Dioxide  28  27  (22-30)  mmol/L


 


BUN  13  15  (9-20)  mg/dL


 


Creatinine  0.82  0.85  (0.66-1.25)  mg/dL


 


Glucose  240 H  161 H  (74-99)  mg/dL


 


Calcium  9.3  8.7  (8.4-10.2)  mg/dL


 


AST  25   (17-59)  U/L


 


ALT  20   (4-49)  U/L


 


Alkaline Phosphatase  80   ()  U/L


 


Total Protein  6.2 L   (6.3-8.2)  g/dL


 


Albumin  4.1   (3.5-5.0)  g/dL








                               Current Medications











Generic Name Dose Route Start Last Admin





  Trade Name Freq  PRN Reason Stop Dose Admin


 


Acetaminophen  650 mg  21 21:11 





  Acetaminophen Tab 325 Mg Tab  PO  





  Q6HR PRN  





  Fever and/ or Pain  


 


Hydrocodone Bitart/Acetaminophen  1 each  21 16:11  05/10/21 03:42





  Hydrocodone/Apap 5-325mg 1 Each Tab  PO   1 each





  Q6HR PRN   Administration





  Pain  


 


Alprazolam  0.25 mg  21 16:11 





  Alprazolam 0.25 Mg Tab  PO  





  DAILY PRN  





  Anxiety  


 


Aspirin  81 mg  05/10/21 09:00 





  Aspirin 81 Mg  PO  





  DAILY MAYRA  


 


Atorvastatin Calcium  40 mg  21 21:00  21 21:30





  Atorvastatin 40 Mg Tab  PO   40 mg





  HS MAYRA   Administration


 


Fluticasone Propionate  1 spray  21 16:11 





  Fluticasone 50mcg/Spray Nasal 16gm  EA NOSTRIL  





  DAILY PRN  





  Congestion  


 


Heparin Sodium/Sodium Chloride  250 mls @ 10 mls/hr  21 12:45  21 

21:03





  25,000 unit/ Sodium Chloride  IV   12.6 units/kg/hr





  .Q24H MAYRA   12 mls/hr





    Titration





  Protocol  





  10.498 UNITS/KG/HR  


 


Insulin Aspart  17 unit  05/10/21 07:30 





  Insuln Asp Prt/Insulin Aspart 100 Unit/Ml 10 Ml Vial  SQ  





  AC-BID MAYRA  


 


Insulin Aspart  0 unit  21 21:00  05/10/21 07:11





  Insulin Aspart (Novolog) 100 Unit/Ml Vial  SQ   2 unit





  ACHS MAYRA   Administration





  Protocol  


 


Ketorolac Tromethamine  1 drops  21 21:00  21 21:30





  Ketorolac 0.5% Ophth Drops 5 Ml Btl  BOTH EYES   1 drops





  BID MAYRA   Administration


 


Lisinopril  40 mg  05/10/21 09:00 





  Lisinopril 20 Mg Tab  PO  





  DAILY [facility]  


 


Loratadine  10 mg  05/10/21 09:00 





  Loratadine 10 Mg Tab  PO  





  DAILY [facility]  


 


Metoprolol Succinate  25 mg  05/10/21 09:00 





  Metoprolol Succinate (Er) 25 Mg Tab.Er.24h  PO  





  DAILY [facility]  


 


Montelukast Sodium  10 mg  05/10/21 09:00 





  Montelukast 10 Mg Tab  PO  





  DAILY MAYRA  


 


Nitroglycerin  0.4 mg  21 13:15  21 15:38





  Nitroglycerin Sl Tabs 0.4 Mg Tab  SUBLINGUAL   0.4 mg





  Q5M PRN   Administration





  Chest Pain  


 


Nitroglycerin  1 inch  21 18:00  05/10/21 07:05





  Nitroglycerin Oint 1 Inch/Gm Packet  TOPICAL   1 inch





  Q6HR MAYRA   Administration


 


Pantoprazole Sodium  40 mg  21 16:11 





  Pantoprazole 40 Mg Tablet  PO  





  BID PRN  





  GERDS  


 


Prednisolone Acetate  1 drops  21 21:00  21 21:30





  Prednisolone Acetate 1% Ophth Drops 5 Ml Btl  BOTH EYES   1 drops





  BID MAYRA   Administration








                                Intake and Output











 05/09/21 05/10/21 05/10/21





 22:59 06:59 14:59


 


Intake Total 304  


 


Output Total 425  


 


Balance -121  


 


Intake:   


 


  Intake, IV Titration 74  





  Amount   


 


    Heparin Sod,Pork in 0.45% 74  





    NaCl 25,000 unit In 0.45   





    % NaCl 1 250ml.bag @ 10.   





    498 UNITS/KG/HR 10 mls/hr   





    IV .Q24H [facility] Rx#:   





    [id number]   


 


  Oral 230  


 


Output:   


 


  Urine 425  


 


Other:   


 


  Weight 95.254 kg 99.7 kg 








                                        





                                 05/10/21 03:49 





                                 05/10/21 03:49

## 2021-06-01 ENCOUNTER — HOSPITAL ENCOUNTER (OUTPATIENT)
Dept: HOSPITAL 47 - EC | Age: 76
Setting detail: OBSERVATION
LOS: 2 days | Discharge: HOME | End: 2021-06-03
Attending: INTERNAL MEDICINE | Admitting: INTERNAL MEDICINE
Payer: MEDICARE

## 2021-06-01 DIAGNOSIS — M25.562: ICD-10-CM

## 2021-06-01 DIAGNOSIS — M19.90: ICD-10-CM

## 2021-06-01 DIAGNOSIS — Z88.9: ICD-10-CM

## 2021-06-01 DIAGNOSIS — E11.9: ICD-10-CM

## 2021-06-01 DIAGNOSIS — Z88.0: ICD-10-CM

## 2021-06-01 DIAGNOSIS — I25.110: ICD-10-CM

## 2021-06-01 DIAGNOSIS — Z79.4: ICD-10-CM

## 2021-06-01 DIAGNOSIS — M19.041: ICD-10-CM

## 2021-06-01 DIAGNOSIS — E78.5: ICD-10-CM

## 2021-06-01 DIAGNOSIS — Z82.3: ICD-10-CM

## 2021-06-01 DIAGNOSIS — I10: ICD-10-CM

## 2021-06-01 DIAGNOSIS — Z83.3: ICD-10-CM

## 2021-06-01 DIAGNOSIS — E87.1: ICD-10-CM

## 2021-06-01 DIAGNOSIS — I21.9: Primary | ICD-10-CM

## 2021-06-01 DIAGNOSIS — Z79.82: ICD-10-CM

## 2021-06-01 DIAGNOSIS — M19.042: ICD-10-CM

## 2021-06-01 DIAGNOSIS — K21.9: ICD-10-CM

## 2021-06-01 DIAGNOSIS — D64.9: ICD-10-CM

## 2021-06-01 DIAGNOSIS — D69.6: ICD-10-CM

## 2021-06-01 DIAGNOSIS — Z88.1: ICD-10-CM

## 2021-06-01 DIAGNOSIS — Z79.899: ICD-10-CM

## 2021-06-01 DIAGNOSIS — Z20.822: ICD-10-CM

## 2021-06-01 DIAGNOSIS — C91.11: ICD-10-CM

## 2021-06-01 LAB
ALBUMIN SERPL-MCNC: 3.9 G/DL (ref 3.5–5)
ALP SERPL-CCNC: 71 U/L (ref 38–126)
ALT SERPL-CCNC: 21 U/L (ref 4–49)
ANION GAP SERPL CALC-SCNC: 6 MMOL/L
APTT BLD: 21.4 SEC (ref 22–30)
AST SERPL-CCNC: 25 U/L (ref 17–59)
BUN SERPL-SCNC: 14 MG/DL (ref 9–20)
CALCIUM SPEC-MCNC: 8.9 MG/DL (ref 8.4–10.2)
CELLS COUNTED: 200
CHLORIDE SERPL-SCNC: 103 MMOL/L (ref 98–107)
CK SERPL-CCNC: 189 U/L (ref 55–170)
CO2 SERPL-SCNC: 26 MMOL/L (ref 22–30)
D DIMER PPP FEU-MCNC: 0.47 MG/L FEU (ref ?–0.6)
EOSINOPHIL # BLD MANUAL: 0.27 K/UL (ref 0–0.7)
ERYTHROCYTE [DISTWIDTH] IN BLOOD BY AUTOMATED COUNT: 3.99 M/UL (ref 4.3–5.9)
ERYTHROCYTE [DISTWIDTH] IN BLOOD: 14.2 % (ref 11.5–15.5)
GLUCOSE BLD-MCNC: 147 MG/DL (ref 75–99)
GLUCOSE SERPL-MCNC: 180 MG/DL (ref 74–99)
HCT VFR BLD AUTO: 36.6 % (ref 39–53)
HGB BLD-MCNC: 12.9 GM/DL (ref 13–17.5)
INR PPP: 1 (ref ?–1.2)
LIPASE SERPL-CCNC: 49 U/L (ref 23–300)
LYMPHOCYTES # BLD MANUAL: 21.98 K/UL (ref 1–4.8)
MAGNESIUM SPEC-SCNC: 1.8 MG/DL (ref 1.6–2.3)
MCH RBC QN AUTO: 32.3 PG (ref 25–35)
MCHC RBC AUTO-ENTMCNC: 35.3 G/DL (ref 31–37)
MCV RBC AUTO: 91.5 FL (ref 80–100)
MONOCYTES # BLD MANUAL: 0.54 K/UL (ref 0–1)
NEUTROPHILS NFR BLD MANUAL: 15 %
NEUTS SEG # BLD MANUAL: 4.02 K/UL (ref 1.3–7.7)
PLATELET # BLD AUTO: 109 K/UL (ref 150–450)
POTASSIUM SERPL-SCNC: 4.3 MMOL/L (ref 3.5–5.1)
PROT SERPL-MCNC: 5.8 G/DL (ref 6.3–8.2)
PT BLD: 11 SEC (ref 9–12)
SODIUM SERPL-SCNC: 135 MMOL/L (ref 137–145)
WBC # BLD AUTO: 26.8 K/UL (ref 3.8–10.6)

## 2021-06-01 PROCEDURE — 85025 COMPLETE CBC W/AUTO DIFF WBC: CPT

## 2021-06-01 PROCEDURE — 96365 THER/PROPH/DIAG IV INF INIT: CPT

## 2021-06-01 PROCEDURE — 81003 URINALYSIS AUTO W/O SCOPE: CPT

## 2021-06-01 PROCEDURE — 80048 BASIC METABOLIC PNL TOTAL CA: CPT

## 2021-06-01 PROCEDURE — 83690 ASSAY OF LIPASE: CPT

## 2021-06-01 PROCEDURE — 96366 THER/PROPH/DIAG IV INF ADDON: CPT

## 2021-06-01 PROCEDURE — 80061 LIPID PANEL: CPT

## 2021-06-01 PROCEDURE — 36415 COLL VENOUS BLD VENIPUNCTURE: CPT

## 2021-06-01 PROCEDURE — 83880 ASSAY OF NATRIURETIC PEPTIDE: CPT

## 2021-06-01 PROCEDURE — 83735 ASSAY OF MAGNESIUM: CPT

## 2021-06-01 PROCEDURE — 85379 FIBRIN DEGRADATION QUANT: CPT

## 2021-06-01 PROCEDURE — 82550 ASSAY OF CK (CPK): CPT

## 2021-06-01 PROCEDURE — 99291 CRITICAL CARE FIRST HOUR: CPT

## 2021-06-01 PROCEDURE — 80053 COMPREHEN METABOLIC PANEL: CPT

## 2021-06-01 PROCEDURE — 84484 ASSAY OF TROPONIN QUANT: CPT

## 2021-06-01 PROCEDURE — 87635 SARS-COV-2 COVID-19 AMP PRB: CPT

## 2021-06-01 PROCEDURE — 85730 THROMBOPLASTIN TIME PARTIAL: CPT

## 2021-06-01 PROCEDURE — 93005 ELECTROCARDIOGRAM TRACING: CPT

## 2021-06-01 PROCEDURE — 71046 X-RAY EXAM CHEST 2 VIEWS: CPT

## 2021-06-01 PROCEDURE — 85610 PROTHROMBIN TIME: CPT

## 2021-06-01 RX ADMIN — CEFAZOLIN SCH MLS/HR: 330 INJECTION, POWDER, FOR SOLUTION INTRAMUSCULAR; INTRAVENOUS at 14:25

## 2021-06-01 RX ADMIN — INSULIN ASPART SCH UNIT: 100 INJECTION, SOLUTION INTRAVENOUS; SUBCUTANEOUS at 22:06

## 2021-06-01 RX ADMIN — PREDNISOLONE ACETATE SCH DROPS: 10 SUSPENSION/ DROPS OPHTHALMIC at 22:22

## 2021-06-01 RX ADMIN — KETOROLAC TROMETHAMINE SCH DROPS: 5 SOLUTION OPHTHALMIC at 22:22

## 2021-06-01 RX ADMIN — HYDROCODONE BITARTRATE AND ACETAMINOPHEN PRN EACH: 5; 325 TABLET ORAL at 22:05

## 2021-06-01 RX ADMIN — ATORVASTATIN CALCIUM SCH MG: 80 TABLET, FILM COATED ORAL at 22:05

## 2021-06-01 RX ADMIN — NITROGLYCERIN SCH: 20 OINTMENT TOPICAL at 22:06

## 2021-06-01 NOTE — XR
EXAMINATION TYPE: XR chest 2V

 

DATE OF EXAM: 6/1/2021

 

COMPARISON: Chest x-ray May 9, 2021

 

HISTORY: Left-sided chest pain and shortness of breath.

 

TECHNIQUE:  Frontal and lateral views of the chest are obtained.

 

FINDINGS:  There are reticular interstitial changes bilaterally without suspicious focal air space op
acity, pleural effusion, or pneumothorax seen.  The cardiac silhouette size is stable and upper limit
s of normal.   The osseous structures are intact.

 

IMPRESSION:  No acute process. No significant change from prior.

## 2021-06-01 NOTE — HP
HISTORY AND PHYSICAL



DATE OF SERVICE:

06/01/2021



CHIEF COMPLAINT:

Chest pain.



HISTORY OF PRESENT ILLNESS:

This 75-year-old gentleman with a past medical history of multiple medical problems,

CAD, history of diabetes, GERD, hypertension, DJD, history of chronic lymphoid leukemia

being followed by Dr. Figueroa in the outpatient setting is  complaining of chest

pain.  The patient felt chest pain in the central part of the chest which is radiating

to the left side.  The patient previously had 50% stenosis of the arteries and because

of the increasing chest pain patient came to [facility] and was admitted for

further evaluation and treatment.  The sodium was 135.  Troponins were found to be

negative.  The patient was admitted earlier this year for atypical chest pain.  There

is no history of any fever, rigors, chills at this time.



PAST MEDICAL HISTORY:

History of CAD, history of cardiac catheterization, history of diabetes, GERD,

hypertension, DJD.



MEDICATIONS:

Home medications are nitroglycerin p.r.n., omeprazole, NPH insulin, Lipitor. Xanax,

Zestril, prednisone, Singulair, Toprol-XL, (  ), aspirin.



ALLERGIES:

AMOXICILLIN, CLARINEX, AND CLAVULANIC ACID.



FAMILY HISTORY:

History of CVA, TIA.



SOCIAL HISTORY:

No history of smoking.  No history of alcohol intake.



REVIEW OF SYSTEMS:

ENT No history of diminished hearing or vision.

CARDIOVASCULAR  As mentioned earlier.

RESPIRATORY  As mentioned earlier.

GI No nausea, vomiting, or diarrhea.

 No dysuria or hematuria.

NERVOUS  No numbness or weakness.

ALLERGY/IMMUNOLOGY No asthma or hayfever.

MUSCULOSKELETAL As mentioned earlier.

HEMATOLOGY/ONCOLOGY Negative.

ENDOCRINE No history of diabetes or hypothyroidism.

CONSTITUTIONAL As  mentioned earlier.

DERMATOLOGY  Negative.

RHEUMATOLOGY Negative,

PSYCHIATRY As mentioned earlier.



PHYSICAL EXAMINATION:

Alert and oriented x3.  Pulse 47, blood pressure 162/78 respiration 18, temperature

97.7, pulse ox 99% on 2 L.

HEENT:  Conjunctivae normal. Oral mucosa moist.

NECK:  No jugular venous distention.  No lymph node enlargement.

CARDIOVASCULAR:  S1, S2, muffled.  No S3, no S4,

RESPIRATORY: Diminished breath sounds at the bases. Scattered rhonchi and crackles.

ABDOMEN: Soft, nontender.  No mass palpable.

LEGS: No edema, no swelling.

NERVOUS SYSTEM: Higher functions mentioned earlier. Moves all four limbs. No focal

motor or sensory deficits.

LYMPHATICS: No lymph node in neck or axilla.

SKIN: No rash.

JOINTS: No active deforming arthropathy.



LAB STUDIES:

WBC 10.2, hemoglobin 12.9, sodium 135.



ASSESSMENT:

1. Chest pain, possible unstable angina.

2. Chronic lymphoid leukemia in remission.

3. Increased WBC.

4. Anemia, normocytic.

5. Thrombocytopenia.

6. Hyponatremia.

7. History of previous cardiac catheterization and CAD.

8. Diabetes mellitus type 2.

9. GERD.

10.Hypertension.

11.History of DJD.



RECOMMENDATIONS AND DISCUSSION:

In this 75-year-old gentleman who presented with multiple complex medical issues, we

will monitor the patient closely, continue the current management and symptomatic

treatment, rule out myocardial infarction, cardiology consultation.  Resume the home

medications. (  ) protocol.  Prognosis guarded because of multiple complex medical

issues.  Further recommendations to follow.





MMODL / IJN: [id number] / Job#: [id number]

## 2021-06-01 NOTE — ED
Chest Pain HPI





- General


Stated Complaint: chest pain


Time Seen by Provider: 21 10:18


Source: patient, EMS, RN notes reviewed


Mode of arrival: EMS





- History of Present Illness


Initial Comments: 





This is a 75-year-old male with a recent admission for chest pain in the finding

of a 50% stenosis of one of his arteries he's not sure which one who presents 

today with complaints of sudden onset of sharp midsternal left-sided chest pain 

10/10 severity.  He did call EMS.  After aspirin and nitroglycerin the pain is 

down to about a 5/10.  No shortness breath no nausea vomiting


MD Complaint: chest pain





- Related Data


                                Home Medications











 Medication  Instructions  Recorded  Confirmed


 


Cetirizine HCl [Zyrtec] 10 mg PO DAILY 17


 


Montelukast Sodium [Singulair] 10 mg PO DAILY 17


 


lisinopriL [Zestril] 40 mg PO DAILY 17


 


Insulin NPH Hum/Reg Insulin Hm 17 units SQ DAILY 20





[Relion Novolin 70-30 Flexpen]   


 


Fluticasone Nasal Spray [Flonase 1 spr EA NOSTRIL DAILY PRN 21





Nasal [place]]   


 


HYDROcodone/APAP 5-325MG [Norco 1 tab PO Q6HR PRN 21





5-325]   


 


Ketorolac 0.5% Ophth Soln [Acular 1 drop BOTH EYES BID 21





0.5%]   


 


Omeprazole 20 mg PO BID PRN 21


 


Prednisolone Acetate/Pf 1 drop BOTH EYES BID 21





[Prednisolone Acet 1% Eye Drop]   


 


ALPRAZolam [Xanax] 0.25 mg PO DAILY PRN 21


 


Metoprolol Succinate (ER) [Toprol 25 mg PO DAILY 21





XL]   


 


Atorvastatin [Lipitor] 80 mg PO HS 21


 


Nitroglycerin Sl Tabs [Nitrostat] 0.4 mg SUBLINGUAL Q5M PRN 06/01/21 06/01/21








                                  Previous Rx's











 Medication  Instructions  Recorded


 


Aspirin 81 mg PO DAILY 30 Days #30 chew 21











                                    Allergies











Allergy/AdvReac Type Severity Reaction Status Date / Time


 


amoxicillin [From Augmentin] Allergy  Rash/Hives Verified 21 12:12


 


clavulanic acid Allergy  Rash/Hives Verified 21 12:12





[From Augmentin]     














Review of Systems


ROS Statement: 


Those systems with pertinent positive or pertinent negative responses have been 

documented in the HPI.





ROS Other: All systems not noted in ROS Statement are negative.





EKG Findings





- EKG Results:


EKG: interpreted by NITHIN (Says bradycardia rate of 49.  Interval 182 QRS 

duration 92 QT since /406 moderate voltage criteria for LVH)





Past Medical History


Past Medical History: Coronary Artery Disease (CAD), Cancer, Diabetes Mellitus, 

GERD/Reflux, Hypertension, Osteoarthritis (OA)


Additional Past Medical History / Comment(s): NIDDM type II, 1999 LEUKEMIA (CLL-

remission), sinus problems, seasonal allergies, tinnitis bilaterally, OA hands, 

R wrist carpal tunnel, past fx with L elbow, R rotator cuff tendon problems, L 

rotator cuff tear, left knee pain


History of Any Multi-Drug Resistant Organisms: None Reported


Past Surgical History: Heart Catheterization, Orthopedic Surgery


Additional Past Surgical History / Comment(s): LEFT HAND thumb tendon repair.


Past Anesthesia/Blood Transfusion Reactions: No Reported Reaction


Smoking Status: Never smoker





- Past Family History


  ** Father


Family Medical History: CVA/TIA


Additional Family Medical History / Comment(s): Father  at the age of 85yrs.





  ** Mother


Family Medical History: Diabetes Mellitus


Additional Family Medical History / Comment(s): Mother  at the age of 90yrs.





General Exam





- General Exam Comments


Initial Comments: 





This is a well-developed well-nourished awake alert oriented 3 male


General appearance: alert, in no apparent distress


Head exam: Present: atraumatic, normocephalic, normal inspection


Eye exam: Present: normal appearance, PERRL, EOMI.  Absent: scleral icterus, 

conjunctival injection, periorbital swelling


ENT exam: Present: normal exam, mucous membranes moist


Neck exam: Present: normal inspection, full ROM, other (No stridor JVD or 

bruits).  Absent: tenderness, meningismus, lymphadenopathy


Respiratory exam: Present: normal lung sounds bilaterally.  Absent: respiratory 

distress, wheezes, rales, rhonchi, stridor, chest wall tenderness


Cardiovascular Exam: Present: regular rate, normal rhythm, normal heart sounds. 

Absent: systolic murmur, diastolic murmur, rubs, gallop, clicks


GI/Abdominal exam: Present: soft, normal bowel sounds.  Absent: distended, 

tenderness, guarding, rebound, rigid


Extremities exam: Present: normal inspection, full ROM, normal capillary refill.

 Absent: tenderness, pedal edema, joint swelling, calf tenderness


Back exam: Present: normal inspection


Neurological exam: Present: alert, oriented X3, CN II-XII intact


Psychiatric exam: Present: normal affect, normal mood


Skin exam: Present: warm, dry, intact, normal color.  Absent: rash





Course


                                   Vital Signs











  21





  10:35 11:28 12:19


 


Temperature 97.8 F  


 


Pulse Rate 52 L 48 L 46 L


 


Respiratory 18 15 18





Rate   


 


Blood Pressure 156/78 144/67 141/65


 


O2 Sat by Pulse 95 99 99





Oximetry   














- Reevaluation(s)


Reevaluation #1: 





21 12:31


Reevaluation the patient is feeling improved at this time.





Chest Pain MDM





- MDM





Imaging reviewed no acute findings patient has elevated white count which is 

normal for him.  Patient does have no elevation of December and he will be 

admitted however because of the chest pain which is consistent with unstable 

angina.  Dr. kerr





Critical Care Time


Critical Care Time: Yes


Total Critical Care Time: 31


Critical Care Time: 





31 minutes of critical care time which includes initial presentation with 

history physical labs x-rays multiple reevaluation the patient discussed with 

patient regarding findings discussed with the admitting physician admission or

ders and documentation of the above





Disposition


Clinical Impression: 


 Chest pain, Unstable angina pectoris





Disposition: ADMITTED AS IP TO THIS HOSP


Condition: Fair


Referrals: 


Smith Figueroa DO [Primary Care Provider] - 1-2 days

## 2021-06-02 LAB
ANION GAP SERPL CALC-SCNC: 6 MMOL/L
BUN SERPL-SCNC: 15 MG/DL (ref 9–20)
CALCIUM SPEC-MCNC: 9.4 MG/DL (ref 8.4–10.2)
CELLS COUNTED: 100
CHLORIDE SERPL-SCNC: 105 MMOL/L (ref 98–107)
CHOLEST SERPL-MCNC: 86 MG/DL (ref 0–200)
CO2 SERPL-SCNC: 27 MMOL/L (ref 22–30)
ERYTHROCYTE [DISTWIDTH] IN BLOOD BY AUTOMATED COUNT: 4.11 M/UL (ref 4.3–5.9)
ERYTHROCYTE [DISTWIDTH] IN BLOOD: 14.3 % (ref 11.5–15.5)
GLUCOSE BLD-MCNC: 168 MG/DL (ref 75–99)
GLUCOSE BLD-MCNC: 178 MG/DL (ref 75–99)
GLUCOSE BLD-MCNC: 191 MG/DL (ref 75–99)
GLUCOSE BLD-MCNC: 251 MG/DL (ref 75–99)
GLUCOSE SERPL-MCNC: 197 MG/DL (ref 74–99)
HCT VFR BLD AUTO: 37.9 % (ref 39–53)
HDLC SERPL-MCNC: 18 MG/DL (ref 40–60)
HGB BLD-MCNC: 13.2 GM/DL (ref 13–17.5)
LDLC SERPL CALC-MCNC: 35.6 MG/DL (ref 0–131)
LYMPHOCYTES # BLD MANUAL: 19.43 K/UL (ref 1–4.8)
MCH RBC QN AUTO: 32.2 PG (ref 25–35)
MCHC RBC AUTO-ENTMCNC: 34.9 G/DL (ref 31–37)
MCV RBC AUTO: 92.1 FL (ref 80–100)
NEUTROPHILS NFR BLD MANUAL: 18 %
NEUTS SEG # BLD MANUAL: 4.27 K/UL (ref 1.3–7.7)
PH UR: 7 [PH] (ref 5–8)
PLATELET # BLD AUTO: 104 K/UL (ref 150–450)
POTASSIUM SERPL-SCNC: 4.8 MMOL/L (ref 3.5–5.1)
SODIUM SERPL-SCNC: 138 MMOL/L (ref 137–145)
SP GR UR: 1.01 (ref 1–1.03)
TRIGL SERPL-MCNC: 162 MG/DL (ref 0–149)
UROBILINOGEN UR QL STRIP: 2 MG/DL (ref ?–2)
VLDLC SERPL CALC-MCNC: 32.4 MG/DL (ref 5–40)
WBC # BLD AUTO: 23.7 K/UL (ref 3.8–10.6)

## 2021-06-02 RX ADMIN — ATORVASTATIN CALCIUM SCH MG: 80 TABLET, FILM COATED ORAL at 20:21

## 2021-06-02 RX ADMIN — CEFAZOLIN SCH MLS/HR: 330 INJECTION, POWDER, FOR SOLUTION INTRAMUSCULAR; INTRAVENOUS at 11:43

## 2021-06-02 RX ADMIN — INSULIN ASPART SCH UNIT: 100 INJECTION, SOLUTION INTRAVENOUS; SUBCUTANEOUS at 20:21

## 2021-06-02 RX ADMIN — PREDNISOLONE ACETATE SCH DROPS: 10 SUSPENSION/ DROPS OPHTHALMIC at 09:03

## 2021-06-02 RX ADMIN — KETOROLAC TROMETHAMINE SCH DROPS: 5 SOLUTION OPHTHALMIC at 20:22

## 2021-06-02 RX ADMIN — ISOSORBIDE MONONITRATE SCH MG: 30 TABLET, EXTENDED RELEASE ORAL at 11:42

## 2021-06-02 RX ADMIN — INSULIN ASPART SCH UNIT: 100 INJECTION, SOLUTION INTRAVENOUS; SUBCUTANEOUS at 17:36

## 2021-06-02 RX ADMIN — INSULIN ASPART SCH UNIT: 100 INJECTION, SOLUTION INTRAVENOUS; SUBCUTANEOUS at 09:02

## 2021-06-02 RX ADMIN — LORATADINE SCH MG: 10 TABLET ORAL at 09:02

## 2021-06-02 RX ADMIN — NITROGLYCERIN SCH: 20 OINTMENT TOPICAL at 00:26

## 2021-06-02 RX ADMIN — NITROGLYCERIN SCH: 20 OINTMENT TOPICAL at 05:23

## 2021-06-02 RX ADMIN — HYDROCODONE BITARTRATE AND ACETAMINOPHEN PRN EACH: 5; 325 TABLET ORAL at 20:21

## 2021-06-02 RX ADMIN — PREDNISOLONE ACETATE SCH DROPS: 10 SUSPENSION/ DROPS OPHTHALMIC at 20:22

## 2021-06-02 RX ADMIN — MONTELUKAST SODIUM SCH MG: 10 TABLET, FILM COATED ORAL at 09:02

## 2021-06-02 RX ADMIN — INSULIN ASPART SCH UNIT: 100 INJECTION, SOLUTION INTRAVENOUS; SUBCUTANEOUS at 13:18

## 2021-06-02 RX ADMIN — METOPROLOL SUCCINATE SCH MG: 25 TABLET, EXTENDED RELEASE ORAL at 09:02

## 2021-06-02 RX ADMIN — HYDROCODONE BITARTRATE AND ACETAMINOPHEN PRN EACH: 5; 325 TABLET ORAL at 09:08

## 2021-06-02 RX ADMIN — KETOROLAC TROMETHAMINE SCH DROPS: 5 SOLUTION OPHTHALMIC at 09:03

## 2021-06-02 RX ADMIN — INSULIN ASPART SCH UNIT: 100 INJECTION, SUSPENSION SUBCUTANEOUS at 09:02

## 2021-06-02 NOTE — P.CRDCN
History of Present Illness


History of present illness: 





HISTORY OF PRESENTING ILLNESS


This is a pleasant 75-year-old  male past medical history significant 

for coronary artery disease with a 50% lesion in the mid RCA, hypertension, 

diabetes mellitus, CLL and dyslipidemia. He follows in the office with Dr. Diamond.

We have been asked to see in consultation for chest pain.  He states on Monday 

night around 9:30 he developed a discomfort in his chest in the midsternal 

region described as a heaviness pounding sensation.  This occurred while at 

rest.  It did not radiate or have any associated symptoms.  He did take a 

sublingual nitroglycerin.  Which did seem to alleviate his discomfort somewhat 

however not completely.  He called EMS and on arrival they gave another 

nitroglycerin which did relieve his symptoms entirely.  He has had no further 

symptoms of discomfort since arriving at the hospital.  In 2021 with 

cardiac catheterization revealing a lesion in the mid RCA 50%, left main, LAD 

and circumflex artery free of significant disease.  Echocardiogram obtained at 

that time revealed preserved LV systolic function with ejection fraction 55-60% 

with mild tricuspid regurgitation.





DIAGNOSTICS


EKG reveals sinus bradycardia heart rate of 49 with nonspecific T-wave 

abnormalities inferiorly, consistent with previous EKG.


Telemetry tracings indicate sinus mechanism with no arrhythmia.


Chest xray negative for an acute cardiopulmonary process.


Laboratory reviewed, WBC 23.7, hemoglobin 13.2, platelets 104, sodium 138, 

potassium 4.8, creatinine 1.11, cardiac enzymes negative 3, and T proBNP 308 

and magnesium 1.8.


Current cardiac medications include aspirin 81 mg daily, Toprol 25 mg daily, 

lisinopril 40 mg daily, atorvastatin 80 mg daily. 





REVIEW OF SYSTEMS


At the time of my exam:


CONSTITUTIONAL: Denies fever or chills.


CARDIOVASCULAR: Denies chest pain, shortness of breath, orthopnea, PND or 

palpitations.


RESPIRATORY: Denies cough. 


GASTROINTESTINAL: Denies abdominal pain, diarrhea, constipation, nausea or 

vomiting.


MUSCULOSKELETAL: Denies myalgias.


NEUROLOGIC: Denies numbness, tingling, headacbe or weakness.


ENDOCRINE: Denies fatigue, weight change,  polydipsia or polyurina.


GENITOURINARY: Denies burning, hematuria or urgency with micturation.


HEMATOLOGIC: Denies history of anemia or bleeding. 





PHYSICAL EXAMINATION


Blood pressure 160/61 heart rate 55 afebrile and maintaining oxygen saturation 

on room air.


CONSTITUTIONAL: No apparent distress. 


HEENT: Head is normocephalic. Pupils are equal, round. Sclerae anicteric. Mucous

membranes of the mouth are moist.  No JVD. No carotid bruit.


CHEST EXAMINATION: Lungs are clear to auscultation. No chest wall tenderness is 

noted on palpation or with deep breathing. 


HEART EXAMINATION: Regular rate and rhythm. S1, S2 heard.  Soft systolic 

ejection murmur at the base, no gallops or rub.


ABDOMEN: Soft, nontender. Positive bowel sounds.


EXTREMITIES: 2+ peripheral pulses, no lower extremity edema and no calf 

tenderness.


NEUROLOGIC EXAMINATION: Patient is awake, alert and oriented x3. 





ASSESSMENT


Chest pain, atypical


Coronary artery disease, nonobstructive


Hypertension


Dyslipidemia


Diabetes mellitus


CLL





PLAN


An acute coronary event has been ruled out. 


Discontinue IV heparin. 


Decrease aspirin to 81 mg daily. 


Add imdur 30 mg daily. 


Increase activity and ambulation. 


Follow up with Dr. Diamond upon discharge. 


Thank you kindly for this consultation. 








Nurse Practitioner note has been reviewed, I agree with a documented findings 

and plan of care.  Patient was seen and examined.











Past Medical History


Past Medical History: Coronary Artery Disease (CAD), Cancer, Diabetes Mellitus, 

GERD/Reflux, Hypertension, Osteoarthritis (OA)


Additional Past Medical History / Comment(s): NIDDM type II, 1999 LEUKEMIA (CLL-

remission), sinus problems, seasonal allergies, tinnitis bilaterally, OA hands, 

R wrist carpal tunnel, past fx with L elbow, R rotator cuff tendon problems, L 

rotator cuff tear, left knee pain


History of Any Multi-Drug Resistant Organisms: None Reported


Past Surgical History: Heart Catheterization, Orthopedic Surgery


Additional Past Surgical History / Comment(s): LEFT HAND thumb tendon repair.


Past Anesthesia/Blood Transfusion Reactions: No Reported Reaction


Past Psychological History: No Psychological Hx Reported


Additional Psychological History / Comment(s): Pt resides with his spouse.  He 

is independent.


Smoking Status: Never smoker


Past Alcohol Use History: None Reported


Past Drug Use History: None Reported





- Past Family History


  ** Father


Family Medical History: CVA/TIA


Additional Family Medical History / Comment(s): Father  at the age of 85yrs.





  ** Mother


Family Medical History: Diabetes Mellitus


Additional Family Medical History / Comment(s): Mother  at the age of 90yrs.





Medications and Allergies


                                Home Medications











 Medication  Instructions  Recorded  Confirmed  Type


 


Cetirizine HCl [Zyrtec] 10 mg PO DAILY 17 History


 


Montelukast Sodium [Singulair] 10 mg PO DAILY 17 History


 


lisinopriL [Zestril] 40 mg PO DAILY 17 History


 


Insulin NPH Hum/Reg Insulin Hm 17 units SQ DAILY 20 History





[Relion Novolin 70-30 Flexpen]    


 


Fluticasone Nasal Spray [Flonase 1 spr EA NOSTRIL DAILY PRN 21 

History





Nasal [place]]    


 


HYDROcodone/APAP 5-325MG [Norco 1 tab PO Q6HR PRN 21 History





5-325]    


 


Ketorolac 0.5% Ophth Soln [Acular 1 drop BOTH EYES BID 21 History





0.5%]    


 


Omeprazole 20 mg PO BID PRN 21 History


 


Prednisolone Acetate/Pf 1 drop BOTH EYES BID 21 History





[Prednisolone Acet 1% Eye Drop]    


 


Aspirin 81 mg PO DAILY 30 Days #30 chew 21 Rx


 


ALPRAZolam [Xanax] 0.25 mg PO DAILY PRN 21 History


 


Metoprolol Succinate (ER) [Toprol 25 mg PO DAILY 21 History





XL]    


 


Atorvastatin [Lipitor] 80 mg PO HS 21 History


 


Nitroglycerin Sl Tabs [Nitrostat] 0.4 mg SUBLINGUAL Q5M PRN 21 

History








                                    Allergies











Allergy/AdvReac Type Severity Reaction Status Date / Time


 


amoxicillin [From Augmentin] Allergy  Rash/Hives Verified 21 12:12


 


clavulanic acid Allergy  Rash/Hives Verified 21 12:12





[From Augmentin]     














Physical Exam


Vitals: 


                                   Vital Signs











  Temp Pulse Pulse Resp BP BP Pulse Ox


 


 21 07:00  98.0 F   55 L  19   160/61  94 L


 


 21 02:00     19   


 


 21 00:47  98.4 F   79  20   162/72  96


 


 21 19:00  97.8 F   51 L  19   172/78  97


 


 21 18:45     19   


 


 21 17:50  97.7 F  47 L   18  162/78   99


 


 21 14:24  97.7 F  47 L   18  154/68   99


 


 21 12:19   46 L   18  141/65   99


 


 21 11:28   48 L   15  144/67   99


 


 21 10:35  97.8 F  52 L   18  156/78   95








                                Intake and Output











 21





 22:59 06:59 14:59


 


Intake Total 480 186.5 


 


Balance 480 186.5 


 


Intake:   


 


  Intake, IV Titration  186.5 





  Amount   


 


    Heparin Sod,Pork in 0.45%  186.5 





    NaCl 25,000 unit In 0.45   





    % NaCl 1 250ml.bag @ 10.   





    021 UNITS/KG/HR 10 mls/hr   





    IV .Q24H [facility] Rx#:   





    [id number]   


 


  Oral 480  


 


Other:   


 


  # Voids 1 3 


 


  Weight 99.79 kg  














Results





                                 21 05:54





                                 21 05:54


                                 Cardiac Enzymes











  21 Range/Units





  10:54 10:54 13:50 


 


AST  25    (17-59)  U/L


 


Troponin I   <0.012  <0.012  (0.000-0.034)  ng/mL














  21 Range/Units





  18:02 


 


AST   (17-59)  U/L


 


Troponin I  <0.012  (0.000-0.034)  ng/mL








                                   Coagulation











  21 Range/Units





  10:54 18:15 05:54 


 


PT  11.0    (9.0-12.0)  sec


 


APTT  21.4 L  63.4 H  46.9 H  (22.0-30.0)  sec








                                       CBC











  21 Range/Units





  10:54 05:54 


 


WBC  26.8 H  23.7 H  (3.8-10.6)  k/uL


 


RBC  3.99 L  4.11 L  (4.30-5.90)  m/uL


 


Hgb  12.9 L  13.2  (13.0-17.5)  gm/dL


 


Hct  36.6 L  37.9 L  (39.0-53.0)  %


 


Plt Count  109 L  104 L  (150-450)  k/uL








                          Comprehensive Metabolic Panel











  21 Range/Units





  10:54 05:54 


 


Sodium  135 L  138  (137-145)  mmol/L


 


Potassium  4.3  4.8  (3.5-5.1)  mmol/L


 


Chloride  103  105  ()  mmol/L


 


Carbon Dioxide  26  27  (22-30)  mmol/L


 


BUN  14  15  (9-20)  mg/dL


 


Creatinine  1.06  1.11  (0.66-1.25)  mg/dL


 


Glucose  180 H  197 H  (74-99)  mg/dL


 


Calcium  8.9  9.4  (8.4-10.2)  mg/dL


 


AST  25   (17-59)  U/L


 


ALT  21   (4-49)  U/L


 


Alkaline Phosphatase  71   ()  U/L


 


Total Protein  5.8 L   (6.3-8.2)  g/dL


 


Albumin  3.9   (3.5-5.0)  g/dL








                               Current Medications











Generic Name Dose Route Start Last Admin





  Trade Name Freq  PRN Reason Stop Dose Admin


 


Hydrocodone Bitart/Acetaminophen  1 each  21 12:35  21 22:05





  Hydrocodone/Apap 5-325mg 1 Each Tab  PO   1 each





  Q6HR PRN   Administration





  Pain  


 


Alprazolam  0.25 mg  21 12:35 





  Alprazolam 0.25 Mg Tab  PO  





  DAILY PRN  





  Anxiety  


 


Aspirin  325 mg  21 09:00 





  Aspirin 325 Mg Tab  PO  





  DAILY MAYRA  


 


Atorvastatin Calcium  80 mg  21 21:00  21 22:05





  Atorvastatin 80 Mg Tab  PO   80 mg





  HS MAYRA   Administration


 


Fluticasone Propionate  1 spray  21 12:35 





  Fluticasone 50mcg/Spray Nasal 16gm  EA NOSTRIL  





  DAILY PRN  





  Congestion  


 


Heparin Sodium/Sodium Chloride  250 mls @ 10 mls/hr  21 10:30  21 

06:54





  25,000 unit/ Sodium Chloride  IV   10.021 units/kg/hr





  .Q24H MAYRA   10 mls/hr





    Titration





  Protocol  





  10.021 UNITS/KG/HR  


 


Sodium Chloride  1,000 mls @ 20 mls/hr  21 12:45  21 14:25





  Saline 0.9%  IV   20 mls/hr





  .Q24H MAYRA   Administration


 


Insulin Aspart  17 unit  21 09:00 





  Insuln Asp Prt/Insulin Aspart 100 Unit/Ml 10 Ml Vial  SQ  





  DAILY MAYRA  


 


Insulin Aspart  0 unit  21 21:00  21 22:06





  Insulin Aspart (Novolog) 100 Unit/Ml Vial  SQ   1 unit





  ACHS MAYRA   Administration





  Protocol  


 


Ketorolac Tromethamine  1 drops  21 21:00  21 22:22





  Ketorolac 0.5% Ophth Drops 5 Ml Btl  BOTH EYES   1 drops





  BID [facility]   Administration


 


Lisinopril  40 mg  21 09:00 





  Lisinopril 20 Mg Tab  PO  





  DAILY MAYRA  


 


Loratadine  10 mg  21 09:00 





  Loratadine 10 Mg Tab  PO  





  DAILY [facility]  


 


Metoprolol Succinate  25 mg  21 09:00 





  Metoprolol Succinate (Er) 25 Mg Tab.Er.24h  PO  





  DAILY [facility]  


 


Montelukast Sodium  10 mg  21 09:00 





  Montelukast 10 Mg Tab  PO  





  DAILY MAYRA  


 


Nitroglycerin  0.4 mg  21 12:33 





  Nitroglycerin Sl Tabs 0.4 Mg Tab  SUBLINGUAL  





  Q5M PRN  





  Chest Pain  


 


Nitroglycerin  1 inch  21 18:00  21 05:23





  Nitroglycerin Oint 1 Inch/Gm Packet  TOPICAL   Not Given





  Q6HR [facility]  


 


Pantoprazole Sodium  40 mg  21 07:30 





  Pantoprazole 40 Mg Tablet  PO  





  DAILY PRN  





  GERD  


 


Prednisolone Acetate  1 drops  21 21:00  21 22:22





  Prednisolone Acetate 1% Ophth Drops 5 Ml Btl  BOTH EYES   1 drops





  BID [facility]   Administration


 


Temazepam  15 mg  21 18:53 





  Temazepam 15 Mg Cap  PO  





  HS PRN  





  Insomnia  








                                Intake and Output











 21





 22:59 06:59 14:59


 


Intake Total 480 186.5 


 


Balance 480 186.5 


 


Intake:   


 


  Intake, IV Titration  186.5 





  Amount   


 


    Heparin Sod,Pork in 0.45%  186.5 





    NaCl 25,000 unit In 0.45   





    % NaCl 1 250ml.bag @ 10.   





    021 UNITS/KG/HR 10 mls/hr   





    IV .Q24H [facility] Rx#:   





    [id number]   


 


  Oral 480  


 


Other:   


 


  # Voids 1 3 


 


  Weight 99.79 kg  








                                        





                                 21 05:54 





                                 21 05:54

## 2021-06-02 NOTE — PN
PROGRESS NOTE



DATE OF SERVICE:

06/02/2021



This 75-year-old gentleman who was admitted with chest pain, still has some 
lower part

of anterior pressure type of pain.  No fever.  No cough.



PHYSICAL EXAMINATION:

Alert and oriented x3.

Pulse 55.

Blood pressure 160/61, respiration 19, temp 98 degrees, pulse ox 94% on room 
air.

HEENT: Conjunctivae normal.

NECK is no JVD.

CARDIOVASCULAR: S1, S2 muffled.

RESPIRATION: Breath sounds diminished in the bases. A few scattered rhonchi.

ABDOMEN: Soft.

NERVOUS SYSTEM: No focal deficits.



LABS:

WBC ntd, hemoglobin 13.2, sodium 130, potassium 4.8, glucose is 251, is 18.



ASSESSMENT:

1. Chest pain, possible unstable angina.

2. Chronic lymphatic leukemia.

3. Increased WBC.

4. Anemia, normocytic.

5. Thrombocytopenia.

6. Hyponatremia.

7. History of previous cardiac catheterization, coronary artery disease.

8. History of previous stress test.

9. Diabetes mellitus type 2.

10.Gastroesophageal reflux disease.

11.Hypertension.

12.History of degenerative joint disease.



RECOMMENDATIONS AND DISCUSSION:

I recommend to continue current medications.  Symptomatic treatment.  The 
patient has

continued continued chest pain.  Recommend continue with antiplatelet agents and

Cardiology has seen the patient and recommended increase activity, ambulation 
and add

Imdur also.  Further recommendations to follow.  I would recommend repeat labs. 
The

white count is elevated.





MMODL / IJN: [id number] / Job#: [id number]

MTDD

## 2021-06-03 VITALS
HEART RATE: 55 BPM | DIASTOLIC BLOOD PRESSURE: 69 MMHG | SYSTOLIC BLOOD PRESSURE: 174 MMHG | RESPIRATION RATE: 18 BRPM | TEMPERATURE: 97.5 F

## 2021-06-03 LAB
ANION GAP SERPL CALC-SCNC: 5 MMOL/L
BASOPHILS # BLD AUTO: 0.06 X 10*3/UL (ref 0–0.1)
BASOPHILS NFR BLD AUTO: 0.3 %
BUN SERPL-SCNC: 17 MG/DL (ref 9–20)
CALCIUM SPEC-MCNC: 9.1 MG/DL (ref 8.4–10.2)
CHLORIDE SERPL-SCNC: 103 MMOL/L (ref 98–107)
CO2 SERPL-SCNC: 28 MMOL/L (ref 22–30)
EOSINOPHIL # BLD AUTO: 0.13 X 10*3/UL (ref 0.04–0.35)
EOSINOPHIL NFR BLD AUTO: 0.6 %
ERYTHROCYTE [DISTWIDTH] IN BLOOD BY AUTOMATED COUNT: 3.92 X 10*6/UL (ref 4.4–5.6)
ERYTHROCYTE [DISTWIDTH] IN BLOOD: 14.4 % (ref 11.5–14.5)
GLUCOSE BLD-MCNC: 133 MG/DL (ref 75–99)
GLUCOSE BLD-MCNC: 187 MG/DL (ref 75–99)
GLUCOSE SERPL-MCNC: 141 MG/DL (ref 74–99)
HCT VFR BLD AUTO: 37.3 % (ref 39.6–50)
HGB BLD-MCNC: 12.1 G/DL (ref 13–17)
LYMPHOCYTES # SPEC AUTO: 18.36 X 10*3/UL (ref 0.9–5)
LYMPHOCYTES NFR SPEC AUTO: 80.1 %
MCH RBC QN AUTO: 30.9 PG (ref 27–32)
MCHC RBC AUTO-ENTMCNC: 32.4 G/DL (ref 32–37)
MCV RBC AUTO: 95.2 FL (ref 80–97)
MONOCYTES # BLD AUTO: 1.29 X 10*3/UL (ref 0.2–1)
MONOCYTES NFR BLD AUTO: 5.6 %
NEUTROPHILS # BLD AUTO: 3.05 X 10*3/UL (ref 1.8–7.7)
NEUTROPHILS NFR BLD AUTO: 13.2 %
PLATELET # BLD AUTO: 103 X 10*3/UL (ref 140–440)
POTASSIUM SERPL-SCNC: 4.6 MMOL/L (ref 3.5–5.1)
SODIUM SERPL-SCNC: 136 MMOL/L (ref 137–145)
WBC # BLD AUTO: 22.93 X 10*3/UL (ref 4.5–10)

## 2021-06-03 RX ADMIN — HYDROCODONE BITARTRATE AND ACETAMINOPHEN PRN EACH: 5; 325 TABLET ORAL at 02:58

## 2021-06-03 RX ADMIN — MONTELUKAST SODIUM SCH MG: 10 TABLET, FILM COATED ORAL at 07:41

## 2021-06-03 RX ADMIN — LORATADINE SCH MG: 10 TABLET ORAL at 07:41

## 2021-06-03 RX ADMIN — ISOSORBIDE MONONITRATE SCH MG: 30 TABLET, EXTENDED RELEASE ORAL at 07:40

## 2021-06-03 RX ADMIN — PREDNISOLONE ACETATE SCH DROPS: 10 SUSPENSION/ DROPS OPHTHALMIC at 08:36

## 2021-06-03 RX ADMIN — INSULIN ASPART SCH UNIT: 100 INJECTION, SOLUTION INTRAVENOUS; SUBCUTANEOUS at 08:32

## 2021-06-03 RX ADMIN — CEFAZOLIN SCH: 330 INJECTION, POWDER, FOR SOLUTION INTRAMUSCULAR; INTRAVENOUS at 13:02

## 2021-06-03 RX ADMIN — INSULIN ASPART SCH UNIT: 100 INJECTION, SUSPENSION SUBCUTANEOUS at 08:31

## 2021-06-03 RX ADMIN — METOPROLOL SUCCINATE SCH MG: 25 TABLET, EXTENDED RELEASE ORAL at 07:41

## 2021-06-03 RX ADMIN — KETOROLAC TROMETHAMINE SCH DROPS: 5 SOLUTION OPHTHALMIC at 08:35

## 2021-06-03 RX ADMIN — INSULIN ASPART SCH UNIT: 100 INJECTION, SOLUTION INTRAVENOUS; SUBCUTANEOUS at 13:00

## 2021-06-04 NOTE — DS
DISCHARGE SUMMARY



DATE OF SERVICE:

06/03/2021



FINAL DIAGNOSIS:

1. Chest pain, myocardial infarction, rule out coronary artery disease.

2. Chronic unstable angina.

3. Chronic lymphatic leukemia.

4. Increased WBC.

5. Anemia, normocytic.

6. Thrombocytopenia.

7. Hyponatremia.

8. History of previous cardiac catheterization showing coronary artery disease.

9. History of previous stress test.

10.Diabetes mellitus type 2.

11.Gastroesophageal reflux disease.

12.Hypertension.

13.History of degenerative joint disease.



DISCHARGE DISPOSITION:

The patient will be discharged in stable condition with guarded prognosis.



HISTORY OF PRESENT ILLNESS:

This is a 75-year-old gentleman with a past medical history of multiple medical

problems admitted with chest pain.  MI ruled out.  Cardiology saw the patient.

Conservative line of management was continued.  The patient was followed by Dr. Diamond in

the outpatient setting.  The patient had a mid RCA lesion 50% in April 2021 cardiac

cath and ejection fraction found to 50-60%.



PHYSICAL EXAMINATION:

On exam, vitals are stable. Cardiovascular S1 and S2. Abdomen soft.  Nervous system no

focal deficits.



DISCHARGE ADVICE:

Diet is cardiac. Activity limited until followup. Follow up with Dr. Figueroa in 1-2

days. Follow up with Cardiology as recommended.



MEDICATIONS:

1. Ketoralac as before.

2. Fluticasone as before.

3. Lipitor 80 mg q.h.s.

4. Nitrostat p.r.n.

5. Norco 5 mg q.6 p.r.n.

6. Omeprazole 20 mg b.i.d.

7. Prednisone 1 drop both eyes.

8. NPH 17 units subcu daily.

9. Singulair 10 mg p.o. daily.

10.Toprol-XL 25 mg.

11.Xanax 0.25 daily.

12.Zestril 40 mg p.o. daily.

13.Zyrtec 10 mg daily.

14.Aspirin 81 mg daily.

15.Imdur 30 mg p.o. daily.



Once again the patient will be discharged in stable condition with a guarded prognosis.





MMODL / BRANDYN: [id number] / Job#: [id number]

## 2021-06-24 ENCOUNTER — HOSPITAL ENCOUNTER (OUTPATIENT)
Dept: HOSPITAL 47 - OR | Age: 76
Discharge: HOME | End: 2021-06-24
Attending: ORTHOPAEDIC SURGERY
Payer: MEDICARE

## 2021-06-24 VITALS — HEART RATE: 51 BPM | SYSTOLIC BLOOD PRESSURE: 155 MMHG | DIASTOLIC BLOOD PRESSURE: 77 MMHG

## 2021-06-24 VITALS — RESPIRATION RATE: 16 BRPM

## 2021-06-24 VITALS — TEMPERATURE: 97 F

## 2021-06-24 DIAGNOSIS — M17.12: ICD-10-CM

## 2021-06-24 DIAGNOSIS — M22.42: ICD-10-CM

## 2021-06-24 DIAGNOSIS — J30.2: ICD-10-CM

## 2021-06-24 DIAGNOSIS — M65.862: ICD-10-CM

## 2021-06-24 DIAGNOSIS — Z79.899: ICD-10-CM

## 2021-06-24 DIAGNOSIS — Z88.0: ICD-10-CM

## 2021-06-24 DIAGNOSIS — Z79.52: ICD-10-CM

## 2021-06-24 DIAGNOSIS — Z79.82: ICD-10-CM

## 2021-06-24 DIAGNOSIS — Z85.6: ICD-10-CM

## 2021-06-24 DIAGNOSIS — I10: ICD-10-CM

## 2021-06-24 DIAGNOSIS — K21.9: ICD-10-CM

## 2021-06-24 DIAGNOSIS — M23.204: Primary | ICD-10-CM

## 2021-06-24 DIAGNOSIS — Z79.4: ICD-10-CM

## 2021-06-24 DIAGNOSIS — E11.9: ICD-10-CM

## 2021-06-24 DIAGNOSIS — Z79.891: ICD-10-CM

## 2021-06-24 DIAGNOSIS — H93.19: ICD-10-CM

## 2021-06-24 LAB
GLUCOSE BLD-MCNC: 191 MG/DL (ref 75–99)
GLUCOSE BLD-MCNC: 212 MG/DL (ref 75–99)

## 2021-06-24 PROCEDURE — 29876 ARTHRS KNEE SURG SYNVCT MAJ: CPT

## 2021-06-24 PROCEDURE — 29881 ARTHRS KNE SRG MNISECTMY M/L: CPT

## 2021-06-24 NOTE — P.OP
Date of Procedure: 06/24/21


Preoperative Diagnosis: 


Internal derangement left knee


Postoperative Diagnosis: 


1.  Tear medial meniscus left knee


2.  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


3.  Reactive synovitis medial, lateral and suprapatellar compartments left knee





Procedure(s) Performed: 


1.  Arthroscopic partial medial meniscectomy left knee


2.  Arthroscopic chondroplasty medial femoral condyle left knee


3.  Arthroscopic partial synovectomy medial, lateral and suprapatellar 

compartments left knee


Anesthesia: GETA, local


Surgeon: Garry Lam


Estimated Blood Loss (ml): 7


Pathology: none sent


Condition: stable


Disposition: PACU


Indications for Procedure: 


75-year-old patient seen with progressive left knee pain.  After having 

treatment options discussed, patient elected to proceed with arthroscopy.


Operative Findings: 


See description of procedure


Description of Procedure: 


Patient was taken to the operative suite.  Patient underwent a general 

anesthetic by the department of anesthesia.  Patient was given preoperative a

ntibiotics.  The left lower extremity was placed in a well-padded arthroscopic 

leg acosta.  The left leg was prepped and draped in the normal sterile 

orthopedic fashion.  A lateral parapatellar and suprapatellar incision was made.

 Trochars were inserted.  Arthroscopy was initiated.  Suprapatellar pouch 

revealed diffuse thick reactive synovitis.  The patellofemoral joint appeared to

articulate congruently.  There was grade 2 chondromalacia of the patella with no

significant osteochondral tears present.  The scope was guided into the medial 

gutter.  No loose bodies or plica were identified.  The scope was then guided 

into the medial compartment.  A medial parapatellar incision was made.  Trocar 

inserted followed by probe.  The tear involving the posterior horn medial 

meniscus.  There were grade 2/3 chondromalacia changes of the medial femoral 

condyle with some diffuse osteochondral tears present.  There was thick reactive

synovitis anteriorly.  I performed a partial medial meniscectomy getting down to

stable meniscal tissue.  I performed a chondroplasty of the medial femoral 

condyle getting down to stable osteochondral tissue.  I performed a partial 

synovectomy decompressing the thick reactive synovitis.  The shaver was removed.

 The residual meniscus was stable.  The residual osteochondral surface was 

stable.  There was good decompression of the synovitis.   Scope and probe were 

then guided into the intercondylar notch.  Cruciates were identified, probed and

found to be stable.  The scope and probe were then guided into lateral 

compartment.  The lateral meniscus reveals some mild superficial fraying.  There

were grade 1 chondromalacia changes of the lateral compartment with no 

osteochondral tears.  There was some thick reactive synovitis anteriorly.  I 

introduced a motorized shaver.  I debrided that superficial fraying of the 

lateral meniscus.  I performed a partial synovectomy decompressing the thick 

reactive synovitis.  The shaver was removed.  There was good decompression of 

the synovitis.  The scope was in guided back into the suprapatellar compartment.

 I introduced a motorized shaver into the suprapatellar compartment.  I debrided

some piecemeal fragments of meniscus I encountered.  I performed a partial 

synovectomy decompressing the thick reactive synovitis.  The shaver was removed.

 There was good decompression of the synovitis.  I took one more look on the 

entire knee, no residual debris.  Instruments were now removed from the joint.  

The joint was infiltrated with .25% Marcaine.  Steri-Strips were applied to the 

portal sites.  Sterile dressings were applied.  The patient was placed into a 

DARI hose.  No tourniquet was utilized.  The patient was awakened, transferred to

a bed and taken to recovery stable satisfactory condition.

## 2021-07-12 ENCOUNTER — HOSPITAL ENCOUNTER (OUTPATIENT)
Dept: HOSPITAL 47 - EC | Age: 76
Setting detail: OBSERVATION
LOS: 1 days | Discharge: HOME | End: 2021-07-13
Attending: INTERNAL MEDICINE | Admitting: INTERNAL MEDICINE
Payer: MEDICARE

## 2021-07-12 VITALS — BODY MASS INDEX: 29.8 KG/M2

## 2021-07-12 DIAGNOSIS — M25.562: ICD-10-CM

## 2021-07-12 DIAGNOSIS — M19.041: ICD-10-CM

## 2021-07-12 DIAGNOSIS — E87.1: ICD-10-CM

## 2021-07-12 DIAGNOSIS — E78.5: ICD-10-CM

## 2021-07-12 DIAGNOSIS — Z79.899: ICD-10-CM

## 2021-07-12 DIAGNOSIS — K21.9: ICD-10-CM

## 2021-07-12 DIAGNOSIS — M19.042: ICD-10-CM

## 2021-07-12 DIAGNOSIS — Z88.0: ICD-10-CM

## 2021-07-12 DIAGNOSIS — I10: ICD-10-CM

## 2021-07-12 DIAGNOSIS — Z79.82: ICD-10-CM

## 2021-07-12 DIAGNOSIS — Z83.3: ICD-10-CM

## 2021-07-12 DIAGNOSIS — C91.11: ICD-10-CM

## 2021-07-12 DIAGNOSIS — Z82.3: ICD-10-CM

## 2021-07-12 DIAGNOSIS — I25.10: ICD-10-CM

## 2021-07-12 DIAGNOSIS — J30.2: ICD-10-CM

## 2021-07-12 DIAGNOSIS — R07.89: Primary | ICD-10-CM

## 2021-07-12 DIAGNOSIS — Z79.4: ICD-10-CM

## 2021-07-12 DIAGNOSIS — R00.1: ICD-10-CM

## 2021-07-12 DIAGNOSIS — E11.9: ICD-10-CM

## 2021-07-12 LAB
ALBUMIN SERPL-MCNC: 3.7 G/DL (ref 3.5–5)
ALP SERPL-CCNC: 103 U/L (ref 38–126)
ALT SERPL-CCNC: 20 U/L (ref 4–49)
ANION GAP SERPL CALC-SCNC: 6 MMOL/L
APTT BLD: 22 SEC (ref 22–30)
AST SERPL-CCNC: 29 U/L (ref 17–59)
BUN SERPL-SCNC: 11 MG/DL (ref 9–20)
CALCIUM SPEC-MCNC: 9.1 MG/DL (ref 8.4–10.2)
CELLS COUNTED: 100
CHLORIDE SERPL-SCNC: 104 MMOL/L (ref 98–107)
CO2 SERPL-SCNC: 26 MMOL/L (ref 22–30)
EOSINOPHIL # BLD MANUAL: 0.19 K/UL (ref 0–0.7)
ERYTHROCYTE [DISTWIDTH] IN BLOOD BY AUTOMATED COUNT: 4.03 M/UL (ref 4.3–5.9)
ERYTHROCYTE [DISTWIDTH] IN BLOOD: 13.2 % (ref 11.5–15.5)
GLUCOSE BLD-MCNC: 201 MG/DL (ref 75–99)
GLUCOSE BLD-MCNC: 237 MG/DL (ref 75–99)
GLUCOSE SERPL-MCNC: 231 MG/DL (ref 74–99)
HCT VFR BLD AUTO: 36.6 % (ref 39–53)
HGB BLD-MCNC: 13 GM/DL (ref 13–17.5)
INR PPP: 1.1 (ref ?–1.2)
LIPASE SERPL-CCNC: 62 U/L (ref 23–300)
LYMPHOCYTES # BLD MANUAL: 15.83 K/UL (ref 1–4.8)
MAGNESIUM SPEC-SCNC: 1.7 MG/DL (ref 1.6–2.3)
MCH RBC QN AUTO: 32.1 PG (ref 25–35)
MCHC RBC AUTO-ENTMCNC: 35.4 G/DL (ref 31–37)
MCV RBC AUTO: 90.7 FL (ref 80–100)
MONOCYTES # BLD MANUAL: 0.77 K/UL (ref 0–1)
NEUTROPHILS NFR BLD MANUAL: 13 %
NEUTS SEG # BLD MANUAL: 2.51 K/UL (ref 1.3–7.7)
PLATELET # BLD AUTO: 109 K/UL (ref 150–450)
POTASSIUM SERPL-SCNC: 4.3 MMOL/L (ref 3.5–5.1)
PROT SERPL-MCNC: 5.7 G/DL (ref 6.3–8.2)
PT BLD: 11.2 SEC (ref 9–12)
SODIUM SERPL-SCNC: 136 MMOL/L (ref 137–145)
WBC # BLD AUTO: 19.3 K/UL (ref 3.8–10.6)

## 2021-07-12 PROCEDURE — 99285 EMERGENCY DEPT VISIT HI MDM: CPT

## 2021-07-12 PROCEDURE — 83690 ASSAY OF LIPASE: CPT

## 2021-07-12 PROCEDURE — 80053 COMPREHEN METABOLIC PANEL: CPT

## 2021-07-12 PROCEDURE — 84484 ASSAY OF TROPONIN QUANT: CPT

## 2021-07-12 PROCEDURE — 93005 ELECTROCARDIOGRAM TRACING: CPT

## 2021-07-12 PROCEDURE — 71046 X-RAY EXAM CHEST 2 VIEWS: CPT

## 2021-07-12 PROCEDURE — 85025 COMPLETE CBC W/AUTO DIFF WBC: CPT

## 2021-07-12 PROCEDURE — 36415 COLL VENOUS BLD VENIPUNCTURE: CPT

## 2021-07-12 PROCEDURE — 85610 PROTHROMBIN TIME: CPT

## 2021-07-12 PROCEDURE — 83735 ASSAY OF MAGNESIUM: CPT

## 2021-07-12 PROCEDURE — 96374 THER/PROPH/DIAG INJ IV PUSH: CPT

## 2021-07-12 PROCEDURE — 85730 THROMBOPLASTIN TIME PARTIAL: CPT

## 2021-07-12 PROCEDURE — 80048 BASIC METABOLIC PNL TOTAL CA: CPT

## 2021-07-12 PROCEDURE — 83880 ASSAY OF NATRIURETIC PEPTIDE: CPT

## 2021-07-12 RX ADMIN — KETOROLAC TROMETHAMINE SCH DROPS: 5 SOLUTION OPHTHALMIC at 20:31

## 2021-07-12 RX ADMIN — PREDNISOLONE ACETATE SCH DROPS: 10 SUSPENSION/ DROPS OPHTHALMIC at 20:31

## 2021-07-12 RX ADMIN — ASPIRIN 81 MG CHEWABLE TABLET STA MG: 81 TABLET CHEWABLE at 13:01

## 2021-07-12 RX ADMIN — BACLOFEN SCH: 10 TABLET ORAL at 17:19

## 2021-07-12 RX ADMIN — INSULIN ASPART SCH UNIT: 100 INJECTION, SUSPENSION SUBCUTANEOUS at 17:28

## 2021-07-12 RX ADMIN — MONTELUKAST SODIUM SCH MG: 10 TABLET, FILM COATED ORAL at 12:57

## 2021-07-12 RX ADMIN — ASPIRIN 81 MG CHEWABLE TABLET SCH MG: 81 TABLET CHEWABLE at 17:28

## 2021-07-12 RX ADMIN — ASPIRIN 81 MG CHEWABLE TABLET STA: 81 TABLET CHEWABLE at 12:18

## 2021-07-12 NOTE — HP
HISTORY AND PHYSICAL



DATE OF SERVICE:

07/12/2021.



CHIEF COMPLAINT:

Chest pain.



HISTORY OF PRESENT ILLNESS:

This 76-year-old gentleman with a past medical history of CAD, diabetes, GERD,

hypertension, history of DJD, history of chronic lymphatic leukemia in 
remission, being

followed by Dr. Figueroa in the outpatient setting, was recently admitted to 
[facility] with chest pain.  The patient apparently had a mid RCA lesion 50%
April 21 cardiac catheterization and the ejection fraction found to be 50-60 percent. 
The

patient followed by Dr. Diamond in the outpatient setting.  The patient was given

medications.  Apparently the patient ran out of some other medications and the 
patient

is having chest pain which is felt in the anterior part of the chest, mainly in 
the

epigastrium which is a pressure type of pain which is also radiating upwards.  
The

patient came to [facility] and was admitted for evaluation and 
treatment.  The

white count was found to be 19.3, but the troponins are negative at this time.  
Sugars

at 231.  The cardiology evaluation is in progress.  The EKG shows sinus 
bradycardia

with some ST-T changes.  There is no history of fever, rigors or chills at this 
time.

No headache, loss of consciousness, seizures at this time.



PAST MEDICAL HISTORY:

History of CAD, diabetes, GERD, hypertension, DJD, CLL on remission.



MEDICATIONS:

Home medications are Singulair, Silvadene cream, prednisone, Nitrostat, Toprol-
XL,

 FlexPen, Norco, Zyrtec, Lioresal, Lipitor, aspirin, Xanax, Imdur, Flonase.



ALLERGIES:

AUGMENTIN.



FAMILY HISTORY:

History of CVA/TIA in the family.



SOCIAL HISTORY:

No history of smoking.  No history of alcohol.



REVIEW OF SYSTEMS:

ENT: No diminished vision. No diminished hearing.

CARDIOVASCULAR as mentioned earlier.

RESPIRATORY: As mentioned earlier.

GI: No nausea or vomiting.

: No dysuria.

NERVOUS SYSTEM: No numbness, weakness.

ALLERGY/IMMUNOLOGY: No asthma or hayfever.

MUSCULOSKELETAL as mentioned earlier.

HEMATOLOGY/ONCOLOGY: No history anemia.

ENDOCRINE: No history of diabetes or hypothyroidism.

CONSTITUTIONAL: As mentioned earlier.

DERMATOLOGY: Negative.

RHEUMATOLOGY: Negative.

PSYCHIATRY: As mentioned earlier.



PHYSICAL EXAMINATION:

Alert and oriented times three.

Pulse 51, blood pressure 169/74, respiration 18, temperature 97.2, pulse ox 97% 
on room

air.

HEENT: Conjunctivae normal.

NECK: No JVD.

CARDIOVASCULAR: S1, S2 muffled.

RESPIRATION: Breath sounds diminished in the bases.  No rhonchi. No crackles.

ABDOMEN:  Soft, nontender.  No mass palpable.

LEGS: No edema. No swelling.

NERVOUS SYSTEM: Higher functions as mentioned earlier. Moves all 4 limbs.  No 
focal

motor or sensory deficits.

LYMPHATICS: No lymph nodes palpable in the neck, axilla or groin.

SKIN:  No ulcer, rash or bleeding.

JOINTS: No active deforming arthropathy.



LABS:

At this time shows: WBC 19.2, hemoglobin 13, sodium 130, potassium 4.2.



ASSESSMENT:

1. Chest pain, possible unstable angina.

2. History of previous coronary artery disease with mid RCA 50% disease.

3. History of diabetes type 2.

4. Gastroesophageal reflux disease.

5. Hypertension.

6. History of degenerative joint disease.

7. Hyponatremia.

8. Chronic lymphatic leukemia in remission.

9. Increased WBC.

10.FULL CODE.



RECOMMENDATIONS AND DISCUSSION:

This 76-year-old gentleman who presented with multiple complex medical issues, 
at this

time I recommend to continue the current medications, management and symptomatic

treatment.  Otherwise, resume the home medications.  Other than that, I would 
also

recommend Cardiology consultation.  Monitor blood sugars closely.  Prognosis 
guarded

because of multiple complex medical issues.  Further recommendations to follow.

Unstable angina protocol.  Copy of this dictation being forwarded to Dr. Figueroa who

is the primary physician.





MMODL / BRANDYN: [id number] / Job#: [id number]

MTDD

## 2021-07-12 NOTE — ED
Chest Pain HPI





- General


Chief Complaint: Chest Pain


Stated Complaint: Chest pain


Source: patient, EMS


Mode of arrival: EMS


Limitations: no limitations





- History of Present Illness


Initial Comments: 





76-year-old male with past nuchal history of CLL, hypertension, diabetes 

presents emergency room with reported chest pain.  Patient has been hospitalized

multiple times for similar complaints.  Patient has had a diagnosis of unstable 

angina.  Reports that he woke this morning from sleep with significant 

substernal pain rated 10 out of 10.  He took 2 of his nitro at home however only

had mild improvement in his symptoms.  He called EMS who additionally gave him 

aspirin and nitro.  Normally he has injured to take at home however has been out

of the medication and is awaiting a refill from his primary care office.  He 

denies any shortness of breath.  No ripping or tearing sensation to his back.  

No lower external swelling.  Patient was cathed earlier this year and was found 

to have a 50% occlusion.  He denies fevers, chills or cough.  No abdominal pain.

 No other alleviating, precipitating or modifying factors





- Related Data


                                Home Medications











 Medication  Instructions  Recorded  Confirmed


 


Cetirizine HCl [Zyrtec] 10 mg PO DAILY 17


 


Montelukast Sodium [Singulair] 10 mg PO DAILY 17


 


Insulin NPH Hum/Reg Insulin Hm 17 units SQ AC-BRKFST 20





[Relion Novolin 70-30 Flexpen]   


 


Fluticasone Nasal Spray [Flonase 1 spr EA NOSTRIL DAILY PRN 21





Nasal [place]]   


 


HYDROcodone/APAP 5-325MG [Norco 1 tab PO Q6HR PRN 21





5-325]   


 


Ketorolac 0.5% Ophth Soln [Acular 1 drop BOTH EYES BID 21





0.5%]   


 


Omeprazole 20 mg PO BID PRN 21


 


Prednisolone Acetate/Pf 1 drop BOTH EYES BID 21





[Prednisolone Acet 1% Eye Drop]   


 


ALPRAZolam [Xanax] 0.25 mg PO DAILY PRN 21


 


Metoprolol Succinate (ER) [Toprol 25 mg PO HS 21





XL]   


 


Atorvastatin [Lipitor] 80 mg PO HS 21


 


Nitroglycerin Sl Tabs [Nitrostat] 0.4 mg SUBLINGUAL Q5M PRN 21


 


Insulin NPH Hum/Reg Insulin Hm See Protocol SQ AC-SUPPER PRN 21





[Relion Novolin 70-30 Flexpen]   


 


Isosorbide Mononitrate ER [Imdur] 30 mg PO DAILY PRN 21


 


Baclofen [Lioresal] 10 mg PO DAILY 21


 


SILVER sulfADIAZINE CREAM 1 applic TOPICAL BID 21





[Silvadene Cream]   


 


lisinopriL 40 mg PO DAILY 21








                                  Previous Rx's











 Medication  Instructions  Recorded


 


Aspirin 81 mg PO DAILY 30 Days #30 chew 21











                                    Allergies











Allergy/AdvReac Type Severity Reaction Status Date / Time


 


amoxicillin [From Augmentin] Allergy  Rash/Hives Verified 21 10:08


 


clavulanic acid Allergy  Rash/Hives Verified 21 10:08





[From Augmentin]     














Review of Systems


ROS Statement: 


Those systems with pertinent positive or pertinent negative responses have been 

documented in the HPI.





ROS Other: All systems not noted in ROS Statement are negative.





EKG Findings





- EKG Comments:


EKG Findings:: EKG demonstrates sinus bradycardia with a ventricular rate of 51.

SD interval of 150.  QRS 94.  QTC of 422.  No acute ST segment elevations or 

depressions concerning for ischemic changes





Past Medical History


Past Medical History: Coronary Artery Disease (CAD), Cancer, Diabetes Mellitus, 

GERD/Reflux, Hypertension, Osteoarthritis (OA)


Additional Past Medical History / Comment(s): NIDDM type II, 1999 LEUKEMIA 

(CLL-remission), sinus problems, seasonal allergies, tinnitis bilaterally, OA 

hands, R wrist carpal tunnel, past fx with L elbow, R rotator cuff tendon 

problems, L rotator cuff tear, left knee pain


History of Any Multi-Drug Resistant Organisms: None Reported


Past Surgical History: Heart Catheterization, Orthopedic Surgery


Additional Past Surgical History / Comment(s): LEFT HAND thumb tendon repair.


Past Anesthesia/Blood Transfusion Reactions: No Reported Reaction


Past Psychological History: No Psychological Hx Reported


Smoking Status: Never smoker


Past Alcohol Use History: None Reported


Past Drug Use History: None Reported





- Past Family History


  ** Father


Family Medical History: CVA/TIA


Additional Family Medical History / Comment(s): Father  at the age of 85yrs.





  ** Mother


Family Medical History: Diabetes Mellitus


Additional Family Medical History / Comment(s): Mother  at the age of 90yrs.





General Exam


Limitations: no limitations





Course


                                   Vital Signs











  21





  09:27 10:11 11:00


 


Temperature 97.2 F L  


 


Pulse Rate 48 L 55 L 58 L


 


Respiratory 18 17 20





Rate   


 


Blood Pressure 178/72 183/87 


 


O2 Sat by Pulse 97 97 94 L





Oximetry   














  21





  12:30


 


Temperature 


 


Pulse Rate 51 L


 


Respiratory 18





Rate 


 


Blood Pressure 169/75


 


O2 Sat by Pulse 97





Oximetry 














Chest Pain MDM





- MDM





Upon arrival patient is placed into room 18.  History and physical exam was 

performed.  He is hooked up to continuous pulse ox and cardiac monitoring.  IV 

is established laboratory studies are conducted.  Patient did go for a chest x-

ray.  Laboratory studies are reviewed and a white count of 19.3.  Troponin is 

negative.  Chest x-ray demonstrates no acute cardiopulmonary process.  Patient 

was given 4 mg of morphine and 4 mg of Zofran.  He is reevaluated and continues 

to have chest pain.  Because of continued pain I did discuss the case with Dr. Germain who accepted admission.  Cardiology consult.  Patient remained in stable 

condition awaiting a bed on the floor





Disposition


Clinical Impression: 


 Unstable angina pectoris





Disposition: ADMITTED AS IP TO THIS HOSP


Condition: Stable


Is patient prescribed a controlled substance at d/c from ED?: No


Decision to Admit Reason: Admit from EC


Decision Date: 21


Decision Time: 11:55

## 2021-07-12 NOTE — XR
EXAMINATION TYPE: XR chest 2V

 

DATE OF EXAM: 7/12/2021

 

CLINICAL HISTORY: Chest Pain.

 

TECHNIQUE:  Frontal and lateral view of the chest.

 

COMPARISON: 6/1/2021 

 

FINDINGS:  

Low lung volumes accentuate the cardiac silhouette. The cardiomediastinal silhouette is within normal
 limits for size. Pulmonary vasculature is normal. There is no focal air space opacity.  No pleural e
ffusion. No pneumothorax seen. Degenerative changes of the shoulders.

 

IMPRESSION:  

No acute cardiopulmonary process.

## 2021-07-12 NOTE — P.CRDCN
History of Present Illness


History of present illness: 





HISTORY OF PRESENTING ILLNESS


This is a pleasant 76-year-old  male past medical history significant 

for CLL since , nonobstructive coronary artery disease with a 50% lesion in 

the mid RCA, diabetes mellitus, hypertension, arthritis and poor functional 

capacity. He follows in the office with Dr. Diamond. We have been asked to see in 

consultation for chest pain.  Patient states he had an episode of chest 

discomfort in the midsternal region last night that radiated to the left s

houlder.  The pain is reproducible on palpation and with movement of his left 

arm.  He denies associated shortness of breath, dizziness or palpitations.  EKG 

reveals sinus bradycardia heart rate of 51 with no acute ST or T wave 

abnormalities noted.  Chest x-ray is negative for an acute cardiopulmonary 

process.  Laboratory data reviewed, the BBC 19.3, hemoglobin 13, platelets 109, 

sodium 136, potassium 4.3, creatinine 0.66, magnesium 1.7, cardiac enzymes 

negative 2, NT proBNP 337.  He underwent a cardiac catheterization in April of 

this year revealing a 50% lesion in the mid RCA, maximum medical therapy was 

recommended at that time.  Ejection fraction is 55-60%.  Current daily cardiac 

medications include lisinopril 40 mg daily, Toprol 25 mg daily, atorvastatin 80 

mg daily, aspirin 81 mg daily and Imdur 30 mg daily as needed for chest pain.





REVIEW OF SYSTEMS


At the time of my exam:


CONSTITUTIONAL: Denies fever or chills.


CARDIOVASCULAR: Denies chest pain, shortness of breath, orthopnea, PND or 

palpitations.


RESPIRATORY: Denies cough. 


GASTROINTESTINAL: Denies abdominal pain, diarrhea, constipation, nausea or 

vomiting.


MUSCULOSKELETAL: Denies myalgias.


NEUROLOGIC: Denies numbness, tingling, headacbe or weakness.


ENDOCRINE: Denies fatigue, weight change,  polydipsia or polyurina.


GENITOURINARY: Denies burning, hematuria or urgency with micturation.


HEMATOLOGIC: Denies history of anemia or bleeding. 





PHYSICAL EXAMINATION


Blood pressure 169/75 heart rate 51 afebrile and maintaining oxygen saturation 

on room air.


CONSTITUTIONAL: No apparent distress. 


HEENT: Head is normocephalic. Pupils are equal, round. Sclerae anicteric. Mucous

membranes of the mouth are moist.  No JVD. No carotid bruit.


CHEST EXAMINATION: Lungs are clear to auscultation. No chest wall tenderness is 

noted on palpation or with deep breathing. 


HEART EXAMINATION: Regular rate and rhythm. S1, S2 heard. No murmurs, gallops or

rub.


ABDOMEN: Soft, nontender. Positive bowel sounds.


EXTREMITIES: 2+ peripheral pulses, no lower extremity edema and no calf 

tenderness.


NEUROLOGIC EXAMINATION: Patient is awake, alert and oriented x3. 





ASSESSMENT


Pain, atypical


Nonobstructive coronary artery disease


Hypertension


Dyslipidemia


CLL


Arthritis





PLAN


An acute coronary event has been ruled out.


Pain is atypical for angina, likely related to musculoskeletal strain or 

arthritis.


He has been to the hospital and office multiple times over the last few months 

with similar pains. All thought to be musculoskeletal in nature.  Possibly 

consider IFR with Dr. Diamond, this can be discussed in the office on follow-up.


Thank you kindly for this consultation.





Nurse Practitioner note has been reviewed, I agree with a documented findings 

and plan of care.  Patient was seen and examined.











Past Medical History


Past Medical History: Coronary Artery Disease (CAD), Cancer, Diabetes Mellitus, 

GERD/Reflux, Hypertension, Osteoarthritis (OA)


Additional Past Medical History / Comment(s): NIDDM type II, 1999 LEUKEMIA (CLL-

remission), sinus problems, seasonal allergies, tinnitis bilaterally, OA hands, 

R wrist carpal tunnel, past fx with L elbow, R rotator cuff tendon problems, L 

rotator cuff tear, left knee pain


History of Any Multi-Drug Resistant Organisms: None Reported


Past Surgical History: Heart Catheterization, Orthopedic Surgery


Additional Past Surgical History / Comment(s): LEFT HAND thumb tendon repair.


Past Anesthesia/Blood Transfusion Reactions: No Reported Reaction


Past Psychological History: No Psychological Hx Reported


Smoking Status: Never smoker


Past Alcohol Use History: None Reported


Past Drug Use History: None Reported





- Past Family History


  ** Father


Family Medical History: CVA/TIA


Additional Family Medical History / Comment(s): Father  at the age of 85yrs.





  ** Mother


Family Medical History: Diabetes Mellitus


Additional Family Medical History / Comment(s): Mother  at the age of 90yrs.





Medications and Allergies


                                Home Medications











 Medication  Instructions  Recorded  Confirmed  Type


 


Cetirizine HCl [Zyrtec] 10 mg PO DAILY 17 History


 


Montelukast Sodium [Singulair] 10 mg PO DAILY 17 History


 


Insulin NPH Hum/Reg Insulin Hm 17 units SQ AC-BRKFST 20 History





[Relion Novolin 70-30 Flexpen]    


 


Fluticasone Nasal Spray [Flonase 1 spr EA NOSTRIL DAILY PRN 21 

History





Nasal [place]]    


 


HYDROcodone/APAP 5-325MG [Norco 1 tab PO Q6HR PRN 21 History





5-325]    


 


Ketorolac 0.5% Ophth Soln [Acular 1 drop BOTH EYES BID 21 History





0.5%]    


 


Omeprazole 20 mg PO BID PRN 21 History


 


Prednisolone Acetate/Pf 1 drop BOTH EYES BID 21 History





[Prednisolone Acet 1% Eye Drop]    


 


Aspirin 81 mg PO DAILY 30 Days #30 chew 21 Rx


 


ALPRAZolam [Xanax] 0.25 mg PO DAILY PRN 21 History


 


Metoprolol Succinate (ER) [Toprol 25 mg PO HS 21 History





XL]    


 


Atorvastatin [Lipitor] 80 mg PO HS 21 History


 


Nitroglycerin Sl Tabs [Nitrostat] 0.4 mg SUBLINGUAL Q5M PRN 21 

History


 


Insulin NPH Hum/Reg Insulin Hm See Protocol SQ AC-SUPPER PRN 21 

History





[Relion Novolin 70-30 Flexpen]    


 


Isosorbide Mononitrate ER [Imdur] 30 mg PO DAILY PRN 21 History


 


Baclofen [Lioresal] 10 mg PO DAILY 21 History


 


SILVER sulfADIAZINE CREAM 1 applic TOPICAL BID 21 History





[Silvadene Cream]    


 


lisinopriL 40 mg PO DAILY 21 History








                                    Allergies











Allergy/AdvReac Type Severity Reaction Status Date / Time


 


amoxicillin [From Augmentin] Allergy  Rash/Hives Verified 21 10:08


 


clavulanic acid Allergy  Rash/Hives Verified 21 10:08





[From Augmentin]     














Physical Exam


Vitals: 


                                   Vital Signs











  Temp Pulse Resp BP Pulse Ox


 


 21 12:30   51 L  18  169/75  97


 


 21 11:00   58 L  20   94 L


 


 21 10:11   55 L  17  183/87  97


 


 21 09:27  97.2 F L  48 L  18  178/72  97








                                Intake and Output











 21





 22:59 06:59 14:59


 


Other:   


 


  Weight   99.79 kg














Results





                                 21 09:40





                                 21 09:40


                                 Cardiac Enzymes











  21 Range/Units





  09:40 09:40 13:24 


 


AST  29    (17-59)  U/L


 


Troponin I   <0.012  <0.012  (0.000-0.034)  ng/mL








                                   Coagulation











  21 Range/Units





  09:40 


 


PT  11.2  (9.0-12.0)  sec


 


APTT  22.0  (22.0-30.0)  sec








                                       CBC











  21 Range/Units





  09:40 


 


WBC  19.3 H  (3.8-10.6)  k/uL


 


RBC  4.03 L  (4.30-5.90)  m/uL


 


Hgb  13.0  (13.0-17.5)  gm/dL


 


Hct  36.6 L  (39.0-53.0)  %


 


Plt Count  109 L  (150-450)  k/uL








                          Comprehensive Metabolic Panel











  21 Range/Units





  09:40 


 


Sodium  136 L  (137-145)  mmol/L


 


Potassium  4.3  (3.5-5.1)  mmol/L


 


Chloride  104  ()  mmol/L


 


Carbon Dioxide  26  (22-30)  mmol/L


 


BUN  11  (9-20)  mg/dL


 


Creatinine  0.66  (0.66-1.25)  mg/dL


 


Glucose  231 H  (74-99)  mg/dL


 


Calcium  9.1  (8.4-10.2)  mg/dL


 


AST  29  (17-59)  U/L


 


ALT  20  (4-49)  U/L


 


Alkaline Phosphatase  103  ()  U/L


 


Total Protein  5.7 L  (6.3-8.2)  g/dL


 


Albumin  3.7  (3.5-5.0)  g/dL








                               Current Medications











Generic Name Dose Route Start Last Admin





  Trade Name Ezequiel  PRN Reason Stop Dose Admin


 


Alprazolam  0.25 mg  21 11:59 





  Alprazolam 0.25 Mg Tab  PO  





  DAILY PRN  





  Anxiety  


 


Isosorbide Mononitrate  30 mg  21 11:59 





  Isosorbide Mononitrate Er 30 Mg Tab.Er.24h  PO  





  DAILY PRN  





  CHEST PRESSURE  


 


Lisinopril  40 mg  21 13:00  21 12:57





  Lisinopril 20 Mg Tab  PO   40 mg





  DAILY MAYRA   Administration


 


Metoprolol Succinate  25 mg  21 21:00 





  Metoprolol Succinate (Er) 25 Mg Tab.Er.24h  PO  





  HS MAYRA  


 


Montelukast Sodium  10 mg  21 13:00  21 12:57





  Montelukast 10 Mg Tab  PO   10 mg





  DAILY MAYRA   Administration


 


Naloxone HCl  0.2 mg  21 11:55 





  Naloxone 0.4 Mg/Ml 1 Ml Vial  IV  





  Q2M PRN  





  Opioid Reversal  








                                Intake and Output











 21





 22:59 06:59 14:59


 


Other:   


 


  Weight   99.79 kg








                                 Patient Weight











 21





 06:59


 


Weight 99.79 kg








                                        





                                 21 09:40 





                                 21 09:40

## 2021-07-13 VITALS — HEART RATE: 60 BPM | DIASTOLIC BLOOD PRESSURE: 58 MMHG | SYSTOLIC BLOOD PRESSURE: 162 MMHG

## 2021-07-13 VITALS — RESPIRATION RATE: 16 BRPM | TEMPERATURE: 97.7 F

## 2021-07-13 LAB
ANION GAP SERPL CALC-SCNC: 10.8 MMOL/L (ref 4–12)
BASOPHILS # BLD AUTO: 0.09 X 10*3/UL (ref 0–0.1)
BASOPHILS NFR BLD AUTO: 0.4 %
BUN SERPL-SCNC: 14 MG/DL (ref 9–27)
BUN/CREAT SERPL: 17.5 RATIO (ref 12–20)
BURR CELLS BLD QL SMEAR: (no result)
CALCIUM SPEC-MCNC: 9 MG/DL (ref 8.7–10.3)
CHLORIDE SERPL-SCNC: 101 MMOL/L (ref 96–109)
CO2 SERPL-SCNC: 25.2 MMOL/L (ref 21.6–31.8)
EOSINOPHIL # BLD AUTO: 0.15 X 10*3/UL (ref 0.04–0.35)
EOSINOPHIL NFR BLD AUTO: 0.7 %
ERYTHROCYTE [DISTWIDTH] IN BLOOD BY AUTOMATED COUNT: 4.62 X 10*6/UL (ref 4.4–5.6)
ERYTHROCYTE [DISTWIDTH] IN BLOOD: 13.2 % (ref 11.5–14.5)
GLUCOSE BLD-MCNC: 216 MG/DL (ref 75–99)
GLUCOSE BLD-MCNC: 220 MG/DL (ref 75–99)
GLUCOSE SERPL-MCNC: 206 MG/DL (ref 70–110)
HCT VFR BLD AUTO: 43.2 % (ref 39.6–50)
HGB BLD-MCNC: 14.4 G/DL (ref 13–17)
LYMPHOCYTES # SPEC AUTO: 16.92 X 10*3/UL (ref 0.9–5)
LYMPHOCYTES NFR SPEC AUTO: 80.5 %
MCH RBC QN AUTO: 31.2 PG (ref 27–32)
MCHC RBC AUTO-ENTMCNC: 33.3 G/DL (ref 32–37)
MCV RBC AUTO: 93.5 FL (ref 80–97)
MONOCYTES # BLD AUTO: 0.45 X 10*3/UL (ref 0.2–1)
MONOCYTES NFR BLD AUTO: 2.1 %
NEUTROPHILS # BLD AUTO: 3.38 X 10*3/UL (ref 1.8–7.7)
NEUTROPHILS NFR BLD AUTO: 16.2 %
PLATELET # BLD AUTO: 105 X 10*3/UL (ref 140–440)
POTASSIUM SERPL-SCNC: 4.2 MMOL/L (ref 3.5–5.5)
SODIUM SERPL-SCNC: 137 MMOL/L (ref 135–145)
WBC # BLD AUTO: 21.02 X 10*3/UL (ref 4.5–10)

## 2021-07-13 RX ADMIN — ASPIRIN 81 MG CHEWABLE TABLET SCH MG: 81 TABLET CHEWABLE at 08:01

## 2021-07-13 RX ADMIN — PREDNISOLONE ACETATE SCH DROPS: 10 SUSPENSION/ DROPS OPHTHALMIC at 08:15

## 2021-07-13 RX ADMIN — INSULIN ASPART SCH UNIT: 100 INJECTION, SUSPENSION SUBCUTANEOUS at 08:00

## 2021-07-13 RX ADMIN — BACLOFEN SCH MG: 10 TABLET ORAL at 08:01

## 2021-07-13 RX ADMIN — MONTELUKAST SODIUM SCH MG: 10 TABLET, FILM COATED ORAL at 08:01

## 2021-07-13 RX ADMIN — KETOROLAC TROMETHAMINE SCH DROPS: 5 SOLUTION OPHTHALMIC at 08:01

## 2021-07-13 NOTE — DS
DISCHARGE SUMMARY



DATE OF SERVICE:

07/13/2021



FINAL DIAGNOSES:

1. Chest pain possible unstable angina.  Myocardial infarction ruled out.

2. History of previous coronary artery  disease and mild RCA disease, 50% disease.

3. History of diabetes type 2.

4. Gastroesophageal reflux disease.

5. Hypertension.

6. History of degenerative joint disease.

7. Hyponatremia.

8. Chronic lymphoid leukemia in remission.

9. Increased WBC.

10.FULL CODE.



DISCHARGE DISPOSITION:

The patient being discharged in stable condition with guarded prognosis. Cardiology

cleared the patient for discharge.



HISTORY OF PRESENT ILLNESS:

This 76-year-old gentleman with a past medical history of multiple medical problems as

mentioned earlier, being followed by Dr. Smith Figueroa in the outpatient setting,

having chest pain, myocardial infarction ruled out.  The patient treated

symptomatically.  Cardiology saw the patient and recommended outpatient followup with

Dr. Diamond.



On exam, vitals are stable.  Cardiovascular: S1, S2.  Abdomen soft.  Nervous system: No

focal deficits.



DISCHARGE ADVICE AND MEDICATIONS:

1. Diet is cardiac diet.

2. Activity limited until followup.

3. Follow up with Dr. Figueroa 2-3 days.

4. Follow up with Dr. Diamond as recommended.



DISCHARGE MEDICATIONS AS FOLLOWS:

1. Ketoralac ophthalmic solution.

2. Fluticasone nasal spray as before.

3. Lioresal 10 mg p.o. daily.

4. Lipitor 80 mg q.h.s.

5. Lisinopril 40 mg p.o. daily.

6. Norco 5 mg q.6 p.r.n.

7. Omeprazole 20 mg b.i.d.

8. Prednisone eyedrops.

9. Insulin NPH 70 units subcu a.c. breakfast.

10.NPH as before.

11.Silver sulfadiazine cream as before.

12.Singulair 10 mg p.o. daily.

13.Toprol-XL 25 mg p.o. q.h.s.

14.Xanax 0.25 mg daily p.r.n.

15.Zetia 10 mg p.o. daily.

16.Aspirin 81 mg p.o. daily.

17.Imdur 30 mg p.o. daily.

18.Nitrostat 0.4 sublingually p.r.n.

Once again, the patient discharged in stable condition with guarded prognosis.





MMODL / IJN: [id number] / Job#: [id number]

## 2021-10-15 ENCOUNTER — HOSPITAL ENCOUNTER (OUTPATIENT)
Dept: HOSPITAL 47 - RADCTMAIN | Age: 76
Discharge: HOME | End: 2021-10-15
Attending: FAMILY MEDICINE
Payer: MEDICARE

## 2021-10-15 DIAGNOSIS — J32.9: Primary | ICD-10-CM

## 2021-10-15 PROCEDURE — 70486 CT MAXILLOFACIAL W/O DYE: CPT

## 2021-10-15 NOTE — CT
EXAMINATION TYPE: CT facial bones wo con

 

DATE OF EXAM: 10/15/2021

 

COMPARISON: None

 

HISTORY: Chronic sinusitis

 

CT DLP: 506 mGycm

Automated exposure control for dose reduction was used.

 

TECHNIQUE: CT scan of the sinuses is performed without contrast, axial images are obtained, coronal r
eformatted images are also reviewed.

 

FINDINGS: The  paranasal sinuses including the frontal, ethmoid, sphenoid, and maxillary sinuses bila
terally are well-aerated without abnormal opacification.  The ostiomeatal complex is patent bilateral
ly on the coronal images. Small bilateral aneta bullosa.

 

Visualized portion of mastoid air cells show no abnormal opacification.  The globes are intact bilate
rally.  Degenerative change of the spine with low-attenuation the white matter suggestive of a nonspe
cific remote ischemic white matter change.

 

IMPRESSION: The sinuses are clear and the ostiomeatal complex is patent bilaterally.

## 2021-10-20 ENCOUNTER — HOSPITAL ENCOUNTER (OUTPATIENT)
Dept: HOSPITAL 47 - EC | Age: 76
Setting detail: OBSERVATION
LOS: 2 days | Discharge: HOME | End: 2021-10-22
Attending: HOSPITALIST | Admitting: HOSPITALIST
Payer: MEDICARE

## 2021-10-20 DIAGNOSIS — R11.0: ICD-10-CM

## 2021-10-20 DIAGNOSIS — M19.90: ICD-10-CM

## 2021-10-20 DIAGNOSIS — R00.1: ICD-10-CM

## 2021-10-20 DIAGNOSIS — M19.041: ICD-10-CM

## 2021-10-20 DIAGNOSIS — I25.110: ICD-10-CM

## 2021-10-20 DIAGNOSIS — K21.9: ICD-10-CM

## 2021-10-20 DIAGNOSIS — Z88.0: ICD-10-CM

## 2021-10-20 DIAGNOSIS — Z20.822: ICD-10-CM

## 2021-10-20 DIAGNOSIS — Z82.3: ICD-10-CM

## 2021-10-20 DIAGNOSIS — H93.13: ICD-10-CM

## 2021-10-20 DIAGNOSIS — Z79.82: ICD-10-CM

## 2021-10-20 DIAGNOSIS — E78.5: ICD-10-CM

## 2021-10-20 DIAGNOSIS — C91.11: ICD-10-CM

## 2021-10-20 DIAGNOSIS — J30.2: ICD-10-CM

## 2021-10-20 DIAGNOSIS — E66.9: ICD-10-CM

## 2021-10-20 DIAGNOSIS — D64.9: ICD-10-CM

## 2021-10-20 DIAGNOSIS — R07.2: Primary | ICD-10-CM

## 2021-10-20 DIAGNOSIS — M19.042: ICD-10-CM

## 2021-10-20 DIAGNOSIS — E11.9: ICD-10-CM

## 2021-10-20 DIAGNOSIS — Z79.4: ICD-10-CM

## 2021-10-20 DIAGNOSIS — Z83.3: ICD-10-CM

## 2021-10-20 DIAGNOSIS — Z79.899: ICD-10-CM

## 2021-10-20 DIAGNOSIS — Z88.1: ICD-10-CM

## 2021-10-20 DIAGNOSIS — I11.9: ICD-10-CM

## 2021-10-20 LAB
ALBUMIN SERPL-MCNC: 3.8 G/DL (ref 3.5–5)
ALP SERPL-CCNC: 101 U/L (ref 38–126)
ALT SERPL-CCNC: 21 U/L (ref 4–49)
ANION GAP SERPL CALC-SCNC: 9 MMOL/L
APTT BLD: 23 SEC (ref 22–30)
AST SERPL-CCNC: 28 U/L (ref 17–59)
BASOPHILS # BLD AUTO: 0.1 K/UL (ref 0–0.2)
BASOPHILS NFR BLD AUTO: 1 %
BUN SERPL-SCNC: 12 MG/DL (ref 9–20)
CALCIUM SPEC-MCNC: 9 MG/DL (ref 8.4–10.2)
CHLORIDE SERPL-SCNC: 103 MMOL/L (ref 98–107)
CO2 SERPL-SCNC: 23 MMOL/L (ref 22–30)
EOSINOPHIL # BLD AUTO: 0.3 K/UL (ref 0–0.7)
EOSINOPHIL NFR BLD AUTO: 1 %
ERYTHROCYTE [DISTWIDTH] IN BLOOD BY AUTOMATED COUNT: 4.2 M/UL (ref 4.3–5.9)
ERYTHROCYTE [DISTWIDTH] IN BLOOD: 14.5 % (ref 11.5–15.5)
GLUCOSE SERPL-MCNC: 202 MG/DL (ref 74–99)
HCT VFR BLD AUTO: 37.7 % (ref 39–53)
HGB BLD-MCNC: 12.9 GM/DL (ref 13–17.5)
INR PPP: 1.1 (ref ?–1.2)
LYMPHOCYTES # SPEC AUTO: 15.8 K/UL (ref 1–4.8)
LYMPHOCYTES NFR SPEC AUTO: 72 %
MAGNESIUM SPEC-SCNC: 1.7 MG/DL (ref 1.6–2.3)
MCH RBC QN AUTO: 30.9 PG (ref 25–35)
MCHC RBC AUTO-ENTMCNC: 34.4 G/DL (ref 31–37)
MCV RBC AUTO: 89.8 FL (ref 80–100)
MONOCYTES # BLD AUTO: 0.4 K/UL (ref 0–1)
MONOCYTES NFR BLD AUTO: 2 %
NEUTROPHILS # BLD AUTO: 4.5 K/UL (ref 1.3–7.7)
NEUTROPHILS NFR BLD AUTO: 21 %
PLATELET # BLD AUTO: 117 K/UL (ref 150–450)
POTASSIUM SERPL-SCNC: 4.1 MMOL/L (ref 3.5–5.1)
PROT SERPL-MCNC: 6.4 G/DL (ref 6.3–8.2)
PT BLD: 11.4 SEC (ref 9–12)
SODIUM SERPL-SCNC: 135 MMOL/L (ref 137–145)
WBC # BLD AUTO: 22 K/UL (ref 3.8–10.6)

## 2021-10-20 PROCEDURE — 85730 THROMBOPLASTIN TIME PARTIAL: CPT

## 2021-10-20 PROCEDURE — 84484 ASSAY OF TROPONIN QUANT: CPT

## 2021-10-20 PROCEDURE — 85610 PROTHROMBIN TIME: CPT

## 2021-10-20 PROCEDURE — 36415 COLL VENOUS BLD VENIPUNCTURE: CPT

## 2021-10-20 PROCEDURE — 93005 ELECTROCARDIOGRAM TRACING: CPT

## 2021-10-20 PROCEDURE — 96366 THER/PROPH/DIAG IV INF ADDON: CPT

## 2021-10-20 PROCEDURE — 93306 TTE W/DOPPLER COMPLETE: CPT

## 2021-10-20 PROCEDURE — 71275 CT ANGIOGRAPHY CHEST: CPT

## 2021-10-20 PROCEDURE — 93970 EXTREMITY STUDY: CPT

## 2021-10-20 PROCEDURE — 80048 BASIC METABOLIC PNL TOTAL CA: CPT

## 2021-10-20 PROCEDURE — 96376 TX/PRO/DX INJ SAME DRUG ADON: CPT

## 2021-10-20 PROCEDURE — 87635 SARS-COV-2 COVID-19 AMP PRB: CPT

## 2021-10-20 PROCEDURE — 71046 X-RAY EXAM CHEST 2 VIEWS: CPT

## 2021-10-20 PROCEDURE — 83880 ASSAY OF NATRIURETIC PEPTIDE: CPT

## 2021-10-20 PROCEDURE — 85379 FIBRIN DEGRADATION QUANT: CPT

## 2021-10-20 PROCEDURE — 99285 EMERGENCY DEPT VISIT HI MDM: CPT

## 2021-10-20 PROCEDURE — 83735 ASSAY OF MAGNESIUM: CPT

## 2021-10-20 PROCEDURE — 80053 COMPREHEN METABOLIC PANEL: CPT

## 2021-10-20 PROCEDURE — 96365 THER/PROPH/DIAG IV INF INIT: CPT

## 2021-10-20 PROCEDURE — 93458 L HRT ARTERY/VENTRICLE ANGIO: CPT

## 2021-10-20 PROCEDURE — 85025 COMPLETE CBC W/AUTO DIFF WBC: CPT

## 2021-10-20 PROCEDURE — 96375 TX/PRO/DX INJ NEW DRUG ADDON: CPT

## 2021-10-20 RX ADMIN — HYDROCODONE BITARTRATE AND ACETAMINOPHEN PRN EACH: 5; 325 TABLET ORAL at 23:21

## 2021-10-20 RX ADMIN — MORPHINE SULFATE PRN MG: 4 INJECTION, SOLUTION INTRAMUSCULAR; INTRAVENOUS at 17:31

## 2021-10-20 RX ADMIN — ATORVASTATIN CALCIUM SCH MG: 80 TABLET, FILM COATED ORAL at 23:21

## 2021-10-20 RX ADMIN — GABAPENTIN SCH MG: 300 CAPSULE ORAL at 23:21

## 2021-10-20 NOTE — ED
General Adult HPI





- General


Chief complaint: Chest Pain


Stated complaint: Chest pain


Time Seen by Provider: 10/20/21 15:33


Source: patient, EMS, RN notes reviewed, old records reviewed


Mode of arrival: EMS


Limitations: no limitations





- History of Present Illness


Initial comments: 





Patient is a 76-year-old male with past medical history remarkable for CAD, 

diabetes, GERD, hypertension, diabetes, leukemia is in remission since in the 

department complaining of chest pain.  Patient received a stress test today at 

Dr. Diamond's office.  He states ascended around noon.  He went home at about 1 PM 

he began having substernal chest pain that felt like a pressure sensation 

approximately 9 out of 10.  Did not radiate.  With across both chest.  He called

EMS to [place] emergency department for evaluation.  EMS provided him with a 

total 324 mg of aspirin, as well as a nitro sublingual tablet which did improve 

his chest pain from a 9 to approximate 6 where he is at now.  He states that he 

had mild shortness of breath but this is been an ongoing issue secondary to 

sinus problems.  He was not vaccinated for Covid.  Denies any lower extremity 

edema.  Denies any headache, lightheadedness.  Denies any current abdominal pain

but states he did feel slightly nauseous when the chest pain started.  This is 

since resolved.  Denies any urinary complaints at this time.  Denies any fever, 

chills, sick contacts.  His no other acute complaints at this time.  Patient 

presents over concern for his chest pain in the setting of recent stress test 

earlier today.  Stress test is is medication induced.





- Related Data


                                Home Medications











 Medication  Instructions  Recorded  Confirmed


 


Cetirizine HCl [Zyrtec] 10 mg PO DAILY 08/18/17 10/20/21


 


Montelukast Sodium [Singulair] 10 mg PO DAILY 08/18/17 10/20/21


 


Insulin NPH Hum/Reg Insulin Hm 17 units SQ AC-BRKFST 05/29/20 10/20/21





[Relion Novolin 70-30 Flexpen]   


 


Fluticasone Nasal Spray [Flonase 1 spr EA NOSTRIL DAILY 04/19/21 10/20/21





Nasal [place]]   


 


HYDROcodone/APAP 5-325MG [Norco 1 tab PO Q6HR PRN 04/19/21 10/20/21





5-325]   


 


Ketorolac 0.5% Ophth Soln [Acular 1 drop BOTH EYES DAILY 04/19/21 10/20/21





0.5%]   


 


Prednisolone Acetate/Pf 1 drop BOTH EYES DAILY 04/19/21 10/20/21





[Prednisolone Acet 1% Eye Drop]   


 


ALPRAZolam [Xanax] 0.25 mg PO DAILY PRN 05/09/21 10/20/21


 


Metoprolol Succinate (ER) [Toprol 25 mg PO DAILY 05/09/21 10/20/21





XL]   


 


Atorvastatin [Lipitor] 80 mg PO HS 06/01/21 10/20/21


 


Insulin NPH Hum/Reg Insulin Hm See Protocol SQ AC-SUPPER PRN 06/22/21 10/20/21





[Relion Novolin 70-30 Flexpen]   


 


Baclofen [Lioresal] 10 mg PO DAILY 07/12/21 10/20/21


 


SILVER sulfADIAZINE CREAM 1 applic TOPICAL BID 07/12/21 10/20/21





[Silvadene Cream]   


 


Gabapentin 300 mg PO HS 10/20/21 10/20/21


 


Omeprazole 40 mg PO DAILY 10/20/21 10/20/21


 


lisinopriL [Zestril] 20 mg PO DAILY 10/20/21 10/20/21








                                  Previous Rx's











 Medication  Instructions  Recorded


 


Isosorbide Mononitrate ER [Imdur] 30 mg PO DAILY #30 tab 21


 


Nitroglycerin Sl Tabs [Nitrostat] 0.4 mg SUBLINGUAL Q5M PRN #20 tab 21











                                    Allergies











Allergy/AdvReac Type Severity Reaction Status Date / Time


 


amoxicillin [From Augmentin] Allergy  Rash/Hives Verified 10/20/21 17:12


 


clavulanic acid Allergy  Rash/Hives Verified 10/20/21 17:12





[From Augmentin]     














Review of Systems


ROS Statement: 


Those systems with pertinent positive or pertinent negative responses have been 

documented in the HPI.


Review of Systems:


CONST: Denies fever 


EYES: Denies blurry vision 


ENT: Denies nasal congestion  


C/V: Endorses chest pain


RESP: Denies shortness of breath 


GI: Denies abdominal pain 


: Denies dysuria  


SKIN: Denies rash.


MSK: Denies joint pain.


NEURO: Denies headache 


ROS Other: All systems not noted in ROS Statement are negative.





Past Medical History


Past Medical History: Coronary Artery Disease (CAD), Cancer, Diabetes Mellitus, 

GERD/Reflux, Hypertension, Osteoarthritis (OA)


Additional Past Medical History / Comment(s): NIDDM type II, 1999 LEUKEMIA (CLL-

remission), sinus problems, seasonal allergies, tinnitis bilaterally, OA hands, 

R wrist carpal tunnel, past fx with L elbow, R rotator cuff tendon problems, L 

rotator cuff tear, left knee pain


History of Any Multi-Drug Resistant Organisms: None Reported


Past Surgical History: Heart Catheterization, Orthopedic Surgery


Additional Past Surgical History / Comment(s): LEFT HAND thumb tendon repair.


Past Anesthesia/Blood Transfusion Reactions: No Reported Reaction


Past Psychological History: No Psychological Hx Reported


Smoking Status: Never smoker


Past Alcohol Use History: None Reported


Past Drug Use History: None Reported





- Past Family History


  ** Father


Family Medical History: CVA/TIA


Additional Family Medical History / Comment(s): Father  at the age of 85yrs.





  ** Mother


Family Medical History: Diabetes Mellitus


Additional Family Medical History / Comment(s): Mother  at the age of 90yrs.





General Exam





- General Exam Comments


Initial Comments: 





General: Appears in no acute distress.


HEAD:  Normal with no signs of head trauma.


EYES:  PERRLA, EOMI, conjunctiva normal, no discharge.


ENT:  Hearing grossly intact, normal oropharynx.


RESPIRATORY:  Clear breath sounds bilaterally.  No wheezes, rales, or rhonchi.  


C/V:  Regular rate and rhythm. S1 and S2 auscultated, no edema, peripheral 

pulses 2+ and intact throughout


ABD:  Abd is soft, nontender, nondistended


EXT: Normal range of motion, no obvious deformity


SKIN:  No rashes or lesions observed on exposed skin.


NEURO: Alert and oriented 4.  No focal sensory strength deficits.


Limitations: no limitations





Course


                                   Vital Signs











  10/20/21 10/20/21 10/20/21





  15:33 16:15 20:00


 


Temperature 97.7 F  


 


Pulse Rate 57 L 57 L 


 


Pulse Rate [  57 L 





Cardiac Monitor   





]   


 


Respiratory 18 16 18





Rate   


 


Blood Pressure 195/95 161/64 


 


O2 Sat by Pulse 98 96 





Oximetry   














  10/20/21





  20:27


 


Temperature 


 


Pulse Rate 51 L


 


Pulse Rate [ 





Cardiac Monitor 





] 


 


Respiratory 16





Rate 


 


Blood Pressure 161/60


 


O2 Sat by Pulse 98





Oximetry 














Medical Decision Making





- Medical Decision Making





Based on the patient's presentation and physical exam, I'm concerned for cardiac

etiology for his current chest pain, especially with some recent stress test.  

Therefore we will obtain cardiac laboratory studies.  He already received 325 mg

of aspirin by EMS.  He will be given an additional sublingual nitro glycerin 

tablet.  EKG and chest x-ray will be obtained.  He will be connected to 

continuous cardiac monitoring.  Patient was in agreement this plan.





EKG shows no acute signs of ischemia and no acute change when compared to prior 

EKGs. Chest x-ray shows no acute cardiopulmonary process.  There is 

cardiomegaly.  Laboratory studies are remarkable for a leukocytosis of 22, 

however this is chronic as the patient does have a history of leukemia this is 

actually improved from prior levels.  This is a normocytic anemia with a 

hemoglobin of 12.9.  Troponin is negative.  Covid is negative.





On reevaluation after multiple nitro tablets, patient's chest pain is improved 

to approximately a 3 or 4.  Patient's blood pressure is also improved at this 

time to 160's systolic.  I discussed with him the results of his laboratory 

studies and imaging.  I would like to admit him to the hospital due to his 

continued chest pain.  He was in agreement this plan.  He'll be started on a 

heparin drip.  I did contact Dr. Diamond who was in agreement this plan.  We will 

trend the troponins.  Echo was ordered by myself.  I spoke with the admitting 

team LIVIA Thrasher who accepted the patient.  Patient was admitted in serious 

condition to observation telemetry under Dr. Germain. Cardiology is consulted.





- Lab Data


Result diagrams: 


                                 10/20/21 15:34





                                 10/20/21 15:34


                                   Lab Results











  10/20/21 10/20/21 10/20/21 Range/Units





  15:34 15:34 15:34 


 


WBC  22.0 H    (3.8-10.6)  k/uL


 


RBC  4.20 L    (4.30-5.90)  m/uL


 


Hgb  12.9 L    (13.0-17.5)  gm/dL


 


Hct  37.7 L    (39.0-53.0)  %


 


MCV  89.8    (80.0-100.0)  fL


 


MCH  30.9    (25.0-35.0)  pg


 


MCHC  34.4    (31.0-37.0)  g/dL


 


RDW  14.5    (11.5-15.5)  %


 


Plt Count  117 L    (150-450)  k/uL


 


MPV  8.1    


 


Neutrophils %  21    %


 


Lymphocytes %  72    %


 


Monocytes %  2    %


 


Eosinophils %  1    %


 


Basophils %  1    %


 


Neutrophils #  4.5    (1.3-7.7)  k/uL


 


Lymphocytes #  15.8 H    (1.0-4.8)  k/uL


 


Monocytes #  0.4    (0-1.0)  k/uL


 


Eosinophils #  0.3    (0-0.7)  k/uL


 


Basophils #  0.1    (0-0.2)  k/uL


 


Manual Slide Review  Performed    


 


PT   11.4   (9.0-12.0)  sec


 


INR   1.1   (<1.2)  


 


APTT   23.0   (22.0-30.0)  sec


 


Sodium    135 L  (137-145)  mmol/L


 


Potassium    4.1  (3.5-5.1)  mmol/L


 


Chloride    103  ()  mmol/L


 


Carbon Dioxide    23  (22-30)  mmol/L


 


Anion Gap    9  mmol/L


 


BUN    12  (9-20)  mg/dL


 


Creatinine    0.76  (0.66-1.25)  mg/dL


 


Est GFR (CKD-EPI)AfAm    >90  (>60 ml/min/1.73 sqM)  


 


Est GFR (CKD-EPI)NonAf    >90  (>60 ml/min/1.73 sqM)  


 


Glucose    202 H  (74-99)  mg/dL


 


Calcium    9.0  (8.4-10.2)  mg/dL


 


Magnesium    1.7  (1.6-2.3)  mg/dL


 


Total Bilirubin    0.7  (0.2-1.3)  mg/dL


 


AST    28  (17-59)  U/L


 


ALT    21  (4-49)  U/L


 


Alkaline Phosphatase    101  ()  U/L


 


Troponin I     (0.000-0.034)  ng/mL


 


NT-Pro-B Natriuret Pep     pg/mL


 


Total Protein    6.4  (6.3-8.2)  g/dL


 


Albumin    3.8  (3.5-5.0)  g/dL


 


Coronavirus (PCR)     (Not Detectd)  














  10/20/21 10/20/21 10/20/21 Range/Units





  15:34 15:34 15:35 


 


WBC     (3.8-10.6)  k/uL


 


RBC     (4.30-5.90)  m/uL


 


Hgb     (13.0-17.5)  gm/dL


 


Hct     (39.0-53.0)  %


 


MCV     (80.0-100.0)  fL


 


MCH     (25.0-35.0)  pg


 


MCHC     (31.0-37.0)  g/dL


 


RDW     (11.5-15.5)  %


 


Plt Count     (150-450)  k/uL


 


MPV     


 


Neutrophils %     %


 


Lymphocytes %     %


 


Monocytes %     %


 


Eosinophils %     %


 


Basophils %     %


 


Neutrophils #     (1.3-7.7)  k/uL


 


Lymphocytes #     (1.0-4.8)  k/uL


 


Monocytes #     (0-1.0)  k/uL


 


Eosinophils #     (0-0.7)  k/uL


 


Basophils #     (0-0.2)  k/uL


 


Manual Slide Review     


 


PT     (9.0-12.0)  sec


 


INR     (<1.2)  


 


APTT     (22.0-30.0)  sec


 


Sodium     (137-145)  mmol/L


 


Potassium     (3.5-5.1)  mmol/L


 


Chloride     ()  mmol/L


 


Carbon Dioxide     (22-30)  mmol/L


 


Anion Gap     mmol/L


 


BUN     (9-20)  mg/dL


 


Creatinine     (0.66-1.25)  mg/dL


 


Est GFR (CKD-EPI)AfAm     (>60 ml/min/1.73 sqM)  


 


Est GFR (CKD-EPI)NonAf     (>60 ml/min/1.73 sqM)  


 


Glucose     (74-99)  mg/dL


 


Calcium     (8.4-10.2)  mg/dL


 


Magnesium     (1.6-2.3)  mg/dL


 


Total Bilirubin     (0.2-1.3)  mg/dL


 


AST     (17-59)  U/L


 


ALT     (4-49)  U/L


 


Alkaline Phosphatase     ()  U/L


 


Troponin I  <0.012    (0.000-0.034)  ng/mL


 


NT-Pro-B Natriuret Pep   239   pg/mL


 


Total Protein     (6.3-8.2)  g/dL


 


Albumin     (3.5-5.0)  g/dL


 


Coronavirus (PCR)    Not Detected  (Not Detectd)  














- EKG Data


-: EKG Interpreted by Me


EKG Comments: 





12-lead Electrocardiogram Interpretation Note





EKG was reviewed and interpreted by myself. 12-lead ECG performed at 1530 is 

interpreted by me as revealing sinus bradycardia at a rate of 59 beats per 

minute.  Axis is normal.  SC interval is 170 ms, QRS duration is 90 ms, QTc is 

421 ms..  There were no ST or T wave abnormalities to suggest myocardial 

ischemia or injury. R wave progression across the precordium was satisfactory. 

By my interpretation this EKG is non-diagnostic for acute ischemia.  When comp

ared to prior EKGs, there is no acute change.  There is actually improvement in 

the T-wave inversion seen on prior EKG from 2021 in lead III.





Disposition


Clinical Impression: 


 Chest pain, History of leukemia, Leukocytosis, Normocytic anemia, Sinus 

bradycardia





Disposition: ADMITTED AS IP TO THIS HOSP


Condition: Serious

## 2021-10-20 NOTE — XR
EXAMINATION TYPE: XR chest 2V

 

DATE OF EXAM: 10/20/2021

 

COMPARISON: 7/12/2021

 

TECHNIQUE: PA and lateral views submitted.

 

HISTORY: Shortness of breath

 

FINDINGS:

No pneumothorax or pleural effusion. There is subsegmental changes in the left upper lobe..  Heart is
 enlarged and there is coarsened interstitium with arthropathy of the shoulders. No sizable pleural e
ffusion.

 

IMPRESSION: 

1. Cardiomegaly correlate for interstitial pneumonitis or early venous congestion. Subsegmental infil
trate in the left upper lobe suggested..

## 2021-10-21 LAB
BASOPHILS # BLD AUTO: 0.08 X 10*3/UL (ref 0–0.1)
BASOPHILS NFR BLD AUTO: 0.4 %
EOSINOPHIL # BLD AUTO: 0.24 X 10*3/UL (ref 0.04–0.35)
EOSINOPHIL NFR BLD AUTO: 1.1 %
ERYTHROCYTE [DISTWIDTH] IN BLOOD BY AUTOMATED COUNT: 4.08 X 10*6/UL (ref 4.4–5.6)
ERYTHROCYTE [DISTWIDTH] IN BLOOD: 13.9 % (ref 11.5–14.5)
GLUCOSE BLD-MCNC: 174 MG/DL (ref 75–99)
GLUCOSE BLD-MCNC: 205 MG/DL (ref 75–99)
GLUCOSE BLD-MCNC: 209 MG/DL (ref 75–99)
HCT VFR BLD AUTO: 37.2 % (ref 39.6–50)
HGB BLD-MCNC: 12.1 G/DL (ref 13–17)
INR PPP: 1.2 (ref ?–1.2)
LYMPHOCYTES # SPEC AUTO: 18.69 X 10*3/UL (ref 0.9–5)
LYMPHOCYTES NFR SPEC AUTO: 82.4 %
MCH RBC QN AUTO: 29.7 PG (ref 27–32)
MCHC RBC AUTO-ENTMCNC: 32.5 G/DL (ref 32–37)
MCV RBC AUTO: 91.2 FL (ref 80–97)
MONOCYTES # BLD AUTO: 0.92 X 10*3/UL (ref 0.2–1)
MONOCYTES NFR BLD AUTO: 4.1 %
NEUTROPHILS # BLD AUTO: 2.71 X 10*3/UL (ref 1.8–7.7)
NEUTROPHILS NFR BLD AUTO: 11.9 %
PLATELET # BLD AUTO: 110 X 10*3/UL (ref 140–440)
PT BLD: 12.1 SEC (ref 9–12)
WBC # BLD AUTO: 22.67 X 10*3/UL (ref 4.5–10)

## 2021-10-21 RX ADMIN — KETOROLAC TROMETHAMINE SCH DROPS: 5 SOLUTION OPHTHALMIC at 09:07

## 2021-10-21 RX ADMIN — PREDNISOLONE ACETATE SCH DROPS: 10 SUSPENSION/ DROPS OPHTHALMIC at 09:06

## 2021-10-21 RX ADMIN — INSULIN ASPART SCH UNIT: 100 INJECTION, SOLUTION INTRAVENOUS; SUBCUTANEOUS at 17:34

## 2021-10-21 RX ADMIN — ATORVASTATIN CALCIUM SCH MG: 80 TABLET, FILM COATED ORAL at 20:23

## 2021-10-21 RX ADMIN — INSULIN ASPART SCH UNIT: 100 INJECTION, SOLUTION INTRAVENOUS; SUBCUTANEOUS at 13:07

## 2021-10-21 RX ADMIN — HYDROCODONE BITARTRATE AND ACETAMINOPHEN PRN EACH: 5; 325 TABLET ORAL at 14:01

## 2021-10-21 RX ADMIN — GABAPENTIN SCH MG: 300 CAPSULE ORAL at 20:23

## 2021-10-21 RX ADMIN — ISOSORBIDE MONONITRATE SCH MG: 30 TABLET, EXTENDED RELEASE ORAL at 09:05

## 2021-10-21 RX ADMIN — FLUTICASONE PROPIONATE SCH SPRAY: 50 SPRAY, METERED NASAL at 09:06

## 2021-10-21 RX ADMIN — CEFAZOLIN SCH: 330 INJECTION, POWDER, FOR SOLUTION INTRAMUSCULAR; INTRAVENOUS at 20:31

## 2021-10-21 RX ADMIN — CEFAZOLIN SCH MLS/HR: 330 INJECTION, POWDER, FOR SOLUTION INTRAMUSCULAR; INTRAVENOUS at 09:50

## 2021-10-21 RX ADMIN — MORPHINE SULFATE PRN MG: 4 INJECTION, SOLUTION INTRAMUSCULAR; INTRAVENOUS at 02:22

## 2021-10-21 RX ADMIN — INSULIN ASPART SCH UNIT: 100 INJECTION, SOLUTION INTRAVENOUS; SUBCUTANEOUS at 20:23

## 2021-10-21 RX ADMIN — FAMOTIDINE SCH MG: 20 TABLET, FILM COATED ORAL at 20:23

## 2021-10-21 RX ADMIN — MONTELUKAST SODIUM SCH MG: 10 TABLET, FILM COATED ORAL at 09:05

## 2021-10-21 RX ADMIN — HYDROCODONE BITARTRATE AND ACETAMINOPHEN PRN EACH: 5; 325 TABLET ORAL at 20:26

## 2021-10-21 RX ADMIN — VERAPAMIL HYDROCHLORIDE ONE ML: 2.5 INJECTION, SOLUTION INTRAVENOUS at 11:25

## 2021-10-21 RX ADMIN — BACLOFEN SCH MG: 10 TABLET ORAL at 09:05

## 2021-10-21 RX ADMIN — METOPROLOL SUCCINATE SCH MG: 25 TABLET, EXTENDED RELEASE ORAL at 09:05

## 2021-10-21 RX ADMIN — VERAPAMIL HYDROCHLORIDE ONE ML: 2.5 INJECTION, SOLUTION INTRAVENOUS at 11:17

## 2021-10-21 NOTE — CC
CARDIAC CATHETERIZATION REPORT



DATE OF SERVICE:

10/21/2021



PERFORMING PHYSICIAN:

Panfilo Diamond MD.



PROCEDURE PERFORMED:

1. Selective right and left coronary angiogram.

2. Left heart catheterization.



INDICATION:

This is a 76-year-old gentleman with coronary artery disease as well as hypertension

and dyslipidemia who was experiencing symptoms of chest discomfort.  He underwent

myocardial perfusion imaging stress test and that revealed lateral ischemia and because

of that, a heart catheterization was advised.



APPROACH:

Right radial artery.



COMPLICATION:

None.



LEVEL OF SEDATION:

Moderate with sedation length of 13 minutes.



PROCEDURE DESCRIPTION:

After obtaining informed consent, the patient was brought to the cardiac cath lab.  The

right radial artery was cannulated using micropuncture technique, the micropuncture

wire passed easily then I placed a 6-[place] sheath at the right radial artery.



I gave the patient 2 mg of verapamil IA and 10,000 units of heparin IV.



Selective right and left coronary angiogram performed with JR4 and JL3.5 catheters.

Left heart catheterization was performed using 5-[place] pigtail catheter.



The procedure was completed without any complication.



SELECTIVE CORONARY ANGIOGRAM:

1. The right coronary artery is a large caliber vessel. It is a dominant vessel.  The

    RCA has intermediate lesion in the midportion, appeared to be in the range of 40%

    to 50%.

2. The left main is angiographically normal. It bifurcates into left circumflex and

    left anterior descending artery.

3. The left circumflex is a large caliber vessel.  It is a nondominant vessel.  The

    left circumflex is angiographically normal.  The midportion has mild disease only,

    appeared to be in the range of 20% to 30%.

4. The LAD is a large caliber vessel.  The LAD appeared to have mild disease distally.

    It gives rise into three diagonal branches, all appeared to be angiographically

    normal.



HEMODYNAMICS:

The LVEDP was about 4-6 mmHg without significant gradient across aortic valve.



CONCLUSION:

1. Intermediate nonobstructive disease involving the mid RCA.

2. Low left side filling pressure.





MMODL / IJN: [id number] / Job#: [id number]

## 2021-10-21 NOTE — P.HPIM
History of Present Illness





This is a pleasant 76 years old male with past medical history of Coronary 

Artery Disease,  LEUKEMIA (CLL-remission), Diabetes Mellitus, GERD, 

Hypertension, Osteoarthritis, tinnitis bilaterally, R rotator cuff tendon 

problems.


Presents because of chest pain since yesterday, in the middle of his chest.  

Nonspecific, nonradiating.


No dyspnea.  No diarrhea.  No dysuria.  No dizziness or weakness in one arm or 

leg.





Vitals are stable, slightly bradycardic with heart rate in 50s.


show leukocytosis of 22k, which is chronic his baseline WBC is in the 20s and 

30sk


INR is 1.2.  BMP and liver enzymes are unremarkable


Troponin 3 are negative the standard 0.012.


ProBNP is 239.  Coronavirus not detected.


EKG showed sinus bradycardia at 59 with  and no significant ST-T changes


Chest x-ray: Cardiomegaly correlate for interstitial pneumonitis or earlier 

venous congestion.  Subsegmental infiltrate in the left upper lobe suggested


Cardiology was consulted from emergency room.  Also patient was started on 

heparin drip 




















Review of Systems





CONSTITUTIONAL: No fever, no malaise, no fatigue. 


HEENT: No recent visual problems or hearing problems. Denied any sore throat. 


CARDIOVASCULAR: No  orthopnea, PND, no palpitations, no syncope. 


PULMONARY: No shortness of breath, no cough, no hemoptysis. 


GASTROINTESTINAL: No diarrhea, no nausea, no vomiting, no abdominal pain. 

Normoactive bowel sounds. 


NEUROLOGICAL: No headaches, no weakness, no numbness. 


HEMATOLOGICAL: Denies any bleeding or petechiae. 


GENITOURINARY: Denies any burning micturition, frequency, or urgency. 


MUSCULOSKELETAL/RHEUMATOLOGICAL: Denies any joint pain, swelling, or any muscle 

pain. 


ENDOCRINE: Denies any polyuria or polydipsia.











Past Medical History


Past Medical History: Coronary Artery Disease (CAD), Cancer, Diabetes Mellitus, 

GERD/Reflux, Hypertension, Osteoarthritis (OA)


Additional Past Medical History / Comment(s): NIDDM type II,  LEUKEMIA (CLL-

remission), sinus problems, seasonal allergies, tinnitis bilaterally, OA hands, 

R wrist carpal tunnel, past fx with L elbow, R rotator cuff tendon problems, L 

rotator cuff tear, left knee pain


History of Any Multi-Drug Resistant Organisms: None Reported


Past Surgical History: Heart Catheterization, Orthopedic Surgery


Additional Past Surgical History / Comment(s): LEFT HAND thumb tendon repair.


Past Anesthesia/Blood Transfusion Reactions: No Reported Reaction


Past Psychological History: No Psychological Hx Reported


Smoking Status: Never smoker


Past Alcohol Use History: None Reported


Past Drug Use History: None Reported





- Past Family History


  ** Father


Family Medical History: CVA/TIA


Additional Family Medical History / Comment(s): Father  at the age of 85yrs.





  ** Mother


Family Medical History: Diabetes Mellitus


Additional Family Medical History / Comment(s): Mother  at the age of 90yrs.





Medications and Allergies


                                Home Medications











 Medication  Instructions  Recorded  Confirmed  Type


 


Cetirizine HCl [Zyrtec] 10 mg PO DAILY 08/18/17 10/20/21 History


 


Montelukast Sodium [Singulair] 10 mg PO DAILY 08/18/17 10/20/21 History


 


Insulin NPH Hum/Reg Insulin Hm 17 units SQ AC-BRKFST 05/29/20 10/20/21 History





[Relion Novolin 70-30 Flexpen]    


 


Fluticasone Nasal Spray [Flonase 1 spr EA NOSTRIL DAILY 04/19/21 10/20/21 

History





Nasal [place]]    


 


HYDROcodone/APAP 5-325MG [Norco 1 tab PO Q6HR PRN 04/19/21 10/20/21 History





5-325]    


 


Ketorolac 0.5% Ophth Soln [Acular 1 drop BOTH EYES DAILY 04/19/21 10/20/21 

History





0.5%]    


 


Prednisolone Acetate/Pf 1 drop BOTH EYES DAILY 04/19/21 10/20/21 History





[Prednisolone Acet 1% Eye Drop]    


 


ALPRAZolam [Xanax] 0.25 mg PO DAILY PRN 05/09/21 10/20/21 History


 


Metoprolol Succinate (ER) [Toprol 25 mg PO DAILY 05/09/21 10/20/21 History





XL]    


 


Atorvastatin [Lipitor] 80 mg PO HS 06/01/21 10/20/21 History


 


Insulin NPH Hum/Reg Insulin Hm See Protocol SQ AC-SUPPER PRN 06/22/21 10/20/21 

History





[Relion Novolin 70-30 Flexpen]    


 


Baclofen [Lioresal] 10 mg PO DAILY 07/12/21 10/20/21 History


 


SILVER sulfADIAZINE CREAM 1 applic TOPICAL BID 07/12/21 10/20/21 History





[Silvadene Cream]    


 


Isosorbide Mononitrate ER [Imdur] 30 mg PO DAILY #30 tab 07/13/21 10/20/21 Rx


 


Nitroglycerin Sl Tabs [Nitrostat] 0.4 mg SUBLINGUAL Q5M PRN #20 tab 07/13/21 

10/20/21 Rx


 


Gabapentin 300 mg PO HS 10/20/21 10/20/21 History


 


Omeprazole 40 mg PO DAILY 10/20/21 10/20/21 History


 


lisinopriL [Zestril] 20 mg PO DAILY 10/20/21 10/20/21 History








                                    Allergies











Allergy/AdvReac Type Severity Reaction Status Date / Time


 


amoxicillin [From Augmentin] Allergy  Rash/Hives Verified 10/20/21 17:12


 


clavulanic acid Allergy  Rash/Hives Verified 10/20/21 17:12





[From Augmentin]     














Physical Exam


Vitals: 


                                   Vital Signs











  Temp Pulse Pulse Pulse Resp BP BP


 


 10/21/21 01:38  97.4 F L    51 L  16   146/79


 


 10/20/21 21:20  98.1 F    50 L  16   191/84


 


 10/20/21 20:27   51 L    16  161/60 


 


 10/20/21 20:00      18  


 


 10/20/21 16:15   57 L  57 L   16  161/64 


 


 10/20/21 15:33  97.7 F  57 L    18  195/95 














  Pulse Ox


 


 10/21/21 01:38  94 L


 


 10/20/21 21:20  96


 


 10/20/21 20:27  98


 


 10/20/21 20:00 


 


 10/20/21 16:15  96


 


 10/20/21 15:33  98








                                Intake and Output











 10/20/21 10/21/21 10/21/21





 22:59 06:59 14:59


 


Intake Total 200  


 


Balance 200  


 


Intake:   


 


  Oral 200  


 


Other:   


 


  # Voids 1 1 


 


  Weight 86.183 kg  














GENERAL: The patient is alert and oriented x3, not in any acute distress. Well 

developed, well nourished. 


HEENT: Pupils are round and equally reacting to light. EOMI. No scleral icterus.

No conjunctival pallor. Normocephalic, atraumatic. No pharyngeal erythema. No 

thyromegaly. 


CARDIOVASCULAR: S1 and S2 present. No murmurs, rubs, or gallops. 


PULMONARY: Chest is clear to auscultation, no wheezing or crackles. 


ABDOMEN: Soft, nontender, nondistended, normoactive bowel sounds. No palpable 

organomegaly. 


MUSCULOSKELETAL: No joint swelling or deformity. 


EXTREMITIES: No cyanosis, clubbing, or pedal edema. 


NEUROLOGICAL: Gross neurological examination did not reveal any focal deficits. 


SKIN: No rashes. No petechiae











Results


CBC & Chem 7: 


                                 10/20/21 15:34





                                 10/20/21 15:34


Labs: 


                  Abnormal Lab Results - Last 24 Hours (Table)











  10/20/21 10/20/21 10/20/21 Range/Units





  15:34 15:34 22:56 


 


WBC  22.0 H    (3.8-10.6)  k/uL


 


RBC  4.20 L    (4.30-5.90)  m/uL


 


Hgb  12.9 L    (13.0-17.5)  gm/dL


 


Hct  37.7 L    (39.0-53.0)  %


 


Plt Count  117 L    (150-450)  k/uL


 


Lymphocytes #  15.8 H    (1.0-4.8)  k/uL


 


PT     (9.0-12.0)  sec


 


INR     (<1.2)  


 


APTT    59.0 H  (22.0-30.0)  sec


 


Sodium   135 L   (137-145)  mmol/L


 


Glucose   202 H   (74-99)  mg/dL














  10/21/21 Range/Units





  05:44 


 


WBC   (3.8-10.6)  k/uL


 


RBC   (4.30-5.90)  m/uL


 


Hgb   (13.0-17.5)  gm/dL


 


Hct   (39.0-53.0)  %


 


Plt Count   (150-450)  k/uL


 


Lymphocytes #   (1.0-4.8)  k/uL


 


PT  12.1 H  (9.0-12.0)  sec


 


INR  1.2 H  (<1.2)  


 


APTT   (22.0-30.0)  sec


 


Sodium   (137-145)  mmol/L


 


Glucose   (74-99)  mg/dL














Thrombosis Risk Factor Assmnt





- Choose All That Apply


Each Factor Represents 1 point: Obesity (BMI >25)


Each Risk Factor Represents 2 Points: Age 61-74 years


Thrombosis Risk Factor Assessment Total Risk Factor Score: 3


Thrombosis Risk Factor Assessment Level: Moderate Risk





Assessment and Plan


Assessment: 





Chest pain, rule out cardiac causes


Diabetes mellitus


Hypertension


Chronic leukocytosis, mostly related to his hematological malignancy


History of GERD


History of coronary artery disease


History of leukemia, CLL


History of tinnitus bilaterally


History of right rotator cuff














Plan: 








This is a pleasant 76 years old male who presents with chest pain


continue with heparin drip, 


Cardiology consult, and patient is going for cardiac cath on 10/21


Labs and medication were reviewed..  Continue same treatment.  Continue with 

symptomatic treatment.  Resume home medication.  Monitor lytes and vitals.  DVT 

and GI prophylaxis.  Further recommendations depends on the clinical course of 

the patient


DVT prophylaxis: heparin


GI Prophylaxis: Pepcid


Prognosis is guarded

## 2021-10-21 NOTE — P.CRDCN
History of Present Illness


History of present illness: 





This is Dr. Cadena dictating a consult on this patient


The patient was interviewed and examined 





IMPRESSION / ASSESSMENT: 


Chest discomfort a few hours after Lexiscan cardiac stress test


Lexiscan cardiac stress test shows lateral wall ischemia according to Dr. Rodriguez


Hypertension, diabetes, leukemia in remission


No evidence for acute myocardial infarction





PLAN:


Discussed with Dr. Diamond and we will proceed with coronary angiography today


Continue cardiac medications





HPI


Patient started experiencing chest discomfort which is quite severe, after 

completion of the Lexiscan Cardiolite stress test, a few hours later


He came to the hospital


EKGs did not show any evidence for ischemia


Cardiac enzymes are normal





ROS: 


No fever chills or rigors, 


no cough, phlegm or expectoration, 


no nausea, vomiting or diarrhea, 


no hematuria, dysuria, 


no musculoskeletal complaints, 


no strokes or seizures, 


no skin lesions.





EXAMINATION: Heart rate in the 50s, blood pressure 161/70 mmHg


Breath sounds are clear no rhonchi no crackles


Heart sounds S1 and S2 are normal


Extremity warm no edema





REVIEW OF LABS, ECG & MEDICAL DATA


White count 22,000, hemoglobin 12


Sodium 135, potassium 4.1


BUN 12 and creatinine 0.76


Normal liver function


NT proBNP 239





















































Past Medical History


Past Medical History: Coronary Artery Disease (CAD), Cancer, Diabetes Mellitus, 

GERD/Reflux, Hypertension, Osteoarthritis (OA)


Additional Past Medical History / Comment(s): NIDDM type II, 1999 LEUKEMIA (CLL-

remission), sinus problems, seasonal allergies, tinnitis bilaterally, OA hands, 

R wrist carpal tunnel, past fx with L elbow, R rotator cuff tendon problems, L 

rotator cuff tear, left knee pain


History of Any Multi-Drug Resistant Organisms: None Reported


Past Surgical History: Heart Catheterization, Orthopedic Surgery


Additional Past Surgical History / Comment(s): LEFT HAND thumb tendon repair.


Past Anesthesia/Blood Transfusion Reactions: No Reported Reaction


Past Psychological History: No Psychological Hx Reported


Smoking Status: Never smoker


Past Alcohol Use History: None Reported


Past Drug Use History: None Reported





- Past Family History


  ** Father


Family Medical History: CVA/TIA


Additional Family Medical History / Comment(s): Father  at the age of 85yrs.





  ** Mother


Family Medical History: Diabetes Mellitus


Additional Family Medical History / Comment(s): Mother  at the age of 90yrs.





Medications and Allergies


                                Home Medications











 Medication  Instructions  Recorded  Confirmed  Type


 


Cetirizine HCl [Zyrtec] 10 mg PO DAILY 08/18/17 10/20/21 History


 


Montelukast Sodium [Singulair] 10 mg PO DAILY 08/18/17 10/20/21 History


 


Insulin NPH Hum/Reg Insulin Hm 17 units SQ AC-BRKFST 05/29/20 10/20/21 History





[Relion Novolin 70-30 Flexpen]    


 


Fluticasone Nasal Spray [Flonase 1 spr EA NOSTRIL DAILY 04/19/21 10/20/21 

History





Nasal [place]]    


 


HYDROcodone/APAP 5-325MG [Norco 1 tab PO Q6HR PRN 04/19/21 10/20/21 History





5-325]    


 


Ketorolac 0.5% Ophth Soln [Acular 1 drop BOTH EYES DAILY 04/19/21 10/20/21 

History





0.5%]    


 


Prednisolone Acetate/Pf 1 drop BOTH EYES DAILY 04/19/21 10/20/21 History





[Prednisolone Acet 1% Eye Drop]    


 


ALPRAZolam [Xanax] 0.25 mg PO DAILY PRN 05/09/21 10/20/21 History


 


Metoprolol Succinate (ER) [Toprol 25 mg PO DAILY 05/09/21 10/20/21 History





XL]    


 


Atorvastatin [Lipitor] 80 mg PO HS 06/01/21 10/20/21 History


 


Insulin NPH Hum/Reg Insulin Hm See Protocol SQ AC-SUPPER PRN 06/22/21 10/20/21 

History





[Relion Novolin 70-30 Flexpen]    


 


Baclofen [Lioresal] 10 mg PO DAILY 07/12/21 10/20/21 History


 


SILVER sulfADIAZINE CREAM 1 applic TOPICAL BID 07/12/21 10/20/21 History





[Silvadene Cream]    


 


Isosorbide Mononitrate ER [Imdur] 30 mg PO DAILY #30 tab 07/13/21 10/20/21 Rx


 


Nitroglycerin Sl Tabs [Nitrostat] 0.4 mg SUBLINGUAL Q5M PRN #20 tab 07/13/21 

10/20/21 Rx


 


Gabapentin 300 mg PO HS 10/20/21 10/20/21 History


 


Omeprazole 40 mg PO DAILY 10/20/21 10/20/21 History


 


lisinopriL [Zestril] 20 mg PO DAILY 10/20/21 10/20/21 History


 


Aspirin 81 mg PO DAILY  tab 10/21/21  Rx








                                    Allergies











Allergy/AdvReac Type Severity Reaction Status Date / Time


 


amoxicillin [From Augmentin] Allergy  Rash/Hives Verified 10/20/21 17:12


 


clavulanic acid Allergy  Rash/Hives Verified 10/20/21 17:12





[From Augmentin]     














Physical Exam


Vitals: 


                                   Vital Signs











  Temp Pulse Pulse Pulse Resp BP BP


 


 10/21/21 14:18     50 L    174/76


 


 10/21/21 13:44     53 L    161/70


 


 10/21/21 13:13     50 L    169/76


 


 10/21/21 12:44     55 L    181/66


 


 10/21/21 12:28     51 L    148/79


 


 10/21/21 12:13     54 L    171/75


 


 10/21/21 11:59     57 L    135/61


 


 10/21/21 11:43  98.6 F    56 L  18   155/75


 


 10/21/21 07:00  97.5 F L    50 L  18   148/65


 


 10/21/21 01:38  97.4 F L    51 L  16   146/79


 


 10/20/21 21:20  98.1 F    50 L  16   191/84


 


 10/20/21 20:27   51 L    16  161/60 


 


 10/20/21 20:00      18  


 


 10/20/21 16:15   57 L  57 L   16  161/64 


 


 10/20/21 15:33  97.7 F  57 L    18  195/95 














  Pulse Ox


 


 10/21/21 14:18  98


 


 10/21/21 13:44  97


 


 10/21/21 13:13  96


 


 10/21/21 12:44  98


 


 10/21/21 12:28  97


 


 10/21/21 12:13  95


 


 10/21/21 11:59  97


 


 10/21/21 11:43  96


 


 10/21/21 07:00  95


 


 10/21/21 01:38  94 L


 


 10/20/21 21:20  96


 


 10/20/21 20:27  98


 


 10/20/21 20:00 


 


 10/20/21 16:15  96


 


 10/20/21 15:33  98








                                Intake and Output











 10/21/21 10/21/21 10/21/21





 06:59 14:59 22:59


 


Intake Total  100 


 


Output Total  600 


 


Balance  -500 


 


Intake:   


 


  IV  100 


 


Output:   


 


  Urine  600 


 


Other:   


 


  # Voids 1 2 














Results





                                 10/21/21 05:44





                                 10/20/21 15:34


                                 Cardiac Enzymes











  10/20/21 10/20/21 10/20/21 Range/Units





  15:34 15:34 18:17 


 


AST  28    (17-59)  U/L


 


Troponin I   <0.012  <0.012  (0.000-0.034)  ng/mL














  10/20/21 Range/Units





  21:14 


 


AST   (17-59)  U/L


 


Troponin I  <0.012  (0.000-0.034)  ng/mL








                                   Coagulation











  10/20/21 10/20/21 10/21/21 Range/Units





  15:34 22:56 05:44 


 


PT  11.4   12.1 H  (9.0-12.0)  sec


 


APTT  23.0  59.0 H   (22.0-30.0)  sec








                                       CBC











  10/20/21 10/21/21 Range/Units





  15:34 05:44 


 


WBC  22.0 H  22.67 H  (3.8-10.6)  k/uL


 


RBC  4.20 L  4.08 L  (4.30-5.90)  m/uL


 


Hgb  12.9 L  12.1 L  (13.0-17.5)  gm/dL


 


Hct  37.7 L  37.2 L  (39.0-53.0)  %


 


Plt Count  117 L  110 L  (150-450)  k/uL








                          Comprehensive Metabolic Panel











  10/20/21 Range/Units





  15:34 


 


Sodium  135 L  (137-145)  mmol/L


 


Potassium  4.1  (3.5-5.1)  mmol/L


 


Chloride  103  ()  mmol/L


 


Carbon Dioxide  23  (22-30)  mmol/L


 


BUN  12  (9-20)  mg/dL


 


Creatinine  0.76  (0.66-1.25)  mg/dL


 


Glucose  202 H  (74-99)  mg/dL


 


Calcium  9.0  (8.4-10.2)  mg/dL


 


AST  28  (17-59)  U/L


 


ALT  21  (4-49)  U/L


 


Alkaline Phosphatase  101  ()  U/L


 


Total Protein  6.4  (6.3-8.2)  g/dL


 


Albumin  3.8  (3.5-5.0)  g/dL








                               Current Medications











Generic Name Dose Route Start Last Admin





  Trade Name Freq  PRN Reason Stop Dose Admin


 


Hydrocodone Bitart/Acetaminophen  1 each  10/20/21 22:57  10/21/21 14:01





  Hydrocodone/Apap 5-325mg 1 Each Tab  PO   1 each





  Q6HR PRN   Administration





  Pain  


 


Alprazolam  0.25 mg  10/21/21 07:56 





  Alprazolam 0.25 Mg Tab  PO  





  DAILY PRN  





  Anxiety  


 


Aspirin  81 mg  10/22/21 09:00 





  Aspirin 81 Mg  PO  





  DAILY MAYRA  


 


Atorvastatin Calcium  80 mg  10/20/21 23:00  10/20/21 23:21





  Atorvastatin 80 Mg Tab  PO   80 mg





  HS MAYRA   Administration


 


Baclofen  10 mg  10/21/21 09:00  10/21/21 09:05





  Baclofen 10 Mg Tab  PO   10 mg





  DAILY MAYRA   Administration


 


Famotidine  20 mg  10/21/21 21:00 





  Famotidine 20 Mg Tab  PO  





  Q12HR MAYRA  


 


Fluticasone Propionate  1 spray  10/21/21 09:00  10/21/21 09:06





  Fluticasone 50mcg/Spray Nasal 16gm  EA NOSTRIL   1 spray





  DAILY MAYRA   Administration


 


Gabapentin  300 mg  10/20/21 23:00  10/20/21 23:21





  Gabapentin 300 Mg Cap  PO   300 mg





  HS MAYRA   Administration


 


Heparin Sodium (Porcine)  0 unit  10/20/21 16:47 





  Heparin Sodium 1,000 Un/Ml (10ml Vl)  IV  





  PER PROTOCOL PRN  





  Low PTT  





  Protocol  


 


Sodium Chloride 1,000 ml/ IV  1,000 mls @ 86.183 mls/hr  10/21/21 09:30  

10/21/21 09:50





  Solution  IV   86.183 mls/hr





  [id number] MAYRA   Administration





  1 ML/KG/HR  


 


Heparin Sodium (Porcine) 10,  1,001 mls @ 999 mls/hr  10/22/21 07:00 





  000 unit/ Sodium Chloride  IRRIGATION  10/22/21 23:00 





  ONCE PRN  





  INTRA-OP  


 


Heparin Sodium (Porcine) 2,500  250.5 mls @ 250 mls/hr  10/22/21 07:00 





  unit/ Sodium Chloride  IRRIGATION  10/22/21 23:00 





  ONCE PRN  





  INTRA-OP  


 


Sodium Chloride  1,000 mls @ 75 mls/hr  10/21/21 11:45  10/21/21 12:12





  Saline 0.9%  IV  10/21/21 16:46  75 mls/hr





  [id number] MAYRA   Administration


 


Insulin Aspart  0 unit  10/21/21 12:30  10/21/21 13:07





  Insulin Aspart (Novolog) 100 Unit/Ml Vial  SQ   2 unit





  ACHS MAYRA   Administration





  Protocol  


 


Isosorbide Mononitrate  30 mg  10/21/21 09:00  10/21/21 09:05





  Isosorbide Mononitrate Er 30 Mg Tab.Er.24h  PO   30 mg





  DAILY MAYRA   Administration


 


Ketorolac Tromethamine  1 drops  10/21/21 09:00  10/21/21 09:07





  Ketorolac 0.5% Ophth Drops 5 Ml Btl  BOTH EYES   1 drops





  DAILY MAYRA   Administration


 


Lisinopril  20 mg  10/21/21 09:00  10/21/21 09:05





  Lisinopril 20 Mg Tab  PO   20 mg





  DAILY MAYRA   Administration


 


Metoprolol Succinate  25 mg  10/21/21 09:00  10/21/21 09:05





  Metoprolol Succinate (Er) 25 Mg Tab.Er.24h  PO   25 mg





  DAILY MAYRA   Administration


 


Miscellaneous Information  1 each  10/21/21 11:37 





  Rx Info: Iv Contrast Was Given 1 Each Misc  MISCELLANE  10/23/21 11:37 





  DAILY PRN  





  Per Protocol  


 


Montelukast Sodium  10 mg  10/21/21 09:00  10/21/21 09:05





  Montelukast 10 Mg Tab  PO   10 mg





  DAILY MAYRA   Administration


 


Morphine Sulfate  4 mg  10/20/21 16:57  10/21/21 02:22





  Morphine Sulfate 4 Mg/Ml Syringe  IV   4 mg





  Q4HR PRN   Administration





  Severe Pain  


 


Naloxone HCl  0.2 mg  10/20/21 16:57 





  Naloxone 0.4 Mg/Ml 1 Ml Vial  IV  





  Q2M PRN  





  Opioid Reversal  


 


Ondansetron HCl  4 mg  10/20/21 16:57 





  Ondansetron 4 Mg/2 Ml Vial  IVP  





  Q8HR PRN  





  Nausea And Vomiting  


 


Prednisolone Acetate  1 drops  10/21/21 09:00  10/21/21 09:06





  Prednisolone Acetate 1% Ophth Drops 5 Ml Btl  BOTH EYES   1 drops





  DAILY MAYRA   Administration








                                Intake and Output











 10/21/21 10/21/21 10/21/21





 06:59 14:59 22:59


 


Intake Total  100 


 


Output Total  600 


 


Balance  -500 


 


Intake:   


 


  IV  100 


 


Output:   


 


  Urine  600 


 


Other:   


 


  # Voids 1 2 








                                        





                                 10/21/21 05:44 





                                 10/20/21 15:34

## 2021-10-22 VITALS
TEMPERATURE: 97.3 F | SYSTOLIC BLOOD PRESSURE: 162 MMHG | DIASTOLIC BLOOD PRESSURE: 75 MMHG | RESPIRATION RATE: 20 BRPM | HEART RATE: 52 BPM

## 2021-10-22 LAB
ANION GAP SERPL CALC-SCNC: 7 MMOL/L
BUN SERPL-SCNC: 14 MG/DL (ref 9–20)
CALCIUM SPEC-MCNC: 9.2 MG/DL (ref 8.4–10.2)
CELLS COUNTED: 200
CHLORIDE SERPL-SCNC: 102 MMOL/L (ref 98–107)
CO2 SERPL-SCNC: 24 MMOL/L (ref 22–30)
ERYTHROCYTE [DISTWIDTH] IN BLOOD BY AUTOMATED COUNT: 4.23 M/UL (ref 4.3–5.9)
ERYTHROCYTE [DISTWIDTH] IN BLOOD: 14 % (ref 11.5–15.5)
GLUCOSE BLD-MCNC: 171 MG/DL (ref 75–99)
GLUCOSE BLD-MCNC: 229 MG/DL (ref 75–99)
GLUCOSE BLD-MCNC: 240 MG/DL (ref 75–99)
GLUCOSE SERPL-MCNC: 167 MG/DL (ref 74–99)
HCT VFR BLD AUTO: 39.2 % (ref 39–53)
HGB BLD-MCNC: 12.8 GM/DL (ref 13–17.5)
LYMPHOCYTES # BLD MANUAL: 16.6 K/UL (ref 1–4.8)
MCH RBC QN AUTO: 30.3 PG (ref 25–35)
MCHC RBC AUTO-ENTMCNC: 32.7 G/DL (ref 31–37)
MCV RBC AUTO: 92.6 FL (ref 80–100)
MONOCYTES # BLD MANUAL: 0.39 K/UL (ref 0–1)
NEUTROPHILS NFR BLD MANUAL: 12 %
NEUTS SEG # BLD MANUAL: 2.32 K/UL (ref 1.3–7.7)
PLATELET # BLD AUTO: 120 K/UL (ref 150–450)
POTASSIUM SERPL-SCNC: 4.5 MMOL/L (ref 3.5–5.1)
SODIUM SERPL-SCNC: 133 MMOL/L (ref 137–145)
WBC # BLD AUTO: 19.3 K/UL (ref 3.8–10.6)

## 2021-10-22 RX ADMIN — ISOSORBIDE MONONITRATE SCH MG: 30 TABLET, EXTENDED RELEASE ORAL at 09:17

## 2021-10-22 RX ADMIN — CEFAZOLIN SCH: 330 INJECTION, POWDER, FOR SOLUTION INTRAMUSCULAR; INTRAVENOUS at 11:50

## 2021-10-22 RX ADMIN — FAMOTIDINE SCH MG: 20 TABLET, FILM COATED ORAL at 09:17

## 2021-10-22 RX ADMIN — INSULIN ASPART SCH UNIT: 100 INJECTION, SOLUTION INTRAVENOUS; SUBCUTANEOUS at 13:16

## 2021-10-22 RX ADMIN — KETOROLAC TROMETHAMINE SCH DROPS: 5 SOLUTION OPHTHALMIC at 09:25

## 2021-10-22 RX ADMIN — INSULIN ASPART SCH UNIT: 100 INJECTION, SOLUTION INTRAVENOUS; SUBCUTANEOUS at 18:18

## 2021-10-22 RX ADMIN — PREDNISOLONE ACETATE SCH DROPS: 10 SUSPENSION/ DROPS OPHTHALMIC at 09:25

## 2021-10-22 RX ADMIN — HYDROCODONE BITARTRATE AND ACETAMINOPHEN PRN EACH: 5; 325 TABLET ORAL at 09:16

## 2021-10-22 RX ADMIN — BACLOFEN SCH MG: 10 TABLET ORAL at 09:17

## 2021-10-22 RX ADMIN — HYDROCODONE BITARTRATE AND ACETAMINOPHEN PRN EACH: 5; 325 TABLET ORAL at 02:34

## 2021-10-22 RX ADMIN — METOPROLOL SUCCINATE SCH MG: 25 TABLET, EXTENDED RELEASE ORAL at 09:16

## 2021-10-22 RX ADMIN — MONTELUKAST SODIUM SCH MG: 10 TABLET, FILM COATED ORAL at 09:17

## 2021-10-22 RX ADMIN — INSULIN ASPART SCH UNIT: 100 INJECTION, SOLUTION INTRAVENOUS; SUBCUTANEOUS at 09:18

## 2021-10-22 RX ADMIN — FLUTICASONE PROPIONATE SCH SPRAY: 50 SPRAY, METERED NASAL at 09:24

## 2021-10-22 NOTE — US
EXAMINATION TYPE: US venous doppler duplex LE 

 

DATE OF EXAM: 10/22/2021 2:46 PM

 

COMPARISON: NONE

 

CLINICAL HISTORY: leg swelling. bilateral leg edema

 

SIDE PERFORMED: Bilateral  

 

TECHNIQUE:  The lower extremity deep venous system is examined utilizing real time linear array sonog
efrain with graded compression, doppler sonography and color-flow sonography.

 

VESSELS IMAGED:

Common Femoral Vein

Deep Femoral Vein

Greater Saphenous Vein *

Femoral Vein

Popliteal Vein

Small Saphenous Vein *

Proximal Calf Veins

(* superficial vessels)

 

 

 

Right Leg:  no evidence of DVT

 

Left Leg:  no evidence of DVT. complex anechoic area left popliteal fossa = 5.1 x 2.4 x 3.8cm, Baker'
s cyst 

 

 

 

IMPRESSION: No evidence for DVT at this time.

## 2021-10-22 NOTE — CT
EXAMINATION TYPE: CT angio chest

 

DATE OF EXAM: 10/22/2021

 

COMPARISON: 1/4/1921

 

HISTORY: r/o PE

 

CT DLP: 366.9 mGycm

 

CONTRAST: 

CT chest with contrast and 3D reconstruction with MIP imaging is performed with IV Contrast, patient 
injected with 100 mL of Isovue 370.

 

Contrast-enhanced CT of the chest was performed through the course of the pulmonary arteries with puneet
g and mediastinal window settings submitted.  3D reconstruction with MIP imaging was also performed.

 

PULMONARY ARTERIES:  The pulmonary arteries and their major tributaries are patent.  I do not see bill
dence for sizable filling defect to suggest pulmonary embolic process. 

 

LUNGS: The lungs are clear and free of infiltrate. No evidence for atelectasis.   No pulmonary nodule
 or mass is detected.  No pleural effusion.  

 

MEDIASTINUM:  Thoracic aorta is of normal caliber,however, evaluation is limited given timing of the 
contrast bolus.  If there is concern for thoracic aortic pathology consider MARAL.  Correlate clinicall
y . Persistent cardiomegaly. No evidence for mediastinal mass.  No mediastinal lymph nodes greater th
an 1cm.

 

HILAR STRUCTURES: No evidence for mass.  No hilar lymph nodes greater than 1 cm.

 

UPPER ABDOMEN:  No significant abnormality is seen.

 

IMPRESSION:

1.  No evidence for Pulmonary embolism at this time.

## 2021-11-10 ENCOUNTER — HOSPITAL ENCOUNTER (OUTPATIENT)
Dept: HOSPITAL 47 - PNWHC3 | Age: 76
End: 2021-11-10
Attending: STUDENT IN AN ORGANIZED HEALTH CARE EDUCATION/TRAINING PROGRAM
Payer: MEDICARE

## 2021-11-10 VITALS — RESPIRATION RATE: 18 BRPM | HEART RATE: 55 BPM | SYSTOLIC BLOOD PRESSURE: 171 MMHG | DIASTOLIC BLOOD PRESSURE: 69 MMHG

## 2021-11-10 DIAGNOSIS — Z79.4: ICD-10-CM

## 2021-11-10 DIAGNOSIS — K21.9: ICD-10-CM

## 2021-11-10 DIAGNOSIS — I25.10: ICD-10-CM

## 2021-11-10 DIAGNOSIS — M47.816: ICD-10-CM

## 2021-11-10 DIAGNOSIS — Z79.891: ICD-10-CM

## 2021-11-10 DIAGNOSIS — M19.90: ICD-10-CM

## 2021-11-10 DIAGNOSIS — I10: ICD-10-CM

## 2021-11-10 DIAGNOSIS — E11.9: ICD-10-CM

## 2021-11-10 DIAGNOSIS — Z79.899: ICD-10-CM

## 2021-11-10 DIAGNOSIS — M51.36: Primary | ICD-10-CM

## 2021-11-10 DIAGNOSIS — G89.29: ICD-10-CM

## 2021-11-10 DIAGNOSIS — Z88.1: ICD-10-CM

## 2021-11-10 PROCEDURE — 99211 OFF/OP EST MAY X REQ PHY/QHP: CPT

## 2021-11-10 NOTE — P.PAINCN
History of Present Illness





- Reason for Consult


Consult date: 11/10/21





- Chief Complaint


Low back pain





- History of Present Illness





Wade is a 76-year-old male presenting to clinic for initial evaluation.  He is 

a referral from Dr. Walker's office.  He reports that his back pain happened

about a year ago when he fell out of his jeep in the yard.  His pain is mostly 

in his lower back more on the left side however pain does radiate to the right 

intermittently.  This is been going on since  of last year.  He describes 

his pain as a dull, sharp, achy sensation.  Pain is made worse with certain 

positions, excessive standing, walking long distances.  His pain is made better 

with rest, ice, heat and medications.  He has been in physical therapy however 

he had knee surgery which he had to stop physical therapy however he is going to

return soon.  He does attempt stretching at home however that activity is too 

painful for him.  He currently takes Norco as needed to get this medication from

his primary care provider.  At his best he rates his pain as 4 out of 10.  At 

worst his pain is 10 out of 10 on a 0-to-10 scale.  Eyes any saddle anesthesia, 

bowel or bladder dysfunction, or any other red flag symptoms.  He denies any 

adverse reaction or palpitations from the medication and is taking.





Past Medical History


Past Medical History: Coronary Artery Disease (CAD), Cancer, Diabetes Mellitus, 

GERD/Reflux, Hypertension, Osteoarthritis (OA)


Additional Past Medical History / Comment(s): NIDDM type II, 1999 LEUKEMIA (CLL-

remission), sinus problems, seasonal allergies, tinnitis bilaterally, OA hands, 

R wrist carpal tunnel, past fx with L elbow, R rotator cuff tendon problems, L 

rotator cuff tear, left knee pain


History of Any Multi-Drug Resistant Organisms: None Reported


Past Surgical History: Heart Catheterization, Orthopedic Surgery


Additional Past Surgical History / Comment(s): LEFT HAND thumb tendon repair.


Past Anesthesia/Blood Transfusion Reactions: No Reported Reaction


Past Psychological History: No Psychological Hx Reported


Smoking Status: Never smoker


Past Alcohol Use History: None Reported


Past Drug Use History: None Reported





- Past Family History


  ** Father


Family Medical History: CVA/TIA


Additional Family Medical History / Comment(s): Father  at the age of 85yrs.





  ** Mother


Family Medical History: Diabetes Mellitus


Additional Family Medical History / Comment(s): Mother  at the age of 90yrs.





Medications and Allergies


                                Home Medications











 Medication  Instructions  Recorded  Confirmed  Type


 


Montelukast Sodium [Singulair] 10 mg PO DAILY 08/18/17 11/10/21 History


 


Insulin NPH Hum/Reg Insulin Hm 17 units SQ AC-BRKFST 05/29/20 11/10/21 History





[Relion Novolin 70-30 Flexpen]    


 


Fluticasone Nasal Spray [Flonase 1 spr EA NOSTRIL DAILY 04/19/21 11/10/21 

History





Nasal [place]]    


 


HYDROcodone/APAP 5-325MG [Norco 1 tab PO Q6HR PRN 04/19/21 11/10/21 History





5-325]    


 


Ketorolac 0.5% Ophth Soln [Acular 1 drop BOTH EYES DAILY 04/19/21 11/10/21 

History





0.5%]    


 


Prednisolone Acetate/Pf 1 drop BOTH EYES DAILY 04/19/21 11/10/21 History





[Prednisolone Acet 1% Eye Drop]    


 


Metoprolol Succinate (ER) [Toprol 25 mg PO DAILY 05/09/21 11/10/21 History





XL]    


 


Atorvastatin [Lipitor] 80 mg PO HS 06/01/21 11/10/21 History


 


Baclofen [Lioresal] 10 mg PO DAILY 07/12/21 11/10/21 History


 


SILVER sulfADIAZINE CREAM 1 applic TOPICAL BID 07/12/21 11/10/21 History





[Silvadene Cream]    


 


Isosorbide Mononitrate ER [Imdur] 30 mg PO DAILY #30 tab 07/13/21 11/10/21 Rx


 


Nitroglycerin Sl Tabs [Nitrostat] 0.4 mg SUBLINGUAL Q5M PRN #20 tab 07/13/21 

11/10/21 Rx


 


Gabapentin 300 mg PO HS 10/20/21 11/10/21 History


 


Omeprazole 40 mg PO DAILY 10/20/21 11/10/21 History


 


lisinopriL [Zestril] 20 mg PO DAILY 10/20/21 11/10/21 History


 


Aspirin 81 mg PO DAILY  tab 10/21/21 11/10/21 Rx








                                    Allergies











Allergy/AdvReac Type Severity Reaction Status Date / Time


 


amoxicillin [From Augmentin] Allergy  Rash/Hives Verified 11/10/21 08:28


 


clavulanic acid Allergy  Rash/Hives Verified 11/10/21 08:28





[From Augmentin]     














Physical Exam








REVIEW OF ORGAN SYSTEMS:


                     CONSTITUTIONAL:  No fevers or chills. No recent weight 

loss.


                     EYES: denies troubles with vision. 


                     HEENT:  No difficulties with hearing. No nosebleeds.  No 

difficulty swallowing. 


                     RESPIRATORY:  Denies any troubles with breathing or dyspnea

on exertion. 


                     CARDIOVASCULAR:  Denies any chest pain, palpitations, or 

recent heart attacks. 


                     GASTROINTESTINAL: Denies fatty food intolerance.  Has 

change in bowel habits and gas bloat.  


                     GENITOURINARY:  Denies any blood in urine.  Has increased 

urinary frequency.  


                      NEUROLOGICAL: +  numbness and tingling along the distal 

extremities. No seizure disorders or headaches.


                      MUSCULOSKELETAL: Has back pain. 


                      SKIN:no  skin cancer. No rash.


                      PSYCHIATRIC:  Denies current depression or suicidal 

thoughts.


                      ENDOCRINE:  Denies current thyroid disorders. Denies any 

blood sugar glucose intolerance.


                      HEME/LYMPHATIC: Denies any lumps and bumps around the 

neck.  History of deep venous thrombosis.


                      ALLERGY/IMMUNOLOGY:  No immunoglobulin therapy. No immune 

deficiencies.


                      BREAST: Denies current breast lumps, pain or nipple 

discharge.


    


   Physical Examinations  :


                Constitutiona       : Cooperative , not in acute distress, walks

with cane


                 HEENT               :  nech :  supple ,  no Lymphadenopathy  , 

normal  thyroid  size .


                                         :   eyes   no ptosis , no icterus,  no 

photophobia .  


                                         :     ENT  normal   of hearing  , 

normal  oropharynx     , no Thrush .  


                 Respiratory        : Chest clear to auscultations Bilaterally  

,  no wheezing   , no Rhonchi   .  


                 Cardiovascula    : regular rate and rhythem , S1 ,  S2  ,   no 

S3 ,  no  S4.


                 Gastrointestina   :  abdomen soft  no tenderness , bowel sounds

 , no organomegally  .


                 Genitourinary     :   Defferred .                              

                                                                                

                                                                                

                                                                                

                                                                                

                 


                 neurologic         :   Cranial nerve II   to  XII  intact ,  no

  focal neurological deffecit  .


                 psychatric          : alert ,  oriented  X 3  ,   appropriate 

affect   , intact judgment  and insight  .  


                 Lymphatic          :    no Lymphadenopathy .


                 musculoskeltal    :     


                                 Cervical Spine 


                                         motor  stregnth in the deltoid and 

biceps,   normal   right side    ,  normal  Left side


                                         motor  stregnth biceps and the wrist 

extensors   normal right side ,normal left side .


                                         motor stregnth in the triceps muscle . 

normal  Right side , normal  Left side  


                                         deep tendon reflexes=  normal   at the 

biceps ,   normal  at Brachioradialis , normal  at triceps.


                                         cervical facet loading test: Positive 

Bilaterally


                                         Spurling test= positive bilaterally.


                                         Neck distraction test= positive 

bilaterally.


                                         Rachel sign= negative


                                   Lumber spine


                                         moter stegnth lower extremities ,thigh 

and legs  5/5 Right side ,  5/5  Left side 


                                         deep tendon reflexes :   normal  Knee 

Jerk    , normal   ankle Jerk  


                                         lumber facet Loading Test= positive 

Right  , positive Left  


                                         Range of motion of the lumbar spine  

Flexion  30 degrees,   extension   10 degrees


                                         strait leg raising test  , positive at 

 20   degree   left


                                         Fabere test= negative Right ,    and  

positive  left  .


                                         Sever tenderness over the  Sacroiliac 

joint  on the  Left  sides   


                                         [place]  test= positive left


                                         Seated flexion test= positive left





Assessment and Plan


Assessment: 





Assessment and plan=


   chronic low back pain secondary to lumbar degenerative disc disease  , lumbar

spondylosis with lumbar facet arthropathy .


   chronic and current  use of high-risk medication (opioids)


   Patient denies any side effects of the current pain medication  and the 

current treatment/medication helping the  patient to do activity of daily living

,


   Diagnoses, prognosis, treatment options, including but not limited to 

physical therapy, medication management, interventional therapies, and surgery, 

were discussed with the patient


   All the questions answered


   The narcotic consent was not signed.





   MAPS Reviwed and it was apropriate .


   Medication managements= no medications from us





Plan:





Patient could benefit from left sacroiliac joint injection #1 of up to 3.


Scheduled follow-up appointment in 4 weeks to evaluate procedures effectiveness








Dr. Basurto was available by phone for consultation during his visit





I spent extensive time with the patient and review of his records.  Medications 

seem to be offering good relief.  There is no signs of any misuse or diversion. 

I discussed with the patient that these medications need to be used as 

prescribed as misuse of medication can cause significant dysfunction and 

possibly death.





I have spent 50 minutes on patient care today. The time was used to review the 

medical records including relevant urine studies and Prescription history 

(MAPs), review of the available imaging, evaluation and examination of the 

patient, coordination of care with the medical staff and if applicable referring

physicians, as well as creation of the medical record.








Time with Patient: Greater than 30





PQRS Measure Charge Sheet





- Pain Location


  ** Left Lower Back


Non-Pharmacological Interventions: Heat, Ice, Inactivity, Position/Reposition, 

Sitting, Stretching


Pharmacological Interventions: PRN Medication


PQRS Narrative: 


                                        





Smoking Status                   Never smoker








Home Medications: 


Ambulatory Orders





Montelukast Sodium [Singulair] 10 mg PO DAILY 17 


Insulin NPH Hum/Reg Insulin Hm [Relion Novolin 70-30 Flexpen] 17 units SQ AC-

BRKFST 20 


Fluticasone Nasal Spray [Flonase Nasal Spray] 1 spr EA NOSTRIL DAILY 21 


HYDROcodone/APAP 5-325MG [Norco 5-325] 1 tab PO Q6HR PRN 21 


Ketorolac 0.5% Ophth Soln [Acular 0.5%] 1 drop BOTH EYES DAILY 21 


Prednisolone Acetate/Pf [Prednisolone Acet 1% Eye Drop] 1 drop BOTH EYES DAILY 

21 


Metoprolol Succinate (ER) [Toprol XL] 25 mg PO DAILY 21 


Atorvastatin [Lipitor] 80 mg PO HS 21 


Baclofen [Lioresal] 10 mg PO DAILY 21 


SILVER sulfADIAZINE CREAM [Silvadene Cream] 1 applic TOPICAL BID 21 


Isosorbide Mononitrate ER [Imdur] 30 mg PO DAILY #30 tab 21 


Nitroglycerin Sl Tabs [Nitrostat] 0.4 mg SUBLINGUAL Q5M PRN #20 tab 21 


Gabapentin 300 mg PO HS 10/20/21 


Omeprazole 40 mg PO DAILY 10/20/21 


lisinopriL [Zestril] 20 mg PO DAILY 10/20/21 


Aspirin 81 mg PO DAILY  tab 10/21/21

## 2021-11-30 ENCOUNTER — HOSPITAL ENCOUNTER (OUTPATIENT)
Dept: HOSPITAL 47 - EC | Age: 76
Setting detail: OBSERVATION
LOS: 2 days | Discharge: HOME | End: 2021-12-02
Attending: INTERNAL MEDICINE | Admitting: INTERNAL MEDICINE
Payer: MEDICARE

## 2021-11-30 DIAGNOSIS — I11.9: ICD-10-CM

## 2021-11-30 DIAGNOSIS — C91.11: ICD-10-CM

## 2021-11-30 DIAGNOSIS — I25.10: ICD-10-CM

## 2021-11-30 DIAGNOSIS — M25.562: ICD-10-CM

## 2021-11-30 DIAGNOSIS — R10.12: ICD-10-CM

## 2021-11-30 DIAGNOSIS — Z88.0: ICD-10-CM

## 2021-11-30 DIAGNOSIS — Z79.899: ICD-10-CM

## 2021-11-30 DIAGNOSIS — Z88.8: ICD-10-CM

## 2021-11-30 DIAGNOSIS — H93.13: ICD-10-CM

## 2021-11-30 DIAGNOSIS — R07.89: Primary | ICD-10-CM

## 2021-11-30 DIAGNOSIS — Z98.890: ICD-10-CM

## 2021-11-30 DIAGNOSIS — E11.65: ICD-10-CM

## 2021-11-30 DIAGNOSIS — R10.13: ICD-10-CM

## 2021-11-30 DIAGNOSIS — H91.90: ICD-10-CM

## 2021-11-30 DIAGNOSIS — E66.9: ICD-10-CM

## 2021-11-30 DIAGNOSIS — K21.9: ICD-10-CM

## 2021-11-30 DIAGNOSIS — R07.81: ICD-10-CM

## 2021-11-30 DIAGNOSIS — Z87.81: ICD-10-CM

## 2021-11-30 DIAGNOSIS — Z83.3: ICD-10-CM

## 2021-11-30 DIAGNOSIS — Z20.822: ICD-10-CM

## 2021-11-30 DIAGNOSIS — J30.2: ICD-10-CM

## 2021-11-30 DIAGNOSIS — E78.5: ICD-10-CM

## 2021-11-30 DIAGNOSIS — Z87.01: ICD-10-CM

## 2021-11-30 DIAGNOSIS — Z98.42: ICD-10-CM

## 2021-11-30 DIAGNOSIS — Z79.4: ICD-10-CM

## 2021-11-30 DIAGNOSIS — Z79.82: ICD-10-CM

## 2021-11-30 DIAGNOSIS — M19.042: ICD-10-CM

## 2021-11-30 DIAGNOSIS — M19.041: ICD-10-CM

## 2021-11-30 DIAGNOSIS — Z82.3: ICD-10-CM

## 2021-11-30 DIAGNOSIS — Z87.39: ICD-10-CM

## 2021-11-30 DIAGNOSIS — G89.29: ICD-10-CM

## 2021-11-30 DIAGNOSIS — R00.1: ICD-10-CM

## 2021-11-30 DIAGNOSIS — Z98.41: ICD-10-CM

## 2021-11-30 LAB
ALBUMIN SERPL-MCNC: 4 G/DL (ref 3.5–5)
ALP SERPL-CCNC: 89 U/L (ref 38–126)
ALT SERPL-CCNC: 21 U/L (ref 4–49)
ANION GAP SERPL CALC-SCNC: 9 MMOL/L
APTT BLD: 20.6 SEC (ref 22–30)
AST SERPL-CCNC: 25 U/L (ref 17–59)
BUN SERPL-SCNC: 14 MG/DL (ref 9–20)
CALCIUM SPEC-MCNC: 9 MG/DL (ref 8.4–10.2)
CELLS COUNTED: 200
CHLORIDE SERPL-SCNC: 104 MMOL/L (ref 98–107)
CO2 SERPL-SCNC: 24 MMOL/L (ref 22–30)
EOSINOPHIL # BLD MANUAL: 0.22 K/UL (ref 0–0.7)
ERYTHROCYTE [DISTWIDTH] IN BLOOD BY AUTOMATED COUNT: 4.23 M/UL (ref 4.3–5.9)
ERYTHROCYTE [DISTWIDTH] IN BLOOD: 14 % (ref 11.5–15.5)
GLUCOSE BLD-MCNC: 118 MG/DL (ref 75–99)
GLUCOSE BLD-MCNC: 144 MG/DL (ref 75–99)
GLUCOSE BLD-MCNC: 148 MG/DL (ref 75–99)
GLUCOSE SERPL-MCNC: 241 MG/DL (ref 74–99)
HCT VFR BLD AUTO: 37.8 % (ref 39–53)
HGB BLD-MCNC: 13.1 GM/DL (ref 13–17.5)
INR PPP: 1 (ref ?–1.2)
LYMPHOCYTES # BLD MANUAL: 18.14 K/UL (ref 1–4.8)
MAGNESIUM SPEC-SCNC: 1.8 MG/DL (ref 1.6–2.3)
MCH RBC QN AUTO: 31.1 PG (ref 25–35)
MCHC RBC AUTO-ENTMCNC: 34.8 G/DL (ref 31–37)
MCV RBC AUTO: 89.4 FL (ref 80–100)
MONOCYTES # BLD MANUAL: 0.67 K/UL (ref 0–1)
NEUTROPHILS NFR BLD MANUAL: 16 %
NEUTS SEG # BLD MANUAL: 3.58 K/UL (ref 1.3–7.7)
PLATELET # BLD AUTO: 89 K/UL (ref 150–450)
POTASSIUM SERPL-SCNC: 4.1 MMOL/L (ref 3.5–5.1)
PROT SERPL-MCNC: 6.3 G/DL (ref 6.3–8.2)
PT BLD: 11 SEC (ref 9–12)
SODIUM SERPL-SCNC: 137 MMOL/L (ref 137–145)
WBC # BLD AUTO: 22.4 K/UL (ref 3.8–10.6)

## 2021-11-30 PROCEDURE — 84145 PROCALCITONIN (PCT): CPT

## 2021-11-30 PROCEDURE — 71046 X-RAY EXAM CHEST 2 VIEWS: CPT

## 2021-11-30 PROCEDURE — 94760 N-INVAS EAR/PLS OXIMETRY 1: CPT

## 2021-11-30 PROCEDURE — 85730 THROMBOPLASTIN TIME PARTIAL: CPT

## 2021-11-30 PROCEDURE — 84484 ASSAY OF TROPONIN QUANT: CPT

## 2021-11-30 PROCEDURE — 83735 ASSAY OF MAGNESIUM: CPT

## 2021-11-30 PROCEDURE — 94640 AIRWAY INHALATION TREATMENT: CPT

## 2021-11-30 PROCEDURE — 99285 EMERGENCY DEPT VISIT HI MDM: CPT

## 2021-11-30 PROCEDURE — 87635 SARS-COV-2 COVID-19 AMP PRB: CPT

## 2021-11-30 PROCEDURE — 87070 CULTURE OTHR SPECIMN AEROBIC: CPT

## 2021-11-30 PROCEDURE — 96365 THER/PROPH/DIAG IV INF INIT: CPT

## 2021-11-30 PROCEDURE — 36415 COLL VENOUS BLD VENIPUNCTURE: CPT

## 2021-11-30 PROCEDURE — 96372 THER/PROPH/DIAG INJ SC/IM: CPT

## 2021-11-30 PROCEDURE — 96366 THER/PROPH/DIAG IV INF ADDON: CPT

## 2021-11-30 PROCEDURE — 93005 ELECTROCARDIOGRAM TRACING: CPT

## 2021-11-30 PROCEDURE — 87205 SMEAR GRAM STAIN: CPT

## 2021-11-30 PROCEDURE — 85610 PROTHROMBIN TIME: CPT

## 2021-11-30 PROCEDURE — 80053 COMPREHEN METABOLIC PANEL: CPT

## 2021-11-30 PROCEDURE — 87040 BLOOD CULTURE FOR BACTERIA: CPT

## 2021-11-30 PROCEDURE — 85025 COMPLETE CBC W/AUTO DIFF WBC: CPT

## 2021-11-30 RX ADMIN — ATORVASTATIN CALCIUM SCH MG: 80 TABLET, FILM COATED ORAL at 21:12

## 2021-11-30 RX ADMIN — INSULIN ASPART SCH: 100 INJECTION, SOLUTION INTRAVENOUS; SUBCUTANEOUS at 13:49

## 2021-11-30 RX ADMIN — HEPARIN SODIUM SCH UNIT: 5000 INJECTION INTRAVENOUS; SUBCUTANEOUS at 21:12

## 2021-11-30 RX ADMIN — PANTOPRAZOLE SODIUM SCH MG: 40 TABLET, DELAYED RELEASE ORAL at 11:36

## 2021-11-30 RX ADMIN — AZITHROMYCIN SCH MG: 500 TABLET, FILM COATED ORAL at 09:24

## 2021-11-30 RX ADMIN — CEFAZOLIN SCH MLS/HR: 330 INJECTION, POWDER, FOR SOLUTION INTRAMUSCULAR; INTRAVENOUS at 09:28

## 2021-11-30 RX ADMIN — GABAPENTIN SCH MG: 300 CAPSULE ORAL at 21:12

## 2021-11-30 RX ADMIN — INSULIN ASPART SCH UNIT: 100 INJECTION, SUSPENSION SUBCUTANEOUS at 11:36

## 2021-11-30 RX ADMIN — HEPARIN SODIUM SCH UNIT: 5000 INJECTION INTRAVENOUS; SUBCUTANEOUS at 15:48

## 2021-11-30 RX ADMIN — TIMOLOL MALEATE SCH DROPS: 5 SOLUTION OPHTHALMIC at 21:14

## 2021-11-30 RX ADMIN — INSULIN ASPART SCH UNIT: 100 INJECTION, SOLUTION INTRAVENOUS; SUBCUTANEOUS at 21:18

## 2021-11-30 RX ADMIN — LATANOPROST SCH DROPS: 50 SOLUTION OPHTHALMIC at 21:14

## 2021-11-30 RX ADMIN — ISOSORBIDE MONONITRATE SCH MG: 30 TABLET, EXTENDED RELEASE ORAL at 11:36

## 2021-11-30 RX ADMIN — INSULIN ASPART SCH: 100 INJECTION, SOLUTION INTRAVENOUS; SUBCUTANEOUS at 17:53

## 2021-11-30 RX ADMIN — BRIMONIDINE TARTRATE SCH DROPS: 2 SOLUTION/ DROPS OPHTHALMIC at 21:14

## 2021-11-30 RX ADMIN — HYDROCODONE BITARTRATE AND ACETAMINOPHEN PRN EACH: 5; 325 TABLET ORAL at 18:34

## 2021-11-30 NOTE — ED
Chest Pain HPI





- General


Stated Complaint: Chest Pain


Time Seen by Provider: 21 05:03





- History of Present Illness


MD Complaint: chest pain


Onset/Timin


-: hour(s)


Onset: during rest


Pain Location: substernal


Pain Radiation: none


Severity: moderate


Quality: heaviness


Consistency: constant


Improves With: nothing


Worsens With: nothing


Anginal Symptoms: dyspnea





- Related Data


                                Home Medications











 Medication  Instructions  Recorded  Confirmed


 


Montelukast Sodium [Singulair] 10 mg PO DAILY 17


 


Insulin NPH Hum/Reg Insulin Hm 17 units SQ AC-BRKFST 20





[Relion Novolin 70-30 Flexpen]   


 


Fluticasone Nasal Spray [Flonase 1 spr EA NOSTRIL DAILY 21





Nasal [place]]   


 


HYDROcodone/APAP 5-325MG [Norco 1 tab PO Q6HR PRN 21





5-325]   


 


Ketorolac 0.5% Ophth Soln [Acular 1 drop RIGHT EYE BID 21





0.5%]   


 


Atorvastatin [Lipitor] 80 mg PO HS 21


 


Baclofen [Lioresal] 10 mg PO DAILY 21


 


SILVER sulfADIAZINE CREAM 1 applic TOPICAL BID 21





[Silvadene Cream]   


 


Gabapentin 300 mg PO HS 10/20/21 11/30/21


 


Omeprazole 40 mg PO DAILY 10/20/21 11/30/21


 


lisinopriL [Zestril] 20 mg PO DAILY 10/20/21 11/30/21


 


ALPRAZolam [Xanax] 0.25 mg PO DAILY PRN 21


 


Brimonidine Tartrate/Timolol 1 drop BOTH EYES BID 21





[Combigan 0.2%-0.5% Eye Drops]   


 


Latanoprost/Pf [Latanoprost 0.005% 1 drop BOTH EYES HS 21





Eye Drop]   


 


Nitroglycerin Sl Tabs [Nitrostat] 0.4 mg SL Q5M PRN 21








                                  Previous Rx's











 Medication  Instructions  Recorded


 


Isosorbide Mononitrate ER [Imdur] 30 mg PO DAILY #30 tab 21


 


Aspirin 81 mg PO DAILY  tab 10/21/21


 


Azithromycin [Zithromax Tri-Quentin (3 500 mg PO DAILY 3 Days #3 tab 21





tabs)]  


 


amLODIPine [Norvasc] 2.5 mg PO DAILY #30 tab 21











                                    Allergies











Allergy/AdvReac Type Severity Reaction Status Date / Time


 


amoxicillin [From Augmentin] Allergy  Rash/Hives Verified 21 09:44


 


clavulanic acid Allergy  Rash/Hives Verified 21 09:44





[From Augmentin]     














Review of Systems


ROS Statement: 


Those systems with pertinent positive or pertinent negative responses have been 

documented in the HPI.





ROS Other: All systems not noted in ROS Statement are negative.


Constitutional: Denies: fever, chills, weakness


Respiratory: Reports: dyspnea.  Denies: cough, wheezes, hemoptysis


Cardiovascular: Reports: chest pain.  Denies: palpitations, orthopnea, edema, 

syncope


Gastrointestinal: Denies: abdominal pain, vomiting, diarrhea


Genitourinary: Denies: dysuria, hematuria


Musculoskeletal: Denies: back pain


Skin: Denies: rash


Neurological: Denies: headache, weakness





EKG Findings





- EKG Results:


EKG: interpreted by NITHIN, sinus rhythm, normal ST/T, not changed from: 

(10/20/2021)


EKG shows: bradycardia (Rate 50 bpm)





- Blocks, Axis, Hypertrophy, ST Abn:


Chamber hypertrophy or enlargement: only voltage criteria for left ventricular 

hypertrophy





Past Medical History


Past Medical History: Coronary Artery Disease (CAD), Cancer, Diabetes Mellitus, 

GERD/Reflux, Hypertension, Osteoarthritis (OA)


Additional Past Medical History / Comment(s): NIDDM type II, 1999 LEUKEMIA (CLL-

remission), sinus problems, seasonal allergies, tinnitis bilaterally, OA hands, 

R wrist carpal tunnel, past fx with L elbow, R rotator cuff tendon problems, L 

rotator cuff tear, left knee pain


History of Any Multi-Drug Resistant Organisms: None Reported


Past Surgical History: Heart Catheterization, Orthopedic Surgery


Additional Past Surgical History / Comment(s): LEFT HAND thumb tendon repair.


Past Anesthesia/Blood Transfusion Reactions: No Reported Reaction


Past Psychological History: No Psychological Hx Reported


Smoking Status: Never smoker


Past Alcohol Use History: None Reported


Past Drug Use History: None Reported





- Past Family History


  ** Father


Family Medical History: CVA/TIA


Additional Family Medical History / Comment(s): Father  at the age of 85yrs.





  ** Mother


Family Medical History: Diabetes Mellitus


Additional Family Medical History / Comment(s): Mother  at the age of 90yrs.





General Exam


General appearance: alert, in no apparent distress


Head exam: Present: atraumatic, normocephalic


Eye exam: Present: normal appearance.  Absent: scleral icterus, conjunctival 

injection


ENT exam: Present: normal oropharynx


Neck exam: Present: normal inspection


Respiratory exam: Present: normal lung sounds bilaterally.  Absent: respiratory 

distress, wheezes, rales, rhonchi, stridor


Cardiovascular Exam: Present: regular rate, normal rhythm, normal heart sounds. 

Absent: systolic murmur, diastolic murmur, rubs, gallop


GI/Abdominal exam: Present: soft.  Absent: distended, tenderness, guarding, 

rebound, rigid, mass


Extremities exam: Present: normal inspection, normal capillary refill.  Absent: 

pedal edema, calf tenderness


Back exam: Present: normal inspection.  Absent: CVA tenderness (R), CVA 

tenderness (L)


Neurological exam: Present: alert


Skin exam: Present: warm, dry, intact, normal color.  Absent: rash





Course


                                   Vital Signs











  21





  04:54 05:10 05:30


 


Temperature 98.0 F  


 


Pulse Rate 50 L  49 L


 


Pulse Rate [  50 L 





Cardiac Monitor   





]   


 


Pulse Rate [   





Left]   


 


Respiratory 18  18





Rate   


 


Blood Pressure 185/75  185/75


 


Blood Pressure   





[Right Arm]   


 


O2 Sat by Pulse 97  97





Oximetry   














  21





  06:09 09:33 11:01


 


Temperature   97.6 F


 


Pulse Rate 46 L 52 L 


 


Pulse Rate [   





Cardiac Monitor   





]   


 


Pulse Rate [   59 L





Left]   


 


Respiratory 18 18 18





Rate   


 


Blood Pressure 186/79 202/89 


 


Blood Pressure   223/98





[Right Arm]   


 


O2 Sat by Pulse 96 97 97





Oximetry   














Disposition


Clinical Impression: 


 Chest pain





Disposition: ADMITTED AS IP TO THIS HOSP


Condition: Fair


Is patient prescribed a controlled substance at d/c from ED?: No

## 2021-11-30 NOTE — XR
EXAMINATION TYPE: XR chest 2V

 

DATE OF EXAM: 11/30/2021

 

COMPARISON: 10/20/2021

 

INDICATION: Chest pain

 

TECHNIQUE:  Frontal and lateral views of the chest are obtained.

 

FINDINGS:  

The heart size is moderately prominent.  

The pulmonary vasculature is normal.

Some minimal increased lung markings may be in the mid left lung. Lungs otherwise appear clear.

 

IMPRESSION:  

1. Moderate cardiomegaly.

2. Minimal infiltrate may be within the periphery of the left mid lung. Correlate for atelectasis or 
atypical pneumonia. Follow-up can be performed as clinically indicated.

## 2021-12-01 LAB
GLUCOSE BLD-MCNC: 143 MG/DL (ref 75–99)
GLUCOSE BLD-MCNC: 163 MG/DL (ref 75–99)
GLUCOSE BLD-MCNC: 219 MG/DL (ref 75–99)
GLUCOSE BLD-MCNC: 287 MG/DL (ref 75–99)

## 2021-12-01 RX ADMIN — PANTOPRAZOLE SODIUM SCH MG: 40 TABLET, DELAYED RELEASE ORAL at 08:03

## 2021-12-01 RX ADMIN — INSULIN ASPART SCH UNIT: 100 INJECTION, SOLUTION INTRAVENOUS; SUBCUTANEOUS at 17:34

## 2021-12-01 RX ADMIN — MONTELUKAST SODIUM SCH MG: 10 TABLET, FILM COATED ORAL at 08:03

## 2021-12-01 RX ADMIN — ATORVASTATIN CALCIUM SCH MG: 80 TABLET, FILM COATED ORAL at 21:33

## 2021-12-01 RX ADMIN — ISODIUM CHLORIDE PRN MG: 0.03 SOLUTION RESPIRATORY (INHALATION) at 19:27

## 2021-12-01 RX ADMIN — INSULIN ASPART SCH UNIT: 100 INJECTION, SOLUTION INTRAVENOUS; SUBCUTANEOUS at 08:11

## 2021-12-01 RX ADMIN — GABAPENTIN SCH MG: 300 CAPSULE ORAL at 21:33

## 2021-12-01 RX ADMIN — FLUTICASONE PROPIONATE SCH: 50 SPRAY, METERED NASAL at 08:29

## 2021-12-01 RX ADMIN — HYDROCODONE BITARTRATE AND ACETAMINOPHEN PRN EACH: 5; 325 TABLET ORAL at 01:34

## 2021-12-01 RX ADMIN — ISOSORBIDE MONONITRATE SCH MG: 30 TABLET, EXTENDED RELEASE ORAL at 08:03

## 2021-12-01 RX ADMIN — ISODIUM CHLORIDE PRN MG: 0.03 SOLUTION RESPIRATORY (INHALATION) at 15:36

## 2021-12-01 RX ADMIN — TIMOLOL MALEATE SCH: 5 SOLUTION OPHTHALMIC at 21:34

## 2021-12-01 RX ADMIN — HYDROCODONE BITARTRATE AND ACETAMINOPHEN PRN EACH: 5; 325 TABLET ORAL at 12:48

## 2021-12-01 RX ADMIN — HYDROCODONE BITARTRATE AND ACETAMINOPHEN PRN EACH: 5; 325 TABLET ORAL at 21:31

## 2021-12-01 RX ADMIN — TIMOLOL MALEATE SCH DROPS: 5 SOLUTION OPHTHALMIC at 08:11

## 2021-12-01 RX ADMIN — CEFAZOLIN SCH: 330 INJECTION, POWDER, FOR SOLUTION INTRAMUSCULAR; INTRAVENOUS at 10:05

## 2021-12-01 RX ADMIN — ISODIUM CHLORIDE PRN MG: 0.03 SOLUTION RESPIRATORY (INHALATION) at 07:29

## 2021-12-01 RX ADMIN — INSULIN ASPART SCH UNIT: 100 INJECTION, SUSPENSION SUBCUTANEOUS at 08:10

## 2021-12-01 RX ADMIN — INSULIN ASPART SCH UNIT: 100 INJECTION, SOLUTION INTRAVENOUS; SUBCUTANEOUS at 21:32

## 2021-12-01 RX ADMIN — AZITHROMYCIN SCH MG: 500 TABLET, FILM COATED ORAL at 08:03

## 2021-12-01 RX ADMIN — ASPIRIN 81 MG CHEWABLE TABLET SCH MG: 81 TABLET CHEWABLE at 08:03

## 2021-12-01 RX ADMIN — INSULIN ASPART SCH UNIT: 100 INJECTION, SOLUTION INTRAVENOUS; SUBCUTANEOUS at 12:48

## 2021-12-01 RX ADMIN — LATANOPROST SCH DROPS: 50 SOLUTION OPHTHALMIC at 21:34

## 2021-12-01 RX ADMIN — ISODIUM CHLORIDE PRN MG: 0.03 SOLUTION RESPIRATORY (INHALATION) at 11:31

## 2021-12-01 RX ADMIN — BRIMONIDINE TARTRATE SCH DROPS: 2 SOLUTION/ DROPS OPHTHALMIC at 08:04

## 2021-12-01 RX ADMIN — BRIMONIDINE TARTRATE SCH DROPS: 2 SOLUTION/ DROPS OPHTHALMIC at 21:34

## 2021-12-01 RX ADMIN — HEPARIN SODIUM SCH UNIT: 5000 INJECTION INTRAVENOUS; SUBCUTANEOUS at 08:03

## 2021-12-01 RX ADMIN — HEPARIN SODIUM SCH UNIT: 5000 INJECTION INTRAVENOUS; SUBCUTANEOUS at 17:34

## 2021-12-01 NOTE — P.HPIM
History of Present Illness


H&P Date: 21


Chief Complaint: Shortness of breath





Patient is a 76-year-old male with a known history of hypertension, 

hyperlipidemia, coronary artery disease with prior cardiac catheterization, 

diabetes type 2 insulin-dependent, history of leukemia, sinus ALLERGIES 

osteoarthritis and hearing disorder/deafness presents to ER with complaints of 

left lower chest pain.  Patient also having cough without any sputum production.

 Patient states that he had similar symptoms when he had pneumonia previously.  

Patient was recently discharged from the hospital on 10/20/2021 and was admitted

to the hospital with chest pain.  Cardiology was consulted and cardiac cath 

showed intermediate nonobstructive disease involving mid RCA.  Lower left-sided 

filling pressure.  CTA chest showed no evidence of PE.


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


Chest x-ray showed moderate cardiomegaly.  Minimal infiltrate may be within the 

periphery of left mid lung.  Correlate for atelectasis or atypical pneumonia.  

COVID-19 PCR not detected.


Patient was started on antibiotics in the form of ceftriaxone and azithromycin.


EKG showed sinus bradycardia next laboratory data showed WBC 22.4 hemoglobin 

13.1 and platelets 89 and lymphocytes 18.1


Sodium 137 production 4.1 chloride 104 BUN 14 and creatinine 0.84 and blood 

sugar is 241 admission


Troponin 0.012 and COVID-19 PCR not detected.











Review of Systems





Constitutional: Patient denies any fever or chills .  No generalized weakness or

weight loss.  


Abdomen: Patient denied nausea vomiting and diarrhea and abdominal pain.


Cardiovascular: Patient complains of chest pain.  Minimal short of breath no 

palpitations.


Respiratory: patient denied any cough is from production.  Does have shortness 

of breath


Neurologic: Patient denied any numbness or tingling headache.


Musculoskeletal: Patient denies any complaints of joint swelling or deformity.


Skin: Negative


Psychiatric: Negative


Endocrine: No heat or cold intolerance.  No recent weight gain.


Genitourinary: No dysuria or hematuria.


All other 14 point ROS negative except the above





Past Medical History


Past Medical History: Coronary Artery Disease (CAD), Cancer, Diabetes Mellitus, 

Eye Disorder, GERD/Reflux, Hearing Disorder / Deafness, Hyperlipidemia, 

Hypertension, Osteoarthritis (OA)


Additional Past Medical History / Comment(s): NIDDM type II,  LEUKEMIA (CLL-

remission), sinus problems, seasonal allergies, tinnitis bilaterally, OA mul

tiple joints, L/R wrist carpal tunnel, past fx with L elbow, R rotator cuff  

problems, chronic left knee pain d/t injury, increased eye pressure.


History of Any Multi-Drug Resistant Organisms: None Reported


Past Surgical History: Heart Catheterization, Orthopedic Surgery


Additional Past Surgical History / Comment(s): L hand thumb tendon repair, L 

knee arthroscopy, bilateral cataract removals


Past Anesthesia/Blood Transfusion Reactions: No Reported Reaction


Smoking Status: Never smoker





- Past Family History


  ** Father


Family Medical History: CVA/TIA


Additional Family Medical History / Comment(s): Father  at the age of 85yrs.





  ** Mother


Family Medical History: Diabetes Mellitus


Additional Family Medical History / Comment(s): Mother  at the age of 90yrs.





Medications and Allergies


                                Home Medications











 Medication  Instructions  Recorded  Confirmed  Type


 


Montelukast Sodium [Singulair] 10 mg PO DAILY 17 History


 


Insulin NPH Hum/Reg Insulin Hm 17 units SQ AC-BRKFST 20 History





[Relion Novolin 70-30 Flexpen]    


 


Fluticasone Nasal Spray [Flonase 1 spr EA NOSTRIL DAILY 21 

History





Nasal [place]]    


 


HYDROcodone/APAP 5-325MG [Norco 1 tab PO Q6HR PRN 21 History





5-325]    


 


Ketorolac 0.5% Ophth Soln [Acular 1 drop RIGHT EYE BID 21 History





0.5%]    


 


Metoprolol Succinate (ER) [Toprol 25 mg PO DAILY 21 History





XL]    


 


Atorvastatin [Lipitor] 80 mg PO HS 21 History


 


Baclofen [Lioresal] 10 mg PO DAILY 21 History


 


SILVER sulfADIAZINE CREAM 1 applic TOPICAL BID 21 History





[Silvadene Cream]    


 


Isosorbide Mononitrate ER [Imdur] 30 mg PO DAILY #30 tab 21 Rx


 


Gabapentin 300 mg PO HS 10/20/21 11/30/21 History


 


Omeprazole 40 mg PO DAILY 10/20/21 11/30/21 History


 


lisinopriL [Zestril] 20 mg PO DAILY 10/20/21 11/30/21 History


 


Aspirin 81 mg PO DAILY  tab 10/21/21 11/30/21 Rx


 


ALPRAZolam [Xanax] 0.25 mg PO DAILY PRN 21 History


 


Brimonidine Tartrate/Timolol 1 drop BOTH EYES BID 21 History





[Combigan 0.2%-0.5% Eye Drops]    


 


Latanoprost/Pf [Latanoprost 0.005% 1 drop BOTH EYES HS 21 History





Eye Drop]    


 


Nitroglycerin Sl Tabs [Nitrostat] 0.4 mg SL Q5M PRN 21 History








                                    Allergies











Allergy/AdvReac Type Severity Reaction Status Date / Time


 


amoxicillin [From Augmentin] Allergy  Rash/Hives Verified 21 09:44


 


clavulanic acid Allergy  Rash/Hives Verified 21 09:44





[From Augmentin]     














Physical Exam


Vitals: 


                                   Vital Signs











  Temp Pulse Pulse Pulse Resp BP BP


 


 21 07:40   50 L     


 


 21 07:31   54 L     


 


 21 06:40  97.6 F    54 L  18   166/77


 


 21 04:35  97.8 F    54 L  16   155/80


 


 21 02:00  97.9 F    48 L  16   114/66


 


 21 20:00    50 L  76  17  


 


 21 18:10  98 F    76  17   131/73


 


 21 16:23     80  16  


 


 21 15:19  97.7 F    80  18   160/87


 


 21 11:01  97.6 F    59 L  18  


 


 21 09:33   52 L    18  202/89 














  BP Pulse Ox


 


 21 07:40  


 


 21 07:31   97


 


 21 06:40  


 


 21 04:35   96


 


 21 02:00   95


 


 21 20:00  


 


 21 18:10  


 


 21 16:23  


 


 21 15:19  


 


 21 11:01  223/98  97


 


 21 09:33   97








                                Intake and Output











 21





 22:59 06:59 14:59


 


Other:   


 


  Voiding Method Toilet  


 


  # Voids  2 














PHYSICAL EXAMINATION: 


Patient is lying in the bed comfortably, no acute distress, awake alert and 

oriented.. 


HEENT: Normocephalic. Neck is supple. Pupils reactive. Nostrils clear. Oral 

cavity is moist. 


Neck reveals no JVD, carotid bruits, or thyromegaly. 


CHEST EXAMINATION: Trachea is central. Symmetrical expansion.  Scattered coarse 

sounds.  No wheezing or rhonchi.  Nonlabored breathing.. 


CARDIAC: Normal S1, S2 with no gallops. No murmurs 


ABDOMEN: Soft. Bowel sounds normal. No organomegaly. No abdominal bruits. 


Extremities: reveal no edema.  No clubbing or cyanosis


Neurologically awake, alert, oriented x3 with well-coordinated movements.  No f

ocal deficits noted


Skin: No rash or skin lesions. 


Psychiatric: Cooperative.  Nonsuicidal


Musculoskeletal: No joint swelling or deformity.  Normal range of motion.








Results


CBC & Chem 7: 


                                 21 05:30





                                 21 05:30


Labs: 


                  Abnormal Lab Results - Last 24 Hours (Table)











  21 Range/Units





  05:30 11:05 17:22 


 


POC Glucose (mg/dL)   144 H  118 H  (75-99)  mg/dL


 


Procalcitonin  <0.02 L    (0.02-0.09)  ng/mL














  21 Range/Units





  20:55 07:26 


 


POC Glucose (mg/dL)  148 H  143 H  (75-99)  mg/dL


 


Procalcitonin    (0.02-0.09)  ng/mL














Thrombosis Risk Factor Assmnt





- DVT/VTE Prophylaxis


DVT/VTE Prophylaxis: Pharmacologic Prophylaxis ordered





- Choose All That Apply


Any of the Below Risk Factors Present?: Yes


Each Factor Represents 1 point: Obesity (BMI >25)


Other Risk Factors: Yes


Each Risk Factor Represents 2 Points: Age 61-74 years, Malignancy


Other congenital or acquired thrombophilia - If yes, enter type in comment: No


Thrombosis Risk Factor Assessment Total Risk Factor Score: 5


Thrombosis Risk Factor Assessment Level: High Risk





Assessment and Plan


Assessment: 





Left-sided chest pain.  Ruled out ACS.  Likely pleuritic.  Recent cardiac 

catheterization showed intermediate CAD and mid RCA


Possible pneumonia/atelectasis


Chronically elevated leukocytosis due to history of CLL


Hyperglycemia with uncontrolled diabetes type 2


Uncontrolled hypertension on admission


Coronary artery disease.  Status post cardiac catheterization in 2021.  

No PCI


Osteoarthritis


Chronic sinus ALLERGIES


GERD


DVT prophylaxis with heparin subcu





Plan:


Patient will be continued on telemetry monitoring.  Serial EKG and troponin 1 

negative.  Encourage incentive spirometry.


Continue with antibiotics in the form of ceftriaxone and azithromycin.  Follow-

up pro-calcitonin level.  COVID-19 PCR is negative.


Continue with symptomatic management and repeat CBC and BMP tomorrow.


Time with Patient: Greater than 30

## 2021-12-02 VITALS
SYSTOLIC BLOOD PRESSURE: 165 MMHG | RESPIRATION RATE: 18 BRPM | TEMPERATURE: 98.2 F | HEART RATE: 56 BPM | DIASTOLIC BLOOD PRESSURE: 80 MMHG

## 2021-12-02 LAB
GLUCOSE BLD-MCNC: 152 MG/DL (ref 75–99)
GLUCOSE BLD-MCNC: 181 MG/DL (ref 75–99)

## 2021-12-02 PROCEDURE — 3E0234Z INTRODUCTION OF SERUM, TOXOID AND VACCINE INTO MUSCLE, PERCUTANEOUS APPROACH: ICD-10-PCS

## 2021-12-02 RX ADMIN — BRIMONIDINE TARTRATE SCH DROPS: 2 SOLUTION/ DROPS OPHTHALMIC at 07:46

## 2021-12-02 RX ADMIN — HEPARIN SODIUM SCH UNIT: 5000 INJECTION INTRAVENOUS; SUBCUTANEOUS at 00:47

## 2021-12-02 RX ADMIN — INSULIN ASPART SCH UNIT: 100 INJECTION, SOLUTION INTRAVENOUS; SUBCUTANEOUS at 09:02

## 2021-12-02 RX ADMIN — ASPIRIN 81 MG CHEWABLE TABLET SCH MG: 81 TABLET CHEWABLE at 07:45

## 2021-12-02 RX ADMIN — HEPARIN SODIUM SCH UNIT: 5000 INJECTION INTRAVENOUS; SUBCUTANEOUS at 09:03

## 2021-12-02 RX ADMIN — CEFAZOLIN SCH: 330 INJECTION, POWDER, FOR SOLUTION INTRAMUSCULAR; INTRAVENOUS at 11:36

## 2021-12-02 RX ADMIN — ISODIUM CHLORIDE PRN MG: 0.03 SOLUTION RESPIRATORY (INHALATION) at 08:45

## 2021-12-02 RX ADMIN — PANTOPRAZOLE SODIUM SCH MG: 40 TABLET, DELAYED RELEASE ORAL at 07:45

## 2021-12-02 RX ADMIN — ISOSORBIDE MONONITRATE SCH MG: 30 TABLET, EXTENDED RELEASE ORAL at 07:45

## 2021-12-02 RX ADMIN — INSULIN ASPART SCH UNIT: 100 INJECTION, SOLUTION INTRAVENOUS; SUBCUTANEOUS at 13:13

## 2021-12-02 RX ADMIN — INSULIN ASPART SCH UNIT: 100 INJECTION, SUSPENSION SUBCUTANEOUS at 09:01

## 2021-12-02 RX ADMIN — MONTELUKAST SODIUM SCH MG: 10 TABLET, FILM COATED ORAL at 07:45

## 2021-12-02 RX ADMIN — FLUTICASONE PROPIONATE SCH SPRAY: 50 SPRAY, METERED NASAL at 07:46

## 2021-12-02 RX ADMIN — TIMOLOL MALEATE SCH DROPS: 5 SOLUTION OPHTHALMIC at 07:46

## 2021-12-02 NOTE — P.CONS
History of Present Illness





- Reason for Consult


Consult date: 21


Upper abdominal pain


Requesting physician: Abhi Shields





- Chief Complaint


chest pain, left arm pain





- History of Present Illness





This is a white male who presented to the emergency department with complaints 

of chest pain and left upper extremity pain.  Patient states he had pain in the 

left upper arm which also create was in the left chest wall and radiating across

to his chest which he thought was his heart.he has a past medical history 

including GERD, coronary artery disease, cancer, diabetes mellitus, hypertension

and osteoarthritis.  The patient was recently admitted to the hospital with 

similar complaints and had a full cardiac workup.  Gastroenterology was 

consulted his primary medicine suspected possible upper abdominal pain.  Patient

denies any abdominal pain, nausea, or vomiting.  He states bowel movements are 

regular.  States his acid reflux is been well controlled and takes omeprazole 

regularly at home.  He states that he believes this is more muscular, however at

the time he was concerned it could be his heart.admitting labs WBC 22.4 hemog

lobin 13 platelet count 89,000 and INR 1.0.  Chemistry unremarkable.  Patient 

denies any previous EGD or colonoscopy.





Review of Systems





REVIEW OF SYSTEMS:


CARDIOPULMONARY: chest wall pain without shortness of breath. 


Gastrointestinal: No abdominal pain.   No nausea or vomiting.  No hematemesis, 

coffee-ground emesis.  No rectal bleeding, or melena.


GENITOURINARY:  No dysuria or hematuria. 


MUSCULOSKELETAL: Reports normal range of motion.,  Joint pain.


SKIN: No rashes.  No jaundice.


ENDOCRINE: No chills, fevers.  No excessive weight gain or loss.  No polydipsia 

or polyuria.


PSYCHIATRIC: Unremarkable. 


NEUROLOGY: No change in mental status.  Denies dizziness, headache.


ENT: Vision unremarkable. 


CONSTITUTIONAL: No recent weight loss. No fever, chills, night sweats. 





Past Medical History


Past Medical History: Coronary Artery Disease (CAD), Cancer, Diabetes Mellitus, 

Eye Disorder, GERD/Reflux, Hearing Disorder / Deafness, Hyperlipidemia, 

Hypertension, Osteoarthritis (OA)


Additional Past Medical History / Comment(s): NIDDM type II, 1999 LEUKEMIA (CLL-

remission), sinus problems, seasonal allergies, tinnitis bilaterally, OA 

multiple joints, L/R wrist carpal tunnel, past fx with L elbow, R rotator cuff  

problems, chronic left knee pain d/t injury, increased eye pressure.


History of Any Multi-Drug Resistant Organisms: None Reported


Past Surgical History: Heart Catheterization, Orthopedic Surgery


Additional Past Surgical History / Comment(s): L hand thumb tendon repair, L 

knee arthroscopy, bilateral cataract removals


Past Anesthesia/Blood Transfusion Reactions: No Reported Reaction


Smoking Status: Never smoker





- Past Family History


  ** Father


Family Medical History: CVA/TIA


Additional Family Medical History / Comment(s): Father  at the age of 85yrs.





  ** Mother


Family Medical History: Diabetes Mellitus


Additional Family Medical History / Comment(s): Mother  at the age of 90yrs.





Medications and Allergies


                                Home Medications











 Medication  Instructions  Recorded  Confirmed  Type


 


Montelukast Sodium [Singulair] 10 mg PO DAILY 17 History


 


Insulin NPH Hum/Reg Insulin Hm 17 units SQ AC-BRKFST 20 History





[Relion Novolin 70-30 Flexpen]    


 


Fluticasone Nasal Spray [Flonase 1 spr EA NOSTRIL DAILY 21 

History





Nasal [place]]    


 


HYDROcodone/APAP 5-325MG [Norco 1 tab PO Q6HR PRN 21 History





5-325]    


 


Ketorolac 0.5% Ophth Soln [Acular 1 drop RIGHT EYE BID 21 History





0.5%]    


 


Metoprolol Succinate (ER) [Toprol 25 mg PO DAILY 21 History





XL]    


 


Atorvastatin [Lipitor] 80 mg PO HS 21 History


 


Baclofen [Lioresal] 10 mg PO DAILY 21 History


 


SILVER sulfADIAZINE CREAM 1 applic TOPICAL BID 21 History





[Silvadene Cream]    


 


Isosorbide Mononitrate ER [Imdur] 30 mg PO DAILY #30 tab 21 Rx


 


Gabapentin 300 mg PO HS 10/20/21 11/30/21 History


 


Omeprazole 40 mg PO DAILY 10/20/21 11/30/21 History


 


lisinopriL [Zestril] 20 mg PO DAILY 10/20/21 11/30/21 History


 


Aspirin 81 mg PO DAILY  tab 10/21/21 11/30/21 Rx


 


ALPRAZolam [Xanax] 0.25 mg PO DAILY PRN 21 History


 


Brimonidine Tartrate/Timolol 1 drop BOTH EYES BID 21 History





[Combigan 0.2%-0.5% Eye Drops]    


 


Latanoprost/Pf [Latanoprost 0.005% 1 drop BOTH EYES HS 21 History





Eye Drop]    


 


Nitroglycerin Sl Tabs [Nitrostat] 0.4 mg SL Q5M PRN 21 History








                                    Allergies











Allergy/AdvReac Type Severity Reaction Status Date / Time


 


amoxicillin [From Augmentin] Allergy  Rash/Hives Verified 21 09:44


 


clavulanic acid Allergy  Rash/Hives Verified 21 09:44





[From Augmentin]     














Physical Exam


Vitals: 


                                   Vital Signs











  Temp Pulse Pulse Pulse Pulse Resp BP


 


 21 08:58   60     


 


 21 08:45   64     


 


 21 07:50      57 L  


 


 21 07:20  98.3 F    57 L   16 


 


 21 04:58  97.4 F L    52 L   20 


 


 21 19:38   58 L     


 


 21 19:30  97.6 F    52 L   20 


 


 21 19:27   56 L     


 


 21 15:47   53 L     


 


 21 15:38   55 L     


 


 21 12:20  98.1 F   57 L    18  156/67


 


 21 11:40   55 L     


 


 21 11:33   53 L     














  BP Pulse Ox


 


 21 08:58  


 


 21 08:45  


 


 21 07:50  183/75 


 


 21 07:20  182/70  97


 


 21 04:58  162/65  97


 


 21 19:38  


 


 21 19:30  167/64  95


 


 21 19:27  


 


 21 15:47  


 


 21 15:38  


 


 21 12:20   93 L


 


 21 11:40  


 


 21 11:33  








                                Intake and Output











 21





 22:59 06:59 14:59


 


Intake Total 490 100 


 


Balance 490 100 


 


Intake:   


 


  Intake, IV Titration 290  





  Amount   


 


    Sodium Chloride 0.9% 1, 240  





    000 ml @ 20 mls/hr IV .   





    Q24H MAYRA Rx#:[id number]   


 


    cefTRIAXone 2 gm In 50  





    Sodium Chloride 0.9% 50   





    ml @ 100 mls/hr IVPB   





    Q24HR MAYRA Rx#:[id number]   


 


  Oral 200 100 


 


Other:   


 


  # Voids  1 














General appearance: The patient is alert, oriented, appears in no acute 

distress.


HET: Head is normocephalic and atraumatic.  Conjunctiva pink.  Sclera anicteric.


Neck: Supple without lymphadenopathy.  Trachea midline.


Heart: S1 S2.  Regular rate and rhythm.


Lungs: Clear to auscultation.


Chest: Normal expansion.  Left Chest wall TTP.


Abdomen: Soft, diffuse tenderness, greatest in the epigastric region, 

nondistended with  bowel sounds.  No guarding or rigidity.


Skin:  No rashes. No jaundice.


Extremities: Normal skin color and turgor.  No pedal edema.


Neurological: No focal deficits.  Alert and oriented x3.





Results


CBC & Chem 7: 


                                 21 05:30





                                 21 05:30


Labs: 


                  Abnormal Lab Results - Last 24 Hours (Table)











  21 Range/Units





  11:15 17:24 20:31 


 


POC Glucose (mg/dL)  287 H  219 H  163 H  (75-99)  mg/dL














  21 Range/Units





  07:46 


 


POC Glucose (mg/dL)  181 H  (75-99)  mg/dL








                      Microbiology - Last 24 Hours (Table)











 21 19:51 Gram Stain - Preliminary





 Sputum Sputum Culture - Preliminary


 


 21 08:23 Blood Culture - Preliminary





 Blood    No Growth after 24 hours


 


 21 08:10 Blood Culture - Preliminary





 Blood    No Growth after 24 hours











Chest x-ray: report reviewed (moderate cardiomegaly.  Minimal infiltrate may be 

within the periphery of the left midlung.  Correlate for atelectasis or atypical

pneumonia.  Follow-up can be performed as clinically indicated.)





Assessment and Plan


(1) Chest pain


Narrative/Plan: 


76-year-old male who presented to the emergency department with complaints of 

chest pain had a chest x-ray showing moderate cardiomegaly with minimal 

infiltrate that may be within the periphery of left midlung.  Correlate for 

atelectasis or atypical pneumonia.  Gastroenterology was consulted for possible 

upper quadrant abdominal pain.  Patient was seen and evaluated with a nonacute 

abdomen.  Patient denies any abdominal pain, nausea, or vomiting.  He does state

he has a history of gastric reflux disease however it is well controlled with 

the omeprazole.  He has had no previous EGD or colonoscopy.  He states that he 

had had a upper respiratory infection and was treated with Bactrim and was 

coughing for some time.  He then was experiencing pain in his left arm as well 

as left chest wall that seemed to radiate across.  The pain has improved and he 

now believes it is more musculoskeletal however he was concerned that it was his

heart.  There is no plans for endoscopic evaluation.  Abdomen is benign.HEENT is

likely musculoskeletal/inflammation.


Current Visit: No   Status: Acute   Code(s): R07.9 - CHEST PAIN, UNSPECIFIED   

SNOMED Code(s): 56180774


   


Plan: 





1.  Continue symptomatic and supportive care


2.  Continue Protonix 40 mg daily


3.  Patient with benign abdomen.  No plans on endoscopic evaluation.  Likely 

related to musculoskeletal/inflammation possibly related to recent URI.


4.  Patient is cleared for discharge by gastroenterology.  If patient has 

further symptoms related to possible GERD/peptic ulcer disease he can follow-up 

as needed outpatient.


Thank you for this consultation, gastroenterology will sign off.








Dr. ANDREA Hansen


I agree with the dictator's note, documented as a scribe by Elsi LEWIS.

## 2021-12-02 NOTE — P.PN
Subjective


Progress Note Date: 12/01/21





Patient is a 76-year-old male with a known history of hypertension, 

hyperlipidemia, coronary artery disease with prior cardiac catheterization, 

diabetes type 2 insulin-dependent, history of leukemia, sinus ALLERGIES 

osteoarthritis and hearing disorder/deafness presents to ER with complaints of 

left lower chest pain.  Patient also having cough without any sputum production.

 Patient states that he had similar symptoms when he had pneumonia previously.  

Patient was recently discharged from the hospital on 10/20/2021 and was admitted

to the hospital with chest pain.  Cardiology was consulted and cardiac cath 

showed intermediate nonobstructive disease involving mid RCA.  Lower left-sided 

filling pressure.  CTA chest showed no evidence of PE.


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


Chest x-ray showed moderate cardiomegaly.  Minimal infiltrate may be within the 

periphery of left mid lung.  Correlate for atelectasis or atypical pneumonia.  

COVID-19 PCR not detected.


Patient was started on antibiotics in the form of ceftriaxone and azithromycin.


EKG showed sinus bradycardia next laboratory data showed WBC 22.4 hemoglobin 

13.1 and platelets 89 and lymphocytes 18.1


Sodium 137 production 4.1 chloride 104 BUN 14 and creatinine 0.84 and blood 

sugar is 241 admission


Troponin 0.012 and COVID-19 PCR not detected.





12/01/2021


Patient is currently lying in the bed.  Awake alert and oriented 3.  

Complaining of epigastric and left upper abdominal pain.  Patient is on 

antibiotics for possible pneumonia.  Pro-calcitonin level is not elevated.  

Possible atelectasis versus gastritis/peptic ulcer disease is being considered. 

GI will be be consulted for further evaluation.


Otherwise patient has been afebrile.  Saturating at 96% on room air.  Blood 

pressure is still elevated.


Laboratory data available at this time.  Patient is being continued on Protonix 

40 mg daily.





Current medications reviewed.





Objective





- Vital Signs


Vital signs: 


                                   Vital Signs











Temp  97.6 F   12/01/21 19:30


 


Pulse  58 L  12/01/21 19:38


 


Resp  20   12/01/21 19:30


 


BP  167/64   12/01/21 19:30


 


Pulse Ox  95   12/01/21 19:30








                                 Intake & Output











 12/01/21 12/01/21 12/02/21





 06:59 18:59 06:59


 


Intake Total  290 200


 


Balance  290 200


 


Intake:   


 


  Intake, IV Titration  290 





  Amount   


 


    Sodium Chloride 0.9% 1,  240 





    000 ml @ 20 mls/hr IV .   





    Q24H MAYRA Rx#:[id number]   


 


    cefTRIAXone 2 gm In  50 





    Sodium Chloride 0.9% 50   





    ml @ 100 mls/hr IVPB   





    Q24HR MAYRA Rx#:[id number]   


 


  Oral   200


 


Other:   


 


  Voiding Method Toilet  


 


  # Voids 2  














- Exam





PHYSICAL EXAMINATION: 


Patient is lying in the bed comfortably, no acute distress, awake alert and 

oriented.. 


HEENT: Normocephalic. Neck is supple. Pupils reactive. Nostrils clear. Oral 

cavity is moist. 


Neck reveals no JVD, carotid bruits, or thyromegaly. 


CHEST EXAMINATION: Trachea is central. Symmetrical expansion.  Scattered coarse 

sounds.  No wheezing or rhonchi.  Nonlabored breathing.. 


CARDIAC: Normal S1, S2 with no gallops. No murmurs 


ABDOMEN: Soft. Bowel sounds normal. No organomegaly. No abdominal bruits. 


Extremities: reveal no edema.  No clubbing or cyanosis


Neurologically awake, alert, oriented x3 with well-coordinated movements.  No 

focal deficits noted


Skin: No rash or skin lesions. 


Psychiatric: Cooperative.  Nonsuicidal


Musculoskeletal: No joint swelling or deformity.  Normal range of motion.





- Labs


CBC & Chem 7: 


                                 11/30/21 05:30





                                 11/30/21 05:30


Labs: 


                  Abnormal Lab Results - Last 24 Hours (Table)











  12/01/21 12/01/21 12/01/21 Range/Units





  07:26 11:15 17:24 


 


POC Glucose (mg/dL)  143 H  287 H  219 H  (75-99)  mg/dL














  12/01/21 Range/Units





  20:31 


 


POC Glucose (mg/dL)  163 H  (75-99)  mg/dL








                      Microbiology - Last 24 Hours (Table)











 11/30/21 08:23 Blood Culture - Preliminary





 Blood    No Growth after 24 hours


 


 11/30/21 08:10 Blood Culture - Preliminary





 Blood    No Growth after 24 hours














Assessment and Plan


Assessment: 





Left-sided chest pain.  Ruled out ACS.  Likely pleuritic.  Recent cardiac 

catheterization showed intermediate CAD and mid RCA


Epigastric and left upper quadrant abdominal pain.


Possible pneumonia/atelectasis.  Low suspicion for pneumonia.


Chronically elevated leukocytosis due to history of CLL


Hyperglycemia with uncontrolled diabetes type 2


Uncontrolled hypertension on admission


Coronary artery disease.  Status post cardiac catheterization in October 2021.  

No PCI


Osteoarthritis


Chronic sinus ALLERGIES


GERD


DVT prophylaxis with heparin subcu





Plan:


Patient will be continued on telemetry monitoring.  Serial EKG and troponin 1 

negative.  Encourage incentive spirometry.


Continue with antibiotics in the form of ceftriaxone and azithromycin.  Continue

PPI, GI evaluation.


Not elevated pro-calcitonin level.  COVID-19 PCR is negative.


Beta blockers on hold due to sinus bradycardia.  Heart rate 56.


Continue with symptomatic management and repeat CBC and BMP tomorrow.


Time with Patient: Greater than 30

## 2021-12-30 ENCOUNTER — HOSPITAL ENCOUNTER (OUTPATIENT)
Dept: HOSPITAL 47 - EC | Age: 76
Setting detail: OBSERVATION
LOS: 2 days | Discharge: HOME | End: 2022-01-01
Attending: HOSPITALIST | Admitting: HOSPITALIST
Payer: MEDICARE

## 2021-12-30 DIAGNOSIS — K21.9: ICD-10-CM

## 2021-12-30 DIAGNOSIS — I10: ICD-10-CM

## 2021-12-30 DIAGNOSIS — G56.01: ICD-10-CM

## 2021-12-30 DIAGNOSIS — E11.65: ICD-10-CM

## 2021-12-30 DIAGNOSIS — I25.10: ICD-10-CM

## 2021-12-30 DIAGNOSIS — Z88.0: ICD-10-CM

## 2021-12-30 DIAGNOSIS — D72.829: ICD-10-CM

## 2021-12-30 DIAGNOSIS — Z20.822: ICD-10-CM

## 2021-12-30 DIAGNOSIS — Z83.3: ICD-10-CM

## 2021-12-30 DIAGNOSIS — Z88.8: ICD-10-CM

## 2021-12-30 DIAGNOSIS — Z79.899: ICD-10-CM

## 2021-12-30 DIAGNOSIS — H93.13: ICD-10-CM

## 2021-12-30 DIAGNOSIS — Z98.890: ICD-10-CM

## 2021-12-30 DIAGNOSIS — M19.042: ICD-10-CM

## 2021-12-30 DIAGNOSIS — Z80.1: ICD-10-CM

## 2021-12-30 DIAGNOSIS — Z79.82: ICD-10-CM

## 2021-12-30 DIAGNOSIS — R07.89: Primary | ICD-10-CM

## 2021-12-30 DIAGNOSIS — F43.9: ICD-10-CM

## 2021-12-30 DIAGNOSIS — Z79.4: ICD-10-CM

## 2021-12-30 DIAGNOSIS — R06.02: ICD-10-CM

## 2021-12-30 DIAGNOSIS — J30.2: ICD-10-CM

## 2021-12-30 DIAGNOSIS — R00.1: ICD-10-CM

## 2021-12-30 DIAGNOSIS — Z82.3: ICD-10-CM

## 2021-12-30 DIAGNOSIS — Z85.6: ICD-10-CM

## 2021-12-30 DIAGNOSIS — K52.9: ICD-10-CM

## 2021-12-30 DIAGNOSIS — Z87.39: ICD-10-CM

## 2021-12-30 DIAGNOSIS — M19.041: ICD-10-CM

## 2021-12-30 LAB
ALBUMIN SERPL-MCNC: 4 G/DL (ref 3.5–5)
ALP SERPL-CCNC: 100 U/L (ref 38–126)
ALT SERPL-CCNC: 19 U/L (ref 4–49)
ANION GAP SERPL CALC-SCNC: 9 MMOL/L
APTT BLD: 22.9 SEC (ref 22–30)
AST SERPL-CCNC: 26 U/L (ref 17–59)
BASOPHILS # BLD AUTO: 0.1 K/UL (ref 0–0.2)
BASOPHILS NFR BLD AUTO: 1 %
BUN SERPL-SCNC: 15 MG/DL (ref 9–20)
CALCIUM SPEC-MCNC: 9 MG/DL (ref 8.4–10.2)
CHLORIDE SERPL-SCNC: 101 MMOL/L (ref 98–107)
CO2 SERPL-SCNC: 23 MMOL/L (ref 22–30)
EOSINOPHIL # BLD AUTO: 0.1 K/UL (ref 0–0.7)
EOSINOPHIL NFR BLD AUTO: 1 %
ERYTHROCYTE [DISTWIDTH] IN BLOOD BY AUTOMATED COUNT: 4.26 M/UL (ref 4.3–5.9)
ERYTHROCYTE [DISTWIDTH] IN BLOOD: 14.1 % (ref 11.5–15.5)
GLUCOSE SERPL-MCNC: 233 MG/DL (ref 74–99)
HCT VFR BLD AUTO: 39.1 % (ref 39–53)
HGB BLD-MCNC: 13.1 GM/DL (ref 13–17.5)
INR PPP: 1.1 (ref ?–1.2)
LIPASE SERPL-CCNC: 44 U/L (ref 23–300)
LYMPHOCYTES # SPEC AUTO: 17.2 K/UL (ref 1–4.8)
LYMPHOCYTES NFR SPEC AUTO: 75 %
MAGNESIUM SPEC-SCNC: 1.6 MG/DL (ref 1.6–2.3)
MCH RBC QN AUTO: 30.9 PG (ref 25–35)
MCHC RBC AUTO-ENTMCNC: 33.6 G/DL (ref 31–37)
MCV RBC AUTO: 91.8 FL (ref 80–100)
MONOCYTES # BLD AUTO: 0.4 K/UL (ref 0–1)
MONOCYTES NFR BLD AUTO: 2 %
NEUTROPHILS # BLD AUTO: 4.2 K/UL (ref 1.3–7.7)
NEUTROPHILS NFR BLD AUTO: 18 %
PLATELET # BLD AUTO: 102 K/UL (ref 150–450)
POTASSIUM SERPL-SCNC: 4 MMOL/L (ref 3.5–5.1)
PROT SERPL-MCNC: 6.4 G/DL (ref 6.3–8.2)
PT BLD: 11.5 SEC (ref 9–12)
SODIUM SERPL-SCNC: 133 MMOL/L (ref 137–145)
WBC # BLD AUTO: 23 K/UL (ref 3.8–10.6)

## 2021-12-30 PROCEDURE — 80048 BASIC METABOLIC PNL TOTAL CA: CPT

## 2021-12-30 PROCEDURE — 96375 TX/PRO/DX INJ NEW DRUG ADDON: CPT

## 2021-12-30 PROCEDURE — 83690 ASSAY OF LIPASE: CPT

## 2021-12-30 PROCEDURE — 80053 COMPREHEN METABOLIC PANEL: CPT

## 2021-12-30 PROCEDURE — 93308 TTE F-UP OR LMTD: CPT

## 2021-12-30 PROCEDURE — 96376 TX/PRO/DX INJ SAME DRUG ADON: CPT

## 2021-12-30 PROCEDURE — 85025 COMPLETE CBC W/AUTO DIFF WBC: CPT

## 2021-12-30 PROCEDURE — 87635 SARS-COV-2 COVID-19 AMP PRB: CPT

## 2021-12-30 PROCEDURE — 85610 PROTHROMBIN TIME: CPT

## 2021-12-30 PROCEDURE — 71046 X-RAY EXAM CHEST 2 VIEWS: CPT

## 2021-12-30 PROCEDURE — 96374 THER/PROPH/DIAG INJ IV PUSH: CPT

## 2021-12-30 PROCEDURE — 36415 COLL VENOUS BLD VENIPUNCTURE: CPT

## 2021-12-30 PROCEDURE — 93005 ELECTROCARDIOGRAM TRACING: CPT

## 2021-12-30 PROCEDURE — 99285 EMERGENCY DEPT VISIT HI MDM: CPT

## 2021-12-30 PROCEDURE — 83735 ASSAY OF MAGNESIUM: CPT

## 2021-12-30 PROCEDURE — 85730 THROMBOPLASTIN TIME PARTIAL: CPT

## 2021-12-30 PROCEDURE — 84484 ASSAY OF TROPONIN QUANT: CPT

## 2021-12-30 RX ADMIN — MORPHINE SULFATE PRN MG: 4 INJECTION, SOLUTION INTRAMUSCULAR; INTRAVENOUS at 22:34

## 2021-12-30 NOTE — XR
EXAMINATION TYPE: XR chest 2V

 

DATE OF EXAM: 12/30/2021

 

COMPARISON: 11/30/2021

 

HISTORY: 76 years Male.

STUDY INDICATION GIVEN: Chest Pain . 

 

TECHNIQUE: Frontal and lateral chest radiographs.

 

IMPRESSION: 

 

No focal airspace disease, pneumothorax or pleural effusion.

 

Chronic interstitial opacities with no significant change.

 

The cardiomediastinal silhouette is normal in appearance.

 

No acute osseous abnormalities seen. Bilateral acromioclavicular joint osteoarthrosis worsened in the
 interval greater on the right.

## 2021-12-30 NOTE — ED
General Adult HPI





- General


Chief complaint: Chest Pain


Stated complaint: Chest Pain


Time Seen by Provider: 21 18:35


Source: patient, EMS


Mode of arrival: EMS


Limitations: no limitations





- History of Present Illness


Initial comments: 


76 year-old female patient presents to the emergency department for evaluation 

of midepigastric pain and chest pain. Patient states it started a couple of 

hours ago. He reports some shortness of breath. Denies radiation of the pain to 

his back, arms, or jaw. Denies vomiting. States he did have an admission with 

heart catheterization in October which showed a 50% blockage to one of his 

coronary arteries. States he is here today visiting his son who is dying of 

cancer, he is unsure if this is contributing to his symptoms. He did receive 

aspirin and nitro in ambulance. States nitro improved his pain somewhat.   

Patient denies any recent rash, fever, chills, cough, abdominal pain, diarrhea, 

constipation, back pain, numbness, tingling, dizziness, weakness, hematuria, 

dysuria, urinary urgency, urinary frequency, headache, visual changes, or any 

other complaints.








- Related Data


                                Home Medications











 Medication  Instructions  Recorded  Confirmed


 


Montelukast Sodium [Singulair] 10 mg PO DAILY 17


 


Insulin NPH Hum/Reg Insulin Hm 17 units SQ AC-BRKFST 20





[Relion Novolin 70-30 Flexpen]   


 


Fluticasone Nasal Spray [Flonase 1 spr EA NOSTRIL DAILY 21





Nasal [place]]   


 


HYDROcodone/APAP 5-325MG [Norco 1 tab PO Q6HR PRN 21





5-325]   


 


Ketorolac 0.5% Ophth Soln [Acular 1 drop BOTH EYES BID 21





0.5%]   


 


Atorvastatin [Lipitor] 80 mg PO HS 21


 


Baclofen [Lioresal] 10 mg PO DAILY 21


 


SILVER sulfADIAZINE CREAM 1 applic TOPICAL BID 21





[Silvadene Cream]   


 


Gabapentin 300 mg PO HS 10/20/21 12/30/21


 


Omeprazole 40 mg PO DAILY 10/20/21 12/30/21


 


lisinopriL [Zestril] 20 mg PO DAILY 10/20/21 12/30/21


 


ALPRAZolam [Xanax] 0.25 mg PO DAILY PRN 21


 


Brimonidine Tartrate/Timolol 1 drop BOTH EYES BID 21





[Combigan 0.2%-0.5% Eye Drops]   


 


Latanoprost/Pf [Latanoprost 0.005% 1 drop BOTH EYES HS 21





Eye Drop]   


 


Nitroglycerin Sl Tabs [Nitrostat] 0.4 mg SL Q5M PRN 21








                                  Previous Rx's











 Medication  Instructions  Recorded


 


Isosorbide Mononitrate ER [Imdur] 30 mg PO DAILY #30 tab 21


 


Aspirin 81 mg PO DAILY  tab 10/21/21


 


amLODIPine [Norvasc] 2.5 mg PO DAILY #30 tab 21











                                    Allergies











Allergy/AdvReac Type Severity Reaction Status Date / Time


 


amoxicillin [From Augmentin] Allergy  Rash/Hives Verified 21 20:28


 


clavulanic acid Allergy  Rash/Hives Verified 21 20:28





[From Augmentin]     














Review of Systems


ROS Statement: 


Those systems with pertinent positive or pertinent negative responses have been 

documented in the HPI.





ROS Other: All systems not noted in ROS Statement are negative.





Past Medical History


Past Medical History: Coronary Artery Disease (CAD), Cancer, Diabetes Mellitus, 

GERD/Reflux, Hypertension, Osteoarthritis (OA)


Additional Past Medical History / Comment(s): NIDDM type II, 1999 LEUKEMIA (CLL-

remission), sinus problems, seasonal allergies, tinnitis bilaterally, OA hands, 

R wrist carpal tunnel, past fx with L elbow, R rotator cuff tendon problems, L 

rotator cuff tear, left knee pain


History of Any Multi-Drug Resistant Organisms: None Reported


Past Surgical History: Heart Catheterization, Orthopedic Surgery


Additional Past Surgical History / Comment(s): LEFT HAND thumb tendon repair.


Past Anesthesia/Blood Transfusion Reactions: No Reported Reaction


Past Psychological History: No Psychological Hx Reported


Smoking Status: Never smoker


Past Alcohol Use History: None Reported


Past Drug Use History: None Reported





- Past Family History


  ** Father


Family Medical History: CVA/TIA


Additional Family Medical History / Comment(s): Father  at the age of 85yrs.





  ** Mother


Family Medical History: Diabetes Mellitus


Additional Family Medical History / Comment(s): Mother  at the age of 90yrs.





General Exam


Limitations: no limitations


General appearance: alert, in no apparent distress, other (This is a well-

developed, well-nourished adult male in no acute distress.)


Eye exam: Present: normal appearance, PERRL, EOMI.  Absent: scleral icterus, 

conjunctival injection, periorbital swelling


ENT exam: Present: normal exam, normal oropharynx, mucous membranes moist


Respiratory exam: Present: normal lung sounds bilaterally.  Absent: respiratory 

distress, wheezes, rales, rhonchi, stridor


Cardiovascular Exam: Present: regular rate, normal rhythm, normal heart sounds. 

Absent: systolic murmur, diastolic murmur, rubs, gallop, clicks


GI/Abdominal exam: Present: soft, tenderness (Mild generalized), normal bowel 

sounds.  Absent: distended, guarding, rebound, rigid


Neurological exam: Present: alert, oriented X3, CN II-XII intact


Psychiatric exam: Present: normal affect, normal mood


Skin exam: Present: warm, dry, intact, normal color.  Absent: rash





Course


                                   Vital Signs











  21





  18:28 22:26


 


Temperature 98.2 F 


 


Pulse Rate 73 48 L


 


Respiratory 18 16





Rate  


 


Blood Pressure 168/78 171/76


 


O2 Sat by Pulse 98 100





Oximetry  














EKG Findings





- EKG Comments:


EKG Findings:: EKG obtained at 1820 shows sinus bradycardia at a ventricular 

rate of 59, ND interval 166, QR restoration 90, , .  No evidence of

ST elevation or depression.





Medical Decision Making





- Medical Decision Making


76 year-old male patient with history of CLL and coronary artery disease 

presents for evaluation of chest pain and midepigastric abdominal pain that 

started a couple of hours prior to arrival. Physical exam was unremarkable. Labs

reviewed and shows elevated WBC count, which is consistent for him. Trop is 

normal. EKG shows no new changes. He will be admitted for rule out ACS with eval

uation by cardiology. He is agreeable to this plan and relieved at the 

admission.  My attending is Dr. Roth.








- Lab Data


Result diagrams: 


                                 21 18:56





                                 21 18:56


                                   Lab Results











  12/30/21 12/30/21 12/30/21 Range/Units





  18:56 18:56 18:56 


 


WBC  23.0 H    (3.8-10.6)  k/uL


 


RBC  4.26 L    (4.30-5.90)  m/uL


 


Hgb  13.1    (13.0-17.5)  gm/dL


 


Hct  39.1    (39.0-53.0)  %


 


MCV  91.8    (80.0-100.0)  fL


 


MCH  30.9    (25.0-35.0)  pg


 


MCHC  33.6    (31.0-37.0)  g/dL


 


RDW  14.1    (11.5-15.5)  %


 


Plt Count  102 L    (150-450)  k/uL


 


MPV  8.1    


 


Neutrophils %  18    %


 


Lymphocytes %  75    %


 


Monocytes %  2    %


 


Eosinophils %  1    %


 


Basophils %  1    %


 


Neutrophils #  4.2    (1.3-7.7)  k/uL


 


Lymphocytes #  17.2 H    (1.0-4.8)  k/uL


 


Monocytes #  0.4    (0-1.0)  k/uL


 


Eosinophils #  0.1    (0-0.7)  k/uL


 


Basophils #  0.1    (0-0.2)  k/uL


 


Manual Slide Review  Performed    


 


PT   11.5   (9.0-12.0)  sec


 


INR   1.1   (<1.2)  


 


APTT   22.9   (22.0-30.0)  sec


 


Sodium    133 L  (137-145)  mmol/L


 


Potassium    4.0  (3.5-5.1)  mmol/L


 


Chloride    101  ()  mmol/L


 


Carbon Dioxide    23  (22-30)  mmol/L


 


Anion Gap    9  mmol/L


 


BUN    15  (9-20)  mg/dL


 


Creatinine    0.76  (0.66-1.25)  mg/dL


 


Est GFR (CKD-EPI)AfAm    >90  (>60 ml/min/1.73 sqM)  


 


Est GFR (CKD-EPI)NonAf    >90  (>60 ml/min/1.73 sqM)  


 


Glucose    233 H  (74-99)  mg/dL


 


Calcium    9.0  (8.4-10.2)  mg/dL


 


Magnesium    1.6  (1.6-2.3)  mg/dL


 


Total Bilirubin    1.2  (0.2-1.3)  mg/dL


 


AST    26  (17-59)  U/L


 


ALT    19  (4-49)  U/L


 


Alkaline Phosphatase    100  ()  U/L


 


Troponin I     (0.000-0.034)  ng/mL


 


Total Protein    6.4  (6.3-8.2)  g/dL


 


Albumin    4.0  (3.5-5.0)  g/dL


 


Lipase    44  ()  U/L














  / Range/Units





  18:56 


 


WBC   (3.8-10.6)  k/uL


 


RBC   (4.30-5.90)  m/uL


 


Hgb   (13.0-17.5)  gm/dL


 


Hct   (39.0-53.0)  %


 


MCV   (80.0-100.0)  fL


 


MCH   (25.0-35.0)  pg


 


MCHC   (31.0-37.0)  g/dL


 


RDW   (11.5-15.5)  %


 


Plt Count   (150-450)  k/uL


 


MPV   


 


Neutrophils %   %


 


Lymphocytes %   %


 


Monocytes %   %


 


Eosinophils %   %


 


Basophils %   %


 


Neutrophils #   (1.3-7.7)  k/uL


 


Lymphocytes #   (1.0-4.8)  k/uL


 


Monocytes #   (0-1.0)  k/uL


 


Eosinophils #   (0-0.7)  k/uL


 


Basophils #   (0-0.2)  k/uL


 


Manual Slide Review   


 


PT   (9.0-12.0)  sec


 


INR   (<1.2)  


 


APTT   (22.0-30.0)  sec


 


Sodium   (137-145)  mmol/L


 


Potassium   (3.5-5.1)  mmol/L


 


Chloride   ()  mmol/L


 


Carbon Dioxide   (22-30)  mmol/L


 


Anion Gap   mmol/L


 


BUN   (9-20)  mg/dL


 


Creatinine   (0.66-1.25)  mg/dL


 


Est GFR (CKD-EPI)AfAm   (>60 ml/min/1.73 sqM)  


 


Est GFR (CKD-EPI)NonAf   (>60 ml/min/1.73 sqM)  


 


Glucose   (74-99)  mg/dL


 


Calcium   (8.4-10.2)  mg/dL


 


Magnesium   (1.6-2.3)  mg/dL


 


Total Bilirubin   (0.2-1.3)  mg/dL


 


AST   (17-59)  U/L


 


ALT   (4-49)  U/L


 


Alkaline Phosphatase   ()  U/L


 


Troponin I  <0.012  (0.000-0.034)  ng/mL


 


Total Protein   (6.3-8.2)  g/dL


 


Albumin   (3.5-5.0)  g/dL


 


Lipase   ()  U/L














- Radiology Data


Radiology results: report reviewed, image reviewed





Disposition


Clinical Impression: 


 Chest pain





Disposition: ADMITTED AS IP TO THIS [facility]


Condition: Serious


Decision to Admit Reason: Admit from EC


Decision Date: 21


Decision Time: 21:05

## 2021-12-31 LAB
BASOPHILS # BLD AUTO: 0.05 X 10*3/UL (ref 0–0.1)
BASOPHILS NFR BLD AUTO: 0.2 %
EOSINOPHIL # BLD AUTO: 0.15 X 10*3/UL (ref 0.04–0.35)
EOSINOPHIL NFR BLD AUTO: 0.7 %
ERYTHROCYTE [DISTWIDTH] IN BLOOD BY AUTOMATED COUNT: 4.14 X 10*6/UL (ref 4.4–5.6)
ERYTHROCYTE [DISTWIDTH] IN BLOOD: 13.3 % (ref 11.5–14.5)
GLUCOSE BLD-MCNC: 202 MG/DL (ref 75–99)
GLUCOSE BLD-MCNC: 231 MG/DL (ref 75–99)
GLUCOSE BLD-MCNC: 255 MG/DL (ref 75–99)
GLUCOSE BLD-MCNC: 275 MG/DL (ref 75–99)
HCT VFR BLD AUTO: 37.7 % (ref 39.6–50)
HGB BLD-MCNC: 12.7 G/DL (ref 13–17)
LYMPHOCYTES # SPEC AUTO: 15.16 X 10*3/UL (ref 0.9–5)
LYMPHOCYTES NFR SPEC AUTO: 74.8 %
MCH RBC QN AUTO: 30.7 PG (ref 27–32)
MCHC RBC AUTO-ENTMCNC: 33.7 G/DL (ref 32–37)
MCV RBC AUTO: 91.1 FL (ref 80–97)
MICROCYTES BLD QL SMEAR: (no result)
MONOCYTES # BLD AUTO: 0.67 X 10*3/UL (ref 0.2–1)
MONOCYTES NFR BLD AUTO: 3.3 %
NEUTROPHILS # BLD AUTO: 4.22 X 10*3/UL (ref 1.8–7.7)
NEUTROPHILS NFR BLD AUTO: 20.9 %
PLATELET # BLD AUTO: 93 X 10*3/UL (ref 140–440)
WBC # BLD AUTO: 20.28 X 10*3/UL (ref 4.5–10)

## 2021-12-31 RX ADMIN — INSULIN ASPART SCH UNIT: 100 INJECTION, SUSPENSION SUBCUTANEOUS at 13:01

## 2021-12-31 RX ADMIN — ASPIRIN 325 MG ORAL TABLET SCH MG: 325 PILL ORAL at 09:46

## 2021-12-31 RX ADMIN — BRIMONIDINE TARTRATE SCH DROPS: 2 SOLUTION/ DROPS OPHTHALMIC at 21:48

## 2021-12-31 RX ADMIN — MORPHINE SULFATE PRN MG: 4 INJECTION, SOLUTION INTRAMUSCULAR; INTRAVENOUS at 08:04

## 2021-12-31 RX ADMIN — KETOROLAC TROMETHAMINE SCH DROPS: 5 SOLUTION OPHTHALMIC at 21:50

## 2021-12-31 RX ADMIN — PANTOPRAZOLE SODIUM SCH MG: 40 TABLET, DELAYED RELEASE ORAL at 13:01

## 2021-12-31 RX ADMIN — MORPHINE SULFATE PRN MG: 4 INJECTION, SOLUTION INTRAMUSCULAR; INTRAVENOUS at 03:18

## 2021-12-31 RX ADMIN — TIMOLOL MALEATE SCH DROPS: 5 SOLUTION OPHTHALMIC at 21:48

## 2021-12-31 RX ADMIN — ISOSORBIDE MONONITRATE SCH MG: 30 TABLET, EXTENDED RELEASE ORAL at 13:01

## 2021-12-31 NOTE — P.CRDCN
History of Present Illness


Consult date: 21


History of present illness: 





Patient is a 76-year-old male with a known history of intermittent 

nonobstructive coronary artery disease, diabetes, GERD, hypertension, Leukemia- 

CLL remission, osteoarthritis, who admitted with chest pain.  Patient was 

experiencing chest pain that lasted a couple hours and reported having mild 

shortness of breath with it.  He denied radiating to the back, arms, or jaw.  

Patient states he has been under a significant amount of stress due to his son 

who having advanced stages of lung cancer.  Patient was giving nitro by EMS, and

states this did improve his symptoms.  He denies today chest pain, palpitations,

dizziness, syncope, or edema.  Patient's troponins were negative 3.  he 

underwent a heart catheterization in October of this year which revealed 

intermittent nonobstructive coronary artery disease of the RCA with 40-50% block

age.  Blood pressure is elevated today at 164/76, heart rate 70, 100% on 2 L 

nasal cannula, afebrile.





DIAGNOSTICS


Hemoglobin 13.1 WBC 23, platelet 102, sodium 133, potassium 4, BUN 15, 

creatinine 0.76, magnesium 1.6, AST 26, ALt 19, troponins negative 3


Chest x-ray negative for acute cardiopulmonary process


Echocardiogram pending





Review of Systems





REVIEW OF SYSTEMS


At the time of my exam:


CONSTITUTIONAL: Denies fever or chills.


EYES: Negative for vision changes


ENT: Negative for hearing loss


CARDIOVASCULAR: Denies chest pain, shortness of breath, diaphoresis, orthopnea, 

PND or palpitations.


VASCULAR: Denies edema


RESPIRATORY: Denies cough. 


GASTROINTESTINAL: Denies abdominal pain, diarrhea, constipation, nausea or 

vomiting.


MUSCULOSKELETAL: Denies myalgias.


NEUROLOGIC: Denies numbness, tingling, headache or weakness.


ENDOCRINE: Denies fatigue, weight change,  polydipsia or polyurina.


GENITOURINARY: Denies burning, hematuria or urgency with micturation.


HEMATOLOGIC: Denies history of anemia or bleeding. 


DERMATOLOGY: Denies rash or skin sores


PSYCH: Negative for depression or hallucinations. 





Past Medical History


Past Medical History: Coronary Artery Disease (CAD), Cancer, Diabetes Mellitus, 

GERD/Reflux, Hypertension, Osteoarthritis (OA)


Additional Past Medical History / Comment(s): NIDDM type II, 1999 LEUKEMIA (CLL-

remission), sinus problems, seasonal allergies, tinnitis bilaterally, OA hands, 

R wrist carpal tunnel, past fx with L elbow, R rotator cuff tendon problems, L 

rotator cuff tear, left knee pain


History of Any Multi-Drug Resistant Organisms: None Reported


Past Surgical History: Heart Catheterization, Orthopedic Surgery


Additional Past Surgical History / Comment(s): LEFT HAND thumb tendon repair.


Past Anesthesia/Blood Transfusion Reactions: No Reported Reaction


Past Psychological History: No Psychological Hx Reported


Additional Psychological History / Comment(s): Pt resides with his spouse.  He 

is independent.  He ambulates with a cane.  He drives.


Smoking Status: Never smoker


Past Alcohol Use History: None Reported


Past Drug Use History: None Reported


Additional Drug Use History / Comment(s): Occasional marijuana





- Past Family History


  ** Father


Family Medical History: CVA/TIA


Additional Family Medical History / Comment(s): Father  at the age of 85yrs.





  ** Mother


Family Medical History: Diabetes Mellitus


Additional Family Medical History / Comment(s): Mother  at the age of 90yrs.





Medications and Allergies


                                Home Medications











 Medication  Instructions  Recorded  Confirmed  Type


 


Montelukast Sodium [Singulair] 10 mg PO DAILY 17 History


 


Insulin NPH Hum/Reg Insulin Hm 17 units SQ AC-BRKFST 20 History





[Relion Novolin 70-30 Flexpen]    


 


Fluticasone Nasal Spray [Flonase 1 spr EA NOSTRIL DAILY 21 

History





Nasal [place]]    


 


HYDROcodone/APAP 5-325MG [Norco 1 tab PO Q6HR PRN 21 History





5-325]    


 


Ketorolac 0.5% Ophth Soln [Acular 1 drop BOTH EYES BID 21 History





0.5%]    


 


Atorvastatin [Lipitor] 80 mg PO HS 21 History


 


Baclofen [Lioresal] 10 mg PO DAILY 21 History


 


SILVER sulfADIAZINE CREAM 1 applic TOPICAL BID 21 History





[Silvadene Cream]    


 


Isosorbide Mononitrate ER [Imdur] 30 mg PO DAILY #30 tab 21 Rx


 


Gabapentin 300 mg PO HS 10/20/21 12/30/21 History


 


Omeprazole 40 mg PO DAILY 10/20/21 12/30/21 History


 


lisinopriL [Zestril] 20 mg PO DAILY 10/20/21 12/30/21 History


 


Aspirin 81 mg PO DAILY  tab 10/21/21 12/30/21 Rx


 


ALPRAZolam [Xanax] 0.25 mg PO DAILY PRN 21 History


 


Brimonidine Tartrate/Timolol 1 drop BOTH EYES BID 21 History





[Combigan 0.2%-0.5% Eye Drops]    


 


Latanoprost/Pf [Latanoprost 0.005% 1 drop BOTH EYES HS 21 History





Eye Drop]    


 


Nitroglycerin Sl Tabs [Nitrostat] 0.4 mg SL Q5M PRN 21 History


 


amLODIPine [Norvasc] 2.5 mg PO DAILY #30 tab 21 Rx








                                    Allergies











Allergy/AdvReac Type Severity Reaction Status Date / Time


 


amoxicillin [From Augmentin] Allergy  Rash/Hives Verified 21 20:28


 


clavulanic acid Allergy  Rash/Hives Verified 21 20:28





[From Augmentin]     














Physical Exam


Vitals: 


                                   Vital Signs











  Temp Pulse Pulse Resp BP BP Pulse Ox


 


 21 12:51  97.1 F L   70  20   164/76  100


 


 21 11:02   59 L   18  176/84   98


 


 21 09:41   58 L   18  177/84   99


 


 21 07:55   57 L   18  193/83   100


 


 21 07:00  98.1 F   56 L  16   193/83  95


 


 21 06:28   56 L   18  174/82   98


 


 21 03:17   58 L   16  181/88   98


 


 21 22:26   48 L   16  171/76   100


 


 21 18:28  98.2 F  73   18  168/78   98








                                Intake and Output











 21





 22:59 06:59 14:59


 


Intake Total   300


 


Balance   300


 


Intake:   


 


  Oral   300


 


Other:   


 


  Weight 95.254 kg  














PHYSICAL EXAMINATION


VITAL SIGNS: Reviewed


CONSTITUTIONAL: No apparent distress. 


HEENT: Head is normocephalic. Pupils are equal, round. Sclerae anicteric. Mucous

membranes of the mouth are moist.  


NECK: No JVD. No carotid bruit.


RESPIRATORY: Lungs are clear to auscultation. No chest wall tenderness is noted 

on palpation or with deep breathing. 


CARDIAC: Regular rate and rhythm. S1, S2 heard. No murmurs, gallops or rub.


ABDOMEN: Soft, nontender. 


EXTREMITIES: 2+ peripheral pulses, no lower extremity edema and no calf te

nderness.


NEUROLOGIC EXAMINATION: Patient is awake, alert and oriented x3. 


INTEGUMENTARY: Warm, absent for rashes or sores


PSYCH: Negative for depression or hallucinations, mood appropriate.





Results





                                 21 18:56





                                 21 18:56


                                 Cardiac Enzymes











  21 Range/Units





  18:56 18:56 22:05 


 


AST  26    (17-59)  U/L


 


Troponin I   <0.012  0.021  (0.000-0.034)  ng/mL














  21 Range/Units





  02:43 


 


AST   (17-59)  U/L


 


Troponin I  0.016  (0.000-0.034)  ng/mL








                                   Coagulation











  21 Range/Units





  18:56 


 


PT  11.5  (9.0-12.0)  sec


 


APTT  22.9  (22.0-30.0)  sec








                                       CBC











  21 Range/Units





  18:56 


 


WBC  23.0 H  (3.8-10.6)  k/uL


 


RBC  4.26 L  (4.30-5.90)  m/uL


 


Hgb  13.1  (13.0-17.5)  gm/dL


 


Hct  39.1  (39.0-53.0)  %


 


Plt Count  102 L  (150-450)  k/uL








                          Comprehensive Metabolic Panel











  21 Range/Units





  18:56 


 


Sodium  133 L  (137-145)  mmol/L


 


Potassium  4.0  (3.5-5.1)  mmol/L


 


Chloride  101  ()  mmol/L


 


Carbon Dioxide  23  (22-30)  mmol/L


 


BUN  15  (9-20)  mg/dL


 


Creatinine  0.76  (0.66-1.25)  mg/dL


 


Glucose  233 H  (74-99)  mg/dL


 


Calcium  9.0  (8.4-10.2)  mg/dL


 


AST  26  (17-59)  U/L


 


ALT  19  (4-49)  U/L


 


Alkaline Phosphatase  100  ()  U/L


 


Total Protein  6.4  (6.3-8.2)  g/dL


 


Albumin  4.0  (3.5-5.0)  g/dL








                               Current Medications











Generic Name Dose Route Start Last Admin





  Trade Name Freq  PRN Reason Stop Dose Admin


 


Hydrocodone Bitart/Acetaminophen  1 each  21 12:21 





  Hydrocodone/Apap 5-325mg 1 Each Tab  PO  





  Q6HR PRN  





  Moderate Pain  


 


Alprazolam  0.25 mg  21 12:21 





  Alprazolam 0.25 Mg Tab  PO  





  DAILY PRN  





  Anxiety  


 


Amlodipine Besylate  2.5 mg  21 12:30  21 13:01





  Amlodipine 2.5 Mg Tab  PO   2.5 mg





  DAILY [facility]   Administration


 


Aspirin  325 mg  21 09:00  21 09:46





  Aspirin 325 Mg Tab  PO   325 mg





  DAILY [facility]   Administration


 


Atorvastatin Calcium  80 mg  21 21:00 





  Atorvastatin 80 Mg Tab  PO  





  HS [facility]  


 


Baclofen  10 mg  22 09:00 





  Baclofen 10 Mg Tab  PO  





  DAILY [facility]  


 


Brimonidine Tartrate  1 drops  21 21:00 





  Brimonidine Tartrate 0.2% Drops 5 Ml Btl  BOTH EYES  





  BID [facility]  


 


Fluticasone Propionate  1 spray  22 09:00 





  Fluticasone 50mcg/Spray Nasal 16gm  EA NOSTRIL  





  DAILY [facility]  


 


Gabapentin  300 mg  21 21:00 





  Gabapentin 300 Mg Cap  PO  





  HS [facility]  


 


Insulin Aspart  17 unit  21 12:30  21 13:01





  Insuln Asp Prt/Insulin Aspart 100 Unit/Ml 10 Ml Vial  SQ   17 unit





  AC-BRKFST [facility]   Administration


 


Isosorbide Mononitrate  30 mg  21 12:30  21 13:01





  Isosorbide Mononitrate Er 30 Mg Tab.Er.24h  PO   30 mg





  DAILY [facility]   Administration


 


Ketorolac Tromethamine  1 drops  21 21:00 





  Ketorolac 0.5% Ophth Drops 5 Ml Btl  BOTH EYES  





  BID [facility]  


 


Latanoprost  1 drops  21 21:00 





  Latanoprost 0.005% Ophth Drops 2.5 Ml Btl  BOTH EYES  





  HS [facility]  


 


Lisinopril  20 mg  21 12:30  21 13:02





  Lisinopril 20 Mg Tab  PO   20 mg





  DAILY [facility]   Administration


 


Montelukast Sodium  10 mg  22 09:00 





  Montelukast 10 Mg Tab  PO  





  DAILY [facility]  


 


Morphine Sulfate  4 mg  21 21:01  21 08:04





  Morphine Sulfate 4 Mg/Ml Syringe  IV   4 mg





  Q4HR PRN   Administration





  Severe Pain  


 


Naloxone HCl  0.2 mg  21 21:01 





  Naloxone 0.4 Mg/Ml 1 Ml Vial  IV  





  Q2M PRN  





  Opioid Reversal  


 


Ondansetron HCl  4 mg  21 21:01 





  Ondansetron 4 Mg/2 Ml Vial  IVP  





  Q8HR PRN  





  Nausea And Vomiting  


 


Pantoprazole Sodium  40 mg  21 12:30  21 13:01





  Pantoprazole 40 Mg Tablet  PO   40 mg





  DAILY [facility]   Administration


 


Timolol Maleate  1 drops  21 21:00 





  Timolol 0.5% Ophth Drops 5 Ml Btl  BOTH EYES  





  BID [facility]  








                                Intake and Output











 21





 22:59 06:59 14:59


 


Intake Total   300


 


Balance   300


 


Intake:   


 


  Oral   300


 


Other:   


 


  Weight 95.254 kg  








                                        





                                 21 18:56 





                                 21 18:56 











Assessment and Plan


Assessment: 





Atypical chest pain, possibly related to increase in stress


Negative troponins


Intermittent nonobstructive coronary artery disease


Hypertension


Plan: 





Echocardiogram pending


Increase Norvasc to 10 mg daily


Increase activity as tolerated


Wean patient off oxygen as tolerated to room air


Possible discharge tomorrow, pending symptoms





The above impression and plan of care have been discussed and directed by the 

signing physician. Trina Llamas, nurse practitioner, acting as scribe for 

signing physician.

## 2021-12-31 NOTE — ECHOF
Referral Reason:chest pain



MEASUREMENTS

--------

HEIGHT: 182.9 cm

WEIGHT: 95.3 kg

BP: 177/84

IVSd:   1.7 cm     (0.6 - 1.1)

LVIDd:   3.7 cm     (3.9 - 5.3)

LVPWd:   1.6 cm     (0.6 - 1.1)

EDV(Teich):   57 ml

IVSs:   2.3 cm

LVIDs:   2.1 cm

LVPWs:   2.0 cm

%IVS Thck:   34 %

ESV(Teich):   15 ml

EF(Teich):   73 %

%FS:   42 %

SV(Teich):   42 ml







FINDINGS

--------

Sinus rhythm.

This was a technically difficult study with suboptimal apical views.

The left ventricular size is normal.   There is moderate concentric left ventricular hypertrophy.   O
verall left ventricular systolic function is normal with, an EF between 55 - 60 %.

Lumason used

There is no pericardial effusion.



CONCLUSIONS

--------

1. The left ventricular size is normal.

2. There is moderate concentric left ventricular hypertrophy.

3. Overall left ventricular systolic function is normal with, an EF between 55 - 60 %.





SONOGRAPHER: Richa Gross, DEEPAK

## 2021-12-31 NOTE — P.HPIM
History of Present Illness


H&P Date: 21


Chief Complaint: Chest Pain





Patient is a 76-year-old male with a known history of hypertension, diabetes 

type 2, history of CLL and chronic elevated WBC count, nonobstructive coronary 

disease with recent history of cardiac catheterization, osteoarthritis and 

previous admission with chest pain came to ER with complaints of chest pain 

started about 2 hours prior to admission associate with mild shortness of 

breath.  Mainly left retrosternal region.  No radiation of the pain to the arm 

to the back of the jaw.  Patient was given nitroglycerin by EMS which seemed to 

improve his symptoms.


Apparently patient's son has advanced cancer and due to that patient is in 

extreme stressful situation.


Denies any complaints of nausea or vomiting or abdominal pain.  Denies any 

dysuria or hematuria.  No headache or dizziness or lightheadedness.


Laboratory data showed WBC 23.0 hemoglobin 13.1 and platelets 102


Sodium 133 potassium 4.0 chloride 101 bicarb is 23 BUN 15 and creatinine 0.76 

and blood sugar is 233 on admission


Troponin x3 - liver enzymes are not elevated lipase level is 44


COVID-19 PCR not detected.








Review of Systems





Constitutional: Patient denies any fever or chills .  No generalized weakness or

weight loss.  


Abdomen: Patient denied nausea vomiting and diarrhea and abdominal pain.


Cardiovascular: Patient denies any chest pain or short of breath no 

palpitations.


Respiratory: patient denied any cough or sputum production.  No shortness of 

breath


Neurologic: Patient denied any numbness or tingling headache.


Musculoskeletal: Patient denies any complaints of joint swelling or deformity.


Skin: Negative


Psychiatric: Negative


Endocrine: No heat or cold intolerance.  No recent weight gain.


Genitourinary: No dysuria or hematuria.


All other 14 point ROS negative except the above





Past Medical History


Past Medical History: Coronary Artery Disease (CAD), Cancer, Diabetes Mellitus, 

GERD/Reflux, Hypertension, Osteoarthritis (OA)


Additional Past Medical History / Comment(s): NIDDM type II, 1999 LEUKEMIA (CLL-

remission), sinus problems, seasonal allergies, tinnitis bilaterally, OA hands, 

R wrist carpal tunnel, past fx with L elbow, R rotator cuff tendon problems, L 

rotator cuff tear, left knee pain


History of Any Multi-Drug Resistant Organisms: None Reported


Past Surgical History: Heart Catheterization, Orthopedic Surgery


Additional Past Surgical History / Comment(s): LEFT HAND thumb tendon repair.


Past Anesthesia/Blood Transfusion Reactions: No Reported Reaction


Past Psychological History: No Psychological Hx Reported


Smoking Status: Never smoker


Past Alcohol Use History: None Reported


Past Drug Use History: None Reported





- Past Family History


  ** Father


Family Medical History: CVA/TIA


Additional Family Medical History / Comment(s): Father  at the age of 85yrs.





  ** Mother


Family Medical History: Diabetes Mellitus


Additional Family Medical History / Comment(s): Mother  at the age of 90yrs.





Medications and Allergies


                                Home Medications











 Medication  Instructions  Recorded  Confirmed  Type


 


Montelukast Sodium [Singulair] 10 mg PO DAILY 17 History


 


Insulin NPH Hum/Reg Insulin Hm 17 units SQ AC-BRKFST 20 History





[Relion Novolin 70-30 Flexpen]    


 


Fluticasone Nasal Spray [Flonase 1 spr EA NOSTRIL DAILY 21 

History





Nasal [place]]    


 


HYDROcodone/APAP 5-325MG [Norco 1 tab PO Q6HR PRN 21 History





5-325]    


 


Ketorolac 0.5% Ophth Soln [Acular 1 drop BOTH EYES BID 21 History





0.5%]    


 


Atorvastatin [Lipitor] 80 mg PO HS 21 History


 


Baclofen [Lioresal] 10 mg PO DAILY 21 History


 


SILVER sulfADIAZINE CREAM 1 applic TOPICAL BID 21 History





[Silvadene Cream]    


 


Isosorbide Mononitrate ER [Imdur] 30 mg PO DAILY #30 tab 21 Rx


 


Gabapentin 300 mg PO HS 10/20/21 12/30/21 History


 


Omeprazole 40 mg PO DAILY 10/20/21 12/30/21 History


 


lisinopriL [Zestril] 20 mg PO DAILY 10/20/21 12/30/21 History


 


Aspirin 81 mg PO DAILY  tab 10/21/21 12/30/21 Rx


 


ALPRAZolam [Xanax] 0.25 mg PO DAILY PRN 21 History


 


Brimonidine Tartrate/Timolol 1 drop BOTH EYES BID 21 History





[Combigan 0.2%-0.5% Eye Drops]    


 


Latanoprost/Pf [Latanoprost 0.005% 1 drop BOTH EYES HS 21 History





Eye Drop]    


 


Nitroglycerin Sl Tabs [Nitrostat] 0.4 mg SL Q5M PRN 21 History


 


amLODIPine [Norvasc] 2.5 mg PO DAILY #30 tab 21 Rx








                                    Allergies











Allergy/AdvReac Type Severity Reaction Status Date / Time


 


amoxicillin [From Augmentin] Allergy  Rash/Hives Verified 21 20:28


 


clavulanic acid Allergy  Rash/Hives Verified 21 20:28





[From Augmentin]     














Physical Exam


Vitals: 


                                   Vital Signs











  Temp Pulse Pulse Resp BP BP Pulse Ox


 


 21 11:02   59 L   18  176/84   98


 


 21 09:41   58 L   18  177/84   99


 


 21 07:55   57 L   18  193/83   100


 


 21 07:00  98.1 F   56 L  16   193/83  95


 


 21 06:28   56 L   18  174/82   98


 


 21 03:17   58 L   16  181/88   98


 


 21 22:26   48 L   16  171/76   100


 


 21 18:28  98.2 F  73   18  168/78   98








                                Intake and Output











 21





 22:59 06:59 14:59


 


Other:   


 


  Weight 95.254 kg  














PHYSICAL EXAMINATION: 


Patient is lying in the bed comfortably, no acute distress, awake alert and 

oriented.. 


HEENT: Normocephalic. Neck is supple. Pupils reactive. Nostrils clear. Oral 

cavity is moist. 


Neck reveals no JVD, carotid bruits, or thyromegaly. 


CHEST EXAMINATION: Trachea is central. Symmetrical expansion. Lung fields clear 

to auscultation and percussion. 


CARDIAC: Normal S1, S2 with no gallops. No murmurs 


ABDOMEN: Soft. Bowel sounds normal. No organomegaly. No abdominal bruits. 


Extremities: reveal no edema.  No clubbing or cyanosis


Neurologically awake, alert, oriented x3 with well-coordinated movements.  No 

focal deficits noted


Skin: No rash or skin lesions. 


Psychiatric: Cooperative.  Nonsuicidal


Musculoskeletal: No joint swelling or deformity.  Normal range of motion.








Results


CBC & Chem 7: 


                                 21 18:56





                                 21 18:56


Labs: 


                  Abnormal Lab Results - Last 24 Hours (Table)











  21 Range/Units





  18:56 18:56 08:02 


 


WBC  23.0 H    (3.8-10.6)  k/uL


 


RBC  4.26 L    (4.30-5.90)  m/uL


 


Plt Count  102 L    (150-450)  k/uL


 


Lymphocytes #  17.2 H    (1.0-4.8)  k/uL


 


Sodium   133 L   (137-145)  mmol/L


 


Glucose   233 H   (74-99)  mg/dL


 


POC Glucose (mg/dL)    202 H  (75-99)  mg/dL














Thrombosis Risk Factor Assmnt





- DVT/VTE Prophylaxis


DVT/VTE Prophylaxis: Pharmacologic Prophylaxis ordered





Assessment and Plan


Assessment: 








Atypical chest pain likely due to stressful situation


Recent admission with chest pain status post cardiac catheterization showed 

intermediate coronary artery disease


Hypertension


Diabetes type 2 uncontrolled with hyperglycemia on admission


Osteoarthritis


History of CLL and chronic diarrhea WBC count


DVT prophylaxis with heparin subcu





Plan:


Patient will be continued monitoring.  Serial EKG and troponin x2 -.  Will be s

tarted back on home insulin regimen and insulin sliding scale for better blood 

sugar control.


Continue with home blood pressure medications and Norvasc dose increased as per 

cardiology recommendations.


Continue to follow closely.


Time with Patient: Greater than 30

## 2022-01-01 VITALS — HEART RATE: 54 BPM

## 2022-01-01 VITALS — DIASTOLIC BLOOD PRESSURE: 57 MMHG | SYSTOLIC BLOOD PRESSURE: 124 MMHG | TEMPERATURE: 98.2 F

## 2022-01-01 VITALS — RESPIRATION RATE: 17 BRPM

## 2022-01-01 LAB
ANION GAP SERPL CALC-SCNC: 10.4 MMOL/L (ref 10–18)
BASOPHILS # BLD AUTO: 0.05 X 10*3/UL (ref 0–0.1)
BASOPHILS NFR BLD AUTO: 0.2 %
BUN SERPL-SCNC: 15.7 MG/DL (ref 9–27)
BUN/CREAT SERPL: 19.63 RATIO (ref 12–20)
CALCIUM SPEC-MCNC: 9 MG/DL (ref 8.7–10.3)
CHLORIDE SERPL-SCNC: 102 MMOL/L (ref 96–109)
CO2 SERPL-SCNC: 24.6 MMOL/L (ref 20–27.5)
EOSINOPHIL # BLD AUTO: 0.17 X 10*3/UL (ref 0.04–0.35)
EOSINOPHIL NFR BLD AUTO: 0.8 %
ERYTHROCYTE [DISTWIDTH] IN BLOOD BY AUTOMATED COUNT: 3.9 X 10*6/UL (ref 4.4–5.6)
ERYTHROCYTE [DISTWIDTH] IN BLOOD: 13.5 % (ref 11.5–14.5)
GLUCOSE BLD-MCNC: 209 MG/DL (ref 75–99)
GLUCOSE BLD-MCNC: 260 MG/DL (ref 75–99)
GLUCOSE BLD-MCNC: 300 MG/DL (ref 75–99)
GLUCOSE SERPL-MCNC: 208 MG/DL (ref 70–110)
HCT VFR BLD AUTO: 35.5 % (ref 39.6–50)
HGB BLD-MCNC: 12.1 G/DL (ref 13–17)
LYMPHOCYTES # SPEC AUTO: 17.12 X 10*3/UL (ref 0.9–5)
LYMPHOCYTES NFR SPEC AUTO: 80.3 %
MCH RBC QN AUTO: 31 PG (ref 27–32)
MCHC RBC AUTO-ENTMCNC: 34.1 G/DL (ref 32–37)
MCV RBC AUTO: 91 FL (ref 80–97)
MONOCYTES # BLD AUTO: 0.58 X 10*3/UL (ref 0.2–1)
MONOCYTES NFR BLD AUTO: 2.7 %
NEUTROPHILS # BLD AUTO: 3.37 X 10*3/UL (ref 1.8–7.7)
NEUTROPHILS NFR BLD AUTO: 15.9 %
PLATELET # BLD AUTO: 84 X 10*3/UL (ref 140–440)
POTASSIUM SERPL-SCNC: 4.1 MMOL/L (ref 3.5–5.5)
SODIUM SERPL-SCNC: 137 MMOL/L (ref 135–145)
WBC # BLD AUTO: 21.32 X 10*3/UL (ref 4.5–10)

## 2022-01-01 RX ADMIN — ISOSORBIDE MONONITRATE SCH MG: 30 TABLET, EXTENDED RELEASE ORAL at 08:09

## 2022-01-01 RX ADMIN — KETOROLAC TROMETHAMINE SCH DROPS: 5 SOLUTION OPHTHALMIC at 08:11

## 2022-01-01 RX ADMIN — TIMOLOL MALEATE SCH DROPS: 5 SOLUTION OPHTHALMIC at 08:17

## 2022-01-01 RX ADMIN — ASPIRIN 325 MG ORAL TABLET SCH MG: 325 PILL ORAL at 08:10

## 2022-01-01 RX ADMIN — BRIMONIDINE TARTRATE SCH DROPS: 2 SOLUTION/ DROPS OPHTHALMIC at 08:11

## 2022-01-01 RX ADMIN — PANTOPRAZOLE SODIUM SCH MG: 40 TABLET, DELAYED RELEASE ORAL at 08:10

## 2022-01-01 RX ADMIN — INSULIN ASPART SCH UNIT: 100 INJECTION, SUSPENSION SUBCUTANEOUS at 08:09

## 2022-01-01 NOTE — P.PN
Subjective


Progress Note Date: 01/01/22





Patient is a 76-year-old male with a known history of intermittent non-

obstructive coronary artery disease, diabetes, GERD, hypertension, Leukemia- CLL

remission, osteoarthritis. He was admitted with chest pain. Patient reports his 

chest pain is much improved since he was brought to the hospital.  Chest pain 

has continue to decrease from yesterday. Chest pain is believed to be induced by

patient's increased stress, from the loss of his son.  Patient is examined 

resting comfortably in bed with no signs of acute distress.  He denies today 

palpitations, dizziness, syncope, or edema.  His Norvasc was increased to 10mg 

daily.  Blood pressure is better controlled today at 149/74, heart rate 54, 

respirations 17, 99% on room air, afebrile.  Echocardiogram showed a normal LV 

function with an EF of 55-60%. 





DIAGNOSTICS


Hemoglobin 12.1 WBC 21.32, platelet 84, sodium 137, potassium 4.1, BUN 15.7, 

creatinine 0.8, troponins negative 3








Objective





- Vital Signs


Vital signs: 


                                   Vital Signs











Temp  98.4 F   01/01/22 07:00


 


Pulse  54 L  01/01/22 07:00


 


Resp  17   01/01/22 08:00


 


BP  149/74   01/01/22 07:00


 


Pulse Ox  99   01/01/22 07:00








                                 Intake & Output











 12/31/21 01/01/22 01/01/22





 18:59 06:59 18:59


 


Intake Total 518  118


 


Balance 518  118


 


Weight 95.254 kg  


 


Intake:   


 


  Oral 518  118


 


Other:   


 


  # Voids  2 














- Exam





PHYSICAL EXAM: 


VITAL SIGNS: Reviewed.


GENERAL: Well-developed in no acute distress. 


HEENT: Head is normocephalic. Pupils are equal, round. Sclerae anicteric. Mucous

membranes of the mouth are moist. 


NECK: Supple. No JVD or thyromegaly


RESPIRATORY: Respirations even and unlabored. Lungs diminished to auscultation 

bilaterally.


CARDIO: Regular rate and rhythm.  S1 and S2 heard. No murmur or gallops.


EXTREMITIES: Normal range of motion.  No clubbing or cyanosis.  Peripheral 

pulses intact.  Negative for bilateral lower extremity edema


NEURO: Orientated to person, time, mood is appropriate





- Labs


CBC & Chem 7: 


                                 01/01/22 06:26





                                 01/01/22 06:26


Labs: 


                  Abnormal Lab Results - Last 24 Hours (Table)











  12/31/21 12/31/21 12/31/21 Range/Units





  12:53 16:03 18:02 


 


WBC   20.28 H   (4.50-10.00)  X 10*3/uL


 


RBC   4.14 L   (4.40-5.60)  X 10*6/uL


 


Hgb   12.7 L   (13.0-17.0)  g/dL


 


Hct   37.7 L   (39.6-50.0)  %


 


Plt Count   93 L   (140-440)  X 10*3/uL


 


Plt Count Comment   DECREASED A   


 


Absolute Nucleated RBC     (0.00-0.00)  X 10*3/uL


 


NRBC/100 WBC Diff     (0.0-0.0)  /100 WBCS


 


Glucose     ()  mg/dL


 


POC Glucose (mg/dL)  275 H   231 H  (75-99)  mg/dL














  12/31/21 01/01/22 01/01/22 Range/Units





  20:47 06:26 06:26 


 


WBC   21.32 H   (4.50-10.00)  X 10*3/uL


 


RBC   3.90 L   (4.40-5.60)  X 10*6/uL


 


Hgb   12.1 L   (13.0-17.0)  g/dL


 


Hct   35.5 L   (39.6-50.0)  %


 


Plt Count   84 L   (140-440)  X 10*3/uL


 


Plt Count Comment   DECREASED A   


 


Absolute Nucleated RBC   0.02 H   (0.00-0.00)  X 10*3/uL


 


NRBC/100 WBC Diff   0.1 H   (0.0-0.0)  /100 WBCS


 


Glucose    208 H  ()  mg/dL


 


POC Glucose (mg/dL)  255 H    (75-99)  mg/dL














  01/01/22 Range/Units





  07:58 


 


WBC   (4.50-10.00)  X 10*3/uL


 


RBC   (4.40-5.60)  X 10*6/uL


 


Hgb   (13.0-17.0)  g/dL


 


Hct   (39.6-50.0)  %


 


Plt Count   (140-440)  X 10*3/uL


 


Plt Count Comment   


 


Absolute Nucleated RBC   (0.00-0.00)  X 10*3/uL


 


NRBC/100 WBC Diff   (0.0-0.0)  /100 WBCS


 


Glucose   ()  mg/dL


 


POC Glucose (mg/dL)  209 H  (75-99)  mg/dL














Assessment and Plan


Assessment: 





Atypical chest pain, believed to be related to increase in stress


Negative troponins x3


Intermittent nonobstructive coronary artery disease


Hypertension


Plan: 








Continue increased dose of Norvasc 10 mg daily


Continue all other cardiac medications


Increase activity as tolerated


Patient is clear to be discharged from a cardiac standpoint





The above impression and plan of care have been discussed and directed by the 

signing physician. Trina Llamas, nurse practitioner, acting as scribe for 

signing physician.

## 2022-03-07 ENCOUNTER — HOSPITAL ENCOUNTER (EMERGENCY)
Dept: HOSPITAL 47 - EC | Age: 77
Discharge: HOME | End: 2022-03-07
Payer: MEDICARE

## 2022-03-07 VITALS — RESPIRATION RATE: 18 BRPM | TEMPERATURE: 98.3 F

## 2022-03-07 VITALS — SYSTOLIC BLOOD PRESSURE: 184 MMHG | DIASTOLIC BLOOD PRESSURE: 83 MMHG | HEART RATE: 63 BPM

## 2022-03-07 DIAGNOSIS — K21.9: ICD-10-CM

## 2022-03-07 DIAGNOSIS — E11.9: ICD-10-CM

## 2022-03-07 DIAGNOSIS — M54.9: Primary | ICD-10-CM

## 2022-03-07 DIAGNOSIS — I10: ICD-10-CM

## 2022-03-07 DIAGNOSIS — M19.90: ICD-10-CM

## 2022-03-07 DIAGNOSIS — Z85.6: ICD-10-CM
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
## 2022-03-07 DIAGNOSIS — Z79.4: ICD-10-CM

## 2022-03-07 DIAGNOSIS — I25.10: ICD-10-CM

## 2022-03-07 DIAGNOSIS — Z79.82: ICD-10-CM

## 2022-03-07 LAB
ALBUMIN SERPL-MCNC: 4 G/DL (ref 3.5–5)
ALP SERPL-CCNC: 88 U/L (ref 38–126)
ALT SERPL-CCNC: 22 U/L (ref 4–49)
ANION GAP SERPL CALC-SCNC: 6 MMOL/L
APTT BLD: 22 SEC (ref 22–30)
AST SERPL-CCNC: 22 U/L (ref 17–59)
BUN SERPL-SCNC: 19 MG/DL (ref 9–20)
CALCIUM SPEC-MCNC: 8.7 MG/DL (ref 8.4–10.2)
CELLS COUNTED: 100
CHLORIDE SERPL-SCNC: 104 MMOL/L (ref 98–107)
CO2 SERPL-SCNC: 28 MMOL/L (ref 22–30)
ERYTHROCYTE [DISTWIDTH] IN BLOOD BY AUTOMATED COUNT: 4.15 M/UL (ref 4.3–5.9)
ERYTHROCYTE [DISTWIDTH] IN BLOOD: 14.8 % (ref 11.5–15.5)
GLUCOSE SERPL-MCNC: 136 MG/DL (ref 74–99)
HCT VFR BLD AUTO: 39 % (ref 39–53)
HGB BLD-MCNC: 13.5 GM/DL (ref 13–17.5)
INR PPP: 1.1 (ref ?–1.2)
LYMPHOCYTES # BLD MANUAL: 20.09 K/UL (ref 1–4.8)
MAGNESIUM SPEC-SCNC: 1.8 MG/DL (ref 1.6–2.3)
MCH RBC QN AUTO: 32.6 PG (ref 25–35)
MCHC RBC AUTO-ENTMCNC: 34.6 G/DL (ref 31–37)
MCV RBC AUTO: 94.1 FL (ref 80–100)
MONOCYTES # BLD MANUAL: 0.74 K/UL (ref 0–1)
NEUTROPHILS NFR BLD MANUAL: 16 %
NEUTS SEG # BLD MANUAL: 3.97 K/UL (ref 1.3–7.7)
PLATELET # BLD AUTO: 96 K/UL (ref 150–450)
POTASSIUM SERPL-SCNC: 3.5 MMOL/L (ref 3.5–5.1)
PROT SERPL-MCNC: 6.3 G/DL (ref 6.3–8.2)
PT BLD: 11.6 SEC (ref 9–12)
SODIUM SERPL-SCNC: 138 MMOL/L (ref 137–145)
WBC # BLD AUTO: 24.8 K/UL (ref 3.8–10.6)

## 2022-03-07 PROCEDURE — 99284 EMERGENCY DEPT VISIT MOD MDM: CPT

## 2022-03-07 PROCEDURE — 84484 ASSAY OF TROPONIN QUANT: CPT

## 2022-03-07 PROCEDURE — 36415 COLL VENOUS BLD VENIPUNCTURE: CPT

## 2022-03-07 PROCEDURE — 85025 COMPLETE CBC W/AUTO DIFF WBC: CPT

## 2022-03-07 PROCEDURE — 70450 CT HEAD/BRAIN W/O DYE: CPT

## 2022-03-07 PROCEDURE — 85610 PROTHROMBIN TIME: CPT

## 2022-03-07 PROCEDURE — 72170 X-RAY EXAM OF PELVIS: CPT

## 2022-03-07 PROCEDURE — 72125 CT NECK SPINE W/O DYE: CPT

## 2022-03-07 PROCEDURE — 84100 ASSAY OF PHOSPHORUS: CPT

## 2022-03-07 PROCEDURE — 83735 ASSAY OF MAGNESIUM: CPT

## 2022-03-07 PROCEDURE — 71045 X-RAY EXAM CHEST 1 VIEW: CPT

## 2022-03-07 PROCEDURE — 85730 THROMBOPLASTIN TIME PARTIAL: CPT

## 2022-03-07 PROCEDURE — 96374 THER/PROPH/DIAG INJ IV PUSH: CPT

## 2022-03-07 PROCEDURE — 73562 X-RAY EXAM OF KNEE 3: CPT

## 2022-03-07 PROCEDURE — 80053 COMPREHEN METABOLIC PANEL: CPT

## 2022-03-07 PROCEDURE — 93005 ELECTROCARDIOGRAM TRACING: CPT

## 2022-03-07 NOTE — XR
EXAMINATION TYPE: XR knee complete LT

 

DATE OF EXAM: 3/7/2022

 

COMPARISON: 5/29/2020

 

HISTORY: Knee pain

 

TECHNIQUE: 3 views

 

FINDINGS: There is minor spurring of the medial tibial condyle. There is minimal calcification of the
 medial meniscus. There is no joint effusion. There is no fracture nor dislocation. There is vascular
 calcification.

 

IMPRESSION: Minor degenerative changes. No fracture. No significant joint space narrowing. No change.

## 2022-03-07 NOTE — CT
EXAMINATION TYPE: CT brain cspine wo con

 

DATE OF EXAM: 3/7/2022

 

COMPARISON: 7/20/2020

 

HISTORY: fall.  worsening pain over last few days

 

CT DLP: 1409.4 mGycm

Automated exposure control for dose reduction was used.

 

There is some cerebral cortical atrophy. There is no mass effect or midline shift. There is no sign o
f intracranial hemorrhage. The calvarium is intact. There is normal aeration of the mastoid sinuses. 
Skull base is intact.

 

Cervical vertebra have normal alignment. Posterior elements are intact. There is hypertrophic mild ce
rvical facet arthropathy. There is some degenerative disc space narrowing at C3-4 and C5-C6 with spur
ring of the endplates. There is no compression fracture.

 

IMPRESSION:

Spondylotic changes in the cervical spine. No fracture.

 

Cerebral atrophy. No acute intracranial abnormality. Brain not changed compared to old exam.

## 2022-03-07 NOTE — ED
Fall HPI





- General


Chief Complaint: Fall


Stated Complaint: Fall


Time Seen by Provider: 22 02:56


Source: patient, EMS, RN notes reviewed, old records reviewed


Mode of arrival: EMS


Limitations: no limitations





- History of Present Illness


Initial Comments: 





This is a 76-year-old male presented with a fall 2 weeks prior to arrival.  

Patient's back pain is getting progressively worse mildly.  Is concerned is 

related to fall.  He also feels weak, but main complaint is pain not being 

controlled at home.  No neurological complaints no loss of bowel or bladder.


MD Complaint: fall


-: week(s)


Fall From: standing


When Fall Occurred: # days PTA (14)


Fall Witnessed: no


Place Fall Occurred: home


Loss of Consciousness: none


Prolonged Down Time?: no


Symptoms Prior to Fall: none


Location: back


Severity: mild


Severity scale (1-10): 3


Context: tripped/slipped


Associated Symptoms: denies





- Related Data


                                Home Medications











 Medication  Instructions  Recorded  Confirmed


 


Montelukast Sodium [Singulair] 10 mg PO DAILY 17


 


Insulin NPH Hum/Reg Insulin Hm 22 units SQ AC-BRKFST 20





[Relion Novolin 70-30 Flexpen]   


 


Fluticasone Nasal Spray [Flonase 1 spr EA NOSTRIL DAILY 21





Nasal [place]]   


 


HYDROcodone/APAP 5-325MG [Norco 1 tab PO Q6HR PRN 21





5-325]   


 


Ketorolac 0.5% Ophth Soln [Acular 1 drop BOTH EYES BID 21





0.5%]   


 


Atorvastatin [Lipitor] 80 mg PO HS 21


 


Baclofen [Lioresal] 10 mg PO DAILY 21


 


SILVER sulfADIAZINE CREAM 1 applic TOPICAL BID 21





[Silvadene Cream]   


 


Gabapentin 300 mg PO HS 10/20/21 01/19/22


 


Omeprazole 40 mg PO DAILY 10/20/21 01/19/22


 


lisinopriL [Zestril] 20 mg PO DAILY 10/20/21 01/19/22


 


ALPRAZolam [Xanax] 0.25 mg PO DAILY PRN 21


 


Brimonidine Tartrate/Timolol 1 drop BOTH EYES BID 21





[Combigan 0.2%-0.5% Eye Drops]   


 


Latanoprost/Pf [Latanoprost 0.005% 1 drop BOTH EYES HS 21





Eye Drop]   


 


Nitroglycerin Sl Tabs [Nitrostat] 0.4 mg SL Q5M PRN 21








                                  Previous Rx's











 Medication  Instructions  Recorded


 


Isosorbide Mononitrate ER [Imdur] 30 mg PO DAILY #30 tab 21


 


Aspirin 81 mg PO DAILY  tab 10/21/21


 


amLODIPine [Norvasc] 10 mg PO DAILY #30 tab 22











                                    Allergies











Allergy/AdvReac Type Severity Reaction Status Date / Time


 


amoxicillin [From Augmentin] Allergy  Rash/Hives Verified 22 14:35


 


clavulanic acid Allergy  Rash/Hives Verified 22 14:35





[From Augmentin]     














Review of Systems


ROS Statement: 


Those systems with pertinent positive or pertinent negative responses have been 

documented in the HPI.





ROS Other: All systems not noted in ROS Statement are negative.





Past Medical History


Past Medical History: Coronary Artery Disease (CAD), Cancer, Diabetes Mellitus, 

GERD/Reflux, Hypertension, Osteoarthritis (OA)


Additional Past Medical History / Comment(s): NIDDM type II, 1999 LEUKEMIA (CLL-

remission), sinus problems, seasonal allergies, tinnitis bilaterally, OA hands, 

R wrist carpal tunnel, past fx with L elbow, R rotator cuff tendon problems, L 

rotator cuff tear, left knee pain


History of Any Multi-Drug Resistant Organisms: None Reported


Past Surgical History: Heart Catheterization, Orthopedic Surgery


Additional Past Surgical History / Comment(s): LEFT HAND thumb tendon repair. L 

knee surgery.


Past Anesthesia/Blood Transfusion Reactions: No Reported Reaction


Past Psychological History: No Psychological Hx Reported


Smoking Status: Never smoker


Past Alcohol Use History: None Reported


Past Drug Use History: None Reported





- Past Family History


  ** Father


Family Medical History: CVA/TIA


Additional Family Medical History / Comment(s): Father  at the age of 85yrs.





  ** Mother


Family Medical History: Diabetes Mellitus


Additional Family Medical History / Comment(s): Mother  at the age of 90yrs.





General Exam


Limitations: no limitations


General appearance: alert, in no apparent distress


Head exam: Present: atraumatic, normocephalic, normal inspection


Eye exam: Present: normal appearance, PERRL, EOMI.  Absent: scleral icterus, 

conjunctival injection, periorbital swelling


ENT exam: Present: normal exam, mucous membranes moist


Neck exam: Present: normal inspection.  Absent: tenderness, meningismus, 

lymphadenopathy


Respiratory exam: Present: normal lung sounds bilaterally.  Absent: respiratory 

distress, wheezes, rales, rhonchi, stridor


Cardiovascular Exam: Present: regular rate, normal rhythm, normal heart sounds. 

Absent: systolic murmur, diastolic murmur, rubs, gallop, clicks


GI/Abdominal exam: Present: soft, normal bowel sounds.  Absent: distended, 

tenderness, guarding, rebound, rigid


Extremities exam: Present: normal inspection, full ROM, normal capillary refill.

 Absent: tenderness, pedal edema, joint swelling, calf tenderness


Back exam: Present: normal inspection


Neurological exam: Present: alert, oriented X3, CN II-XII intact


Psychiatric exam: Present: normal affect, normal mood


Skin exam: Present: warm, dry, intact, normal color.  Absent: rash





Course


                                   Vital Signs











  22





  02:35 03:42 06:16


 


Temperature 98.3 F  


 


Pulse Rate 57 L 74 63


 


Respiratory 18 18 18





Rate   


 


Blood Pressure 187/89 155/77 184/83


 


O2 Sat by Pulse 97 98 98





Oximetry   














- Reevaluation(s)


Reevaluation #1: 





22 


Medical record is reviewed





Patient symptoms are improved here in the emergency department





Patient informed of results and questions answered








Medical Decision Making





- Medical Decision Making





76 male with underlying cancer coming in for a fall.  He is mildly to profoundly

weak but is able to amply.  Patient can be discharged home





- Lab Data


Result diagrams: 


                                 22 03:20





                                 22 03:20


                                   Lab Results











  22 Range/Units





  03:20 03:20 03:20 


 


WBC  24.8 H    (3.8-10.6)  k/uL


 


RBC  4.15 L    (4.30-5.90)  m/uL


 


Hgb  13.5    (13.0-17.5)  gm/dL


 


Hct  39.0    (39.0-53.0)  %


 


MCV  94.1    (80.0-100.0)  fL


 


MCH  32.6    (25.0-35.0)  pg


 


MCHC  34.6    (31.0-37.0)  g/dL


 


RDW  14.8    (11.5-15.5)  %


 


Plt Count  96 L    (150-450)  k/uL


 


MPV  8.4    


 


Neutrophils % (Manual)  16    %


 


Lymphocytes % (Manual)  81    %


 


Monocytes % (Manual)  3    %


 


Neutrophils # (Manual)  3.97    (1.3-7.7)  k/uL


 


Lymphocytes # (Manual)  20.09 H    (1.0-4.8)  k/uL


 


Monocytes # (Manual)  0.74    (0-1.0)  k/uL


 


Nucleated RBCs  0    (0-0)  /100 WBC


 


Differential Comment      


 


Manual Slide Review  Performed    


 


PT   11.6   (9.0-12.0)  sec


 


INR   1.1   (<1.2)  


 


APTT   22.0   (22.0-30.0)  sec


 


Sodium    138  (137-145)  mmol/L


 


Potassium    3.5  (3.5-5.1)  mmol/L


 


Chloride    104  ()  mmol/L


 


Carbon Dioxide    28  (22-30)  mmol/L


 


Anion Gap    6  mmol/L


 


BUN    19  (9-20)  mg/dL


 


Creatinine    0.74  (0.66-1.25)  mg/dL


 


Est GFR (CKD-EPI)AfAm    >90  (>60 ml/min/1.73 sqM)  


 


Est GFR (CKD-EPI)NonAf    >90  (>60 ml/min/1.73 sqM)  


 


Glucose    136 H  (74-99)  mg/dL


 


Calcium    8.7  (8.4-10.2)  mg/dL


 


Phosphorus    3.4  (2.5-4.5)  mg/dL


 


Magnesium    1.8  (1.6-2.3)  mg/dL


 


Total Bilirubin    1.2  (0.2-1.3)  mg/dL


 


AST    22  (17-59)  U/L


 


ALT    22  (4-49)  U/L


 


Alkaline Phosphatase    88  ()  U/L


 


Troponin I     (0.000-0.034)  ng/mL


 


Total Protein    6.3  (6.3-8.2)  g/dL


 


Albumin    4.0  (3.5-5.0)  g/dL














  22 Range/Units





  03:20 


 


WBC   (3.8-10.6)  k/uL


 


RBC   (4.30-5.90)  m/uL


 


Hgb   (13.0-17.5)  gm/dL


 


Hct   (39.0-53.0)  %


 


MCV   (80.0-100.0)  fL


 


MCH   (25.0-35.0)  pg


 


MCHC   (31.0-37.0)  g/dL


 


RDW   (11.5-15.5)  %


 


Plt Count   (150-450)  k/uL


 


MPV   


 


Neutrophils % (Manual)   %


 


Lymphocytes % (Manual)   %


 


Monocytes % (Manual)   %


 


Neutrophils # (Manual)   (1.3-7.7)  k/uL


 


Lymphocytes # (Manual)   (1.0-4.8)  k/uL


 


Monocytes # (Manual)   (0-1.0)  k/uL


 


Nucleated RBCs   (0-0)  /100 WBC


 


Differential Comment   


 


Manual Slide Review   


 


PT   (9.0-12.0)  sec


 


INR   (<1.2)  


 


APTT   (22.0-30.0)  sec


 


Sodium   (137-145)  mmol/L


 


Potassium   (3.5-5.1)  mmol/L


 


Chloride   ()  mmol/L


 


Carbon Dioxide   (22-30)  mmol/L


 


Anion Gap   mmol/L


 


BUN   (9-20)  mg/dL


 


Creatinine   (0.66-1.25)  mg/dL


 


Est GFR (CKD-EPI)AfAm   (>60 ml/min/1.73 sqM)  


 


Est GFR (CKD-EPI)NonAf   (>60 ml/min/1.73 sqM)  


 


Glucose   (74-99)  mg/dL


 


Calcium   (8.4-10.2)  mg/dL


 


Phosphorus   (2.5-4.5)  mg/dL


 


Magnesium   (1.6-2.3)  mg/dL


 


Total Bilirubin   (0.2-1.3)  mg/dL


 


AST   (17-59)  U/L


 


ALT   (4-49)  U/L


 


Alkaline Phosphatase   ()  U/L


 


Troponin I  <0.012  (0.000-0.034)  ng/mL


 


Total Protein   (6.3-8.2)  g/dL


 


Albumin   (3.5-5.0)  g/dL














- EKG Data


-: EKG Interpreted by Me (EKG is sinus rhythm 61  QRS 78 QTc 421)





- Radiology Data


Radiology results: report reviewed (Chest x-ray CT brain C-spine multiple other 

x-rays are negative for acute disease), image reviewed





Disposition


Clinical Impression: 


 Fall, Back pain





Disposition: HOME SELF-CARE


Condition: Good


Instructions (If sedation given, give patient instructions):  Fall Prevention 

for Older Adults (ED)


Is patient prescribed a controlled substance at d/c from ED?: No


Referrals: 


Smith Figueroa DO [Primary Care Provider] - 1-2 days

## 2022-03-07 NOTE — XR
EXAMINATION TYPE: XR chest 1V

 

DATE OF EXAM: 3/7/2022

 

COMPARISON: 12/30/2021

 

HISTORY: Chest pain

 

TECHNIQUE:

 

FINDINGS: Heart and mediastinum are normal. Lungs are clear. Diaphragm is normal. Bony thorax appears
 normal.

 

IMPRESSION: Normal chest. No change.

## 2022-03-07 NOTE — XR
EXAMINATION TYPE: XR pelvis AP view

 

DATE OF EXAM: 3/7/2022

 

COMPARISON: 5/29/2020

 

HISTORY: Pain

 

TECHNIQUE: Single view

 

FINDINGS: The pelvic ring is intact. Proximal femurs and hip joints are intact. Sacroiliac joints are
 normal. There is mild acetabular spurring. There is no evidence of focal bone destruction. 

 

 

 

IMPRESSION: Negative pelvis xray exam. No fracture. No change.

## 2022-06-28 ENCOUNTER — HOSPITAL ENCOUNTER (OUTPATIENT)
Dept: HOSPITAL 47 - EC | Age: 77
Setting detail: OBSERVATION
LOS: 2 days | Discharge: HOME | End: 2022-06-30
Attending: INTERNAL MEDICINE | Admitting: INTERNAL MEDICINE
Payer: MEDICARE

## 2022-06-28 DIAGNOSIS — I25.10: ICD-10-CM

## 2022-06-28 DIAGNOSIS — M54.9: ICD-10-CM

## 2022-06-28 DIAGNOSIS — J30.2: ICD-10-CM

## 2022-06-28 DIAGNOSIS — R00.2: ICD-10-CM

## 2022-06-28 DIAGNOSIS — M54.10: ICD-10-CM

## 2022-06-28 DIAGNOSIS — E11.65: ICD-10-CM

## 2022-06-28 DIAGNOSIS — R10.12: ICD-10-CM

## 2022-06-28 DIAGNOSIS — K57.30: ICD-10-CM

## 2022-06-28 DIAGNOSIS — R00.1: ICD-10-CM

## 2022-06-28 DIAGNOSIS — G56.01: ICD-10-CM

## 2022-06-28 DIAGNOSIS — Z91.81: ICD-10-CM

## 2022-06-28 DIAGNOSIS — Z98.890: ICD-10-CM

## 2022-06-28 DIAGNOSIS — R11.0: ICD-10-CM

## 2022-06-28 DIAGNOSIS — G89.29: ICD-10-CM

## 2022-06-28 DIAGNOSIS — R07.89: Primary | ICD-10-CM

## 2022-06-28 DIAGNOSIS — R51.9: ICD-10-CM

## 2022-06-28 DIAGNOSIS — C91.11: ICD-10-CM

## 2022-06-28 DIAGNOSIS — Z83.3: ICD-10-CM

## 2022-06-28 DIAGNOSIS — I11.9: ICD-10-CM

## 2022-06-28 DIAGNOSIS — Z88.0: ICD-10-CM

## 2022-06-28 DIAGNOSIS — K21.9: ICD-10-CM

## 2022-06-28 DIAGNOSIS — Z79.899: ICD-10-CM

## 2022-06-28 DIAGNOSIS — Z82.3: ICD-10-CM

## 2022-06-28 DIAGNOSIS — Z88.8: ICD-10-CM

## 2022-06-28 DIAGNOSIS — R06.09: ICD-10-CM

## 2022-06-28 DIAGNOSIS — D72.829: ICD-10-CM

## 2022-06-28 DIAGNOSIS — Z87.828: ICD-10-CM

## 2022-06-28 DIAGNOSIS — M19.041: ICD-10-CM

## 2022-06-28 DIAGNOSIS — M19.042: ICD-10-CM

## 2022-06-28 DIAGNOSIS — Z79.4: ICD-10-CM

## 2022-06-28 DIAGNOSIS — Z79.82: ICD-10-CM

## 2022-06-28 DIAGNOSIS — H93.13: ICD-10-CM

## 2022-06-28 DIAGNOSIS — R10.11: ICD-10-CM

## 2022-06-28 DIAGNOSIS — M79.2: ICD-10-CM

## 2022-06-28 DIAGNOSIS — R61: ICD-10-CM

## 2022-06-28 DIAGNOSIS — M25.512: ICD-10-CM

## 2022-06-28 LAB
ALBUMIN SERPL-MCNC: 4.1 G/DL (ref 3.5–5)
ALP SERPL-CCNC: 93 U/L (ref 38–126)
ALT SERPL-CCNC: 20 U/L (ref 4–49)
ANION GAP SERPL CALC-SCNC: 11 MMOL/L
APTT BLD: 22.7 SEC (ref 22–30)
AST SERPL-CCNC: 36 U/L (ref 17–59)
BUN SERPL-SCNC: 14 MG/DL (ref 9–20)
CALCIUM SPEC-MCNC: 8.6 MG/DL (ref 8.4–10.2)
CHLORIDE SERPL-SCNC: 107 MMOL/L (ref 98–107)
CO2 SERPL-SCNC: 19 MMOL/L (ref 22–30)
ERYTHROCYTE [DISTWIDTH] IN BLOOD BY AUTOMATED COUNT: 4.19 M/UL (ref 4.3–5.9)
ERYTHROCYTE [DISTWIDTH] IN BLOOD: 14.3 % (ref 11.5–15.5)
GLUCOSE SERPL-MCNC: 139 MG/DL (ref 74–99)
HCT VFR BLD AUTO: 39.5 % (ref 39–53)
HGB BLD-MCNC: 13.8 GM/DL (ref 13–17.5)
INR PPP: 1 (ref ?–1.2)
LIPASE SERPL-CCNC: 56 U/L (ref 23–300)
MAGNESIUM SPEC-SCNC: 1.9 MG/DL (ref 1.6–2.3)
MCH RBC QN AUTO: 33 PG (ref 25–35)
MCHC RBC AUTO-ENTMCNC: 35 G/DL (ref 31–37)
MCV RBC AUTO: 94.3 FL (ref 80–100)
PLATELET # BLD AUTO: 131 K/UL (ref 150–450)
PROT SERPL-MCNC: 6.4 G/DL (ref 6.3–8.2)
PT BLD: 11.2 SEC (ref 9–12)
SODIUM SERPL-SCNC: 137 MMOL/L (ref 137–145)
WBC # BLD AUTO: 30.7 K/UL (ref 3.8–10.6)

## 2022-06-28 PROCEDURE — 84484 ASSAY OF TROPONIN QUANT: CPT

## 2022-06-28 PROCEDURE — 93005 ELECTROCARDIOGRAM TRACING: CPT

## 2022-06-28 PROCEDURE — 83880 ASSAY OF NATRIURETIC PEPTIDE: CPT

## 2022-06-28 PROCEDURE — 71045 X-RAY EXAM CHEST 1 VIEW: CPT

## 2022-06-28 PROCEDURE — 94760 N-INVAS EAR/PLS OXIMETRY 1: CPT

## 2022-06-28 PROCEDURE — 99285 EMERGENCY DEPT VISIT HI MDM: CPT

## 2022-06-28 PROCEDURE — 85379 FIBRIN DEGRADATION QUANT: CPT

## 2022-06-28 PROCEDURE — 85025 COMPLETE CBC W/AUTO DIFF WBC: CPT

## 2022-06-28 PROCEDURE — 96374 THER/PROPH/DIAG INJ IV PUSH: CPT

## 2022-06-28 PROCEDURE — 80061 LIPID PANEL: CPT

## 2022-06-28 PROCEDURE — 70450 CT HEAD/BRAIN W/O DYE: CPT

## 2022-06-28 PROCEDURE — 85652 RBC SED RATE AUTOMATED: CPT

## 2022-06-28 PROCEDURE — 74177 CT ABD & PELVIS W/CONTRAST: CPT

## 2022-06-28 PROCEDURE — 83735 ASSAY OF MAGNESIUM: CPT

## 2022-06-28 PROCEDURE — 36415 COLL VENOUS BLD VENIPUNCTURE: CPT

## 2022-06-28 PROCEDURE — 85730 THROMBOPLASTIN TIME PARTIAL: CPT

## 2022-06-28 PROCEDURE — 83690 ASSAY OF LIPASE: CPT

## 2022-06-28 PROCEDURE — 70486 CT MAXILLOFACIAL W/O DYE: CPT

## 2022-06-28 PROCEDURE — 96361 HYDRATE IV INFUSION ADD-ON: CPT

## 2022-06-28 PROCEDURE — 83036 HEMOGLOBIN GLYCOSYLATED A1C: CPT

## 2022-06-28 PROCEDURE — 80053 COMPREHEN METABOLIC PANEL: CPT

## 2022-06-28 PROCEDURE — 86140 C-REACTIVE PROTEIN: CPT

## 2022-06-28 PROCEDURE — 96375 TX/PRO/DX INJ NEW DRUG ADDON: CPT

## 2022-06-28 PROCEDURE — 85610 PROTHROMBIN TIME: CPT

## 2022-06-28 NOTE — XR
EXAMINATION TYPE: XR chest 1V portable

 

DATE OF EXAM: 6/28/2022

 

COMPARISON: 3/7/2022

 

HISTORY: Chest pain

 

TECHNIQUE:

 

FINDINGS: There is no heart failure or confluent pneumonic infiltrate. Costophrenic angles are clear.
 There are chest leads. Bony thorax is intact. There is slight coarsening of interstitial markings. N
o pneumothorax.

 

IMPRESSION: There is some mild pulmonary interstitial density. Interstitial density increase compared
 to old exam. This could be acute or chronic interstitial pneumonia. No pleural fluid seen to suggest
 heart failure.

## 2022-06-28 NOTE — ED
Chest Pain HPI





- General


Chief Complaint: Chest Pain


Stated Complaint: Chest Pain


Time Seen by Provider: 22 22:52


Source: patient, EMS, RN notes reviewed, old records reviewed


Mode of arrival: EMS


Limitations: no limitations





- History of Present Illness


Initial Comments: 





This is a 76-year-old male to the emergency department for evaluation.  Patient 

presents today for evaluation regards to dizziness lightheadedness and chest 

pain.  Patient has significant chest pain here in the emergency department.  No 

real travel history or sick contacts.  Patient has increased stress in his life 

lately has history of CAD high blood pressure diabetes.  Patient having 

persistent chest pain on arrival to the ER


MD Complaint: chest pain


-: hour(s)


Onset: during rest, during exertion


Pain Location: left chest


Severity: mild


Severity scale (1-10): 1


Quality: heaviness


Consistency: intermittent


Improves With: nothing


Worsens With: nothing


Anginal Symptoms: nausea, diaphoresis, dyspnea


Other Symptoms: palpitations


Treatments Prior to Arrival: none





- Related Data


                                Home Medications











 Medication  Instructions  Recorded  Confirmed


 


Montelukast Sodium [Singulair] 10 mg PO DAILY 17


 


Insulin NPH Hum/Reg Insulin Hm 22 units SQ AC-BRKFST 20





[Relion Novolin 70-30 Flexpen]   


 


Fluticasone Nasal Spray [Flonase 1 spr EA NOSTRIL DAILY 21





Nasal [place]]   


 


HYDROcodone/APAP 5-325MG [Norco 1 tab PO Q6HR PRN 21





5-325]   


 


Ketorolac 0.5% Ophth Soln [Acular 1 drop BOTH EYES BID 21





0.5%]   


 


Atorvastatin [Lipitor] 80 mg PO HS 21


 


Baclofen [Lioresal] 10 mg PO DAILY 21


 


SILVER sulfADIAZINE CREAM 1 applic TOPICAL BID 21





[Silvadene Cream]   


 


Gabapentin 300 mg PO HS 10/20/21 01/19/22


 


Omeprazole 40 mg PO DAILY 10/20/21 01/19/22


 


lisinopriL [Zestril] 20 mg PO DAILY 10/20/21 01/19/22


 


ALPRAZolam [Xanax] 0.25 mg PO DAILY PRN 21


 


Brimonidine Tartrate/Timolol 1 drop BOTH EYES BID 21





[Combigan 0.2%-0.5% Eye Drops]   


 


Latanoprost/Pf [Latanoprost 0.005% 1 drop BOTH EYES HS 21





Eye Drop]   


 


Nitroglycerin Sl Tabs [Nitrostat] 0.4 mg SL Q5M PRN 21








                                  Previous Rx's











 Medication  Instructions  Recorded


 


Isosorbide Mononitrate ER [Imdur] 30 mg PO DAILY #30 tab 21


 


Aspirin 81 mg PO DAILY  tab 10/21/21


 


amLODIPine [Norvasc] 10 mg PO DAILY #30 tab 22











                                    Allergies











Allergy/AdvReac Type Severity Reaction Status Date / Time


 


amoxicillin [From Augmentin] Allergy  Rash/Hives Verified 22 14:35


 


clavulanic acid Allergy  Rash/Hives Verified 22 14:35





[From Augmentin]     














Review of Systems


ROS Statement: 


Those systems with pertinent positive or pertinent negative responses have been 

documented in the HPI.





ROS Other: All systems not noted in ROS Statement are negative.





EKG Findings





- EKG Comments:


EKG Findings:: EKG sinus bradycardia 53   





Past Medical History


Past Medical History: Coronary Artery Disease (CAD), Cancer, Diabetes Mellitus, 

GERD/Reflux, Hypertension, Osteoarthritis (OA)


Additional Past Medical History / Comment(s): NIDDM type II, 1999 LEUKEMIA (CLL-

remission), sinus problems, seasonal allergies, tinnitis bilaterally, OA hands, 

R wrist carpal tunnel, past fx with L elbow, R rotator cuff tendon problems, L 

rotator cuff tear, left knee pain


History of Any Multi-Drug Resistant Organisms: None Reported


Past Surgical History: Heart Catheterization, Orthopedic Surgery


Additional Past Surgical History / Comment(s): LEFT HAND thumb tendon repair. L 

knee surgery.


Past Anesthesia/Blood Transfusion Reactions: No Reported Reaction


Past Psychological History: No Psychological Hx Reported


Smoking Status: Never smoker


Past Alcohol Use History: None Reported


Past Drug Use History: None Reported





- Past Family History


  ** Father


Family Medical History: CVA/TIA


Additional Family Medical History / Comment(s): Father  at the age of 85yrs.





  ** Mother


Family Medical History: Diabetes Mellitus


Additional Family Medical History / Comment(s): Mother  at the age of 90yrs.





General Exam


General appearance: alert, in no apparent distress


Head exam: Present: atraumatic, normocephalic, normal inspection


Eye exam: Present: normal appearance, PERRL, EOMI.  Absent: scleral icterus, 

conjunctival injection, periorbital swelling


ENT exam: Present: normal exam, mucous membranes moist


Neck exam: Present: normal inspection.  Absent: tenderness, meningismus, 

lymphadenopathy


Respiratory exam: Present: normal lung sounds bilaterally.  Absent: respiratory 

distress, wheezes, rales, rhonchi, stridor


Cardiovascular Exam: Present: regular rate, normal rhythm, normal heart sounds. 

Absent: systolic murmur, diastolic murmur, rubs, gallop, clicks


GI/Abdominal exam: Present: soft, normal bowel sounds.  Absent: distended, 

tenderness, guarding, rebound, rigid


Extremities exam: Present: normal inspection, full ROM, normal capillary refill.

 Absent: tenderness, pedal edema, joint swelling, calf tenderness


Back exam: Present: normal inspection


Neurological exam: Present: alert, oriented X3, CN II-XII intact


Psychiatric exam: Present: normal affect, normal mood


Skin exam: Present: warm, dry, intact, normal color.  Absent: rash





Course


                                   Vital Signs











  22





  22:53 00:05 01:19


 


Temperature 98 F  


 


Pulse Rate 59 L 50 L 49 L


 


Respiratory 18 14 12





Rate   


 


Blood Pressure 147/67 121/66 154/77


 


O2 Sat by Pulse 95  100





Oximetry   














- Reevaluation(s)


Reevaluation #1: 





22 01:50


Medical records reviewed


Reevaluation #2: 





22 01:51


Patient still having symptoms dizziness headache chest pain.


Reevaluation #3: 





22 01:51


Patient informed of results and questions answered





- Consultations


Consultation #1: 





Spoke with sound who agrees to admit this patient





Chest Pain MDM





- MDM





76 male DF for evaluation.  Patient coming in for dizziness lightheadedness 

weakness and chest pain.  Chest pain is original and main complaint.  Patient be

admitted for chest pain observation she has history of CAD diabetes high blood 

pressure





Disposition


Clinical Impression: 


 Chest pain, Weakness, Dizziness





Disposition: ADMITTED AS IP TO THIS HOSP


Condition: Good


Is patient prescribed a controlled substance at d/c from ED?: No


Referrals: 


Smith Figueroa DO [Primary Care Provider] - 1-2 days

## 2022-06-29 LAB
CELLS COUNTED: 100
CHOLEST SERPL-MCNC: 136 MG/DL (ref 0–200)
GLUCOSE BLD-MCNC: 102 MG/DL (ref 70–110)
GLUCOSE BLD-MCNC: 254 MG/DL (ref 70–110)
GLUCOSE BLD-MCNC: 292 MG/DL (ref 70–110)
HDLC SERPL-MCNC: 34.7 MG/DL (ref 40–60)
LDLC SERPL CALC-MCNC: 88.4 MG/DL (ref 0–131)
LYMPHOCYTES # BLD MANUAL: 25.48 K/UL (ref 1–4.8)
MONOCYTES # BLD MANUAL: 0.31 K/UL (ref 0–1)
NEUTROPHILS NFR BLD MANUAL: 16 %
NEUTS SEG # BLD MANUAL: 4.91 K/UL (ref 1.3–7.7)
POTASSIUM SERPL-SCNC: 4.3 MMOL/L (ref 3.5–5.1)
TRIGL SERPL-MCNC: 64.5 MG/DL (ref 0–149)
VLDLC SERPL CALC-MCNC: 12.9 MG/DL (ref 5–40)

## 2022-06-29 RX ADMIN — FLUTICASONE PROPIONATE SCH: 50 SPRAY, METERED NASAL at 11:53

## 2022-06-29 RX ADMIN — CEFAZOLIN SCH MLS/HR: 330 INJECTION, POWDER, FOR SOLUTION INTRAMUSCULAR; INTRAVENOUS at 02:07

## 2022-06-29 RX ADMIN — TIMOLOL MALEATE SCH: 5 SOLUTION OPHTHALMIC at 11:53

## 2022-06-29 RX ADMIN — BRIMONIDINE TARTRATE SCH DROPS: 2 SOLUTION/ DROPS OPHTHALMIC at 19:55

## 2022-06-29 RX ADMIN — KETOROLAC TROMETHAMINE SCH DROPS: 5 SOLUTION OPHTHALMIC at 19:55

## 2022-06-29 RX ADMIN — GABAPENTIN SCH MG: 300 CAPSULE ORAL at 19:54

## 2022-06-29 RX ADMIN — TIMOLOL MALEATE SCH DROPS: 5 SOLUTION OPHTHALMIC at 19:56

## 2022-06-29 RX ADMIN — BACLOFEN SCH MG: 10 TABLET ORAL at 11:03

## 2022-06-29 RX ADMIN — GABAPENTIN SCH MG: 300 CAPSULE ORAL at 11:03

## 2022-06-29 RX ADMIN — CEFAZOLIN SCH: 330 INJECTION, POWDER, FOR SOLUTION INTRAMUSCULAR; INTRAVENOUS at 23:50

## 2022-06-29 RX ADMIN — HYDROCODONE BITARTRATE AND ACETAMINOPHEN PRN EACH: 5; 325 TABLET ORAL at 11:03

## 2022-06-29 RX ADMIN — BRIMONIDINE TARTRATE SCH: 2 SOLUTION/ DROPS OPHTHALMIC at 11:53

## 2022-06-29 RX ADMIN — HYDROCODONE BITARTRATE AND ACETAMINOPHEN PRN EACH: 5; 325 TABLET ORAL at 18:27

## 2022-06-29 RX ADMIN — CEFAZOLIN SCH: 330 INJECTION, POWDER, FOR SOLUTION INTRAMUSCULAR; INTRAVENOUS at 19:50

## 2022-06-29 RX ADMIN — ASPIRIN 81 MG CHEWABLE TABLET SCH MG: 81 TABLET CHEWABLE at 11:03

## 2022-06-29 RX ADMIN — CEFAZOLIN SCH MLS/HR: 330 INJECTION, POWDER, FOR SOLUTION INTRAMUSCULAR; INTRAVENOUS at 08:50

## 2022-06-29 NOTE — CT
EXAMINATION TYPE: CT sinus wo con

 

DATE OF EXAM: 6/29/2022

 

COMPARISON: CT dated 10/15/2021

 

HISTORY: Acute sinusitis.

 

CT DLP: 1188.4 mGycm.  Automated Exposure Control for Dose Reduction was Utilized.

 

TECHNIQUE: CT scan of the sinuses is performed without contrast, axial images are obtained, coronal r
eformatted images are also reviewed.

 

FINDINGS:

Minimal deviation of the bony nasal septum convex to the right side anteriorly and to the left side p
osteriorly. Bilateral middle conchal bullosa. Debris/mucosal thickening of the left nasal fossa just 
inferior to the left middle turbinate. No significant mucosal thickening of the nasal fossa otherwise
.

 

Patent infundibulum bilaterally. Mild mucosal thickening of the middle meatus bilaterally. Clear maxi
llary sinuses, frontal sinus, ethmoid air cells and sphenoid sinus. Patent sphenoethmoidal recesses. 
Clear mastoid air cells. Grossly unremarkable nasopharynx and orbits. CT scan of the brain is dictate
d separately.

 

IMPRESSION:

No evidence of acute or chronic sinusitis. Mild bony nasal septum deviation and other findings as sneha
cribed above.

## 2022-06-29 NOTE — CT
EXAMINATION TYPE: CT brain wo con

 

DATE OF EXAM: 6/29/2022

 

COMPARISON: CT dated 3/7/2022

 

HISTORY: Headache.

 

CT DLP: 1188.4 mGycm

Automated exposure control for dose reduction was used.

 

TECHNIQUE: CT scan of the brain is performed without IV contrast administration.

 

FINDINGS: 

Brain volume loss changes, likely age-related. Bilateral cerebral white matter hypodensities, likely 
representing chronic microvascular ischemic changes. Scattered arterial atherosclerotic calcification
s. 

 

No acute intracranial hemorrhage. No gross acute cortical infarct. No midline shift, herniation or ve
ntriculomegaly.

 

Unremarkable basal cisterns, sella and CP angles. No gross space-occupying lesion, vasogenic edema or
 mass effect.

 

Unremarkable orbits. Clear mastoid air cells. Unremarkable calvarial bones.

 

IMPRESSION: 

No acute intracranial abnormality or gross space-occupying lesion by this nonenhanced CT scan. Chroni
c changes as described above.

## 2022-06-29 NOTE — P.HPIM
History of Present Illness


H&P Date: 22


Chief Complaint: Chest pain


History of present illness:


76-year-old gentleman with past medical history significant for nonobstructive 

coronary disease with multiple admissions for chest pain in the past few months.

 Past medical history significant for hypertension type 2 diabetes mellitus, 

chronic back pain and chronic lymphocytic leukemia.  Patient presenting with 

ambulance with chief complaint of chest pain and inguinal on and off for the 

past 2 weeks associated with exertional dyspnea.  The pain is in the left side 

described as someone sitting in his chest with no radiation.  Patient denied any

diaphoresis or nausea or vomiting.  The pain was 9 out of 10 in severity for 

calling the ambulance.  No alleviating or exacerbating factors.  Patient also 

reports intermittent headache and he attributes that to possible sinus problem. 

Patient denies any visual changes.  Patient denies any history of stroke.  

Patient denies any history of cancer.  Patient denies recent travel sick 

contacts.  He lives with his wife.








Review of system:


All 14 review of systems evaluated and all negative except for above.  











Physical examination:


General: non toxic, no distress, appears at stated age


Derm: warm, dry


Head: atraumatic, normocephalic, symmetric


Eyes: EOMI, no lid lag, anicteric sclera


Mouth: no lip lesion, mucus membranes moist


Cardiovascular: S1S2 reg, no murmur, positive posterior tibial pulse bilateral, 


Lungs: CTA bilateral, no rhonchi, no rales , no accessory muscle use


Abdominal: soft,  nontender to palpation, no guarding, no appreciable 

organomegaly


Ext: no gross muscle atrophy, no edema, no contractures


Neuro:  CN II-XI grossly intact, no focal neuro deficits


Psych: Alert, oriented, appropriate affect 








Assessment and plan:





#Chest pain


-Acute coronary syndrome has been ruled out


-Pending cardiology evaluation


-EKG shows sinus bradycardia with nonspecific ST segment or T-wave changes.


-Pending cardiology evaluation


-Resume baby aspirin, Imdur and statins


-Check LDL and A1c





#Intractable headache for 2 months


-CT of the brain ordered


-Sed rate and CRP ordered


-Check CT sinuses.  


-Neurology consultation was placed





#History of chronic CLL





#Chronic back pain with left lower extremity radiculopathy


-Resume gabapentin, Norco, and baclofen as needed








#Type 2 diabetes mellitus


-Diabetic diet and sliding scale insulin


-Check A1c





#Hypertension


-Resume Norvasc and lisinopril.











Past Medical History


Past Medical History: Coronary Artery Disease (CAD), Cancer, Diabetes Mellitus, 

GERD/Reflux, Hypertension, Osteoarthritis (OA)


Additional Past Medical History / Comment(s): NIDDM type II,  LEUKEMIA (CLL-

remission), sinus problems, seasonal allergies, tinnitis bilaterally, OA hands, 

R wrist carpal tunnel, past fx with L elbow, R rotator cuff tendon problems, L 

rotator cuff tear, left knee pain


History of Any Multi-Drug Resistant Organisms: None Reported


Past Surgical History: Heart Catheterization, Orthopedic Surgery


Additional Past Surgical History / Comment(s): LEFT HAND thumb tendon repair. L 

knee surgery.


Past Anesthesia/Blood Transfusion Reactions: No Reported Reaction


Past Psychological History: No Psychological Hx Reported


Smoking Status: Never smoker


Past Alcohol Use History: None Reported


Past Drug Use History: None Reported





- Past Family History


  ** Father


Family Medical History: CVA/TIA


Additional Family Medical History / Comment(s): Father  at the age of 85yrs.





  ** Mother


Family Medical History: Diabetes Mellitus


Additional Family Medical History / Comment(s): Mother  at the age of 90yrs.





Medications and Allergies


                                Home Medications











 Medication  Instructions  Recorded  Confirmed  Type


 


Montelukast Sodium [Singulair] 10 mg PO HS 17 History


 


Insulin NPH Hum/Reg Insulin Hm 20 units SQ AC-BRKFST 20 History





[Relion Novolin 70-30 Flexpen]    


 


Fluticasone Nasal Spray [Flonase 1 spr EA NOSTRIL DAILY 21 

History





Nasal [place]]    


 


HYDROcodone/APAP 5-325MG [Norco 1 tab PO Q6HR PRN 21 History





5-325]    


 


Ketorolac 0.5% Ophth Soln [Acular 1 drop BOTH EYES BID 21 History





0.5%]    


 


Atorvastatin [Lipitor] 80 mg PO HS 21 History


 


Baclofen [Lioresal] 10 mg PO DAILY 21 History


 


SILVER sulfADIAZINE CREAM 1 applic TOPICAL DAILY PRN 21 History





[Silvadene Cream]    


 


Isosorbide Mononitrate ER [Imdur] 30 mg PO DAILY #30 tab 21 Rx


 


Gabapentin 300 mg PO BID 10/20/21 06/29/22 History


 


Omeprazole 40 mg PO DAILY 10/20/21 06/29/22 History


 


lisinopriL [Zestril] 20 mg PO BID 10/20/21 06/29/22 History


 


Aspirin 81 mg PO DAILY  tab 10/21/21 06/29/22 Rx


 


ALPRAZolam [Xanax] 0.25 mg PO DAILY PRN 21 History


 


Brimonidine Tartrate/Timolol 1 drop BOTH EYES BID 21 History





[Combigan 0.2%-0.5% Eye Drops]    


 


Latanoprost/Pf [Latanoprost 0.005% 1 drop BOTH EYES HS 21 History





Eye Drop]    


 


Nitroglycerin Sl Tabs [Nitrostat] 0.4 mg SL Q5M PRN 21 History


 


amLODIPine [Norvasc] 5 mg PO DAILY 22 History








                                    Allergies











Allergy/AdvReac Type Severity Reaction Status Date / Time


 


amoxicillin [From Augmentin] Allergy  Rash/Hives Verified 22 07:52


 


clavulanic acid Allergy  Rash/Hives Verified 22 07:52





[From Augmentin]     














Physical Exam


Vitals: 


                                   Vital Signs











  Temp Pulse Resp BP Pulse Ox


 


 22 12:00   58 L  16  169/72 


 


 22 10:00    16  164/76 


 


 22 08:51   60  18  177/95  97


 


 22 04:50   52 L  16  116/58  95


 


 22 01:19   49 L  12  154/77  100


 


 22 00:05   50 L  14  121/66 


 


 22 22:53  98 F  59 L  18  147/67  95








                                Intake and Output











 22





 22:59 06:59 14:59


 


Other:   


 


  Weight 98.883 kg  














Results


CBC & Chem 7: 


                                 22 23:00





                                 22 23:00


Labs: 


                  Abnormal Lab Results - Last 24 Hours (Table)











  22 Range/Units





  23:00 23:00 


 


WBC  30.7 H   (3.8-10.6)  k/uL


 


RBC  4.19 L   (4.30-5.90)  m/uL


 


Plt Count  131 L   (150-450)  k/uL


 


Lymphocytes # (Manual)  25.48 H   (1.0-4.8)  k/uL


 


Carbon Dioxide   19 L  (22-30)  mmol/L


 


Glucose   139 H  (74-99)  mg/dL

## 2022-06-30 VITALS
TEMPERATURE: 97.9 F | RESPIRATION RATE: 18 BRPM | SYSTOLIC BLOOD PRESSURE: 142 MMHG | HEART RATE: 48 BPM | DIASTOLIC BLOOD PRESSURE: 74 MMHG

## 2022-06-30 LAB
BASOPHILS # BLD AUTO: 0.1 K/UL (ref 0–0.2)
BASOPHILS NFR BLD AUTO: 1 %
EOSINOPHIL # BLD AUTO: 0.1 K/UL (ref 0–0.7)
EOSINOPHIL NFR BLD AUTO: 1 %
ERYTHROCYTE [DISTWIDTH] IN BLOOD BY AUTOMATED COUNT: 4.3 M/UL (ref 4.3–5.9)
ERYTHROCYTE [DISTWIDTH] IN BLOOD: 14 % (ref 11.5–15.5)
GLUCOSE BLD-MCNC: 210 MG/DL (ref 70–110)
GLUCOSE BLD-MCNC: 228 MG/DL (ref 70–110)
HCT VFR BLD AUTO: 41.4 % (ref 39–53)
HGB BLD-MCNC: 14.1 GM/DL (ref 13–17.5)
LYMPHOCYTES # SPEC AUTO: 16.2 K/UL (ref 1–4.8)
LYMPHOCYTES NFR SPEC AUTO: 74 %
MCH RBC QN AUTO: 32.8 PG (ref 25–35)
MCHC RBC AUTO-ENTMCNC: 34.1 G/DL (ref 31–37)
MCV RBC AUTO: 96.2 FL (ref 80–100)
MONOCYTES # BLD AUTO: 0.3 K/UL (ref 0–1)
MONOCYTES NFR BLD AUTO: 2 %
NEUTROPHILS # BLD AUTO: 4.6 K/UL (ref 1.3–7.7)
NEUTROPHILS NFR BLD AUTO: 21 %
PLATELET # BLD AUTO: 93 K/UL (ref 150–450)
WBC # BLD AUTO: 22 K/UL (ref 3.8–10.6)

## 2022-06-30 RX ADMIN — KETOROLAC TROMETHAMINE SCH DROPS: 5 SOLUTION OPHTHALMIC at 08:14

## 2022-06-30 RX ADMIN — BACLOFEN SCH MG: 10 TABLET ORAL at 08:12

## 2022-06-30 RX ADMIN — GABAPENTIN SCH MG: 300 CAPSULE ORAL at 08:17

## 2022-06-30 RX ADMIN — TIMOLOL MALEATE SCH DROPS: 5 SOLUTION OPHTHALMIC at 08:13

## 2022-06-30 RX ADMIN — BRIMONIDINE TARTRATE SCH DROPS: 2 SOLUTION/ DROPS OPHTHALMIC at 08:14

## 2022-06-30 RX ADMIN — FLUTICASONE PROPIONATE SCH SPRAY: 50 SPRAY, METERED NASAL at 08:14

## 2022-06-30 RX ADMIN — HYDROCODONE BITARTRATE AND ACETAMINOPHEN PRN EACH: 5; 325 TABLET ORAL at 02:41

## 2022-06-30 RX ADMIN — ASPIRIN 81 MG CHEWABLE TABLET SCH MG: 81 TABLET CHEWABLE at 08:13
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
## 2022-06-30 NOTE — CT
EXAMINATION TYPE: CT abdomen pelvis w con

 

DATE OF EXAM: 6/30/2022

 

COMPARISON: CT dated 7/21/2020

 

HISTORY: lower abdominal pain

 

CT DLP: 2040 mGycm

Automated exposure control for dose reduction was used.

 

TECHNIQUE:  Helical acquisition of images was performed from the lung bases through the pelvis.

 

CONTRAST: 

Performed without Oral Contrast and with IV Contrast, patient injected with 70mL mL of Isovue 300.

 

FINDINGS: 

 

LUNG BASES: Mild peripheral pulmonary reticulations and minimal fibrotic changes. Left atrial and calvin
tricular dilatation. Coronary arterial calcifications.

 

LIVER/GB: Unremarkable liver. Suspected gallbladder sludge.

 

PANCREAS: Fatty infiltration of the pancreas.

 

SPLEEN: Enlarged spleen measuring 15.2 cm. No definite splenic focal lesion.

 

ADRENALS: No significant abnormality is seen.

 

KIDNEYS: No significant abnormality is seen.

 

FREE AIR:  No free air is visualized.

 

RETROPERITONEAL ADENOPATHY:  None visualized

 

REPRODUCTIVE ORGANS: No significant abnormality is seen

 

URINARY BLADDER:  No significant abnormality is seen.

 

PELVIC ADENOPATHY:  None visualized.

 

OSSEOUS STRUCTURES:  Degenerative changes of the hip joints and lumbar spine. Further elective lumbar
 spine MRI assessment can be considered.

 

BOWEL:  Unremarkable nondistended stomach, duodenum and small bowel. Scattered uncomplicated colonic 
diverticulosis with segments of fecal loading of the colon. No evidence of acute diverticulitis or ac
[place] colitis.

 

OTHER: Arterial atherosclerotic calcifications. Right fat-containing inguinal hernia.

 

IMPRESSION:

Colonic diverticulosis without evidence of acute diverticulitis. Other incidental findings as describ
ed above.

## 2022-06-30 NOTE — P.CRDCN
History of Present Illness


Consult date: 22


History of present illness: 





HISTORY OF PRESENT ILLNESS:  





This is a 76-year-old male with a past medical history significant for 

nonobstructive coronary artery disease, hypertension, diabetes, and GERD. 

Patient follows in the office with Dr. Diamond. We have been asked to see the 

patient in consultation for chest pain. Patient examined at the bedside.  

Patient states he presented to the hospital with a chief complaint of headache. 

Patient also reports having chest pain.  However when questioning the patient 

regarding his chest pain he points to his left and right upper abdominal 

quadrants.  His pain is not actually in fact chest pain.  He denied any 

shortness of breath.  He reports nausea but no vomiting.  Denies any radiation 

of the pain into his arm jaw or back.  Denies dizziness or lightheadedness.  He 

states this pain has been occurring for the past several months.





* EKG reveals sinus mechanism with nonspecific ST-T wave changes


* Chest xray there is some mild pulmonary interstitial density.  Interstitial 

  densities increased compared to old exam.  This could be acute or chronic 

  interstitial pneumonia.  No pleural fluid seen to suggest heart failure.


* Laboratory data: WBC 22.0.  Hemoglobin 14.1.  Platelet count 93.  Sodium 137. 

  Potassium 4.3.  BUN 14.  Creatinine 0.86.  Troponin negative 3


* Current home cardiac medications include lisinopril 20 mg twice a day, Norvasc

  5 mg daily, Imdur 30 mg daily, Lipitor 80 mg at night, and aspirin 81 mg daily


* Most recent echocardiogram obtained in the 2021 revealed ejection 

  fraction 55-60%


* Cardiac catheterization history: 2021 revealing intermediate 

  nonobstructive disease involving the RCA with a lesion ranging between 40 and 

  50%.  Midportion of circumflex with 20-30% stenosis.





REVIEW OF SYSTEMS: 


At the time of my exam:


CONSTITUTIONAL: Denies fever or chills.


HEENT: Denies blurred vision, vision changes, or eye pain. Denies hemoptysis 


CARDIOVASCULAR: Denies chest pain. Denies orthopnea. Denies PND. Denies 

palpitations


RESPIRATORY: Denies shortness of breath. 


GASTROINTESTINAL: Denies abdominal pain. Denies nausea or vomiting. 


HEMATOLOGIC: Denies bleeding disorders.


GENITOURINARY:  Denies any blood in urine.


SKIN: Denies pruitis. Denies rash.





PHYSICAL EXAM: 


VITAL SIGNS: Reviewed.


GENERAL: Well-developed in no acute distress. 


HEENT: Head is normocephalic. Pupils are equal, round. Sclerae anicteric. Mucous

membranes of the mouth are moist. Neck supple. No JVD or thyromegaly


LUNGS: Respirations even and unlabored. Lungs essentially clear to auscultation 

bilaterally.


HEART: Regular rate and rhythm.  S1 and S2 heard.


ABDOMEN: Soft. Nondistended. Nontender.


EXTREMITIES: Normal range of motion.  No clubbing or cyanosis.  Peripheral 

pulses intact.  No lower extremity edema


NEUROLOGIC: Awake and alert. Oriented x 3. 





ASSESSMENT: 


Chest pain, ruled out, patient actually has right and left upper quadrant 

abdominal pain


Nonobstructive coronary artery disease


Hypertension


Diabetes


GERD





PLAN: 


Acute coronary and has been ruled out


Patient is noted to have recent echocardiogram performed in 2021 and 

heart catheterization performed in 2021 revealing nonobstructive 

coronary artery disease


The patient is currently not complaining of chest pain.  However he is reporting

right and upper left quadrant abdominal pain.  We'll defer management of this to

internal medicine.


We will sign off. Please reconsult if needed








Nurse practitioner note has been reviewed by physician. Signing provider agrees 

with the documented findings, assessment, and plan of care. 








Past Medical History


Past Medical History: Coronary Artery Disease (CAD), Cancer, Diabetes Mellitus, 

GERD/Reflux, Hypertension, Osteoarthritis (OA)


Additional Past Medical History / Comment(s): NIDDM type II, 1999 LEUKEMIA (CLL-

remission), sinus problems, seasonal allergies, tinnitis bilaterally, OA hands, 

R wrist carpal tunnel, past fx with L elbow, R rotator cuff tendon problems, L 

rotator cuff tear, left knee pain


History of Any Multi-Drug Resistant Organisms: None Reported


Past Surgical History: Heart Catheterization, Orthopedic Surgery


Additional Past Surgical History / Comment(s): LEFT HAND thumb tendon repair. L 

knee surgery.


Past Anesthesia/Blood Transfusion Reactions: No Reported Reaction


Past Psychological History: No Psychological Hx Reported


Smoking Status: Never smoker


Past Alcohol Use History: None Reported


Past Drug Use History: None Reported





- Past Family History


  ** Father


Family Medical History: CVA/TIA


Additional Family Medical History / Comment(s): Father  at the age of 85yrs.





  ** Mother


Family Medical History: Diabetes Mellitus


Additional Family Medical History / Comment(s): Mother  at the age of 90yrs.





Medications and Allergies


                                Home Medications











 Medication  Instructions  Recorded  Confirmed  Type


 


Montelukast Sodium [Singulair] 10 mg PO HS 17 History


 


Insulin NPH Hum/Reg Insulin Hm 20 units SQ AC-BRKFST 20 History





[Relion Novolin 70-30 Flexpen]    


 


Fluticasone Nasal Spray [Flonase 1 spr EA NOSTRIL DAILY 21 

History





Nasal [place]]    


 


HYDROcodone/APAP 5-325MG [Norco 1 tab PO Q6HR PRN 21 History





5-325]    


 


Ketorolac 0.5% Ophth Soln [Acular 1 drop BOTH EYES BID 21 History





0.5%]    


 


Atorvastatin [Lipitor] 80 mg PO HS 21 History


 


Baclofen [Lioresal] 10 mg PO DAILY 21 History


 


SILVER sulfADIAZINE CREAM 1 applic TOPICAL DAILY PRN 21 History





[Silvadene Cream]    


 


Isosorbide Mononitrate ER [Imdur] 30 mg PO DAILY #30 tab 21 Rx


 


Gabapentin 300 mg PO BID 10/20/21 06/29/22 History


 


Omeprazole 40 mg PO DAILY 10/20/21 06/29/22 History


 


lisinopriL [Zestril] 20 mg PO BID 10/20/21 06/29/22 History


 


Aspirin 81 mg PO DAILY  tab 10/21/21 06/29/22 Rx


 


ALPRAZolam [Xanax] 0.25 mg PO DAILY PRN 21 History


 


Brimonidine Tartrate/Timolol 1 drop BOTH EYES BID 21 History





[Combigan 0.2%-0.5% Eye Drops]    


 


Latanoprost/Pf [Latanoprost 0.005% 1 drop BOTH EYES HS 21 History





Eye Drop]    


 


Nitroglycerin Sl Tabs [Nitrostat] 0.4 mg SL Q5M PRN 21 History


 


amLODIPine [Norvasc] 5 mg PO DAILY 22 History








                                    Allergies











Allergy/AdvReac Type Severity Reaction Status Date / Time


 


amoxicillin [From Augmentin] Allergy  Rash/Hives Verified 22 07:52


 


clavulanic acid Allergy  Rash/Hives Verified 22 07:52





[From Augmentin]     














Physical Exam


Vitals: 


                                   Vital Signs











  Temp Pulse Pulse Resp BP BP Pulse Ox


 


 22 08:00     16   


 


 22 07:00  98.3 F   53 L  16   143/76  98


 


 22 03:28  97.5 F L   43 L  16   127/67  96


 


 22 20:21  97.8 F   52 L  17   152/67  96


 


 22 14:46  97.9 F   67  18   158/75  94 L


 


 22 12:00   58 L   16  169/72  








                                Intake and Output











 22





 22:59 06:59 14:59


 


Intake Total 118  


 


Balance 118  


 


Intake:   


 


  Oral 118  


 


Other:   


 


  # Voids 1 1 














Results





                                 22 09:09





                                 22 23:00


                                     Lipids











  22 Range/Units





  10:51 


 


Triglycerides  64.50  (0..00)  mg/dL


 


Cholesterol  136.00  (0..00)  mg/dL


 


HDL Cholesterol  34.70 L  (40.00-60.00)  mg/dL


 


Cholesterol/HDL Ratio  3.92  Ratio








                                       CBC











  22 Range/Units





  09:09 


 


WBC  22.0 H  (3.8-10.6)  k/uL


 


RBC  4.30  (4.30-5.90)  m/uL


 


Hgb  14.1  (13.0-17.5)  gm/dL


 


Hct  41.4  (39.0-53.0)  %








                               Current Medications











Generic Name Dose Route Start Last Admin





  Trade Name Freq  PRN Reason Stop Dose Admin


 


Hydrocodone Bitart/Acetaminophen  1 each  22 10:35  22 02:41





  Hydrocodone/Apap 5-325mg 1 Each Tab  PO   1 each





  Q6HR PRN   Administration





  Pain  


 


Alprazolam  0.25 mg  22 10:35  22 16:15





  Alprazolam 0.25 Mg Tab  PO   0.25 mg





  DAILY PRN   Administration





  Anxiety  


 


Amlodipine Besylate  5 mg  22 10:45  22 08:13





  Amlodipine 5 Mg Tab  PO   5 mg





  DAILY MAYRA   Administration


 


Aspirin  81 mg  22 10:45  22 08:13





  Aspirin 81 Mg  PO   81 mg





  DAILY MAYRA   Administration


 


Atorvastatin Calcium  80 mg  22 21:00  22 19:54





  Atorvastatin 80 Mg Tab  PO   80 mg





  HS MAYRA   Administration


 


Baclofen  10 mg  22 10:45  22 08:12





  Baclofen 10 Mg Tab  PO   10 mg





  DAILY MAYRA   Administration


 


Brimonidine Tartrate  1 drops  22 10:45  22 08:14





  Brimonidine Tartrate 0.2% Drops 5 Ml Btl  BOTH EYES   1 drops





  BID MAYRA   Administration


 


Fluticasone Propionate  1 spray  22 10:45  22 08:14





  Fluticasone 50mcg/Spray Nasal 16gm  EA NOSTRIL   1 spray





  DAILY MAYRA   Administration


 


Gabapentin  300 mg  22 10:45  22 08:17





  Gabapentin 300 Mg Cap  PO   300 mg





  BID MAYRA   Administration


 


Sodium Chloride  1,000 mls @ 130 mls/hr  22 02:00  22 23:50





  Saline 0.9%  IV   Not Given





  .[id number] [facility]  


 


Insulin Aspart  20 unit  22 07:30  22 08:13





  Insuln Asp Prt/Insulin Aspart 100 Unit/Ml 10 Ml Vial  SQ   20 unit





  AC-BRKFST [facility]   Administration


 


Isosorbide Mononitrate  30 mg  22 09:00  22 08:12





  Isosorbide Mononitrate Er 30 Mg Tab.Er.24h  PO   30 mg





  DAILY [facility]   Administration


 


Ketorolac Tromethamine  1 drops  22 21:00  22 08:14





  Ketorolac 0.5% Ophth Drops 5 Ml Btl  BOTH EYES   1 drops





  BID MAYRA   Administration


 


Latanoprost  1 drops  22 21:00  22 19:55





  Latanoprost 0.005% Ophth Drops 2.5 Ml Btl  BOTH EYES   1 drops





  HS MAYRA   Administration


 


Lisinopril  20 mg  22 10:45  22 08:17





  Lisinopril 20 Mg Tab  PO   20 mg





  BID MAYRA   Administration


 


Lorazepam  0.5 mg  22 10:37 





  Lorazepam 0.5 Mg Tab  PO  





  Q6HR PRN  





  Anxiety  


 


Montelukast Sodium  10 mg  22 21:00  22 19:54





  Montelukast 10 Mg Tab  PO   10 mg





  HS MAYRA   Administration


 


Morphine Sulfate  4 mg  22 01:48 





  Morphine Sulfate 4 Mg/Ml Syringe  IV  





  Q4HR PRN  





  Severe Pain  


 


Naloxone HCl  0.2 mg  22 01:48 





  Naloxone 0.4 Mg/Ml 1 Ml Vial  IV  





  Q2M PRN  





  Opioid Reversal  


 


Nitroglycerin  0.4 mg  22 10:35 





  Nitroglycerin Sl Tabs 0.4 Mg Tab  SUBLINGUAL  





  Q5M PRN  





  Chest Pain  


 


Ondansetron HCl  4 mg  22 01:48 





  Ondansetron 4 Mg/2 Ml Vial  IVP  





  Q8HR PRN  





  Nausea And Vomiting  


 


Pantoprazole Sodium  40 mg  22 07:30  22 08:12





  Pantoprazole 40 Mg Tablet  PO   40 mg





  AC-BRKFST MAYRA   Administration


 


Silver Sulfadiazine  1 applic  22 10:35 





  Silver Sulfadiazine 1% Cream 25 Gm Tube  TOPICAL  





  DAILY PRN  





  Wound Healing  





  Protocol  


 


Timolol Maleate  1 drops  22 10:45  22 08:13





  Timolol 0.5% Ophth Drops 5 Ml Btl  BOTH EYES   1 drops





  BID MAYRA   Administration








                                Intake and Output











 22





 22:59 06:59 14:59


 


Intake Total 118  


 


Balance 118  


 


Intake:   


 


  Oral 118  


 


Other:   


 


  # Voids 1 1 








                                        





                                 22 09:09 





                                 22 23:00

## 2022-06-30 NOTE — P.DS
Providers


Date of admission: 


06/29/22 01:48





Expected date of discharge: 06/30/22


Attending physician: 


Sera Warren MD





Consults: 





                                        





06/29/22 13:39


Consult Physician Routine 


   Consulting Provider: Mary Fair


   Consult Reason/Comments: interactable headach


   Do you want consulting provider notified?: Yes











[facility] 


[facility]





[facility] Course: 





History of present illness:


76-year-old gentleman with past medical history significant for nonobstructive 

coronary disease with multiple admissions for chest pain in the past few months.

 Past medical history significant for hypertension type 2 diabetes mellitus, 

chronic back pain and chronic lymphocytic leukemia.  Patient presenting with 

ambulance with chief complaint of chest pain and inguinal on and off for the 

past 2 weeks associated with exertional dyspnea.  The pain is in the left side 

described as someone sitting in his chest with no radiation.  Patient denied any

diaphoresis or nausea or vomiting.  The pain was 9 out of 10 in severity for 

calling the ambulance.  No alleviating or exacerbating factors.  Patient also 

reports intermittent headache and he attributes that to possible sinus problem. 

Patient denies any visual changes.  Patient denies any history of stroke.  

Patient denies any history of cancer.  Patient denies recent travel sick 

contacts.  He lives with his wife.

















Physical examination on discharge:


General: non toxic, no distress, appears at stated age


Derm: warm, dry


Head: atraumatic, normocephalic, symmetric


Eyes: EOMI, no lid lag, anicteric sclera


Mouth: no lip lesion, mucus membranes moist


Cardiovascular: S1S2 reg, no murmur, positive posterior tibial pulse bilateral, 


Lungs: CTA bilateral, no rhonchi, no rales , no accessory muscle use


Abdominal: soft,  nontender to palpation, no guarding, no appreciable 

organomegaly


Ext: no gross muscle atrophy, no edema, no contractures


Neuro:  CN II-XI grossly intact, no focal neuro deficits


Psych: Alert, oriented, appropriate affect 








Hospital course in detail the problem list





#Chest pain


-Acute coronary syndrome has been ruled out


-Pending cardiology evaluation


-EKG shows sinus bradycardia with nonspecific ST segment or T-wave changes.


-Cardiology evaluated the patient and cleared the patient for discharge


-Resume baby aspirin, Imdur and statins


-A1c is 8.1


-LDL is 88.4





#Intractable headache for 2 months


-Unclear etiology


-CT of the brain without contrast unremarkable


-Normal sed rate and CRP


-CT sinus a few negative for acute or chronic sinusitis


-Neurology recommended changing Neurontin to 300 mg 3 times a day instead of 

twice daily--prescription was given on discharge





#Nonspecific abdominal discomfort reported by cardiology


-Benign abdominal exam


-Patient denies any abdominal pain to myself and RN at the bedside





#History of chronic CLL





#Chronic back pain with left lower extremity radiculopathy


-Resume gabapentin, Norco, and baclofen as needed


-CT abdomen and pelvis with contrast showed colonic diverticulosis without 

evidence of acute diverticulitis.





#Type 2 diabetes mellitus with uncontrolled hyperglycemia


-Diabetic diet and sliding scale insulin


-A1c is 8.1


-Follow up with primary care physician for tight glycemic control





#Hypertension


-Resume Norvasc and lisinopril.











Patient Condition at Discharge: Stable





Plan - Discharge Summary


Discharge Rx Participant: No


New Discharge Prescriptions: 


New


   Gabapentin [Neurontin] 300 mg PO TID #90 cap





Continue


   Montelukast Sodium [Singulair] 10 mg PO HS


   Insulin NPH Hum/Reg Insulin Hm [Relion Novolin 70-30 Flexpen] 20 units SQ AC-

BRKFST


   Fluticasone Nasal Spray [Flonase Nasal Spray] 1 spr EA NOSTRIL DAILY


   Atorvastatin [Lipitor] 80 mg PO HS


   Baclofen [Lioresal] 10 mg PO DAILY


   Latanoprost/Pf [Latanoprost 0.005% Eye Drop] 1 drop BOTH EYES HS


   ALPRAZolam [Xanax] 0.25 mg PO DAILY PRN


     PRN Reason: Anxiety


   Nitroglycerin Sl Tabs [Nitrostat] 0.4 mg SL Q5M PRN


     PRN Reason: Chest Pain


   amLODIPine [Norvasc] 5 mg PO DAILY


   Ketorolac 0.5% Ophth Soln [Acular 0.5%] 1 drop BOTH EYES BID


   HYDROcodone/APAP 5-325MG [Norco 5-325] 1 tab PO Q6HR PRN


     PRN Reason: Pain


   SILVER sulfADIAZINE CREAM [Silvadene Cream] 1 applic TOPICAL DAILY PRN


     PRN Reason: Wound Healing


   Isosorbide Mononitrate ER [Imdur] 30 mg PO DAILY #30 tab


   lisinopriL [Zestril] 20 mg PO BID


   Omeprazole 40 mg PO DAILY


   Aspirin 81 mg PO DAILY  tab


   Brimonidine Tartrate/Timolol [Combigan 0.2%-0.5% Eye Drops] 1 drop BOTH EYES 

BID





Discontinued


   Gabapentin 300 mg PO BID


Discharge Medication List





Montelukast Sodium [Singulair] 10 mg PO HS 08/18/17 [History]


Insulin NPH Hum/Reg Insulin Hm [Relion Novolin 70-30 Flexpen] 20 units SQ AC-

BRKFST 05/29/20 [History]


Fluticasone Nasal Spray [Flonase Nasal Spray] 1 spr EA NOSTRIL DAILY 04/19/21 

[History]


HYDROcodone/APAP 5-325MG [Norco 5-325] 1 tab PO Q6HR PRN 04/19/21 [History]


Ketorolac 0.5% Ophth Soln [Acular 0.5%] 1 drop BOTH EYES BID 04/19/21 [History]


Atorvastatin [Lipitor] 80 mg PO HS 06/01/21 [History]


Baclofen [Lioresal] 10 mg PO DAILY 07/12/21 [History]


SILVER sulfADIAZINE CREAM [Silvadene Cream] 1 applic TOPICAL DAILY PRN 07/12/21 

[History]


Isosorbide Mononitrate ER [Imdur] 30 mg PO DAILY #30 tab 07/13/21 [Rx]


Omeprazole 40 mg PO DAILY 10/20/21 [History]


lisinopriL [Zestril] 20 mg PO BID 10/20/21 [History]


Aspirin 81 mg PO DAILY  tab 10/21/21 [Rx]


ALPRAZolam [Xanax] 0.25 mg PO DAILY PRN 11/30/21 [History]


Brimonidine Tartrate/Timolol [Combigan 0.2%-0.5% Eye Drops] 1 drop BOTH EYES BID

11/30/21 [History]


Latanoprost/Pf [Latanoprost 0.005% Eye Drop] 1 drop BOTH EYES HS 11/30/21 

[History]


Nitroglycerin Sl Tabs [Nitrostat] 0.4 mg SL Q5M PRN 11/30/21 [History]


amLODIPine [Norvasc] 5 mg PO DAILY 06/29/22 [History]


Gabapentin [Neurontin] 300 mg PO TID #90 cap 06/30/22 [Rx]








Follow up Appointment(s)/Referral(s): 


Smith Figueroa DO [Primary Care Provider] - 1-2 days


Discharge Disposition: HOME SELF-CARE

## 2022-08-06 ENCOUNTER — HOSPITAL ENCOUNTER (OUTPATIENT)
Dept: HOSPITAL 47 - EC | Age: 77
Setting detail: OBSERVATION
LOS: 2 days | Discharge: HOME | End: 2022-08-08
Attending: FAMILY MEDICINE | Admitting: FAMILY MEDICINE
Payer: MEDICARE

## 2022-08-06 DIAGNOSIS — Z79.82: ICD-10-CM

## 2022-08-06 DIAGNOSIS — Z91.11: ICD-10-CM

## 2022-08-06 DIAGNOSIS — M19.042: ICD-10-CM

## 2022-08-06 DIAGNOSIS — M54.50: ICD-10-CM

## 2022-08-06 DIAGNOSIS — G89.29: ICD-10-CM

## 2022-08-06 DIAGNOSIS — M19.041: ICD-10-CM

## 2022-08-06 DIAGNOSIS — E11.9: ICD-10-CM

## 2022-08-06 DIAGNOSIS — Z79.4: ICD-10-CM

## 2022-08-06 DIAGNOSIS — I25.10: ICD-10-CM

## 2022-08-06 DIAGNOSIS — Z85.6: ICD-10-CM

## 2022-08-06 DIAGNOSIS — K21.9: ICD-10-CM

## 2022-08-06 DIAGNOSIS — J30.2: ICD-10-CM

## 2022-08-06 DIAGNOSIS — Z82.3: ICD-10-CM

## 2022-08-06 DIAGNOSIS — F12.90: ICD-10-CM

## 2022-08-06 DIAGNOSIS — I10: ICD-10-CM

## 2022-08-06 DIAGNOSIS — E78.5: ICD-10-CM

## 2022-08-06 DIAGNOSIS — I16.1: Primary | ICD-10-CM

## 2022-08-06 DIAGNOSIS — Z79.899: ICD-10-CM

## 2022-08-06 DIAGNOSIS — Z83.3: ICD-10-CM

## 2022-08-06 LAB
ALBUMIN SERPL-MCNC: 3.7 G/DL (ref 3.5–5)
ALP SERPL-CCNC: 72 U/L (ref 38–126)
ALT SERPL-CCNC: 18 U/L (ref 4–49)
ANION GAP SERPL CALC-SCNC: 4 MMOL/L
APTT BLD: 21.3 SEC (ref 22–30)
AST SERPL-CCNC: 33 U/L (ref 17–59)
BASOPHILS # BLD AUTO: 0.1 K/UL (ref 0–0.2)
BASOPHILS NFR BLD AUTO: 0 %
BUN SERPL-SCNC: 13 MG/DL (ref 9–20)
CALCIUM SPEC-MCNC: 8.5 MG/DL (ref 8.4–10.2)
CHLORIDE SERPL-SCNC: 108 MMOL/L (ref 98–107)
CO2 SERPL-SCNC: 27 MMOL/L (ref 22–30)
EOSINOPHIL # BLD AUTO: 0.1 K/UL (ref 0–0.7)
EOSINOPHIL NFR BLD AUTO: 0 %
ERYTHROCYTE [DISTWIDTH] IN BLOOD BY AUTOMATED COUNT: 4.04 M/UL (ref 4.3–5.9)
ERYTHROCYTE [DISTWIDTH] IN BLOOD: 13.6 % (ref 11.5–15.5)
GLUCOSE BLD-MCNC: 161 MG/DL (ref 70–110)
GLUCOSE BLD-MCNC: 305 MG/DL (ref 70–110)
GLUCOSE SERPL-MCNC: 123 MG/DL (ref 74–99)
HCT VFR BLD AUTO: 38.1 % (ref 39–53)
HGB BLD-MCNC: 12.7 GM/DL (ref 13–17.5)
INR PPP: 1 (ref ?–1.2)
LYMPHOCYTES # SPEC AUTO: 16.6 K/UL (ref 1–4.8)
LYMPHOCYTES NFR SPEC AUTO: 81 %
MAGNESIUM SPEC-SCNC: 1.8 MG/DL (ref 1.6–2.3)
MCH RBC QN AUTO: 31.3 PG (ref 25–35)
MCHC RBC AUTO-ENTMCNC: 33.2 G/DL (ref 31–37)
MCV RBC AUTO: 94.3 FL (ref 80–100)
MONOCYTES # BLD AUTO: 0.4 K/UL (ref 0–1)
MONOCYTES NFR BLD AUTO: 2 %
NEUTROPHILS # BLD AUTO: 2.6 K/UL (ref 1.3–7.7)
NEUTROPHILS NFR BLD AUTO: 13 %
PH UR: 6.5 [PH] (ref 5–8)
PLATELET # BLD AUTO: 97 K/UL (ref 150–450)
POTASSIUM SERPL-SCNC: 4.3 MMOL/L (ref 3.5–5.1)
PROT SERPL-MCNC: 5.9 G/DL (ref 6.3–8.2)
PT BLD: 11 SEC (ref 9–12)
SODIUM SERPL-SCNC: 139 MMOL/L (ref 137–145)
SP GR UR: 1.01 (ref 1–1.03)
UROBILINOGEN UR QL STRIP: <2 MG/DL (ref ?–2)
WBC # BLD AUTO: 20.5 K/UL (ref 3.8–10.6)

## 2022-08-06 PROCEDURE — 83036 HEMOGLOBIN GLYCOSYLATED A1C: CPT

## 2022-08-06 PROCEDURE — 85730 THROMBOPLASTIN TIME PARTIAL: CPT

## 2022-08-06 PROCEDURE — 93005 ELECTROCARDIOGRAM TRACING: CPT

## 2022-08-06 PROCEDURE — 36415 COLL VENOUS BLD VENIPUNCTURE: CPT

## 2022-08-06 PROCEDURE — 85610 PROTHROMBIN TIME: CPT

## 2022-08-06 PROCEDURE — 80053 COMPREHEN METABOLIC PANEL: CPT

## 2022-08-06 PROCEDURE — 71046 X-RAY EXAM CHEST 2 VIEWS: CPT

## 2022-08-06 PROCEDURE — 99285 EMERGENCY DEPT VISIT HI MDM: CPT

## 2022-08-06 PROCEDURE — 83735 ASSAY OF MAGNESIUM: CPT

## 2022-08-06 PROCEDURE — 84484 ASSAY OF TROPONIN QUANT: CPT

## 2022-08-06 PROCEDURE — 85379 FIBRIN DEGRADATION QUANT: CPT

## 2022-08-06 PROCEDURE — 85025 COMPLETE CBC W/AUTO DIFF WBC: CPT

## 2022-08-06 PROCEDURE — 83880 ASSAY OF NATRIURETIC PEPTIDE: CPT

## 2022-08-06 PROCEDURE — 80061 LIPID PANEL: CPT

## 2022-08-06 PROCEDURE — 81003 URINALYSIS AUTO W/O SCOPE: CPT

## 2022-08-06 RX ADMIN — GABAPENTIN SCH MG: 300 CAPSULE ORAL at 16:20

## 2022-08-06 RX ADMIN — GABAPENTIN SCH MG: 300 CAPSULE ORAL at 21:10

## 2022-08-06 RX ADMIN — NITROGLYCERIN PRN MG: 0.4 TABLET SUBLINGUAL at 11:18

## 2022-08-06 RX ADMIN — INSULIN ASPART SCH UNIT: 100 INJECTION, SOLUTION INTRAVENOUS; SUBCUTANEOUS at 18:04

## 2022-08-06 RX ADMIN — LATANOPROST SCH DROPS: 50 SOLUTION OPHTHALMIC at 21:12

## 2022-08-06 RX ADMIN — TIMOLOL MALEATE SCH DROPS: 5 SOLUTION OPHTHALMIC at 21:12

## 2022-08-06 RX ADMIN — INSULIN ASPART SCH UNIT: 100 INJECTION, SOLUTION INTRAVENOUS; SUBCUTANEOUS at 21:11

## 2022-08-06 RX ADMIN — BRIMONIDINE TARTRATE SCH DROPS: 2 SOLUTION/ DROPS OPHTHALMIC at 21:12

## 2022-08-06 RX ADMIN — NITROGLYCERIN PRN MG: 0.4 TABLET SUBLINGUAL at 11:13

## 2022-08-06 RX ADMIN — ATORVASTATIN CALCIUM SCH MG: 80 TABLET, FILM COATED ORAL at 21:11

## 2022-08-06 RX ADMIN — KETOROLAC TROMETHAMINE SCH DROPS: 5 SOLUTION OPHTHALMIC at 21:12

## 2022-08-06 RX ADMIN — HYDROCODONE BITARTRATE AND ACETAMINOPHEN PRN EACH: 5; 325 TABLET ORAL at 13:36

## 2022-08-06 RX ADMIN — MONTELUKAST SODIUM SCH MG: 10 TABLET, FILM COATED ORAL at 21:11

## 2022-08-06 RX ADMIN — HYDROCODONE BITARTRATE AND ACETAMINOPHEN PRN EACH: 5; 325 TABLET ORAL at 21:10

## 2022-08-06 NOTE — ED
Chest Pain HPI





- General


Chief Complaint: Chest Pain


Stated Complaint: Chest Pain


Time Seen by Provider: 22 04:18


Source: patient, EMS


Mode of arrival: EMS


Limitations: no limitations





- History of Present Illness


Initial Comments: 





This patient is a 77-year-old man who presents to have evaluation of chest pain.

 The pain is substernal, came on over the course the evening, and then it was 

relieved with nitroglycerin.  Patient currently feeling better following 

medications.  There was some dyspnea early this also resolved.


MD Complaint: chest pain


-: hour(s)


Onset: during rest


Pain Location: substernal


Pain Radiation: none


Severity: moderate


Quality: heaviness


Consistency: now resolved


Improves With: nitroglycerin


Worsens With: nothing


Anginal Symptoms: dyspnea


Treatments Prior to Arrival: aspirin, nitroglycerin





- Related Data


                                Home Medications











 Medication  Instructions  Recorded  Confirmed


 


Montelukast Sodium [Singulair] 10 mg PO HS 17


 


Insulin NPH Hum/Reg Insulin Hm 20 units SQ AC-BRKFST 20





[Relion Novolin 70-30 Flexpen]   


 


Fluticasone Nasal Spray [Flonase 1 spr EA NOSTRIL DAILY 21





Nasal [place]]   


 


HYDROcodone/APAP 5-325MG [Norco 1 tab PO Q6HR PRN 21





5-325]   


 


Ketorolac 0.5% Ophth Soln [Acular 1 drop BOTH EYES BID 21





0.5%]   


 


Atorvastatin [Lipitor] 80 mg PO HS 21


 


Baclofen [Lioresal] 10 mg PO DAILY 21


 


SILVER sulfADIAZINE CREAM 1 applic TOPICAL DAILY PRN 21





[Silvadene Cream]   


 


Omeprazole 40 mg PO DAILY 10/20/21 06/29/22


 


lisinopriL [Zestril] 20 mg PO BID 10/20/21 06/29/22


 


ALPRAZolam [Xanax] 0.25 mg PO DAILY PRN 21


 


Brimonidine Tartrate/Timolol 1 drop BOTH EYES BID 21





[Combigan 0.2%-0.5% Eye Drops]   


 


Latanoprost/Pf [Latanoprost 0.005% 1 drop BOTH EYES HS 21





Eye Drop]   


 


Nitroglycerin Sl Tabs [Nitrostat] 0.4 mg SL Q5M PRN 21


 


amLODIPine [Norvasc] 5 mg PO DAILY 22








                                  Previous Rx's











 Medication  Instructions  Recorded


 


Isosorbide Mononitrate ER [Imdur] 30 mg PO DAILY #30 tab 21


 


Aspirin 81 mg PO DAILY  tab 10/21/21


 


Gabapentin [Neurontin] 300 mg PO TID #90 cap 22











                                    Allergies











Allergy/AdvReac Type Severity Reaction Status Date / Time


 


amoxicillin [From Augmentin] Allergy  Rash/Hives Verified 22 07:52


 


clavulanic acid Allergy  Rash/Hives Verified 22 07:52





[From Augmentin]     














Review of Systems


ROS Statement: 


Those systems with pertinent positive or pertinent negative responses have been 

documented in the HPI.





ROS Other: All systems not noted in ROS Statement are negative.


Constitutional: Denies: fever, chills


Respiratory: Reports: dyspnea.  Denies: cough


Cardiovascular: Reports: chest pain.  Denies: palpitations, orthopnea, edema, 

syncope


Gastrointestinal: Denies: abdominal pain, nausea, vomiting, diarrhea


Genitourinary: Denies: dysuria, hematuria


Musculoskeletal: Denies: back pain


Skin: Denies: rash


Neurological: Denies: headache, weakness, numbness





EKG Findings





- EKG Results:


EKG: interpreted by ERMD, sinus rhythm (With PVC, rate 60 bpm), normal axis





- Blocks, Axis, Hypertrophy, ST Abn:


AV and intraventricular conduction: intraventricular conduction delay


Repolarization changes or abnormalities: nonspecific abnormality, ST segment, 

and/or T wave





Past Medical History


Past Medical History: Coronary Artery Disease (CAD), Cancer, Diabetes Mellitus, 

GERD/Reflux, Hypertension, Osteoarthritis (OA)


Additional Past Medical History / Comment(s): NIDDM type II,  LEUKEMIA (CLL-

remission), sinus problems, seasonal allergies, tinnitis bilaterally, OA hands, 

R wrist carpal tunnel, past fx with L elbow, R rotator cuff tendon problems, L 

rotator cuff tear, left knee pain


History of Any Multi-Drug Resistant Organisms: None Reported


Past Surgical History: Heart Catheterization, Orthopedic Surgery


Additional Past Surgical History / Comment(s): LEFT HAND thumb tendon repair. L 

knee surgery.


Past Anesthesia/Blood Transfusion Reactions: No Reported Reaction


Past Psychological History: No Psychological Hx Reported


Smoking Status: Never smoker


Past Alcohol Use History: None Reported


Past Drug Use History: None Reported





- Past Family History


  ** Father


Family Medical History: CVA/TIA


Additional Family Medical History / Comment(s): Father  at the age of 85yrs.





  ** Mother


Family Medical History: Diabetes Mellitus


Additional Family Medical History / Comment(s): Mother  at the age of 90yrs.





General Exam


Limitations: no limitations


General appearance: alert, in no apparent distress


Head exam: Present: atraumatic, normocephalic


Eye exam: Present: normal appearance.  Absent: scleral icterus, conjunctival 

injection


Neck exam: Present: normal inspection


Respiratory exam: Present: normal lung sounds bilaterally.  Absent: respiratory 

distress, wheezes, rales, rhonchi, stridor, chest wall tenderness


Cardiovascular Exam: Present: regular rate, normal rhythm, normal heart sounds. 

Absent: systolic murmur, diastolic murmur, rubs, gallop


GI/Abdominal exam: Present: soft.  Absent: distended, tenderness, guarding, 

rebound, rigid, mass


Extremities exam: Present: normal inspection, normal capillary refill.  Absent: 

pedal edema, calf tenderness


Back exam: Present: normal inspection.  Absent: CVA tenderness (R), CVA 

tenderness (L)


Neurological exam: Present: alert


Skin exam: Present: warm, dry, intact, normal color.  Absent: rash





Course


                                   Vital Signs











  22





  04:03 05:57 08:13


 


Temperature 98.3 F 97.8 F 


 


Pulse Rate 72 64 52 L


 


Respiratory 16 16 19





Rate   


 


Blood Pressure 150/90 149/71 179/83


 


O2 Sat by Pulse 96 95 97





Oximetry   














Chest Pain MDM





- [facility]





Patient 77-year-old man with history of CLL who had episode of chest pain 

relieved with nitroglycerin.  Given that the pain improve with nitroglycerin 

will admit for serial cardiac enzymes and telemetry monitoring.  The patient did

have recent cardiac cath not showing severe lesion.





Disposition


Clinical Impression: 


 Chest pain





Disposition: ADMITTED AS IP TO THIS HOSP


Condition: Good


Instructions (If sedation given, give patient instructions):  Chest Pain (ED)


Is patient prescribed a controlled substance at d/c from ED?: No


Referrals: 


Smith Figueroa DO [Primary Care Provider] - 1-2 days

## 2022-08-06 NOTE — P.HPIM
History of Present Illness


H&P Date: 22


Chief Complaint: CHest pain 





Chief complaint,


Chest pain





History of present illness:


77-year-old male with nonobstructive coronary artery disease with multiple admi

ssions with chest pain.  Type 2 diabetes.  Chronic back pain.  Chronic 

lymphocytic leukemia this time also presents with atypical chest pain.  The 

patient describes his chest pain in the mid chest radiating to his left arm and 

to his jaw.  The pain is reproducible by pressing on the chest.  Denied nausea, 

vomiting or abdominal pain or diaphoresis.  Denied palpitations.  Denies 

shortness of breath.  He stated that the chest pain has been getting worse for 

the last 2 days.  He stated that at night he felt that somebody threw a rock on 

his abdomen/chest.





In the ED, his initial blood work revealed evidence of leukocytosis which he had

chronically due to history of leukemia.  His D dimers are negative.  His UA is 

negative.  Troponin normal.  EKG without ischemic changes.  Chest x-ray without 

acute cardiopulmonary changes.











The patient is admitted to observation with cardiology consultation.











Past Medical History


Past Medical History: Coronary Artery Disease (CAD), Cancer, Diabetes Mellitus, 

GERD/Reflux, Hypertension, Osteoarthritis (OA)


Additional Past Medical History / Comment(s): NIDDM type II, 1999 LEUKEMIA (CLL-

remission), sinus problems, seasonal allergies, tinnitis bilaterally, OA hands, 

R wrist carpal tunnel, past fx with L elbow, R rotator cuff tendon problems, L 

rotator cuff tear, left knee pain


History of Any Multi-Drug Resistant Organisms: None Reported


Past Surgical History: Heart Catheterization, Orthopedic Surgery


Additional Past Surgical History / Comment(s): LEFT HAND thumb tendon repair. L 

knee surgery.


Past Anesthesia/Blood Transfusion Reactions: No Reported Reaction


Past Psychological History: No Psychological Hx Reported


Additional Psychological History / Comment(s): Pt resides with his spouse.  He 

is independent.  He ambulates with a cane.  He drives.


Smoking Status: Never smoker


Past Alcohol Use History: None Reported


Past Drug Use History: None Reported


Additional Drug Use History / Comment(s): Occasional marijuana





- Past Family History


  ** Father


Family Medical History: CVA/TIA


Additional Family Medical History / Comment(s): Father  at the age of 85yrs.





  ** Mother


Family Medical History: Diabetes Mellitus


Additional Family Medical History / Comment(s): Mother  at the age of 90yrs.





Medications and Allergies


                                Home Medications











 Medication  Instructions  Recorded  Confirmed  Type


 


Montelukast Sodium [Singulair] 10 mg PO HS 17 History


 


Insulin NPH Hum/Reg Insulin Hm 20 units SQ AC-BRKFST 20 History





[Relion Novolin 70-30 Flexpen]    


 


Fluticasone Nasal Spray [Flonase 1 spr EA NOSTRIL DAILY 21 

History





Nasal [place]]    


 


HYDROcodone/APAP 5-325MG [Norco 1 tab PO Q6HR PRN 21 History





5-325]    


 


Ketorolac 0.5% Ophth Soln [Acular 1 drop BOTH EYES BID 21 History





0.5%]    


 


Atorvastatin [Lipitor] 80 mg PO HS 21 History


 


Baclofen [Lioresal] 10 mg PO DAILY 21 History


 


SILVER sulfADIAZINE CREAM 1 applic TOPICAL DAILY PRN 21 History





[Silvadene Cream]    


 


Isosorbide Mononitrate ER [Imdur] 30 mg PO DAILY #30 tab 21 Rx


 


Omeprazole 40 mg PO DAILY 10/20/21 08/06/22 History


 


lisinopriL [Zestril] 20 mg PO BID 10/20/21 08/06/22 History


 


Aspirin 81 mg PO DAILY  tab 10/21/21 08/06/22 Rx


 


ALPRAZolam [Xanax] 0.25 mg PO DAILY PRN 21 History


 


Brimonidine Tartrate/Timolol 1 drop BOTH EYES BID 21 History





[Combigan 0.2%-0.5% Eye Drops]    


 


Latanoprost/Pf [Latanoprost 0.005% 1 drop BOTH EYES HS 21 History





Eye Drop]    


 


Nitroglycerin Sl Tabs [Nitrostat] 0.4 mg SL Q5M PRN 21 History


 


amLODIPine [Norvasc] 5 mg PO DAILY 22 History


 


Gabapentin [Neurontin] 300 mg PO TID #90 cap 22 Rx








                                    Allergies











Allergy/AdvReac Type Severity Reaction Status Date / Time


 


amoxicillin [From Augmentin] Allergy  Rash/Hives Verified 22 11:05


 


clavulanic acid Allergy  Rash/Hives Verified 22 11:05





[From Augmentin]     














Physical Exam


Vitals: 


                                   Vital Signs











  Temp Pulse Pulse Resp BP BP Pulse Ox


 


 22 15:00  97.7 F   63  17   185/79  97


 


 22 13:50   50 L   18  144/65   95


 


 22 11:19   64    146/71  


 


 22 11:13  98.4 F  51 L   18  177/78   95


 


 22 08:13   52 L   19  179/83   97


 


 22 05:57  97.8 F  64   16  149/71   95


 


 22 04:03  98.3 F  72   16  150/90   96








                                Intake and Output











 22





 06:59 14:59 22:59


 


Other:   


 


  Weight 97.522 kg 97.522 kg 

















Constitutional:       No acute distress, conversant, pleasant


Eyes:         Anicteric sclerae, moist conjunctiva, no lid-lag


         PERRLA


         ENMT:          NC/AT


         Oropharynx clear, no erythema, exudates


Neck:         Supple, FROM, no masses, or JVD


         No carotid bruits


         No thyromegaly


Lungs:           Clear to auscultation


         Clear to percussion


         Normal respiratory effort, no accessory muscle use 


Cardiovascular:      Chest pain is reproducible by pressing on the chest.


Heart regular in rate and rhythm, 


         No murmurs, gallops, or rubs


         No peripheral edema


Abdominal:       Soft


         Nontender, no guarding, rebound or rigidity


         Abdomen moving with respiration


         Normoactive bowel sounds


         No hepatomegaly, No splenomegaly


   No palpable mass 


         No abdominal wall hernia noted 


Skin:          Normal temperature, tone, texture, turgor


         No induration


No subcutaneous nodules 


         No rash, lesions


No ulcers


Extremities:      No digital cyanosis 


         No clubbing


         Pedal pulses intact and symmetrical


         Radial pulses intact and symmetrical 


         Normal gait and station


         No calf tenderness 


Psychiatric:      Alert and oriented to person, place and time


         Appropriate affect


         Intact judgement         


Neuro:         Muscles Strength 5/5 in all 4 extremities


         Sensation to light touch grossly present throughout


         Cranial nerves II-XII grossly intact


         No focal sensory deficits








Results


CBC & Chem 7: 


                                 22 04:33





                                 22 04:33


Labs: 


                  Abnormal Lab Results - Last 24 Hours (Table)











  22 Range/Units





  04:33 04:33 04:33 


 


WBC  20.5 H    (3.8-10.6)  k/uL


 


RBC  4.04 L    (4.30-5.90)  m/uL


 


Hgb  12.7 L    (13.0-17.5)  gm/dL


 


Hct  38.1 L    (39.0-53.0)  %


 


Plt Count  97 L    (150-450)  k/uL


 


Lymphocytes #  16.6 H    (1.0-4.8)  k/uL


 


APTT   21.3 L   (22.0-30.0)  sec


 


Chloride    108 H  ()  mmol/L


 


Glucose    123 H  (74-99)  mg/dL


 


Total Protein    5.9 L  (6.3-8.2)  g/dL


 


Urine Glucose (UA)     (Negative)  














  22 Range/Units





  09:36 


 


WBC   (3.8-10.6)  k/uL


 


RBC   (4.30-5.90)  m/uL


 


Hgb   (13.0-17.5)  gm/dL


 


Hct   (39.0-53.0)  %


 


Plt Count   (150-450)  k/uL


 


Lymphocytes #   (1.0-4.8)  k/uL


 


APTT   (22.0-30.0)  sec


 


Chloride   ()  mmol/L


 


Glucose   (74-99)  mg/dL


 


Total Protein   (6.3-8.2)  g/dL


 


Urine Glucose (UA)  Trace H  (Negative)  














Thrombosis Risk Factor Assmnt





- Choose All That Apply


Each Risk Factor Represents 3 Points: Age 75 years or older


Thrombosis Risk Factor Assessment Total Risk Factor Score: 3


Thrombosis Risk Factor Assessment Level: Moderate Risk





Assessment and Plan


Plan: 











Assessment and plan:








77-year-old male with history of hypertension, diabetes2, history of CLL who 

presented to the hospital with atypical chest pain 





Atypical chest pain.


Likely musculoskeletal.  reproducible by pressure.


Cardiac monitor


Cardiology consult


We'll defer cardiac workup to cardiology as the patient has multiple admissions 

to the hospital for that reason.  Most likely his pain is musculoskeletal given 

the nature and description of the pain and also clinical examination.





History of chronic CLL with lymphocytosis


Chronic.  Continue to monitor





Chronic lower back pain, continue gabapentin, Norco and baclofen.





Type 2 diabetes mellitus


Sliding scale





Hypertension.  Continue Norvasc and lisinopril





DVT ppx: SCD

## 2022-08-06 NOTE — XR
EXAMINATION TYPE: XR chest 2V

 

DATE OF EXAM: 8/6/2022

 

COMPARISON: 6/28/2022

 

HISTORY: Chest pain

 

TECHNIQUE: 2 view

 

FINDINGS: There is no heart failure nor confluent pneumonic infiltrate. Costophrenic angles are clear
. There are chest leads. Bony thorax is intact.

 

IMPRESSION: No active cardiopulmonary disease. Normal heart. No adverse change.

## 2022-08-07 LAB
CHOLEST SERPL-MCNC: 144 MG/DL (ref 0–200)
GLUCOSE BLD-MCNC: 157 MG/DL (ref 70–110)
GLUCOSE BLD-MCNC: 171 MG/DL (ref 70–110)
GLUCOSE BLD-MCNC: 240 MG/DL (ref 70–110)
GLUCOSE BLD-MCNC: 286 MG/DL (ref 70–110)
HDLC SERPL-MCNC: 31.2 MG/DL (ref 40–60)
LDLC SERPL CALC-MCNC: 90.8 MG/DL (ref 0–131)
TRIGL SERPL-MCNC: 110 MG/DL (ref 0–149)
VLDLC SERPL CALC-MCNC: 22 MG/DL (ref 5–40)

## 2022-08-07 RX ADMIN — BRIMONIDINE TARTRATE SCH DROPS: 2 SOLUTION/ DROPS OPHTHALMIC at 08:32

## 2022-08-07 RX ADMIN — INSULIN ASPART SCH UNIT: 100 INJECTION, SOLUTION INTRAVENOUS; SUBCUTANEOUS at 12:29

## 2022-08-07 RX ADMIN — PANTOPRAZOLE SODIUM SCH MG: 40 TABLET, DELAYED RELEASE ORAL at 08:31

## 2022-08-07 RX ADMIN — MONTELUKAST SODIUM SCH MG: 10 TABLET, FILM COATED ORAL at 20:56

## 2022-08-07 RX ADMIN — LATANOPROST SCH DROPS: 50 SOLUTION OPHTHALMIC at 20:57

## 2022-08-07 RX ADMIN — ASPIRIN 81 MG CHEWABLE TABLET SCH MG: 81 TABLET CHEWABLE at 08:31

## 2022-08-07 RX ADMIN — GABAPENTIN SCH MG: 300 CAPSULE ORAL at 08:31

## 2022-08-07 RX ADMIN — FLUTICASONE PROPIONATE SCH SPRAY: 50 SPRAY, METERED NASAL at 08:32

## 2022-08-07 RX ADMIN — KETOROLAC TROMETHAMINE SCH DROPS: 5 SOLUTION OPHTHALMIC at 08:33

## 2022-08-07 RX ADMIN — INSULIN ASPART SCH UNIT: 100 INJECTION, SOLUTION INTRAVENOUS; SUBCUTANEOUS at 17:25

## 2022-08-07 RX ADMIN — CHLORTHALIDONE SCH MG: 25 TABLET ORAL at 09:56

## 2022-08-07 RX ADMIN — HYDROCODONE BITARTRATE AND ACETAMINOPHEN PRN EACH: 5; 325 TABLET ORAL at 20:55

## 2022-08-07 RX ADMIN — TIMOLOL MALEATE SCH DROPS: 5 SOLUTION OPHTHALMIC at 08:33

## 2022-08-07 RX ADMIN — BRIMONIDINE TARTRATE SCH DROPS: 2 SOLUTION/ DROPS OPHTHALMIC at 20:56

## 2022-08-07 RX ADMIN — HYDROCODONE BITARTRATE AND ACETAMINOPHEN PRN EACH: 5; 325 TABLET ORAL at 03:20

## 2022-08-07 RX ADMIN — ATORVASTATIN CALCIUM SCH MG: 80 TABLET, FILM COATED ORAL at 20:56

## 2022-08-07 RX ADMIN — TIMOLOL MALEATE SCH DROPS: 5 SOLUTION OPHTHALMIC at 20:56

## 2022-08-07 RX ADMIN — GABAPENTIN SCH MG: 300 CAPSULE ORAL at 17:25

## 2022-08-07 RX ADMIN — INSULIN ASPART SCH UNIT: 100 INJECTION, SOLUTION INTRAVENOUS; SUBCUTANEOUS at 08:31

## 2022-08-07 RX ADMIN — KETOROLAC TROMETHAMINE SCH DROPS: 5 SOLUTION OPHTHALMIC at 20:57

## 2022-08-07 RX ADMIN — GABAPENTIN SCH MG: 300 CAPSULE ORAL at 20:55

## 2022-08-07 RX ADMIN — INSULIN ASPART SCH UNIT: 100 INJECTION, SOLUTION INTRAVENOUS; SUBCUTANEOUS at 20:56

## 2022-08-07 NOTE — P.PN
Subjective


Progress Note Date: 08/07/22


Principal diagnosis: 





dizzy 








Patient stated that he feels little dizzy . Chest pain improving. no events 

overnight 





Objective





- Vital Signs


Vital signs: 


                                   Vital Signs











Temp  98.0 F   08/07/22 07:00


 


Pulse  54 L  08/07/22 07:00


 


Resp  16   08/07/22 07:00


 


BP  141/62   08/07/22 12:28


 


Pulse Ox  99   08/07/22 08:08


 


FiO2      








                                 Intake & Output











 08/06/22 08/07/22 08/07/22





 18:59 06:59 18:59


 


Weight 97.522 kg  


 


Other:   


 


  Voiding Method  Toilet 


 


  # Voids  3 














- Exam





Gen: A&OX 3 NAD 


Chest: NSR, No mumrus. CP reproducible by pressure 


Lungs CTAB No rales or wheezing 


Abd; Soft, NT , ND 


Skin Warm and dry 














- Labs


CBC & Chem 7: 


                                 08/06/22 04:33





                                 08/06/22 04:33


Labs: 


                  Abnormal Lab Results - Last 24 Hours (Table)











  08/06/22 08/06/22 08/06/22 Range/Units





  17:12 20:58 21:02 


 


POC Glucose (mg/dL)  305 H   161 H  ()  mg/dL


 


Hemoglobin A1c   8.1 H   (0.0-6.0)  %


 


HDL Cholesterol     (40.00-60.00)  mg/dL














  08/07/22 08/07/22 08/07/22 Range/Units





  05:06 07:39 12:13 


 


POC Glucose (mg/dL)   171 H  240 H  ()  mg/dL


 


Hemoglobin A1c     (0.0-6.0)  %


 


HDL Cholesterol  31.20 L    (40.00-60.00)  mg/dL














Assessment and Plan


Plan: 





77-year-old male with history of hypertension, diabetes2, history of CLL who pre

sented to the hospital with atypical chest pain 





Atypical chest pain.


Likely musculoskeletal.  reproducible by pressure.


Cardiology also thinks its related to hypertensive urgency 


Cardiac monitor


Cardiology consult - added Chlorhalidone 25 mg  and increased  norvasc from 5 to

10 mg  for better blood pressure control 


I recommend tylenol prn for reproducible chest pain 


Monitor overnight. If BP improves. DC tomorrow to home 








History of chronic CLL with lymphocytosis


Chronic.  Continue to monitor





Chronic lower back pain, continue gabapentin, Norco and baclofen.





Type 2 diabetes mellitus


Sliding scale





Hypertension.  Continue Norvasc and lisinopril





DVT ppx: SCD 


Time with Patient: Greater than 30

## 2022-08-07 NOTE — P.CRDCN
History of Present Illness


Consult date: 22


Chief complaint: Chest discomfort


History of present illness: 





The patient is a pleasant 77-year-old gentleman who is known to our service from

before with a past medical history significant for known intermediate 

nonobstructive coronary artery disease based on heart catheterization in  as

well as hypertension and dyslipidemia and preserved left ventricular systolic 

function on the most recent echo presented to the hospital complaining of chest 

discomfort.  As a matter of fact he presented because he has not been feeding 

well.  He has been experiencing chest discomfort for the last 24 hour appeared 

to be very atypical for angina.  The discomfort is sharp on the left side of the

chest at one spot only with no radiation andCT symptoms.  More importantly his 

pressure has been elevated at home as well as in the hospital.  The blood pr

essure seems to be consistent with hypertension emergency.  His systolic 

pressure above 1 80 mmHg.  The patient states clearly that he has been compliant

with his medications but has not been compliant with his diet.  I had a long 

discussion with him about the importance of the low-sodium diet and to be 

compliant with the diet to get the pressure under control.  Beside that he 

describes no shortness of breath and no dizziness or lightheadedness and no 

feeling of heart racing or fluttering and no presyncope or syncope. The EKG 

showed sinus rhythm without any significant ST or T-wave abnormalities.  The 

blood work came in to be unremarkable in terms off troponin





Past Medical History


Past Medical History: Coronary Artery Disease (CAD), Cancer, Diabetes Mellitus, 

GERD/Reflux, Hypertension, Osteoarthritis (OA)


Additional Past Medical History / Comment(s): NIDDM type II, 1999 LEUKEMIA (CLL-

remission), sinus problems, seasonal allergies, tinnitis bilaterally, OA hands, 

R wrist carpal tunnel, past fx with L elbow, R rotator cuff tendon problems, L 

rotator cuff tear, left knee pain


History of Any Multi-Drug Resistant Organisms: None Reported


Past Surgical History: Heart Catheterization, Orthopedic Surgery


Additional Past Surgical History / Comment(s): LEFT HAND thumb tendon repair. L 

knee surgery.


Past Anesthesia/Blood Transfusion Reactions: No Reported Reaction


Past Psychological History: No Psychological Hx Reported


Additional Psychological History / Comment(s): Pt resides with his spouse.  He 

is independent.  He ambulates with a cane.  He drives.


Smoking Status: Never smoker


Past Alcohol Use History: None Reported


Past Drug Use History: None Reported


Additional Drug Use History / Comment(s): Occasional marijuana





- Past Family History


  ** Father


Family Medical History: CVA/TIA


Additional Family Medical History / Comment(s): Father  at the age of 85yrs.





  ** Mother


Family Medical History: Diabetes Mellitus


Additional Family Medical History / Comment(s): Mother  at the age of 90yrs.





Medications and Allergies


                                Home Medications











 Medication  Instructions  Recorded  Confirmed  Type


 


Montelukast Sodium [Singulair] 10 mg PO HS 17 History


 


Insulin NPH Hum/Reg Insulin Hm 20 units SQ AC-BRKFST 20 History





[Relion Novolin 70-30 Flexpen]    


 


Fluticasone Nasal Spray [Flonase 1 spr EA NOSTRIL DAILY 21 

History





Nasal [place]]    


 


HYDROcodone/APAP 5-325MG [Norco 1 tab PO Q6HR PRN 21 History





5-325]    


 


Ketorolac 0.5% Ophth Soln [Acular 1 drop BOTH EYES BID 21 History





0.5%]    


 


Atorvastatin [Lipitor] 80 mg PO HS 21 History


 


Baclofen [Lioresal] 10 mg PO DAILY 21 History


 


SILVER sulfADIAZINE CREAM 1 applic TOPICAL DAILY PRN 21 History





[Silvadene Cream]    


 


Isosorbide Mononitrate ER [Imdur] 30 mg PO DAILY #30 tab 21 Rx


 


Omeprazole 40 mg PO DAILY 10/20/21 08/06/22 History


 


lisinopriL [Zestril] 20 mg PO BID 10/20/21 08/06/22 History


 


Aspirin 81 mg PO DAILY  tab 10/21/21 08/06/22 Rx


 


ALPRAZolam [Xanax] 0.25 mg PO DAILY PRN 21 History


 


Brimonidine Tartrate/Timolol 1 drop BOTH EYES BID 21 History





[Combigan 0.2%-0.5% Eye Drops]    


 


Latanoprost/Pf [Latanoprost 0.005% 1 drop BOTH EYES HS 21 History





Eye Drop]    


 


Nitroglycerin Sl Tabs [Nitrostat] 0.4 mg SL Q5M PRN 21 History


 


amLODIPine [Norvasc] 5 mg PO DAILY 22 History


 


Gabapentin [Neurontin] 300 mg PO TID #90 cap 22 Rx








                                    Allergies











Allergy/AdvReac Type Severity Reaction Status Date / Time


 


amoxicillin [From Augmentin] Allergy  Rash/Hives Verified 22 11:05


 


clavulanic acid Allergy  Rash/Hives Verified 22 11:05





[From Augmentin]     














Physical Exam


Vitals: 


                                   Vital Signs











  Temp Pulse Pulse Resp BP BP Pulse Ox


 


 22 08:08        99


 


 22 07:00  98.0 F   54 L  16   194/75  99


 


 22 02:15  97.5 F L    19   158/71  99


 


 22 18:55  98.6 F   52 L  17   166/66  99


 


 22 15:00  97.7 F   63  17   185/79  97


 


 22 13:50   50 L   18  144/65   95


 


 22 11:19   64    146/71  


 


 22 11:13  98.4 F  51 L   18  177/78   95








                                Intake and Output











 22





 22:59 06:59 14:59


 


Other:   


 


  Voiding Method Toilet Toilet 


 


  # Voids 1 3 














- Constitutional


General appearance: no acute distress





- Respiratory


Respiratory: bilateral: CTA





- Cardiovascular


Rhythm: regular


Heart sounds: normal: S1, S2


Abnormal Heart Sounds: systolic murmur





Results





                                 22 04:33





                                 22 04:33


                                 Cardiac Enzymes











  22 Range/Units





  11:59 15:52 


 


Troponin I  <0.012  <0.012  (0.000-0.034)  ng/mL








                               Current Medications











Generic Name Dose Route Start Last Admin





  Trade Name Freq  PRN Reason Stop Dose Admin


 


Hydrocodone Bitart/Acetaminophen  1 each  22 11:37  22 03:20





  Hydrocodone/Apap 5-325mg 1 Each Tab  PO   1 each





  Q6HR PRN   Administration





  Pain  


 


Alprazolam  0.25 mg  22 11:37 





  Alprazolam 0.25 Mg Tab  PO  





  DAILY PRN  





  Anxiety  


 


Amlodipine Besylate  10 mg  22 09:00 





  Amlodipine 10 Mg Tab  PO  





  DAILY MAYRA  


 


Amlodipine Besylate  5 mg  22 09:07 





  Amlodipine 5 Mg Tab  PO  22 09:08 





  ONCE STA  


 


Aspirin  81 mg  22 09:00  22 08:31





  Aspirin 81 Mg  PO   81 mg





  DAILY MAYRA   Administration


 


Atorvastatin Calcium  80 mg  22 21:00  22 21:11





  Atorvastatin 80 Mg Tab  PO   80 mg





  HS MAYRA   Administration


 


Brimonidine Tartrate  1 drops  22 21:00  22 08:32





  Brimonidine Tartrate 0.2% Drops 5 Ml Btl  BOTH EYES   1 drops





  BID MAYRA   Administration


 


Dextrose/Water  25 ml  22 17:32 





  Dextrose 50% Syringe 50 Ml  IVP  





  PER PROTOCOL PRN  





  Hypoglycemia  





  Protocol  


 


Dextrose/Water  50 ml  22 17:32 





  Dextrose 50% Syringe 50 Ml  IVP  





  PER PROTOCOL PRN  





  Hypoglycemia  





  Protocol  


 


Fluticasone Propionate  1 spray  22 09:00  22 08:32





  Fluticasone 50mcg/Spray Nasal 16gm  EA NOSTRIL   1 spray





  DAILY MAYRA   Administration


 


Gabapentin  300 mg  22 16:00  22 08:31





  Gabapentin 300 Mg Cap  PO   300 mg





  TID MAYRA   Administration


 


Insulin Aspart  0 unit  22 17:34  22 08:31





  Insulin Aspart (Novolog) 100 Unit/Ml Vial  SQ   2 unit





  ACHS MAYRA   Administration





  Protocol  


 


Ketorolac Tromethamine  1 drops  22 21:00  22 08:33





  Ketorolac 0.5% Ophth Drops 5 Ml Btl  BOTH EYES   1 drops





  BID MAYRA   Administration


 


Latanoprost  1 drops  22 21:00  22 21:12





  Latanoprost 0.005% Ophth Drops 2.5 Ml Btl  BOTH EYES   1 drops





  HS MAYRA   Administration


 


Lisinopril  20 mg  22 21:00  22 08:31





  Lisinopril 20 Mg Tab  PO   20 mg





  BID MAYRA   Administration


 


Montelukast Sodium  10 mg  22 21:00  22 21:11





  Montelukast 10 Mg Tab  PO   10 mg





  HS MAYRA   Administration


 


Nitroglycerin  0.4 mg  22 10:51  22 11:18





  Nitroglycerin Sl Tabs 0.4 Mg Tab  SUBLINGUAL   0.4 mg





  Q5M PRN   Administration





  Chest Pain  


 


Pantoprazole Sodium  40 mg  22 09:00  22 08:31





  Pantoprazole 40 Mg Tablet  PO   40 mg





  DAILY MAYRA   Administration


 


Timolol Maleate  1 drops  22 21:00  22 08:33





  Timolol 0.5% Ophth Drops 5 Ml Btl  BOTH EYES   1 drops





  BID MAYRA   Administration








                                Intake and Output











 22





 22:59 06:59 14:59


 


Other:   


 


  Voiding Method Toilet Toilet 


 


  # Voids 1 3 








                                        





                                 22 04:33 





                                 22 04:33 











Assessment and Plan


Assessment: 





Assessment


#1 hypertension emergency


#2 patient is compliant with medication but noncompliant with diet


#3 chest discomfort secondary to hypertension emergency


#4 known intermediate nonobstructive CAD based on recent heart catheterization


#5 multiple comorbid conditions





Plan


#1 acute coronary event was ruled out


#2 increase the dose of amlodipine


#3 consider adding diuretics.  I added chlorthalidone


#4 monitor the patient for additional 24-hour

## 2022-08-08 VITALS
TEMPERATURE: 97.8 F | DIASTOLIC BLOOD PRESSURE: 76 MMHG | HEART RATE: 49 BPM | SYSTOLIC BLOOD PRESSURE: 137 MMHG | RESPIRATION RATE: 18 BRPM

## 2022-08-08 LAB
GLUCOSE BLD-MCNC: 238 MG/DL (ref 70–110)
GLUCOSE BLD-MCNC: 273 MG/DL (ref 70–110)

## 2022-08-08 RX ADMIN — GABAPENTIN SCH MG: 300 CAPSULE ORAL at 08:06

## 2022-08-08 RX ADMIN — BRIMONIDINE TARTRATE SCH DROPS: 2 SOLUTION/ DROPS OPHTHALMIC at 08:07

## 2022-08-08 RX ADMIN — ASPIRIN 81 MG CHEWABLE TABLET SCH MG: 81 TABLET CHEWABLE at 08:06

## 2022-08-08 RX ADMIN — TIMOLOL MALEATE SCH DROPS: 5 SOLUTION OPHTHALMIC at 08:07

## 2022-08-08 RX ADMIN — FLUTICASONE PROPIONATE SCH: 50 SPRAY, METERED NASAL at 08:06

## 2022-08-08 RX ADMIN — PANTOPRAZOLE SODIUM SCH MG: 40 TABLET, DELAYED RELEASE ORAL at 08:06

## 2022-08-08 RX ADMIN — KETOROLAC TROMETHAMINE SCH DROPS: 5 SOLUTION OPHTHALMIC at 08:07

## 2022-08-08 RX ADMIN — INSULIN ASPART SCH UNIT: 100 INJECTION, SOLUTION INTRAVENOUS; SUBCUTANEOUS at 13:04

## 2022-08-08 RX ADMIN — HYDROCODONE BITARTRATE AND ACETAMINOPHEN PRN EACH: 5; 325 TABLET ORAL at 02:49

## 2022-08-08 RX ADMIN — GABAPENTIN SCH MG: 300 CAPSULE ORAL at 15:17

## 2022-08-08 RX ADMIN — INSULIN ASPART SCH UNIT: 100 INJECTION, SOLUTION INTRAVENOUS; SUBCUTANEOUS at 08:05

## 2022-08-08 RX ADMIN — CHLORTHALIDONE SCH MG: 25 TABLET ORAL at 08:06

## 2022-08-08 NOTE — P.DS
Providers


Date of admission: 


08/06/22 10:52





Expected date of discharge: 08/08/22


Attending physician: 


Jason Macias MD





Consults: 





                                        





08/06/22 10:52


Consult Physician Routine 


   Consulting Provider: Panfilo Diamond


   Consult Reason/Comments: chest pain


   Do you want consulting provider notified?: Yes











Primary care physician: 


[facility] Course: 


77-year-old male with nonobstructive coronary artery disease with multiple 

admissions with chest pain.  Type 2 diabetes.  Chronic back pain.  Chronic 

lymphocytic leukemia this time also presents with atypical chest pain.  The 

patient describes his chest pain in the mid chest radiating to his left arm and 

to his jaw.  Patient was evaluated by cardiology.  Recommended to titrate his 

blood pressure medications as his chest pain could be attributed to hypertensive

urgency.  His Norvasc was increased to 10 mg.  Chlorthalidone was added.  

Patient's symptoms improved significantly.  Blood pressure also improved.  The 

patient was worked up on an admission for ACS and it was negative.  No EKG 

changes.  Troponin negative.  Also the patient described the producible chest 

pain.  Could be related to musculoskeletal chest pain.


The patient will be discharged home today in improved condition.  His 

medications were reconciled and the new medications he was given a prescription 

for those.  The patient plan of care was discussed extensively and is agreeable.

 He will follow up with his PCP and cardiology as an outpatient he already has 

an appointment scheduled with cardiology.





Assessment: 


Atypical chest pain


Hypertensive urgency causing chest pain


Improved.  Blood pressure improved significantly.  Chlorthalidone 25 mg added.  

Norvasc increased to 10 mg daily.


The patient will be discharged home today and to follow-up with his primary care

and also with cardiology.  No further changes with his home medications.


Patient Condition at Discharge: Good





Plan - Discharge Summary


Discharge Rx Participant: No


New Discharge Prescriptions: 


New


   Chlorthalidone [Hygroton] 25 mg PO DAILY #90 tab


   amLODIPine [Norvasc] 10 mg PO DAILY #90 tab


   Timolol 0.5% Ophth Soln [Timoptic 0.5% Ophth Soln] 1 drops BOTH EYES BID  ml


   Nitroglycerin Sl Tabs [Nitrostat] 0.4 mg SUBLINGUAL Q5M PRN  tab


     PRN Reason: Chest Pain





Continue


   Montelukast Sodium [Singulair] 10 mg PO HS


   Insulin NPH Hum/Reg Insulin Hm [Relion Novolin 70-30 Flexpen] 20 units SQ AC-

BRKFST


   Fluticasone Nasal Spray [Flonase Nasal Spray] 1 spr EA NOSTRIL DAILY


   Atorvastatin [Lipitor] 80 mg PO HS


   Baclofen [Lioresal] 10 mg PO DAILY


   Latanoprost/Pf [Latanoprost 0.005% Eye Drop] 1 drop BOTH EYES HS


   ALPRAZolam [Xanax] 0.25 mg PO DAILY PRN


     PRN Reason: Anxiety


   Nitroglycerin Sl Tabs [Nitrostat] 0.4 mg SL Q5M PRN


     PRN Reason: Chest Pain


   Gabapentin [Neurontin] 300 mg PO TID #90 cap


   Ketorolac 0.5% Ophth Soln [Acular 0.5%] 1 drop BOTH EYES BID


   HYDROcodone/APAP 5-325MG [Norco 5-325] 1 tab PO Q6HR PRN


     PRN Reason: Pain


   SILVER sulfADIAZINE CREAM [Silvadene Cream] 1 applic TOPICAL DAILY PRN


     PRN Reason: Wound Healing


   Isosorbide Mononitrate ER [Imdur] 30 mg PO DAILY #30 tab


   lisinopriL [Zestril] 20 mg PO BID


   Omeprazole 40 mg PO DAILY


   Aspirin 81 mg PO DAILY  tab


   Brimonidine Tartrate/Timolol [Combigan 0.2%-0.5% Eye Drops] 1 drop BOTH EYES 

BID





Discontinued


   amLODIPine [Norvasc] 5 mg PO DAILY


Discharge Medication List





Montelukast Sodium [Singulair] 10 mg PO HS 08/18/17 [History]


Insulin NPH Hum/Reg Insulin Hm [Relion Novolin 70-30 Flexpen] 20 units SQ AC-

BRKFST 05/29/20 [History]


Fluticasone Nasal Spray [Flonase Nasal Spray] 1 spr EA NOSTRIL DAILY 04/19/21 

[History]


HYDROcodone/APAP 5-325MG [Norco 5-325] 1 tab PO Q6HR PRN 04/19/21 [History]


Ketorolac 0.5% Ophth Soln [Acular 0.5%] 1 drop BOTH EYES BID 04/19/21 [History]


Atorvastatin [Lipitor] 80 mg PO HS 06/01/21 [History]


Baclofen [Lioresal] 10 mg PO DAILY 07/12/21 [History]


SILVER sulfADIAZINE CREAM [Silvadene Cream] 1 applic TOPICAL DAILY PRN 07/12/21 

[History]


Isosorbide Mononitrate ER [Imdur] 30 mg PO DAILY #30 tab 07/13/21 [Rx]


Omeprazole 40 mg PO DAILY 10/20/21 [History]


lisinopriL [Zestril] 20 mg PO BID 10/20/21 [History]


Aspirin 81 mg PO DAILY  tab 10/21/21 [Rx]


ALPRAZolam [Xanax] 0.25 mg PO DAILY PRN 11/30/21 [History]


Brimonidine Tartrate/Timolol [Combigan 0.2%-0.5% Eye Drops] 1 drop BOTH EYES BID

11/30/21 [History]


Latanoprost/Pf [Latanoprost 0.005% Eye Drop] 1 drop BOTH EYES HS 11/30/21 

[History]


Nitroglycerin Sl Tabs [Nitrostat] 0.4 mg SL Q5M PRN 11/30/21 [History]


Gabapentin [Neurontin] 300 mg PO TID #90 cap 06/30/22 [Rx]


Chlorthalidone [Hygroton] 25 mg PO DAILY #90 tab 08/08/22 [Rx]


Nitroglycerin Sl Tabs [Nitrostat] 0.4 mg SUBLINGUAL Q5M PRN  tab 08/08/22 [Rx]


Timolol 0.5% Ophth Soln [Timoptic 0.5% Ophth Soln] 1 drops BOTH EYES BID  ml 

08/08/22 [Rx]


amLODIPine [Norvasc] 10 mg PO DAILY #90 tab 08/08/22 [Rx]








Follow up Appointment(s)/Referral(s): 


Panfilo Diamond MD [STAFF PHYSICIAN] - 2 Weeks


Smith Figueroa DO [Primary Care Provider] - 1-2 days


Patient Instructions/Handouts:  Chest Pain (ED), Low-Sodium Diet (GEN), 

Mediterranean Diet (GEN)


Discharge Disposition: HOME SELF-CARE

## 2022-08-08 NOTE — P.PN
Subjective


The patient is a pleasant 77-year-old gentleman, with a past medical history of 

known intermediate nonobstructive coronary artery disease based on heart 

catheterization in 2021, hypertension, type 2 diabetes, dyslipidemia and 

preserved left ventricular systolic function on the most recent echo presented 

to the hospital complaining of chest discomfort.  He follows in the office with 

Dr. Diamond.  We have been asked to see in consultation for chest pain, acute 

coronary syndrome has ruled out.  More importantly his pressure has been 

elevated at home as well as in the hospital.  The blood pressure seems to be 

consistent with hypertension emergency.  His systolic pressure was above 180 

mmHg. The EKG showed sinus rhythm without any significant ST or T-wave 

abnormalities, troponin negative x 3. 





Patient seen and examined at bedside, no acute distress. No chest pain or 

shortness of breath. Endorses some mild abdominal pain. BP has improved. BP 1

26/59, heart rate 46, afebrile, oxygen saturation is 96% on room air.





Meds: Amlodipine 10 mg daily, aspirin 81 mg daily, atorvastatin 80 mg nightly, 

lisinopril 20 mg twice a day, chlorthalidone 25 mg daily





GENERAL: Well-appearing, well-nourished and in no acute distress.


NECK: Supple without JVD or thyromegaly.


LUNGS: Breath sounds clear to auscultation bilaterally. Respiration equal and 

unlabored.  No wheezes, rales or rhonchi.


HEART: Regular rate and rhythm with systolic ejection murmur. S1 and S2 heard.


EXTREMITIES: Normal range of motion, no edema.  No clubbing or cyanosis. 

Peripheral pulses intact.





ASSESSMENT


Hypertension emergency, improved, patient is noncompliant with his diet


Chest discomfort secondary to hypertensive emergency


known intermediate nonobstructive coronary artery disease based on heart 

catheterization in 2021


Hypertension


Dyslipidemia


Type 2 diabetes





PLAN


Continue current medication regimen, BP is improved. Patient's symptoms 

improved. From a cardiology perspective patient can be discharged home follow up

with Dr. Diamond as an outpatient. 





Nurse Practitioner note has been reviewed, I agree with a documented findings 

and plan of care.  Patient was seen and examined.








Objective





- Vital Signs


Vital signs: 


                                   Vital Signs











Temp  97.6 F   08/08/22 07:00


 


Pulse  46 L  08/08/22 07:00


 


Resp  20   08/08/22 07:00


 


BP  126/59   08/08/22 07:00


 


Pulse Ox  96   08/08/22 07:00


 


FiO2      








                                 Intake & Output











 08/07/22 08/08/22 08/08/22





 18:59 06:59 18:59


 


Intake Total 118  


 


Balance 118  


 


Intake:   


 


  Oral 118  


 


Other:   


 


  Voiding Method  Toilet 


 


  # Voids 4 2 














- Labs


CBC & Chem 7: 


                                 08/06/22 04:33





                                 08/06/22 04:33


Labs: 


                  Abnormal Lab Results - Last 24 Hours (Table)











  08/06/22 08/07/22 08/07/22 Range/Units





  20:58 05:06 12:13 


 


POC Glucose (mg/dL)    240 H  ()  mg/dL


 


Hemoglobin A1c  8.1 H    (0.0-6.0)  %


 


HDL Cholesterol   31.20 L   (40.00-60.00)  mg/dL














  08/07/22 08/07/22 08/08/22 Range/Units





  17:12 20:26 07:02 


 


POC Glucose (mg/dL)  157 H  286 H  238 H  ()  mg/dL


 


Hemoglobin A1c     (0.0-6.0)  %


 


HDL Cholesterol     (40.00-60.00)  mg/dL

## 2022-12-08 ENCOUNTER — HOSPITAL ENCOUNTER (INPATIENT)
Dept: HOSPITAL 47 - EC | Age: 77
LOS: 5 days | Discharge: NURSING, REHAB OR LONG-TERM CARE | DRG: 178 | End: 2022-12-13
Attending: HOSPITALIST | Admitting: HOSPITALIST
Payer: MEDICARE

## 2022-12-08 DIAGNOSIS — E86.0: ICD-10-CM

## 2022-12-08 DIAGNOSIS — E78.5: ICD-10-CM

## 2022-12-08 DIAGNOSIS — C91.10: ICD-10-CM

## 2022-12-08 DIAGNOSIS — E11.42: ICD-10-CM

## 2022-12-08 DIAGNOSIS — I10: ICD-10-CM

## 2022-12-08 DIAGNOSIS — M19.042: ICD-10-CM

## 2022-12-08 DIAGNOSIS — R17: ICD-10-CM

## 2022-12-08 DIAGNOSIS — E11.65: ICD-10-CM

## 2022-12-08 DIAGNOSIS — K21.9: ICD-10-CM

## 2022-12-08 DIAGNOSIS — Z88.0: ICD-10-CM

## 2022-12-08 DIAGNOSIS — E66.9: ICD-10-CM

## 2022-12-08 DIAGNOSIS — Z79.899: ICD-10-CM

## 2022-12-08 DIAGNOSIS — M79.10: ICD-10-CM

## 2022-12-08 DIAGNOSIS — J30.2: ICD-10-CM

## 2022-12-08 DIAGNOSIS — Z28.310: ICD-10-CM

## 2022-12-08 DIAGNOSIS — M19.041: ICD-10-CM

## 2022-12-08 DIAGNOSIS — Z88.8: ICD-10-CM

## 2022-12-08 DIAGNOSIS — U07.1: Primary | ICD-10-CM

## 2022-12-08 DIAGNOSIS — Z79.82: ICD-10-CM

## 2022-12-08 DIAGNOSIS — Z79.4: ICD-10-CM

## 2022-12-08 DIAGNOSIS — I25.10: ICD-10-CM

## 2022-12-08 LAB
ALBUMIN SERPL-MCNC: 4.4 G/DL (ref 3.5–5)
ALP SERPL-CCNC: 105 U/L (ref 38–126)
ALT SERPL-CCNC: 23 U/L (ref 4–49)
ANION GAP SERPL CALC-SCNC: 11 MMOL/L
APTT BLD: 23.7 SEC (ref 22–30)
AST SERPL-CCNC: 28 U/L (ref 17–59)
BUN SERPL-SCNC: 21 MG/DL (ref 9–20)
CALCIUM SPEC-MCNC: 9 MG/DL (ref 8.4–10.2)
CELLS COUNTED: 100
CHLORIDE SERPL-SCNC: 104 MMOL/L (ref 98–107)
CO2 SERPL-SCNC: 22 MMOL/L (ref 22–30)
ERYTHROCYTE [DISTWIDTH] IN BLOOD BY AUTOMATED COUNT: 4.46 M/UL (ref 4.3–5.9)
ERYTHROCYTE [DISTWIDTH] IN BLOOD: 13.5 % (ref 11.5–15.5)
GLUCOSE BLD-MCNC: 163 MG/DL (ref 70–110)
GLUCOSE BLD-MCNC: 186 MG/DL (ref 70–110)
GLUCOSE SERPL-MCNC: 138 MG/DL (ref 74–99)
HCT VFR BLD AUTO: 40.7 % (ref 39–53)
HGB BLD-MCNC: 14.1 GM/DL (ref 13–17.5)
INR PPP: 1.1 (ref ?–1.2)
LYMPHOCYTES # BLD MANUAL: 23.85 K/UL (ref 1–4.8)
MAGNESIUM SPEC-SCNC: 1.7 MG/DL (ref 1.6–2.3)
MCH RBC QN AUTO: 31.6 PG (ref 25–35)
MCHC RBC AUTO-ENTMCNC: 34.6 G/DL (ref 31–37)
MCV RBC AUTO: 91.2 FL (ref 80–100)
NEUTROPHILS NFR BLD MANUAL: 10 %
NEUTS SEG # BLD MANUAL: 2.65 K/UL (ref 1.3–7.7)
PH UR: 5.5 [PH] (ref 5–8)
PLATELET # BLD AUTO: 101 K/UL (ref 150–450)
POTASSIUM SERPL-SCNC: 4.1 MMOL/L (ref 3.5–5.1)
PROT SERPL-MCNC: 6.7 G/DL (ref 6.3–8.2)
PT BLD: 11.9 SEC (ref 9–12)
SODIUM SERPL-SCNC: 137 MMOL/L (ref 137–145)
SP GR UR: 1.02 (ref 1–1.03)
UROBILINOGEN UR QL STRIP: <2 MG/DL (ref ?–2)
WBC # BLD AUTO: 26.5 K/UL (ref 3.8–10.6)

## 2022-12-08 PROCEDURE — 81003 URINALYSIS AUTO W/O SCOPE: CPT

## 2022-12-08 PROCEDURE — 87634 RSV DNA/RNA AMP PROBE: CPT

## 2022-12-08 PROCEDURE — 82728 ASSAY OF FERRITIN: CPT

## 2022-12-08 PROCEDURE — 83605 ASSAY OF LACTIC ACID: CPT

## 2022-12-08 PROCEDURE — 96365 THER/PROPH/DIAG IV INF INIT: CPT

## 2022-12-08 PROCEDURE — 85610 PROTHROMBIN TIME: CPT

## 2022-12-08 PROCEDURE — 85730 THROMBOPLASTIN TIME PARTIAL: CPT

## 2022-12-08 PROCEDURE — 80053 COMPREHEN METABOLIC PANEL: CPT

## 2022-12-08 PROCEDURE — 36415 COLL VENOUS BLD VENIPUNCTURE: CPT

## 2022-12-08 PROCEDURE — 83615 LACTATE (LD) (LDH) ENZYME: CPT

## 2022-12-08 PROCEDURE — 84145 PROCALCITONIN (PCT): CPT

## 2022-12-08 PROCEDURE — 96361 HYDRATE IV INFUSION ADD-ON: CPT

## 2022-12-08 PROCEDURE — 96375 TX/PRO/DX INJ NEW DRUG ADDON: CPT

## 2022-12-08 PROCEDURE — 84484 ASSAY OF TROPONIN QUANT: CPT

## 2022-12-08 PROCEDURE — 71045 X-RAY EXAM CHEST 1 VIEW: CPT

## 2022-12-08 PROCEDURE — 87502 INFLUENZA DNA AMP PROBE: CPT

## 2022-12-08 PROCEDURE — 93005 ELECTROCARDIOGRAM TRACING: CPT

## 2022-12-08 PROCEDURE — 85379 FIBRIN DEGRADATION QUANT: CPT

## 2022-12-08 PROCEDURE — 99285 EMERGENCY DEPT VISIT HI MDM: CPT

## 2022-12-08 PROCEDURE — 85025 COMPLETE CBC W/AUTO DIFF WBC: CPT

## 2022-12-08 PROCEDURE — 87040 BLOOD CULTURE FOR BACTERIA: CPT

## 2022-12-08 PROCEDURE — 87635 SARS-COV-2 COVID-19 AMP PRB: CPT

## 2022-12-08 PROCEDURE — 94760 N-INVAS EAR/PLS OXIMETRY 1: CPT

## 2022-12-08 PROCEDURE — 83735 ASSAY OF MAGNESIUM: CPT

## 2022-12-08 PROCEDURE — 84100 ASSAY OF PHOSPHORUS: CPT

## 2022-12-08 RX ADMIN — TIMOLOL MALEATE SCH DROPS: 5 SOLUTION OPHTHALMIC at 21:38

## 2022-12-08 RX ADMIN — CEFAZOLIN SCH MLS/HR: 330 INJECTION, POWDER, FOR SOLUTION INTRAMUSCULAR; INTRAVENOUS at 06:12

## 2022-12-08 RX ADMIN — BRIMONIDINE TARTRATE SCH DROPS: 2 SOLUTION/ DROPS OPHTHALMIC at 21:38

## 2022-12-08 RX ADMIN — MONTELUKAST SODIUM SCH MG: 10 TABLET, FILM COATED ORAL at 20:53

## 2022-12-08 RX ADMIN — INSULIN ASPART SCH UNIT: 100 INJECTION, SOLUTION INTRAVENOUS; SUBCUTANEOUS at 20:52

## 2022-12-08 RX ADMIN — HYDROCODONE BITARTRATE AND ACETAMINOPHEN PRN EACH: 5; 325 TABLET ORAL at 13:42

## 2022-12-08 RX ADMIN — GABAPENTIN SCH MG: 300 CAPSULE ORAL at 20:53

## 2022-12-08 RX ADMIN — ATORVASTATIN CALCIUM SCH MG: 80 TABLET, FILM COATED ORAL at 20:53

## 2022-12-08 RX ADMIN — HEPARIN SODIUM SCH UNIT: 5000 INJECTION INTRAVENOUS; SUBCUTANEOUS at 20:53

## 2022-12-08 RX ADMIN — INSULIN ASPART SCH UNIT: 100 INJECTION, SOLUTION INTRAVENOUS; SUBCUTANEOUS at 16:51

## 2022-12-08 RX ADMIN — GABAPENTIN SCH MG: 300 CAPSULE ORAL at 16:19

## 2022-12-08 RX ADMIN — HYDROCODONE BITARTRATE AND ACETAMINOPHEN PRN EACH: 5; 325 TABLET ORAL at 21:30

## 2022-12-08 RX ADMIN — LATANOPROST SCH DROPS: 50 SOLUTION OPHTHALMIC at 21:38

## 2022-12-08 NOTE — P.HPIM
History of Present Illness


H&P Date: 22





Patient is 76-year-old male with PMH of CLL, CAD, diabetes mellitus, GERD, 

hypertension presents ED for generalized weakness.  Patient reports weakness 

that has been ongoing for the past 2 days. He has had difficulty getting out of 

bed. He reports a dry cough. Today, he has had 3 episodes of diarrhea.  He has 

taken a course of antibiotics for sinusitis without much relief.  He reports 

myalgias in his bilateral lower extremities. He denies any headache, lower 

extremity edema, nausea or vomiting, fever or chills, chest pain, shortness of 

breath, palpitations or changes in urination.  No changes in appetite or weight.

 He denies any dizziness, numbness/weakness/tingling of the extremities.  He has

not been vaccinated for COVID-19.  Patient was noted to have a fever of 102 

Fahrenheit in the ED.  Vital signs are otherwise stable.  CBC showed WBC count 

of 26.5 lymphocytic predominant and platelet count 101.  INR was 1.1.  CMP 

showed BUN of 21, glucose 138, total bilirubin 1.5.  Troponin was 0.018, EKG 

showing sinus rhythm.  Urinalysis showed trace ketones, protein and glucose.  

Influenza and RSV negative.  COVID-19 positive.  Chest x-ray was negative.  

Patient is admitted for generalized weakness and PTOT evaluation.





Pertinent positives and negatives as discussed in HPI, a complete review of 

systems was performed and all other systems are negative.





General: non toxic, no distress, appears at stated age


Derm: warm, dry


Head: atraumatic, normocephalic, symmetric


Eyes: EOMI, no lid lag, anicteric sclera


Mouth: no lip lesion, mucus membranes moist


Cardiovascular: S1S2 reg, no murmur, positive posterior tibial pulse bilateral, 


Lungs: CTA bilateral, no rhonchi, no rales , no accessory muscle use


Abdominal: soft,  nontender to palpation, no guarding, no appreciable 

organomegaly


Ext: no gross muscle atrophy, no edema, no contractures


Neuro:  CN II-XI grossly intact, no focal neuro deficits


Psych: Alert, oriented, appropriate affect 





#Generalized weakness and debility


PT and OT will be consulted to work with this patient.


Patient will be placed on fall precautions.


#COVID-19


Patient is not requiring any supplemental oxygen.


Obtain inflammatory labs including d-dimer, ferritin and LDH.


No indication for antibiotics.


#Leukocytosis, lymphocytic predominant likely related to CLL


Appears at baseline.


Continue to monitor.


#Obesity


Patient will benefit from a structured weight loss program.


Chronic conditions: CAD, diabetes mellitus, GERD, hypertension, peripheral 

neuropathy


Restart aspirin, Lipitor.  Low-dose insulin sliding scale, Accu-Cheks before 

meals at bedtime, hypoglycemic precautions.  Restart Protonix.  Restart 

chlorthalidone, Imdur, lisinopril.  Monitor vitals, adjust medication if 

necessary.  Restart gabapentin.





DVT prophylaxis: Heparin


Discussed with: Patient


Anticipated discharge: 2-3 days


Anticipated discharge place: [facility] versus home with home health


A total of 35 minutes was spent on the care of this complex patient more than 

50% of the time was spent in counseling and care coordination. 





Patient names his wife decision maker if he can't make decisions for himself.  

Patient would like to be full code.





Past Medical History


Past Medical History: Coronary Artery Disease (CAD), Cancer, Diabetes Mellitus, 

GERD/Reflux, Hypertension, Osteoarthritis (OA)


Additional Past Medical History / Comment(s): NIDDM type II, 1999 LEUKEMIA (CLL-

remission), sinus problems, seasonal allergies, tinnitis bilaterally, OA hands, 

R wrist carpal tunnel, past fx with L elbow, R rotator cuff tendon problems, L 

rotator cuff tear, left knee pain


History of Any Multi-Drug Resistant Organisms: None Reported


Past Surgical History: Heart Catheterization, Orthopedic Surgery


Additional Past Surgical History / Comment(s): LEFT HAND thumb tendon repair. L 

knee surgery.


Past Anesthesia/Blood Transfusion Reactions: No Reported Reaction


Past Psychological History: No Psychological Hx Reported


Additional Psychological History / Comment(s): Pt resides with his spouse.  He 

is independent.  He ambulates with a cane.  He drives.


Smoking Status: Never smoker


Past Alcohol Use History: None Reported


Past Drug Use History: None Reported


Additional Drug Use History / Comment(s): Occasional marijuana





- Past Family History


  ** Father


Family Medical History: CVA/TIA


Additional Family Medical History / Comment(s): Father  at the age of 85yrs.





  ** Mother


Family Medical History: Diabetes Mellitus


Additional Family Medical History / Comment(s): Mother  at the age of 90yrs.





Medications and Allergies


                                Home Medications











 Medication  Instructions  Recorded  Confirmed  Type


 


Montelukast Sodium [Singulair] 10 mg PO HS 17 History


 


Fluticasone Nasal Spray [Flonase 2 spr EA NOSTRIL DAILY 21 

History





Nasal [place]]    


 


HYDROcodone/APAP 5-325MG [Norco 1 tab PO Q6HR PRN 21 History





5-325]    


 


Atorvastatin [Lipitor] 80 mg PO HS 21 History


 


Baclofen [Lioresal] 10 mg PO DAILY 21 History


 


SILVER sulfADIAZINE CREAM 1 applic TOPICAL BID 21 History





[Silvadene Cream]    


 


Isosorbide Mononitrate ER [Imdur] 30 mg PO DAILY #30 tab 21 Rx


 


Omeprazole 40 mg PO DAILY 10/20/21 12/08/22 History


 


lisinopriL [Zestril] 20 mg PO BID 10/20/21 12/08/22 History


 


ALPRAZolam [Xanax] 0.25 mg PO DAILY PRN 21 History


 


Brimonidine Tartrate/Timolol 1 drop BOTH EYES BID 21 History





[Combigan 0.2%-0.5% Eye Drops]    


 


Latanoprost/Pf [Latanoprost 0.005% 1 drop BOTH EYES HS 21 History





Eye Drop]    


 


Gabapentin [Neurontin] 300 mg PO TID #90 cap 22 Rx


 


Chlorthalidone [Hygroton] 25 mg PO DAILY #90 tab 22 Rx


 


Nitroglycerin Sl Tabs [Nitrostat] 0.4 mg SUBLINGUAL Q5M PRN  tab 22 Rx


 


Aspirin EC [Ecotrin Low Dose] 81 mg PO DAILY 22 History


 


Doxycycline Monohydrate 100 mg PO BID 22 History


 


Insulin Aspart Prot/Insuln Asp 15 units SQ HS 22 History





[Relion Novolog Mix 70-30 Vial]    


 


Insulin Aspart Prot/Insuln Asp 22 units SQ QAM 22 History





[Relion Novolog Mix 70-30 Vial]    


 


Meclizine [Antivert] 25 mg PO TID 22 History








                                    Allergies











Allergy/AdvReac Type Severity Reaction Status Date / Time


 


amoxicillin [From Augmentin] Allergy  Rash/Hives Verified 22 08:51


 


clavulanic acid Allergy  Rash/Hives Verified 22 08:51





[From Augmentin]     














Physical Exam


Vitals: 


                                   Vital Signs











  Temp Pulse Pulse Resp BP BP Pulse Ox


 


 22 13:10  97.6 F   58 L  18   168/71  96


 


 22 08:00     19   


 


 22 06:01  97.8 F  62   19  101/68   100


 


 22 01:56  102 F H  84   16  140/71   96








                                Intake and Output











 22





 06:59 14:59 22:59


 


Intake Total  118 


 


Balance  118 


 


Intake:   


 


  Oral  118 


 


Other:   


 


  Voiding Method  Urinal 


 


  # Bowel Movements  1 


 


  Weight 98.883 kg 98.883 kg 














Results


CBC & Chem 7: 


                                 22 02:43





                                 22 02:43


Labs: 


                  Abnormal Lab Results - Last 24 Hours (Table)











  22 Range/Units





  02:43 02:43 10:11 


 


WBC  26.5 H    (3.8-10.6)  k/uL


 


Plt Count  101 L    (150-450)  k/uL


 


Lymphocytes # (Manual)  23.85 H    (1.0-4.8)  k/uL


 


BUN   21 H   (9-20)  mg/dL


 


Glucose   138 H   (74-99)  mg/dL


 


Total Bilirubin   1.5 H   (0.2-1.3)  mg/dL


 


Urine Protein     (Negative)  


 


Urine Glucose (UA)     (Negative)  


 


Urine Ketones     (Negative)  


 


Coronavirus (PCR)    Detected A  (Not Detectd)  














  22 Range/Units





  10:26 


 


WBC   (3.8-10.6)  k/uL


 


Plt Count   (150-450)  k/uL


 


Lymphocytes # (Manual)   (1.0-4.8)  k/uL


 


BUN   (9-20)  mg/dL


 


Glucose   (74-99)  mg/dL


 


Total Bilirubin   (0.2-1.3)  mg/dL


 


Urine Protein  Trace H  (Negative)  


 


Urine Glucose (UA)  Trace H  (Negative)  


 


Urine Ketones  Trace H  (Negative)  


 


Coronavirus (PCR)   (Not Detectd)  














Thrombosis Risk Factor Assmnt





- Choose All That Apply


Any of the Below Risk Factors Present?: Yes


Each Factor Represents 1 point: Obesity (BMI >25)


Each Risk Factor Represents 3 Points: Age 75 years or older


Thrombosis Risk Factor Assessment Total Risk Factor Score: 4


Thrombosis Risk Factor Assessment Level: Moderate Risk

## 2022-12-08 NOTE — ED
Weakness HPI





- General


Chief complaint: Weakness


Stated complaint: Weakness


Time Seen by Provider: 22 02:20


Source: EMS, RN notes reviewed, old records reviewed, Caregiver


Mode of arrival: EMS


Limitations: no limitations





- History of Present Illness


Initial comments: 





This is a 76-year-old male mildly poor story and multiple medical complications 

including CO leukemia.  Patient coming in for evaluation of fever fever not 

feeling well weakness and to hydration difficulty with ambulation and movement. 

Patient was just discharged hospital 2 days ago supposedly working on and home 

nursing care inpatient treatment plan.  Patient has no headache chest pain 

shortness breath or abdominal pain currently


MD Complaint: generalized weakness, lack of energy, difficulty walking


-: days(s)


Location: generalized


Severity: severe


Severity scale (1-10): 9


Quality: tingling, numbness, aching


Consistency: constant


Context: history of similar


Associated Symptoms: confusion, nausea/vomiting





- Related Data


                                Home Medications











 Medication  Instructions  Recorded  Confirmed


 


Montelukast Sodium [Singulair] 10 mg PO HS 17


 


Insulin NPH Hum/Reg Insulin Hm 20 units SQ [id number] 20





[Relion Novolin 70-30 Flexpen]   


 


Fluticasone Nasal Spray [Flonase 1 spr EA NOSTRIL DAILY 21





Nasal [place]]   


 


HYDROcodone/APAP 5-325MG [Norco 1 tab PO Q6HR PRN 21





5-325]   


 


Ketorolac 0.5% Ophth Soln [Acular 1 drop BOTH EYES BID 21





0.5%]   


 


Atorvastatin [Lipitor] 80 mg PO HS 21


 


Baclofen [Lioresal] 10 mg PO DAILY 21


 


SILVER sulfADIAZINE CREAM 1 applic TOPICAL DAILY PRN 21





[Silvadene Cream]   


 


Omeprazole 40 mg PO DAILY 10/20/21 08/06/22


 


lisinopriL [Zestril] 20 mg PO BID 10/20/21 08/06/22


 


ALPRAZolam [Xanax] 0.25 mg PO DAILY PRN 21


 


Brimonidine Tartrate/Timolol 1 drop BOTH EYES BID 21





[Combigan 0.2%-0.5% Eye Drops]   


 


Latanoprost/Pf [Latanoprost 0.005% 1 drop BOTH EYES HS 21





Eye Drop]   


 


Nitroglycerin Sl Tabs [Nitrostat] 0.4 mg SL Q5M PRN 21








                                  Previous Rx's











 Medication  Instructions  Recorded


 


Isosorbide Mononitrate ER [Imdur] 30 mg PO DAILY #30 tab 21


 


Aspirin 81 mg PO DAILY  tab 10/21/21


 


Gabapentin [Neurontin] 300 mg PO TID #90 cap 22


 


Chlorthalidone [Hygroton] 25 mg PO DAILY #90 tab 22


 


Nitroglycerin Sl Tabs [Nitrostat] 0.4 mg SUBLINGUAL Q5M PRN  tab 22


 


Timolol 0.5% Ophth Soln [Timoptic 1 drops BOTH EYES BID  ml 22





0.5% Ophth Soln]  


 


amLODIPine [Norvasc] 10 mg PO DAILY #90 tab 22











                                    Allergies











Allergy/AdvReac Type Severity Reaction Status Date / Time


 


amoxicillin [From Augmentin] Allergy  Rash/Hives Verified 22 11:05


 


clavulanic acid Allergy  Rash/Hives Verified 22 11:05





[From Augmentin]     














Review of Systems


ROS Statement: 


Those systems with pertinent positive or pertinent negative responses have been 

documented in the HPI.





ROS Other: All systems not noted in ROS Statement are negative.





Past Medical History


Past Medical History: Coronary Artery Disease (CAD), Cancer, Diabetes Mellitus, 

GERD/Reflux, Hypertension, Osteoarthritis (OA)


Additional Past Medical History / Comment(s): NIDDM type II, 1999 LEUKEMIA (CLL-

remission), sinus problems, seasonal allergies, tinnitis bilaterally, OA hands, 

R wrist carpal tunnel, past fx with L elbow, R rotator cuff tendon problems, L 

rotator cuff tear, left knee pain


History of Any Multi-Drug Resistant Organisms: None Reported


Past Surgical History: Heart Catheterization, Orthopedic Surgery


Additional Past Surgical History / Comment(s): LEFT HAND thumb tendon repair. L 

knee surgery.


Past Anesthesia/Blood Transfusion Reactions: No Reported Reaction


Past Psychological History: No Psychological Hx Reported


Smoking Status: Never smoker


Past Alcohol Use History: None Reported


Past Drug Use History: None Reported





- Past Family History


  ** Father


Family Medical History: CVA/TIA


Additional Family Medical History / Comment(s): Father  at the age of 85yrs.





  ** Mother


Family Medical History: Diabetes Mellitus


Additional Family Medical History / Comment(s): Mother  at the age of 90yrs.





General Exam


Limitations: no limitations, altered mental status


General appearance: alert, in no apparent distress, anxious, obese


Head exam: Present: atraumatic, normocephalic, normal inspection


Eye exam: Present: normal appearance, PERRL, EOMI.  Absent: scleral icterus, 

conjunctival injection, periorbital swelling


ENT exam: Present: normal exam, mucous membranes dry


Neck exam: Present: normal inspection.  Absent: tenderness, meningismus, 

lymphadenopathy


Respiratory exam: Present: normal lung sounds bilaterally, accessory muscle use.

 Absent: respiratory distress, wheezes, rales, rhonchi, stridor


Cardiovascular Exam: Present: regular rate, normal rhythm, normal heart sounds. 

Absent: systolic murmur, diastolic murmur, rubs, gallop, clicks


GI/Abdominal exam: Present: soft, normal bowel sounds.  Absent: distended, tende

rness, guarding, rebound, rigid


Extremities exam: Present: normal inspection, full ROM, normal capillary refill.

 Absent: tenderness, pedal edema, joint swelling, calf tenderness


Back exam: Present: normal inspection


Neurological exam: Present: alert, oriented X3, CN II-XII intact


Psychiatric exam: Present: normal affect, normal mood


Skin exam: Present: warm, dry, intact, normal color.  Absent: rash





Course


                                   Vital Signs











  22





  01:56


 


Temperature 102 F H


 


Pulse Rate 84


 


Respiratory 16





Rate 


 


Blood Pressure 140/71


 


O2 Sat by Pulse 96





Oximetry 














- Reevaluation(s)


Reevaluation #1: 





22 06:01


Medical record is reviewed


Reevaluation #2: 





22 06:01


Patient still feels coming here in the ER very weak


Reevaluation #3: 





22 06:01


Patient informed results and questions answered





- Consultations


Consultation #1: 





Spoke with sound agrees to admit this patient





EKG Findings





- EKG Comments:


EKG Findings:: EKG interpreted by me sinus 87  QRS 94 





Medical Decision Making





- Lab Data


Result diagrams: 


                                 22 02:43





                                 22 02:43


                                   Lab Results











  22 Range/Units





  02:43 02:43 02:43 


 


WBC  26.5 H    (3.8-10.6)  k/uL


 


RBC  4.46    (4.30-5.90)  m/uL


 


Hgb  14.1    (13.0-17.5)  gm/dL


 


Hct  40.7    (39.0-53.0)  %


 


MCV  91.2    (80.0-100.0)  fL


 


MCH  31.6    (25.0-35.0)  pg


 


MCHC  34.6    (31.0-37.0)  g/dL


 


RDW  13.5    (11.5-15.5)  %


 


Plt Count  101 L    (150-450)  k/uL


 


MPV  8.7    


 


Neutrophils % (Manual)  10    %


 


Lymphocytes % (Manual)  90    %


 


Neutrophils # (Manual)  2.65    (1.3-7.7)  k/uL


 


Lymphocytes # (Manual)  23.85 H    (1.0-4.8)  k/uL


 


Nucleated RBCs  0    (0-0)  /100 WBC


 


Differential Comment      


 


Manual Slide Review  Performed    


 


PT   11.9   (9.0-12.0)  sec


 


INR   1.1   (<1.2)  


 


APTT   23.7   (22.0-30.0)  sec


 


Sodium    137  (137-145)  mmol/L


 


Potassium    4.1  (3.5-5.1)  mmol/L


 


Chloride    104  ()  mmol/L


 


Carbon Dioxide    22  (22-30)  mmol/L


 


Anion Gap    11  mmol/L


 


BUN    21 H  (9-20)  mg/dL


 


Creatinine    1.04  (0.66-1.25)  mg/dL


 


Est GFR (CKD-EPI)AfAm    81  (>60 ml/min/1.73 sqM)  


 


Est GFR (CKD-EPI)NonAf    70  (>60 ml/min/1.73 sqM)  


 


Glucose    138 H  (74-99)  mg/dL


 


Plasma Lactic Acid Robert     (0.7-2.0)  mmol/L


 


Calcium    9.0  (8.4-10.2)  mg/dL


 


Phosphorus    3.1  (2.5-4.5)  mg/dL


 


Magnesium    1.7  (1.6-2.3)  mg/dL


 


Total Bilirubin    1.5 H  (0.2-1.3)  mg/dL


 


AST    28  (17-59)  U/L


 


ALT    23  (4-49)  U/L


 


Alkaline Phosphatase    105  ()  U/L


 


Troponin I     (0.000-0.034)  ng/mL


 


Total Protein    6.7  (6.3-8.2)  g/dL


 


Albumin    4.4  (3.5-5.0)  g/dL


 


Influenza Type A RNA     (Not Detectd)  


 


Influenza Type B (PCR)     (Not Detectd)  














  22 Range/Units





  02:43 02:43 02:51 


 


WBC     (3.8-10.6)  k/uL


 


RBC     (4.30-5.90)  m/uL


 


Hgb     (13.0-17.5)  gm/dL


 


Hct     (39.0-53.0)  %


 


MCV     (80.0-100.0)  fL


 


MCH     (25.0-35.0)  pg


 


MCHC     (31.0-37.0)  g/dL


 


RDW     (11.5-15.5)  %


 


Plt Count     (150-450)  k/uL


 


MPV     


 


Neutrophils % (Manual)     %


 


Lymphocytes % (Manual)     %


 


Neutrophils # (Manual)     (1.3-7.7)  k/uL


 


Lymphocytes # (Manual)     (1.0-4.8)  k/uL


 


Nucleated RBCs     (0-0)  /100 WBC


 


Differential Comment     


 


Manual Slide Review     


 


PT     (9.0-12.0)  sec


 


INR     (<1.2)  


 


APTT     (22.0-30.0)  sec


 


Sodium     (137-145)  mmol/L


 


Potassium     (3.5-5.1)  mmol/L


 


Chloride     ()  mmol/L


 


Carbon Dioxide     (22-30)  mmol/L


 


Anion Gap     mmol/L


 


BUN     (9-20)  mg/dL


 


Creatinine     (0.66-1.25)  mg/dL


 


Est GFR (CKD-EPI)AfAm     (>60 ml/min/1.73 sqM)  


 


Est GFR (CKD-EPI)NonAf     (>60 ml/min/1.73 sqM)  


 


Glucose     (74-99)  mg/dL


 


Plasma Lactic Acid Robert  1.4    (0.7-2.0)  mmol/L


 


Calcium     (8.4-10.2)  mg/dL


 


Phosphorus     (2.5-4.5)  mg/dL


 


Magnesium     (1.6-2.3)  mg/dL


 


Total Bilirubin     (0.2-1.3)  mg/dL


 


AST     (17-59)  U/L


 


ALT     (4-49)  U/L


 


Alkaline Phosphatase     ()  U/L


 


Troponin I   0.018   (0.000-0.034)  ng/mL


 


Total Protein     (6.3-8.2)  g/dL


 


Albumin     (3.5-5.0)  g/dL


 


Influenza Type A RNA    Not Detected  (Not Detectd)  


 


Influenza Type B (PCR)    Not Detected  (Not Detectd)  














Disposition


Clinical Impression: 


 Fall, Dehydration, Weakness, Fever





Disposition: ADMITTED AS IP TO THIS HOSP


Condition: Fair


Is patient prescribed a controlled substance at d/c from ED?: No


Referrals: 


Smith Figueroa DO [Primary Care Provider] - 1-2 days


Time of Disposition: 06:00

## 2022-12-08 NOTE — XR
EXAMINATION TYPE: XR chest 1V portable

 

DATE OF EXAM: 12/8/2022

 

COMPARISON: 8/6/2022

 

HISTORY: Weakness

 

TECHNIQUE: Single view

 

FINDINGS: There is no heart failure nor confluent pneumonic infiltrate. Costophrenic angles are clear
. There are no hilar masses. There are chest leads.

 

IMPRESSION: No active cardiopulmonary disease. Normal heart. No change.

## 2022-12-09 LAB
ALBUMIN SERPL-MCNC: 4.2 G/DL (ref 3.8–4.9)
ALBUMIN/GLOB SERPL: 2.33 G/DL (ref 1.6–3.17)
ALP SERPL-CCNC: 102 U/L (ref 41–126)
ALT SERPL-CCNC: 67 U/L (ref 10–49)
ANION GAP SERPL CALC-SCNC: 16.4 MMOL/L (ref 10–18)
AST SERPL-CCNC: 73 U/L (ref 14–35)
BASOPHILS # BLD AUTO: 0.06 X 10*3/UL (ref 0–0.1)
BASOPHILS NFR BLD AUTO: 0.2 %
BUN SERPL-SCNC: 20.7 MG/DL (ref 9–27)
BUN/CREAT SERPL: 18.82 RATIO (ref 12–20)
CALCIUM SPEC-MCNC: 8.7 MG/DL (ref 8.7–10.3)
CHLORIDE SERPL-SCNC: 102 MMOL/L (ref 96–109)
CO2 SERPL-SCNC: 20.6 MMOL/L (ref 20–27.5)
EOSINOPHIL # BLD AUTO: 0.13 X 10*3/UL (ref 0.04–0.35)
EOSINOPHIL NFR BLD AUTO: 0.5 %
ERYTHROCYTE [DISTWIDTH] IN BLOOD BY AUTOMATED COUNT: 4.42 X 10*6/UL (ref 4.4–5.6)
ERYTHROCYTE [DISTWIDTH] IN BLOOD: 13.4 % (ref 11.5–14.5)
FERRITIN SERPL-MCNC: 311 NG/ML (ref 22–322)
GLOBULIN SER CALC-MCNC: 1.8 G/DL (ref 1.6–3.3)
GLUCOSE BLD-MCNC: 212 MG/DL (ref 70–110)
GLUCOSE BLD-MCNC: 231 MG/DL (ref 70–110)
GLUCOSE BLD-MCNC: 235 MG/DL (ref 70–110)
GLUCOSE BLD-MCNC: 247 MG/DL (ref 70–110)
GLUCOSE SERPL-MCNC: 211 MG/DL (ref 70–110)
HCT VFR BLD AUTO: 41.7 % (ref 39.6–50)
HGB BLD-MCNC: 14.1 G/DL (ref 13–17)
IMM GRANULOCYTES BLD QL AUTO: 0.2 %
LDH SPEC-CCNC: 251 U/L (ref 120–246)
LYMPHOCYTES # SPEC AUTO: 21.79 X 10*3/UL (ref 0.9–5)
LYMPHOCYTES NFR SPEC AUTO: 82.7 %
MAGNESIUM SPEC-SCNC: 1.7 MG/DL (ref 1.5–2.4)
MCH RBC QN AUTO: 31.9 PG (ref 27–32)
MCHC RBC AUTO-ENTMCNC: 33.8 G/DL (ref 32–37)
MCV RBC AUTO: 94.3 FL (ref 80–97)
MONOCYTES # BLD AUTO: 0.9 X 10*3/UL (ref 0.2–1)
MONOCYTES NFR BLD AUTO: 3.4 %
NEUTROPHILS # BLD AUTO: 3.42 X 10*3/UL (ref 1.8–7.7)
NEUTROPHILS NFR BLD AUTO: 13 %
NRBC BLD AUTO-RTO: 0.1 /100 WBCS (ref 0–0)
PLATELET # BLD AUTO: 89 X 10*3/UL (ref 140–440)
POTASSIUM SERPL-SCNC: 3.6 MMOL/L (ref 3.5–5.5)
PROT SERPL-MCNC: 6 G/DL (ref 6.2–8.2)
RBC MORPH BLD: NORMAL
SODIUM SERPL-SCNC: 139 MMOL/L (ref 135–145)
WBC # BLD AUTO: 26.35 X 10*3/UL (ref 4.5–10)

## 2022-12-09 RX ADMIN — ATORVASTATIN CALCIUM SCH MG: 80 TABLET, FILM COATED ORAL at 22:27

## 2022-12-09 RX ADMIN — BRIMONIDINE TARTRATE SCH DROPS: 2 SOLUTION/ DROPS OPHTHALMIC at 22:27

## 2022-12-09 RX ADMIN — PANTOPRAZOLE SODIUM SCH MG: 40 TABLET, DELAYED RELEASE ORAL at 06:55

## 2022-12-09 RX ADMIN — ACETAMINOPHEN PRN MG: 325 TABLET, FILM COATED ORAL at 01:24

## 2022-12-09 RX ADMIN — INSULIN ASPART SCH UNIT: 100 INJECTION, SOLUTION INTRAVENOUS; SUBCUTANEOUS at 18:14

## 2022-12-09 RX ADMIN — ISOSORBIDE MONONITRATE SCH MG: 30 TABLET, EXTENDED RELEASE ORAL at 10:00

## 2022-12-09 RX ADMIN — INSULIN ASPART SCH UNIT: 100 INJECTION, SOLUTION INTRAVENOUS; SUBCUTANEOUS at 06:55

## 2022-12-09 RX ADMIN — GABAPENTIN SCH MG: 300 CAPSULE ORAL at 22:26

## 2022-12-09 RX ADMIN — BRIMONIDINE TARTRATE SCH DROPS: 2 SOLUTION/ DROPS OPHTHALMIC at 10:01

## 2022-12-09 RX ADMIN — HEPARIN SODIUM SCH UNIT: 5000 INJECTION INTRAVENOUS; SUBCUTANEOUS at 10:03

## 2022-12-09 RX ADMIN — TIMOLOL MALEATE SCH DROPS: 5 SOLUTION OPHTHALMIC at 10:01

## 2022-12-09 RX ADMIN — HEPARIN SODIUM SCH UNIT: 5000 INJECTION INTRAVENOUS; SUBCUTANEOUS at 22:27

## 2022-12-09 RX ADMIN — MONTELUKAST SODIUM SCH MG: 10 TABLET, FILM COATED ORAL at 22:27

## 2022-12-09 RX ADMIN — GABAPENTIN SCH MG: 300 CAPSULE ORAL at 10:00

## 2022-12-09 RX ADMIN — CHLORTHALIDONE SCH MG: 25 TABLET ORAL at 10:00

## 2022-12-09 RX ADMIN — TIMOLOL MALEATE SCH DROPS: 5 SOLUTION OPHTHALMIC at 22:28

## 2022-12-09 RX ADMIN — CEFAZOLIN SCH: 330 INJECTION, POWDER, FOR SOLUTION INTRAMUSCULAR; INTRAVENOUS at 06:17

## 2022-12-09 RX ADMIN — HYDROCODONE BITARTRATE AND ACETAMINOPHEN PRN EACH: 5; 325 TABLET ORAL at 04:19

## 2022-12-09 RX ADMIN — LATANOPROST SCH DROPS: 50 SOLUTION OPHTHALMIC at 22:28

## 2022-12-09 RX ADMIN — CEFAZOLIN SCH MLS/HR: 330 INJECTION, POWDER, FOR SOLUTION INTRAMUSCULAR; INTRAVENOUS at 12:43

## 2022-12-09 RX ADMIN — INSULIN ASPART SCH UNIT: 100 INJECTION, SOLUTION INTRAVENOUS; SUBCUTANEOUS at 22:31

## 2022-12-09 RX ADMIN — GABAPENTIN SCH: 300 CAPSULE ORAL at 17:22

## 2022-12-09 RX ADMIN — INSULIN ASPART SCH UNIT: 100 INJECTION, SOLUTION INTRAVENOUS; SUBCUTANEOUS at 12:43

## 2022-12-09 RX ADMIN — ASPIRIN 81 MG CHEWABLE TABLET SCH MG: 81 TABLET CHEWABLE at 10:00

## 2022-12-09 NOTE — P.PN
Subjective


Progress Note Date: 12/09/22


Principal diagnosis: 





sob





Patient denied having any pain, no shortness of breath.  Still having fevers 

however up to 102.5.  It came down with Tylenol.  He is currently not requiring 

any oxygen.  Chest x-ray and urinalysis are both negative.





Objective





- Vital Signs


Vital signs: 


                                   Vital Signs











Temp  98.2 F   12/09/22 08:00


 


Pulse  57 L  12/09/22 08:00


 


Resp  18   12/09/22 08:00


 


BP  102/69   12/09/22 08:00


 


Pulse Ox  95   12/09/22 09:20


 


FiO2      








                                 Intake & Output











 12/08/22 12/09/22 12/09/22





 18:59 06:59 18:59


 


Intake Total 118  


 


Balance 118  


 


Weight 98.883 kg  


 


Intake:   


 


  Oral 118  


 


Other:   


 


  Voiding Method Urinal Urinal Bedside Commode


 


  # Voids  4 


 


  # Bowel Movements 1  














- Exam





Constitutional: No acute distress, conversant, pleasant


Eyes:Anicteric sclerae, moist conjunctiva, no lid-lag, PERRLA, 


ENMT: Oropharynx clear, no erythema, exudates


Neck: Supple, FROM, no masses, or JVD, No carotid bruits, No thyromegaly


Lungs: Clear to auscultation, Clear to percussion, Normal respiratory effort, no

accessory muscle use 


Cardiovascular: Heart regular in rate and rhythm, No murmurs, gallops, or rubs, 

No peripheral edema


Abdominal: Soft, Nontender, no guarding, rebound or rigidity, Normoactive bowel 

sounds, No hepatomegaly, No splenomegaly, No palpable mass 


Skin: Normal temperature, tone, texture, turgor, no induration, No subcutaneous 

nodules, No rash, lesions, No ulcers


Extremities: No digital cyanosis, No clubbing, Pedal pulses intact and 

symmetrical, Radial pulses intact and symmetrical, No calf tenderness 


Psychiatric: Alert and oriented to person, place and time, appropriate affect, 

intact judgement         


Neuro: Muscles Strength 5/5 in all 4 extremities, Sensation to light touch 

grossly present throughout, Cranial nerves II-XII grossly intact, no focal 

sensory deficits








- Labs


CBC & Chem 7: 


                                 12/09/22 06:26





                                 12/09/22 06:26


Labs: 


                  Abnormal Lab Results - Last 24 Hours (Table)











  12/08/22 12/08/22 12/09/22 Range/Units





  16:37 20:16 06:26 


 


WBC    26.35 H  (4.50-10.00)  X 10*3/uL


 


Plt Count    89 L  (140-440)  X 10*3/uL


 


Plt Count Comment    DECREASED A  


 


Absolute Nucleated RBC    0.02 H  (0.00-0.00)  X 10*3/uL


 


Immature Gran #    0.05 H  (0.00-0.04)  X 10*3/uL


 


NRBC/100 WBC Diff    0.1 H  (0.0-0.0)  /100 WBCS


 


D-Dimer     (<0.60)  mg/L FEU


 


Glucose     ()  mg/dL


 


POC Glucose (mg/dL)  163 H  186 H   ()  mg/dL


 


AST     (14-35)  U/L


 


ALT     (10-49)  U/L


 


Lactate Dehydrogenase     (120-246)  U/L


 


Total Protein     (6.2-8.2)  g/dL














  12/09/22 12/09/22 12/09/22 Range/Units





  06:26 06:26 06:29 


 


WBC     (4.50-10.00)  X 10*3/uL


 


Plt Count     (140-440)  X 10*3/uL


 


Plt Count Comment     


 


Absolute Nucleated RBC     (0.00-0.00)  X 10*3/uL


 


Immature Gran #     (0.00-0.04)  X 10*3/uL


 


NRBC/100 WBC Diff     (0.0-0.0)  /100 WBCS


 


D-Dimer   1.00 H   (<0.60)  mg/L FEU


 


Glucose  211 H    ()  mg/dL


 


POC Glucose (mg/dL)    212 H  ()  mg/dL


 


AST  73 H    (14-35)  U/L


 


ALT  67 H    (10-49)  U/L


 


Lactate Dehydrogenase  251 H    (120-246)  U/L


 


Total Protein  6.0 L    (6.2-8.2)  g/dL














  12/09/22 Range/Units





  11:56 


 


WBC   (4.50-10.00)  X 10*3/uL


 


Plt Count   (140-440)  X 10*3/uL


 


Plt Count Comment   


 


Absolute Nucleated RBC   (0.00-0.00)  X 10*3/uL


 


Immature Gran #   (0.00-0.04)  X 10*3/uL


 


NRBC/100 WBC Diff   (0.0-0.0)  /100 WBCS


 


D-Dimer   (<0.60)  mg/L FEU


 


Glucose   ()  mg/dL


 


POC Glucose (mg/dL)  231 H  ()  mg/dL


 


AST   (14-35)  U/L


 


ALT   (10-49)  U/L


 


Lactate Dehydrogenase   (120-246)  U/L


 


Total Protein   (6.2-8.2)  g/dL








                      Microbiology - Last 24 Hours (Table)











 12/08/22 02:46 Blood Culture - Preliminary





 Blood    No Growth after 24 hours














Assessment and Plan


Plan: 





#Generalized weakness and debility


PT and OT will be consulted to work with this patient.


Patient will be placed on fall precautions.





#COVID-19


#Sepsis secondary to covid


Patient is not requiring any supplemental oxygen.


No indication for antibiotics.  Check pro calcitonin





#Leukocytosis, lymphocytic predominant likely related to CLL


Appears at baseline.


Continue to monitor.





#Obesity


Patient will benefit from a structured weight loss program.





Chronic conditions: CAD, diabetes mellitus, GERD, hypertension, peripheral 

neuropathy


Restart aspirin, Lipitor.  Low-dose insulin sliding scale, Accu-Cheks before 

meals at bedtime, hypoglycemic precautions.  Restart Protonix.  Restart 

chlorthalidone, Imdur, lisinopril.  Monitor vitals, adjust medication if 

necessary.  Restart gabapentin.





DVT prophylaxis: Heparin


Discussed with: Patient


Anticipated discharge: 1-2 days


Anticipated discharge place: [facility] versus home with home health

## 2022-12-10 LAB
ALBUMIN SERPL-MCNC: 3.4 G/DL (ref 3.5–5)
ALBUMIN/GLOB SERPL: 1.7 {RATIO}
ALP SERPL-CCNC: 75 U/L (ref 38–126)
ALT SERPL-CCNC: 57 U/L (ref 4–49)
ANION GAP SERPL CALC-SCNC: 9 MMOL/L
AST SERPL-CCNC: 59 U/L (ref 17–59)
BUN SERPL-SCNC: 27 MG/DL (ref 9–20)
CALCIUM SPEC-MCNC: 8 MG/DL (ref 8.4–10.2)
CELLS COUNTED: 100
CHLORIDE SERPL-SCNC: 107 MMOL/L (ref 98–107)
CO2 SERPL-SCNC: 19 MMOL/L (ref 22–30)
ERYTHROCYTE [DISTWIDTH] IN BLOOD BY AUTOMATED COUNT: 3.83 M/UL (ref 4.3–5.9)
ERYTHROCYTE [DISTWIDTH] IN BLOOD: 13.6 % (ref 11.5–15.5)
GLOBULIN SER CALC-MCNC: 2 G/DL
GLUCOSE BLD-MCNC: 162 MG/DL (ref 70–110)
GLUCOSE BLD-MCNC: 186 MG/DL (ref 70–110)
GLUCOSE BLD-MCNC: 194 MG/DL (ref 70–110)
GLUCOSE BLD-MCNC: 225 MG/DL (ref 70–110)
GLUCOSE SERPL-MCNC: 208 MG/DL (ref 74–99)
HCT VFR BLD AUTO: 35 % (ref 39–53)
HGB BLD-MCNC: 12.7 GM/DL (ref 13–17.5)
LYMPHOCYTES # BLD MANUAL: 20.79 K/UL (ref 1–4.8)
MAGNESIUM SPEC-SCNC: 1.7 MG/DL (ref 1.6–2.3)
MCH RBC QN AUTO: 33.2 PG (ref 25–35)
MCHC RBC AUTO-ENTMCNC: 36.4 G/DL (ref 31–37)
MCV RBC AUTO: 91.2 FL (ref 80–100)
MONOCYTES # BLD MANUAL: 0.23 K/UL (ref 0–1)
NEUTROPHILS NFR BLD MANUAL: 7 %
NEUTS SEG # BLD MANUAL: 1.58 K/UL (ref 1.3–7.7)
PLATELET # BLD AUTO: 81 K/UL (ref 150–450)
POTASSIUM SERPL-SCNC: 3.7 MMOL/L (ref 3.5–5.1)
PROT SERPL-MCNC: 5.4 G/DL (ref 6.3–8.2)
SODIUM SERPL-SCNC: 135 MMOL/L (ref 137–145)
WBC # BLD AUTO: 22.6 K/UL (ref 3.8–10.6)

## 2022-12-10 RX ADMIN — HEPARIN SODIUM SCH UNIT: 5000 INJECTION INTRAVENOUS; SUBCUTANEOUS at 21:39

## 2022-12-10 RX ADMIN — BRIMONIDINE TARTRATE SCH DROPS: 2 SOLUTION/ DROPS OPHTHALMIC at 08:38

## 2022-12-10 RX ADMIN — CEFAZOLIN SCH MLS/HR: 330 INJECTION, POWDER, FOR SOLUTION INTRAMUSCULAR; INTRAVENOUS at 12:52

## 2022-12-10 RX ADMIN — TIMOLOL MALEATE SCH DROPS: 5 SOLUTION OPHTHALMIC at 08:38

## 2022-12-10 RX ADMIN — HEPARIN SODIUM SCH UNIT: 5000 INJECTION INTRAVENOUS; SUBCUTANEOUS at 08:37

## 2022-12-10 RX ADMIN — MONTELUKAST SODIUM SCH MG: 10 TABLET, FILM COATED ORAL at 21:39

## 2022-12-10 RX ADMIN — INSULIN ASPART SCH UNIT: 100 INJECTION, SOLUTION INTRAVENOUS; SUBCUTANEOUS at 17:19

## 2022-12-10 RX ADMIN — PANTOPRAZOLE SODIUM SCH MG: 40 TABLET, DELAYED RELEASE ORAL at 08:37

## 2022-12-10 RX ADMIN — ASPIRIN 81 MG CHEWABLE TABLET SCH MG: 81 TABLET CHEWABLE at 08:37

## 2022-12-10 RX ADMIN — LATANOPROST SCH DROPS: 50 SOLUTION OPHTHALMIC at 21:40

## 2022-12-10 RX ADMIN — INSULIN DETEMIR SCH UNIT: 100 INJECTION, SOLUTION SUBCUTANEOUS at 21:39

## 2022-12-10 RX ADMIN — INSULIN ASPART SCH UNIT: 100 INJECTION, SOLUTION INTRAVENOUS; SUBCUTANEOUS at 06:48

## 2022-12-10 RX ADMIN — GABAPENTIN SCH MG: 300 CAPSULE ORAL at 21:39

## 2022-12-10 RX ADMIN — GABAPENTIN SCH MG: 300 CAPSULE ORAL at 08:37

## 2022-12-10 RX ADMIN — TIMOLOL MALEATE SCH DROPS: 5 SOLUTION OPHTHALMIC at 21:39

## 2022-12-10 RX ADMIN — INSULIN ASPART SCH UNIT: 100 INJECTION, SOLUTION INTRAVENOUS; SUBCUTANEOUS at 21:38

## 2022-12-10 RX ADMIN — CHLORTHALIDONE SCH MG: 25 TABLET ORAL at 08:37

## 2022-12-10 RX ADMIN — ATORVASTATIN CALCIUM SCH MG: 80 TABLET, FILM COATED ORAL at 21:39

## 2022-12-10 RX ADMIN — ISOSORBIDE MONONITRATE SCH MG: 30 TABLET, EXTENDED RELEASE ORAL at 08:37

## 2022-12-10 RX ADMIN — BRIMONIDINE TARTRATE SCH DROPS: 2 SOLUTION/ DROPS OPHTHALMIC at 21:40

## 2022-12-10 RX ADMIN — INSULIN ASPART SCH UNIT: 100 INJECTION, SOLUTION INTRAVENOUS; SUBCUTANEOUS at 12:51

## 2022-12-10 RX ADMIN — GABAPENTIN SCH MG: 300 CAPSULE ORAL at 16:09

## 2022-12-10 NOTE — P.PN
Subjective


Progress Note Date: 12/10/22


Principal diagnosis: 





sob





Patient still very weak, denied having fevers, shortness of breath or any pain. 

No overnight events.





Objective





- Vital Signs


Vital signs: 


                                   Vital Signs











Temp  98.3 F   12/10/22 07:49


 


Pulse  58 L  12/10/22 07:49


 


Resp  14   12/10/22 07:49


 


BP  119/66   12/10/22 07:49


 


Pulse Ox  96   12/10/22 07:49


 


FiO2      








                                 Intake & Output











 12/09/22 12/10/22 12/10/22





 18:59 06:59 18:59


 


Other:   


 


  Voiding Method Bedside Commode Bedside Commode 


 


  # Voids  3 


 


  # Bowel Movements 3 1 














- Exam





Constitutional: No acute distress, conversant, pleasant


Eyes:Anicteric sclerae, moist conjunctiva, no lid-lag, PERRLA, 


ENMT: Oropharynx clear, no erythema, exudates


Neck: Supple, FROM, no masses, or JVD, No carotid bruits, No thyromegaly


Lungs: Clear to auscultation, Clear to percussion, Normal respiratory effort, no

accessory muscle use 


Cardiovascular: Heart regular in rate and rhythm, No murmurs, gallops, or rubs, 

No peripheral edema


Abdominal: Soft, Nontender, no guarding, rebound or rigidity, Normoactive bowel 

sounds, No hepatomegaly, No splenomegaly, No palpable mass 


Skin: Normal temperature, tone, texture, turgor, no induration, No subcutaneous 

nodules, No rash, lesions, No ulcers


Extremities: No digital cyanosis, No clubbing, Pedal pulses intact and 

symmetrical, Radial pulses intact and symmetrical, No calf tenderness 


Psychiatric: Alert and oriented to person, place and time, appropriate affect, 

intact judgement         


Neuro: Muscles Strength 5/5 in all 4 extremities, Sensation to light touch 

grossly present throughout, Cranial nerves II-XII grossly intact, no focal 

sensory deficits








- Labs


CBC & Chem 7: 


                                 12/09/22 06:26





                                 12/09/22 06:26


Labs: 


                  Abnormal Lab Results - Last 24 Hours (Table)











  12/09/22 12/09/22 12/10/22 Range/Units





  16:56 20:34 05:51 


 


POC Glucose (mg/dL)  235 H  247 H  162 H  ()  mg/dL














  12/10/22 Range/Units





  11:50 


 


POC Glucose (mg/dL)  225 H  ()  mg/dL








                      Microbiology - Last 24 Hours (Table)











 12/08/22 02:46 Blood Culture - Preliminary





 Blood    No Growth after 48 hours














Assessment and Plan


Plan: 





#Generalized weakness and debility likely sec to covid


PT and OT evaluated patient, will benefit from [facility]


Patient will be placed on fall precautions.





#COVID-19


#Sepsis secondary to covid


Patient is not requiring any supplemental oxygen.


No indication for antibiotics.  





#Leukocytosis, lymphocytic predominant likely related to CLL


Appears at baseline.


Continue to monitor.





#Obesity


Patient will benefit from a structured weight loss program.





#Diabetes mellitus with hyperglycemia


Add 10 units of levemir daily as BG still high in the 200s


SSI


Accu-Cheks before meals at bedtime, hypoglycemic precautions. 





Chronic conditions: CAD, GERD, hypertension, peripheral neuropathy


All stable continue meds





#General weakness


PT recommending home with home care vs rehab





DVT prophylaxis: Heparin


Discussed with: Patient


Anticipated discharge: rehab on monday


=

## 2022-12-11 LAB
GLUCOSE BLD-MCNC: 127 MG/DL (ref 70–110)
GLUCOSE BLD-MCNC: 179 MG/DL (ref 70–110)
GLUCOSE BLD-MCNC: 191 MG/DL (ref 70–110)
GLUCOSE BLD-MCNC: 195 MG/DL (ref 70–110)

## 2022-12-11 RX ADMIN — ASPIRIN 81 MG CHEWABLE TABLET SCH MG: 81 TABLET CHEWABLE at 07:27

## 2022-12-11 RX ADMIN — GABAPENTIN SCH MG: 300 CAPSULE ORAL at 14:55

## 2022-12-11 RX ADMIN — GABAPENTIN SCH MG: 300 CAPSULE ORAL at 21:29

## 2022-12-11 RX ADMIN — ACETAMINOPHEN PRN MG: 325 TABLET, FILM COATED ORAL at 14:55

## 2022-12-11 RX ADMIN — INSULIN ASPART SCH UNIT: 100 INJECTION, SOLUTION INTRAVENOUS; SUBCUTANEOUS at 12:40

## 2022-12-11 RX ADMIN — INSULIN DETEMIR SCH UNIT: 100 INJECTION, SOLUTION SUBCUTANEOUS at 21:29

## 2022-12-11 RX ADMIN — CHLORTHALIDONE SCH MG: 25 TABLET ORAL at 07:27

## 2022-12-11 RX ADMIN — GABAPENTIN SCH MG: 300 CAPSULE ORAL at 07:27

## 2022-12-11 RX ADMIN — BRIMONIDINE TARTRATE SCH DROPS: 2 SOLUTION/ DROPS OPHTHALMIC at 21:30

## 2022-12-11 RX ADMIN — ATORVASTATIN CALCIUM SCH MG: 80 TABLET, FILM COATED ORAL at 21:29

## 2022-12-11 RX ADMIN — MONTELUKAST SODIUM SCH MG: 10 TABLET, FILM COATED ORAL at 21:29

## 2022-12-11 RX ADMIN — INSULIN ASPART SCH: 100 INJECTION, SOLUTION INTRAVENOUS; SUBCUTANEOUS at 06:11

## 2022-12-11 RX ADMIN — CEFAZOLIN SCH MLS/HR: 330 INJECTION, POWDER, FOR SOLUTION INTRAMUSCULAR; INTRAVENOUS at 07:45

## 2022-12-11 RX ADMIN — HEPARIN SODIUM SCH UNIT: 5000 INJECTION INTRAVENOUS; SUBCUTANEOUS at 07:27

## 2022-12-11 RX ADMIN — INSULIN ASPART SCH UNIT: 100 INJECTION, SOLUTION INTRAVENOUS; SUBCUTANEOUS at 17:21

## 2022-12-11 RX ADMIN — TIMOLOL MALEATE SCH DROPS: 5 SOLUTION OPHTHALMIC at 21:29

## 2022-12-11 RX ADMIN — ISOSORBIDE MONONITRATE SCH MG: 30 TABLET, EXTENDED RELEASE ORAL at 07:27

## 2022-12-11 RX ADMIN — PANTOPRAZOLE SODIUM SCH MG: 40 TABLET, DELAYED RELEASE ORAL at 07:27

## 2022-12-11 RX ADMIN — INSULIN ASPART SCH UNIT: 100 INJECTION, SOLUTION INTRAVENOUS; SUBCUTANEOUS at 21:28

## 2022-12-11 RX ADMIN — TIMOLOL MALEATE SCH DROPS: 5 SOLUTION OPHTHALMIC at 07:28

## 2022-12-11 RX ADMIN — HEPARIN SODIUM SCH UNIT: 5000 INJECTION INTRAVENOUS; SUBCUTANEOUS at 21:28

## 2022-12-11 RX ADMIN — LATANOPROST SCH DROPS: 50 SOLUTION OPHTHALMIC at 21:29

## 2022-12-11 RX ADMIN — BRIMONIDINE TARTRATE SCH DROPS: 2 SOLUTION/ DROPS OPHTHALMIC at 07:28

## 2022-12-11 NOTE — P.PN
Subjective


Progress Note Date: 12/11/22


Principal diagnosis: 





sob





Doing well. Was sleeping this am. No fevers, no sob, not of O2. No n/v. Still 

weak and requiring significant assistance.





Objective





- Vital Signs


Vital signs: 


                                   Vital Signs











Temp  98.0 F   12/11/22 08:00


 


Pulse  51 L  12/11/22 08:00


 


Resp  16   12/11/22 08:00


 


BP  138/74   12/11/22 08:00


 


Pulse Ox  96   12/11/22 08:00


 


FiO2      








                                 Intake & Output











 12/10/22 12/11/22 12/11/22





 18:59 06:59 18:59


 


Intake Total 400  400


 


Output Total  1000 500


 


Balance 400 -1000 -100


 


Intake:   


 


  Intake, IV Titration 400  400





  Amount   


 


    Sodium Chloride 0.9% 1, 400  400





    000 ml @ 50 mls/hr IV .   





    Q20H [facility] Rx#:[id number]   


 


Output:   


 


  Urine  1000 500


 


Other:   


 


  Voiding Method  Bedside Commode 





  Urinal 














- Exam





Constitutional: No acute distress, conversant, pleasant


Eyes:Anicteric sclerae, moist conjunctiva, no lid-lag, PERRLA, 


ENMT: Oropharynx clear, no erythema, exudates


Neck: Supple, FROM, no masses, or JVD, No carotid bruits, No thyromegaly


Lungs: Clear to auscultation, Clear to percussion, Normal respiratory effort, no

accessory muscle use 


Cardiovascular: Heart regular in rate and rhythm, No murmurs, gallops, or rubs, 

No peripheral edema


Abdominal: Soft, Nontender, no guarding, rebound or rigidity, Normoactive bowel 

sounds, No hepatomegaly, No splenomegaly, No palpable mass 


Skin: Normal temperature, tone, texture, turgor, no induration, No subcutaneous 

nodules, No rash, lesions, No ulcers


Extremities: No digital cyanosis, No clubbing, Pedal pulses intact and 

symmetrical, Radial pulses intact and symmetrical, No calf tenderness 


Psychiatric: Alert and oriented to person, place and time, appropriate affect, 

intact judgement         


Neuro: Muscles Strength 5/5 in all 4 extremities, Sensation to light touch 

grossly present throughout, Cranial nerves II-XII grossly intact, no focal 

sensory deficits








- Labs


CBC & Chem 7: 


                                 12/10/22 12:12





                                 12/10/22 12:12


Labs: 


                  Abnormal Lab Results - Last 24 Hours (Table)











  12/10/22 12/10/22 12/10/22 Range/Units





  12:12 12:12 16:37 


 


WBC  22.6 H    (3.8-10.6)  k/uL


 


RBC  3.83 L    (4.30-5.90)  m/uL


 


Hgb  12.7 L    (13.0-17.5)  gm/dL


 


Hct  35.0 L    (39.0-53.0)  %


 


Plt Count  81 L    (150-450)  k/uL


 


Lymphocytes # (Manual)  20.79 H    (1.0-4.8)  k/uL


 


Sodium   135 L   (137-145)  mmol/L


 


Carbon Dioxide   19 L   (22-30)  mmol/L


 


BUN   27 H   (9-20)  mg/dL


 


Glucose   208 H   (74-99)  mg/dL


 


POC Glucose (mg/dL)    186 H  ()  mg/dL


 


Calcium   8.0 L   (8.4-10.2)  mg/dL


 


ALT   57 H   (4-49)  U/L


 


Total Protein   5.4 L   (6.3-8.2)  g/dL


 


Albumin   3.4 L   (3.5-5.0)  g/dL














  12/10/22 12/11/22 12/11/22 Range/Units





  20:17 05:59 11:26 


 


WBC     (3.8-10.6)  k/uL


 


RBC     (4.30-5.90)  m/uL


 


Hgb     (13.0-17.5)  gm/dL


 


Hct     (39.0-53.0)  %


 


Plt Count     (150-450)  k/uL


 


Lymphocytes # (Manual)     (1.0-4.8)  k/uL


 


Sodium     (137-145)  mmol/L


 


Carbon Dioxide     (22-30)  mmol/L


 


BUN     (9-20)  mg/dL


 


Glucose     (74-99)  mg/dL


 


POC Glucose (mg/dL)  194 H  127 H  195 H  ()  mg/dL


 


Calcium     (8.4-10.2)  mg/dL


 


ALT     (4-49)  U/L


 


Total Protein     (6.3-8.2)  g/dL


 


Albumin     (3.5-5.0)  g/dL








                      Microbiology - Last 24 Hours (Table)











 12/08/22 02:46 Blood Culture - Preliminary





 Blood    No Growth after 72 hours














Assessment and Plan


Plan: 





#Generalized weakness and debility likely sec to covid


PT and OT evaluated patient, will benefit from MICHELL


Patient will be placed on fall precautions.





#COVID-19


#Sepsis secondary to covid


Patient is not requiring any supplemental oxygen.


No indication for antibiotics.  





#Leukocytosis, lymphocytic predominant likely related to CLL


Appears at baseline.


Continue to monitor.





#Obesity


Patient will benefit from a structured weight loss program.





#Diabetes mellitus with hyperglycemia


Continue 10 units of levemir daily as BG still high in the 200s


SSI


Accu-Cheks before meals at bedtime, hypoglycemic precautions. 





Chronic conditions: CAD, GERD, hypertension, peripheral neuropathy


All stable continue meds





#General weakness


Will go to rehab





DVT prophylaxis: Heparin


Discussed with: Patient


Anticipated discharge: rehab on monday


=

## 2022-12-12 LAB
GLUCOSE BLD-MCNC: 181 MG/DL (ref 70–110)
GLUCOSE BLD-MCNC: 181 MG/DL (ref 70–110)
GLUCOSE BLD-MCNC: 187 MG/DL (ref 70–110)
GLUCOSE BLD-MCNC: 191 MG/DL (ref 70–110)

## 2022-12-12 RX ADMIN — TIMOLOL MALEATE SCH DROPS: 5 SOLUTION OPHTHALMIC at 08:18

## 2022-12-12 RX ADMIN — CHLORTHALIDONE SCH MG: 25 TABLET ORAL at 08:09

## 2022-12-12 RX ADMIN — INSULIN ASPART SCH UNIT: 100 INJECTION, SOLUTION INTRAVENOUS; SUBCUTANEOUS at 18:18

## 2022-12-12 RX ADMIN — INSULIN ASPART SCH UNIT: 100 INJECTION, SOLUTION INTRAVENOUS; SUBCUTANEOUS at 12:36

## 2022-12-12 RX ADMIN — CEFAZOLIN SCH MLS/HR: 330 INJECTION, POWDER, FOR SOLUTION INTRAMUSCULAR; INTRAVENOUS at 08:11

## 2022-12-12 RX ADMIN — INSULIN DETEMIR SCH UNIT: 100 INJECTION, SOLUTION SUBCUTANEOUS at 21:37

## 2022-12-12 RX ADMIN — BRIMONIDINE TARTRATE SCH DROPS: 2 SOLUTION/ DROPS OPHTHALMIC at 21:37

## 2022-12-12 RX ADMIN — TIMOLOL MALEATE SCH DROPS: 5 SOLUTION OPHTHALMIC at 21:37

## 2022-12-12 RX ADMIN — BRIMONIDINE TARTRATE SCH DROPS: 2 SOLUTION/ DROPS OPHTHALMIC at 08:10

## 2022-12-12 RX ADMIN — PANTOPRAZOLE SODIUM SCH MG: 40 TABLET, DELAYED RELEASE ORAL at 08:09

## 2022-12-12 RX ADMIN — GABAPENTIN SCH MG: 300 CAPSULE ORAL at 21:37

## 2022-12-12 RX ADMIN — GABAPENTIN SCH MG: 300 CAPSULE ORAL at 08:09

## 2022-12-12 RX ADMIN — MONTELUKAST SODIUM SCH MG: 10 TABLET, FILM COATED ORAL at 21:37

## 2022-12-12 RX ADMIN — HYDROCODONE BITARTRATE AND ACETAMINOPHEN PRN EACH: 5; 325 TABLET ORAL at 08:17

## 2022-12-12 RX ADMIN — ISOSORBIDE MONONITRATE SCH MG: 30 TABLET, EXTENDED RELEASE ORAL at 08:09

## 2022-12-12 RX ADMIN — ATORVASTATIN CALCIUM SCH MG: 80 TABLET, FILM COATED ORAL at 21:37

## 2022-12-12 RX ADMIN — LATANOPROST SCH DROPS: 50 SOLUTION OPHTHALMIC at 21:37

## 2022-12-12 RX ADMIN — HEPARIN SODIUM SCH UNIT: 5000 INJECTION INTRAVENOUS; SUBCUTANEOUS at 08:09

## 2022-12-12 RX ADMIN — ASPIRIN 81 MG CHEWABLE TABLET SCH MG: 81 TABLET CHEWABLE at 08:09

## 2022-12-12 RX ADMIN — HEPARIN SODIUM SCH UNIT: 5000 INJECTION INTRAVENOUS; SUBCUTANEOUS at 21:37

## 2022-12-12 RX ADMIN — INSULIN ASPART SCH UNIT: 100 INJECTION, SOLUTION INTRAVENOUS; SUBCUTANEOUS at 08:09

## 2022-12-12 RX ADMIN — INSULIN ASPART SCH UNIT: 100 INJECTION, SOLUTION INTRAVENOUS; SUBCUTANEOUS at 21:37

## 2022-12-12 RX ADMIN — GABAPENTIN SCH MG: 300 CAPSULE ORAL at 16:11

## 2022-12-12 NOTE — P.PN
Subjective


Progress Note Date: 12/12/22





Hospital course:





Patient is a very pleasant 77-year-old male with a past medical history of CLL, 

CAD, hypertension, hyperlipidemia and non-insulin-dependent diabetes mellitus.  

He presented to the emergency department on 12/8/22 with a chief complaint of 

generalized weakness accompanied by a dry cough, diarrhea, and body aches.  He 

underwent full evaluation in the emergency department.  CBC revealing 

significant leukocytosis with WBC count of 26.5 (chronic secondary to CLL and 

currently at baseline levels).  CMP revealing hyperglycemia with glucose of 138 

and hyperbilirubinemia with bilirubin of 1.5.  Urinalysis negative for 

infection.  Influenza A, influenza B, and RSV were negative.  Covid PCR 

positive.  EKG revealing sinus mechanism with no significant T-wave or ST abnorm

alities showing no signs of acute ischemia.  Chest x-ray negative for acute 

cardiopulmonary process.  Patient was admitted under our services with 

consultation to PT/OT.  Throughout hospitalization patient having asymptomatic 

bradycardia with heart rate in 50s.  Also intermittently spiking elevated temp 

with initial temperature upon arrival to our facility 102.0F and temperature 

high throughout hospitalization being in the 102.5F.  Patient has remained 

afebrile for greater than 48 hours at this time.  Patient was evaluated by 

physical and occupational therapy, patient was found to have difficulties with 

transfers and endurance requiring assistance for much-needed activities of daily

living.  Physical and occupational therapy recommending subacute rehab upon 

discharge.  Patient's condition is improving, he is not requiring any 

supplemental oxygen needs and is medically stable for discharge to 

rehabilitation facility.  Patient awaiting insurance authorization at this time.





Physical exam:





Patient seen and fully evaluated at bedside this morning.  Patient denied having

any complaints at this time including dizziness, lightheadedness, chest pain, 

palpitations, shortness of breath, or experiencing any 

numbness/tingling/weakness in his extremities.  Vital signs stable with blood 

pressure 111/65, heart rate 51, respiratory rate 16, and SpO2 of 97% on room 

air.  Patient reports he ate breakfast well this morning.  Patient updated on 

plan for skilled nursing facility placement upon discharge to help build 

strength and endurance so he can safely be discharged back home with his wife.  

Patient in agreement with plan at this time.





Vital signs reviewed and stable. 


General: Nontoxic, no distress and appears stated age.  


Derm: Skin warm and dry, normal coloration for ethnicity.


Head: Atraumatic, normocephalic and symmetric.  


Eyes: EOMs intact, no lid lag, and anicteric sclera


Mouth: no lip lesions, mucus membranes moist


Cardiovascular: regular rate and rhythm with normal S1S2, systolic murmur, 

positive posterior tibial pulses bilaterally, and cap refill < 2 seconds.  


Lungs: Respirations even, regular, and unlabored on room air. Lungs CTA 

bilaterally, no rhonchi, no rales, no wheezing, and no accessory muscle usage. 


Abdominal: soft, nontender to palpation, no guarding, no appreciable 

organomegaly


Ext: ROM intact. No gross muscle atrophy, no edema, no contractures


Neuro: Speech clear, face symmetrical and CN II-XII grossly intact with no noted

focal neuro deficits


Psych: Alert and oriented to person, place, time, and situation. Appropriate and

pleasant affect. 





Assessment and Plan of Care:





COVID-19 infection


Generalized weakness and debility


History of CLL


Leukocytosis, lymphocytic predominant likely related to CLL (appears at 

baseline)


Elevated inflammatory markers, believed to be secondary to Covid 19 infection


-Patient's condition improving throughout hospitalization.


-PT/OT following, patient continues to have difficulties with transfers and 

endurance.  Will benefit from subacute rehabilitation facility placement upon 

discharge.


-Fall precautions


-Provide safe and supportive care and redirection as needed.





Hyperbilirubinemia, resolved





Type II non-insulin-dependent diabetes mellitus with hyperglycemia


-Continue glycemic protocol with NovoLog sliding scale and 10 units of Levemir 

nightly.





History of CAD


Hypertension


Hyperlipidemia


-Patient to continue home medication regimen with aspirin, atorvastatin, 

chlorthalidone, isosorbide mononitrate, and lisinopril.





Peripheral neuropathy


-Patient to continue daily medication regimen with gabapentin.





GERD


-Continue PPI with Protonix 40 mg daily. 





CODE STATUS: Full code


DVT prophylaxis: Heparin


Discussed with: Patient and RN


Anticipated discharge date: Pending insurance authorization, patient medically 

stable for discharge


Anticipated discharge place: Skilled nursing facility


A total of 33 minutes was spent on the care of this complex patient more than 

50% of the time was spent in counseling and care coordination.











Objective





- Vital Signs


Vital signs: 


                                   Vital Signs











Temp  98.4 F   12/12/22 07:42


 


Pulse  53 L  12/12/22 07:42


 


Resp  16   12/12/22 07:42


 


BP  150/75   12/12/22 07:42


 


Pulse Ox  96   12/12/22 07:59


 


FiO2      








                                 Intake & Output











 12/11/22 12/12/22 12/12/22





 18:59 06:59 18:59


 


Intake Total 400  


 


Output Total 500 875 


 


Balance -100 -875 


 


Intake:   


 


  Intake, IV Titration 400  





  Amount   


 


    Sodium Chloride 0.9% 1, 400  





    000 ml @ 50 mls/hr IV .   





    Q20H [facility] Rx#:[id number]   


 


Output:   


 


  Urine 500 875 


 


Other:   


 


  Voiding Method  Bedside Commode 





  Urinal 














- Labs


CBC & Chem 7: 


                                 12/10/22 12:12





                                 12/10/22 12:12


Labs: 


                  Abnormal Lab Results - Last 24 Hours (Table)











  12/11/22 12/11/22 12/11/22 Range/Units





  11:26 16:35 20:37 


 


POC Glucose (mg/dL)  195 H  179 H  191 H  ()  mg/dL














  12/12/22 Range/Units





  06:26 


 


POC Glucose (mg/dL)  181 H  ()  mg/dL








                      Microbiology - Last 24 Hours (Table)











 12/08/22 02:46 Blood Culture - Preliminary





 Blood    No Growth after 96 hours

## 2022-12-13 VITALS
DIASTOLIC BLOOD PRESSURE: 61 MMHG | TEMPERATURE: 99 F | HEART RATE: 53 BPM | RESPIRATION RATE: 18 BRPM | SYSTOLIC BLOOD PRESSURE: 148 MMHG

## 2022-12-13 LAB
GLUCOSE BLD-MCNC: 168 MG/DL (ref 70–110)
GLUCOSE BLD-MCNC: 177 MG/DL (ref 70–110)

## 2022-12-13 RX ADMIN — CHLORTHALIDONE SCH MG: 25 TABLET ORAL at 08:04

## 2022-12-13 RX ADMIN — BRIMONIDINE TARTRATE SCH DROPS: 2 SOLUTION/ DROPS OPHTHALMIC at 08:04

## 2022-12-13 RX ADMIN — ISOSORBIDE MONONITRATE SCH MG: 30 TABLET, EXTENDED RELEASE ORAL at 08:04

## 2022-12-13 RX ADMIN — INSULIN ASPART SCH UNIT: 100 INJECTION, SOLUTION INTRAVENOUS; SUBCUTANEOUS at 06:36

## 2022-12-13 RX ADMIN — INSULIN ASPART SCH UNIT: 100 INJECTION, SOLUTION INTRAVENOUS; SUBCUTANEOUS at 12:24

## 2022-12-13 RX ADMIN — TIMOLOL MALEATE SCH DROPS: 5 SOLUTION OPHTHALMIC at 08:04

## 2022-12-13 RX ADMIN — LOPERAMIDE HYDROCHLORIDE PRN MG: 2 CAPSULE ORAL at 02:02

## 2022-12-13 RX ADMIN — ASPIRIN 81 MG CHEWABLE TABLET SCH MG: 81 TABLET CHEWABLE at 08:04

## 2022-12-13 RX ADMIN — PANTOPRAZOLE SODIUM SCH MG: 40 TABLET, DELAYED RELEASE ORAL at 06:36

## 2022-12-13 RX ADMIN — HEPARIN SODIUM SCH UNIT: 5000 INJECTION INTRAVENOUS; SUBCUTANEOUS at 08:04

## 2022-12-13 RX ADMIN — HYDROCODONE BITARTRATE AND ACETAMINOPHEN PRN EACH: 5; 325 TABLET ORAL at 12:27

## 2022-12-13 RX ADMIN — CEFAZOLIN SCH: 330 INJECTION, POWDER, FOR SOLUTION INTRAMUSCULAR; INTRAVENOUS at 01:45

## 2022-12-13 RX ADMIN — GABAPENTIN SCH MG: 300 CAPSULE ORAL at 08:04

## 2022-12-13 RX ADMIN — LOPERAMIDE HYDROCHLORIDE PRN MG: 2 CAPSULE ORAL at 08:22

## 2022-12-13 NOTE — P.DS
Providers


Date of admission: 


12/08/22 05:57





Expected date of discharge: 12/13/22


Attending physician: 


Sia Baez MD





Primary care physician: 


[facility]





[facility] Course: 





Discharge Diagnosis:





COVID-19 infection.  Patient has been afebrile 4 days.





Generalized weakness and debility.  Patient did show improvement throughout 

hospitalization.  He was evaluated by PT/OT and was found to have difficulties 

with transfers and endurance.  Will benefit from subacute rehabilitation 

facility placement upon discharge.  Patient being discharged to [facility] for rehab





History of CLL





Leukocytosis, lymphocytic predominant likely related to CLL (appears at 

baseline)





Elevated inflammatory markers, secondary to Covid 19 infection. 





Hyperbilirubinemia, resolved





Type II non-insulin-dependent diabetes mellitus with hyperglycemia.  Resume home

insulin medication regimen with NovoLog 70/3015 units nightly and 22 units each 

morning.





History of CAD. Patient to continue home cardiac medication regimen with 

aspirin, atorvastatin, chlorthalidone, isosorbide mononitrate, and lisinopril.





Hypertension.  Monitor vital signs and continue daily medication regimen with 

isosorbide mononitrate and lisinopril.





Hyperlipidemia.  Continue daily medication regimen with atorvastatin.  Continue 

heart healthy and carb consistent diet.





Peripheral neuropathy. Patient to continue daily medication regimen with 

gabapentin.





GERD. Continue PPI with Protonix 40 mg daily. 





Hospital Course: 





Patient is a very pleasant 77-year-old male with a past medical history of CLL, 

CAD, hypertension, hyperlipidemia and non-insulin-dependent diabetes mellitus.  

He presented to the emergency department on 12/8/22 with a chief complaint of 

generalized weakness accompanied by a dry cough, diarrhea, and body aches.  He 

underwent full evaluation in the emergency department.  CBC revealing 

significant leukocytosis with WBC count of 26.5 (chronic secondary to CLL and 

currently at baseline levels).  CMP revealing hyperglycemia with glucose of 138 

and hyperbilirubinemia with bilirubin of 1.5.  Urinalysis negative for 

infection.  Influenza A, influenza B, and RSV were negative.  Covid PCR 

positive.  EKG revealing sinus mechanism with no significant T-wave or ST 

abnormalities showing no signs of acute ischemia.  Chest x-ray negative for 

acute cardiopulmonary process.  Patient was admitted under our services with 

consultation to PT/OT.  Throughout hospitalization patient having asymptomatic 

bradycardia with heart rate in 50s.  Also intermittently spiking elevated temp 

with initial temperature upon arrival to our facility 102.0F and temperature 

high throughout hospitalization being in the 102.5F.  Patient has remained 

afebrile for greater than 48 hours at this time.  Patient was evaluated by 

physical and occupational therapy, patient was found to have difficulties with 

transfers and endurance requiring assistance for much-needed activities of daily

living.  Physical and occupational therapy recommending subacute rehab upon 

discharge.  Patient's condition is improving, he is not required any 

supplemental oxygen needs and is medically stable for discharge to 

rehabilitation facility.  Patient has received insurance authorization and is 

being discharged to [facility] for rehab to assist with building 

endurance, stamina, and strength until patient is strong enough to return home 

with wife.





Physical exam:





Patient seen and fully evaluated at bedside this morning.  Patient denied having

any complaints at this time including dizziness, lightheadedness, chest pain, 

palpitations, shortness of breath, or experiencing any 

numbness/tingling/weakness in his extremities.





Vital signs reviewed and stable. 


General: Nontoxic, no distress and appears stated age.  


Derm: Skin warm and dry, normal coloration for ethnicity.


Head: Atraumatic, normocephalic and symmetric.  


Eyes: EOMs intact, no lid lag, and anicteric sclera


Mouth: no lip lesions, mucus membranes moist


Cardiovascular: regular rate and rhythm with normal S1S2, systolic murmur, 

positive posterior tibial pulses bilaterally, and cap refill < 2 seconds.  


Lungs: Respirations even, regular, and unlabored on room air. Lungs CTA 

bilaterally, no rhonchi, no rales, no wheezing, and no accessory muscle usage. 


Abdominal: soft, nontender to palpation, no guarding, no appreciable 

organomegaly


Ext: ROM intact. No gross muscle atrophy, no edema, no contractures


Neuro: Speech clear, face symmetrical and CN II-XII grossly intact with no noted

focal neuro deficits


Psych: Alert and oriented to person, place, time, and situation. Appropriate and

pleasant affect. 





A total of 35 minutes of time were spent preparing this complex discharge 

summary.





Pt was discharged on 12/13/20 to 11:28 AM.











Patient Condition at Discharge: Stable





Plan - Discharge Summary


Discharge Rx Participant: No


New Discharge Prescriptions: 


New


   Acetaminophen Tab [Tylenol] 650 mg PO Q6HR PRN  tab


     PRN Reason: Mild Pain Or Fever > 100.5





Continue


   Montelukast Sodium [Singulair] 10 mg PO HS


   Fluticasone Nasal Spray [Flonase Nasal Spray] 2 spr EA NOSTRIL DAILY


   Atorvastatin [Lipitor] 80 mg PO HS


   Baclofen [Lioresal] 10 mg PO DAILY


   Latanoprost/Pf [Latanoprost 0.005% Eye Drop] 1 drop BOTH EYES HS


   Chlorthalidone [Hygroton] 25 mg PO DAILY #90 tab


   Meclizine [Antivert] 25 mg PO TID


   ALPRAZolam [Xanax] 0.25 mg PO DAILY PRN #3 tab


     PRN Reason: Anxiety


   SILVER sulfADIAZINE CREAM [Silvadene Cream] 1 applic TOPICAL BID


   Isosorbide Mononitrate ER [Imdur] 30 mg PO DAILY #30 tab


   lisinopriL [Zestril] 20 mg PO BID


   Omeprazole 40 mg PO DAILY


   Brimonidine Tartrate/Timolol [Combigan 0.2%-0.5% Eye Drops] 1 drop BOTH EYES 

BID


   Nitroglycerin Sl Tabs [Nitrostat] 0.4 mg SUBLINGUAL Q5M PRN  tab


     PRN Reason: Chest Pain


   Aspirin EC [Ecotrin Low Dose] 81 mg PO DAILY


   Insulin Aspart Prot/Insuln Asp [Relion Novolog Mix 70-30 Vial] 15 units SQ HS


   Insulin Aspart Prot/Insuln Asp [Relion Novolog Mix 70-30 Vial] 22 units SQ 

QAM


   Gabapentin [Neurontin] 300 mg PO TID #9 cap


   HYDROcodone/APAP 5-325MG [Norco 5-325] 1 tab PO Q6HR PRN #12 tab


     PRN Reason: Pain





Discontinued


   Doxycycline Monohydrate 100 mg PO BID


Discharge Medication List





Montelukast Sodium [Singulair] 10 mg PO HS 08/18/17 [History]


Fluticasone Nasal Spray [Flonase Nasal Spray] 2 spr EA NOSTRIL DAILY 04/19/21 

[History]


Atorvastatin [Lipitor] 80 mg PO HS 06/01/21 [History]


Baclofen [Lioresal] 10 mg PO DAILY 07/12/21 [History]


SILVER sulfADIAZINE CREAM [Silvadene Cream] 1 applic TOPICAL BID 07/12/21 

[History]


Isosorbide Mononitrate ER [Imdur] 30 mg PO DAILY #30 tab 07/13/21 [Rx]


Omeprazole 40 mg PO DAILY 10/20/21 [History]


lisinopriL [Zestril] 20 mg PO BID 10/20/21 [History]


Brimonidine Tartrate/Timolol [Combigan 0.2%-0.5% Eye Drops] 1 drop BOTH EYES BID

11/30/21 [History]


Latanoprost/Pf [Latanoprost 0.005% Eye Drop] 1 drop BOTH EYES HS 11/30/21 

[History]


Chlorthalidone [Hygroton] 25 mg PO DAILY #90 tab 08/08/22 [Rx]


Nitroglycerin Sl Tabs [Nitrostat] 0.4 mg SUBLINGUAL Q5M PRN  tab 08/08/22 [Rx]


Aspirin EC [Ecotrin Low Dose] 81 mg PO DAILY 12/08/22 [History]


Insulin Aspart Prot/Insuln Asp [Relion Novolog Mix 70-30 Vial] 15 units SQ HS 

12/08/22 [History]


Insulin Aspart Prot/Insuln Asp [Relion Novolog Mix 70-30 Vial] 22 units SQ QAM 

12/08/22 [History]


Meclizine [Antivert] 25 mg PO TID 12/08/22 [History]


ALPRAZolam [Xanax] 0.25 mg PO DAILY PRN #3 tab 12/13/22 [Rx]


Acetaminophen Tab [Tylenol] 650 mg PO Q6HR PRN  tab 12/13/22 [Rx]


Gabapentin [Neurontin] 300 mg PO TID #9 cap 12/13/22 [Rx]


HYDROcodone/APAP 5-325MG [Norco 5-325] 1 tab PO Q6HR PRN #12 tab 12/13/22 [Rx]








Follow up Appointment(s)/Referral(s): 


Smith Figueroa DO [Primary Care Provider] - 1-2 days


Activity/Diet/Wound Care/Special Instructions: 


[facility] for rehab


Discharge Disposition: TRANSFER TO SNF/F

## 2023-05-01 ENCOUNTER — HOSPITAL ENCOUNTER (OUTPATIENT)
Dept: HOSPITAL 47 - RADCTMAIN | Age: 78
Discharge: HOME | End: 2023-05-01
Attending: FAMILY MEDICINE
Payer: MEDICARE

## 2023-05-01 DIAGNOSIS — J32.0: ICD-10-CM

## 2023-05-01 DIAGNOSIS — R90.82: Primary | ICD-10-CM

## 2023-05-01 DIAGNOSIS — R42: ICD-10-CM

## 2023-05-01 DIAGNOSIS — H74.8X1: ICD-10-CM

## 2023-05-01 PROCEDURE — 70450 CT HEAD/BRAIN W/O DYE: CPT

## 2023-05-01 NOTE — CT
EXAMINATION TYPE: CT brain wo con

CT DLP: 1081.60 mGycm, Automated exposure control for dose reduction was used.

 

DATE OF EXAM: 5/1/2023 7:53 AM

 

COMPARISON: Prior CT Brain from 6/29/2022.

 

CLINICAL INDICATION:Male, 77 years old with history of R51.9 Headache, Headache

 

TECHNIQUE: 

Brain: Multiple axial CT images of the brain were obtained without IV contrast. Coronal and sagittal 
reformats reviewed.

 

FINDINGS:

 

Brain:

Extra-axial spaces: No abnormal extra-axial fluid collections.

Ventricular system: Within normal limits

Cerebral parenchyma: Cerebral atrophy. No acute intraparenchymal hemorrhage or mass effect.  The gray
-white junction is well differentiated. Scattered hypoattenuating areas are seen within the white mat
ter.  

Cerebellum: Unremarkable.

Mass effect: No evidence of midline shift.

Intracranial vasculature: Atherosclerotic calcifications of the intracranial vessels.

Soft tissues: Normal.

Calvarium/osseous structures: No depressed skull fracture.

Paranasal sinuses and mastoid air cells: Moderate to severe mucosal thickening of the right maxillary
 sinus. This is new from prior exam. The remaining paranasal sinuses are clear. Minimal opacification
 of the inferior right mastoid air cells. This is new from prior exam.

Visualized orbits: Bilateral aphakia

 

 

IMPRESSION:

1. No acute intracranial process.

2. Nonspecific white matter changes, likely secondary to chronic small vessel ischemic disease.

3. New moderate to severe right maxillary sinus disease. Clinical correlation is recommended.

4. Minimal right mastoid effusion.

## 2023-05-04 ENCOUNTER — HOSPITAL ENCOUNTER (EMERGENCY)
Dept: HOSPITAL 47 - EC | Age: 78
LOS: 1 days | Discharge: HOME | End: 2023-05-05
Payer: MEDICARE

## 2023-05-04 VITALS — HEART RATE: 67 BPM | RESPIRATION RATE: 18 BRPM | TEMPERATURE: 98.4 F

## 2023-05-04 VITALS — SYSTOLIC BLOOD PRESSURE: 143 MMHG | DIASTOLIC BLOOD PRESSURE: 71 MMHG

## 2023-05-04 DIAGNOSIS — Z79.4: ICD-10-CM

## 2023-05-04 DIAGNOSIS — E11.9: ICD-10-CM

## 2023-05-04 DIAGNOSIS — Z88.0: ICD-10-CM

## 2023-05-04 DIAGNOSIS — Z79.82: ICD-10-CM

## 2023-05-04 DIAGNOSIS — I10: ICD-10-CM

## 2023-05-04 DIAGNOSIS — Z79.899: ICD-10-CM

## 2023-05-04 DIAGNOSIS — I25.10: ICD-10-CM

## 2023-05-04 DIAGNOSIS — K21.9: ICD-10-CM

## 2023-05-04 DIAGNOSIS — F12.90: ICD-10-CM

## 2023-05-04 DIAGNOSIS — R07.89: Primary | ICD-10-CM

## 2023-05-04 DIAGNOSIS — Z88.1: ICD-10-CM

## 2023-05-04 LAB
ANION GAP SERPL CALC-SCNC: 9 MMOL/L
APTT BLD: 24.1 SEC (ref 22–30)
BASOPHILS # BLD AUTO: 0.1 K/UL (ref 0–0.2)
BASOPHILS NFR BLD AUTO: 1 %
BUN SERPL-SCNC: 12 MG/DL (ref 9–20)
CALCIUM SPEC-MCNC: 8.9 MG/DL (ref 8.4–10.2)
CHLORIDE SERPL-SCNC: 102 MMOL/L (ref 98–107)
CO2 SERPL-SCNC: 29 MMOL/L (ref 22–30)
EOSINOPHIL # BLD AUTO: 0.2 K/UL (ref 0–0.7)
EOSINOPHIL NFR BLD AUTO: 1 %
ERYTHROCYTE [DISTWIDTH] IN BLOOD BY AUTOMATED COUNT: 4.27 M/UL (ref 4.3–5.9)
ERYTHROCYTE [DISTWIDTH] IN BLOOD: 14.8 % (ref 11.5–15.5)
GLUCOSE SERPL-MCNC: 164 MG/DL (ref 74–99)
HCT VFR BLD AUTO: 39.4 % (ref 39–53)
HGB BLD-MCNC: 12.8 GM/DL (ref 13–17.5)
INR PPP: 1.1 (ref ?–1.2)
LIPASE SERPL-CCNC: 61 U/L (ref 23–300)
LYMPHOCYTES # SPEC AUTO: 15 K/UL (ref 1–4.8)
LYMPHOCYTES NFR SPEC AUTO: 74 %
MCH RBC QN AUTO: 29.9 PG (ref 25–35)
MCHC RBC AUTO-ENTMCNC: 32.5 G/DL (ref 31–37)
MCV RBC AUTO: 92.2 FL (ref 80–100)
MONOCYTES # BLD AUTO: 0.5 K/UL (ref 0–1)
MONOCYTES NFR BLD AUTO: 3 %
NEUTROPHILS # BLD AUTO: 3.7 K/UL (ref 1.3–7.7)
NEUTROPHILS NFR BLD AUTO: 19 %
PLATELET # BLD AUTO: 119 K/UL (ref 150–450)
POTASSIUM SERPL-SCNC: 3.5 MMOL/L (ref 3.5–5.1)
PT BLD: 11.5 SEC (ref 9–12)
SODIUM SERPL-SCNC: 140 MMOL/L (ref 137–145)
WBC # BLD AUTO: 20.2 K/UL (ref 3.8–10.6)

## 2023-05-04 PROCEDURE — 71275 CT ANGIOGRAPHY CHEST: CPT

## 2023-05-04 PROCEDURE — 83690 ASSAY OF LIPASE: CPT

## 2023-05-04 PROCEDURE — 85730 THROMBOPLASTIN TIME PARTIAL: CPT

## 2023-05-04 PROCEDURE — 84484 ASSAY OF TROPONIN QUANT: CPT

## 2023-05-04 PROCEDURE — 85610 PROTHROMBIN TIME: CPT

## 2023-05-04 PROCEDURE — 85025 COMPLETE CBC W/AUTO DIFF WBC: CPT

## 2023-05-04 PROCEDURE — 80048 BASIC METABOLIC PNL TOTAL CA: CPT

## 2023-05-04 PROCEDURE — 93005 ELECTROCARDIOGRAM TRACING: CPT

## 2023-05-04 PROCEDURE — 36415 COLL VENOUS BLD VENIPUNCTURE: CPT

## 2023-05-04 PROCEDURE — 71045 X-RAY EXAM CHEST 1 VIEW: CPT

## 2023-05-04 PROCEDURE — 85379 FIBRIN DEGRADATION QUANT: CPT

## 2023-05-04 PROCEDURE — 99285 EMERGENCY DEPT VISIT HI MDM: CPT

## 2023-05-04 NOTE — ED
General Adult HPI





- General


Chief complaint: Chest Pain


Stated complaint: Chest Pain


Time Seen by Provider: 23 23:03


Source: patient


Mode of arrival: EMS


Limitations: no limitations





- History of Present Illness


Initial comments: 


Dictation was produced using dragon dictation software. please excuse any 

grammatical, word or spelling errors. 











Chief Complaint: 77-year-old male presents with sharp chest pain





History of Present Illness: 77-year-old male is brought in by EMS from home.  

Patient complaint of one day of sharp chest pain.  Patient states his symptoms 

began several hours prior to arrival.  States that the pain is substernal sharp 

in nature worse with deep inspiration.  He has had a mild cough.  Denies any 

numbness Xochilt paresthesias to the arms or legs.  Denies any shortness of 

breath.  Patient is not associated diaphoresis or nausea.  Nonradiating to the 

extremities or jaw








The ROS documented in this emergency department record has been reviewed and 

confirmed by me.  Those systems with pertinent positive or negative responses 

have been documented in the HPI.  All other systems are other negative and/or 

noncontributory.

















- Related Data


                                Home Medications











 Medication  Instructions  Recorded  Confirmed


 


Montelukast Sodium [Singulair] 10 mg PO HS 17


 


Fluticasone Nasal Spray [Flonase 2 spr EA NOSTRIL DAILY 21





Nasal Spray]   


 


Atorvastatin [Lipitor] 80 mg PO HS 21


 


Baclofen [Lioresal] 10 mg PO DAILY 21


 


SILVER sulfADIAZINE CREAM 1 applic TOPICAL BID 21





[Silvadene Cream]   


 


Omeprazole 40 mg PO DAILY 10/20/21 12/08/22


 


lisinopriL [Zestril] 20 mg PO BID 10/20/21 12/08/22


 


Brimonidine Tartrate/Timolol 1 drop BOTH EYES BID 21





[Combigan 0.2%-0.5% Eye Drops]   


 


Latanoprost/Pf [Latanoprost 0.005% 1 drop BOTH EYES HS 21





Eye Drop]   


 


Aspirin EC [Ecotrin Low Dose] 81 mg PO DAILY 22


 


Insulin Aspart Prot/Insuln Asp 15 units SQ HS 22





[Relion Novolog Mix 70-30 Vial]   


 


Insulin Aspart Prot/Insuln Asp 22 units SQ QAM 22





[Relion Novolog Mix 70-30 Vial]   


 


Meclizine [Antivert] 25 mg PO TID 22








                                  Previous Rx's











 Medication  Instructions  Recorded


 


Isosorbide Mononitrate ER [Imdur] 30 mg PO DAILY #30 tab 21


 


Chlorthalidone [Hygroton] 25 mg PO DAILY #90 tab 22


 


Nitroglycerin Sl Tabs [Nitrostat] 0.4 mg SUBLINGUAL Q5M PRN  tab 22


 


ALPRAZolam [Xanax] 0.25 mg PO DAILY PRN #3 tab 22


 


Acetaminophen Tab [Tylenol] 650 mg PO Q6HR PRN  tab 22


 


Gabapentin [Neurontin] 300 mg PO TID #9 cap 22


 


HYDROcodone/APAP 5-325MG [Norco 1 tab PO Q6HR PRN #12 tab 22





5-325]  











                                    Allergies











Allergy/AdvReac Type Severity Reaction Status Date / Time


 


amoxicillin [From Augmentin] Allergy  Rash/Hives Verified 22 08:51


 


clavulanic acid Allergy  Rash/Hives Verified 22 08:51





[From Augmentin]     














Review of Systems


ROS Statement: 


Those systems with pertinent positive or pertinent negative responses have been 

documented in the HPI.





ROS Other: All systems not noted in ROS Statement are negative.





Past Medical History


Past Medical History: Coronary Artery Disease (CAD), Cancer, Diabetes Mellitus, 

GERD/Reflux, Hypertension, Osteoarthritis (OA)


Additional Past Medical History / Comment(s): NIDDM type II, 1999 LEUKEMIA (CLL-

remission), sinus problems, seasonal allergies, tinnitis bilaterally, OA hands, 

R wrist carpal tunnel, past fx with L elbow, R rotator cuff tendon problems, L 

rotator cuff tear, left knee pain


History of Any Multi-Drug Resistant Organisms: None Reported


Past Surgical History: Heart Catheterization, Orthopedic Surgery


Additional Past Surgical History / Comment(s): LEFT HAND thumb tendon repair. L 

knee surgery.


Past Anesthesia/Blood Transfusion Reactions: No Reported Reaction


Past Psychological History: No Psychological Hx Reported


Additional Psychological History / Comment(s): Pt resides with his spouse.  He 

is independent.  He ambulates with a cane.  He drives.


Smoking Status: Never smoker


Past Alcohol Use History: None Reported


Past Drug Use History: None Reported


Additional Drug Use History / Comment(s): Occasional marijuana





- Past Family History


  ** Father


Family Medical History: CVA/TIA


Additional Family Medical History / Comment(s): Father  at the age of 85yrs.





  ** Mother


Family Medical History: Diabetes Mellitus


Additional Family Medical History / Comment(s): Mother  at the age of 90yrs.





General Exam





- General Exam Comments


Initial Comments: 











PHYSICAL EXAM:


General Impression: Alert and oriented x3, not in acute distress


HEENT: Normocephalic atraumatic, extra-ocular movements intact, pupils equal and

reactive to light bilaterally, mucous membranes moist.


Cardiovascular: Heart regular rate and rhythm


Chest: Able to complete full sentences, no retractions, no tachypnea


Abdomen: abdomen soft, non-tender, non-distended, no organomegaly


Musculoskeletal: Pulses present and equal in all extremities, no peripheral 

edema


Motor:  no focal deficits noted


Neurological: CN II-XII grossly intact, no focal motor or sensory deficits noted


Skin: Intact with no visualized rashes


Psych: Normal affect and mood








Limitations: no limitations





Course


                                   Vital Signs











  23





  22:58 23:05


 


Temperature 98.4 F 


 


Pulse Rate 67 


 


Respiratory 18 





Rate  


 


Blood Pressure  143/71














Medical Decision Making





- Medical Decision Making


Was pt. sent in by a medical professional or institution (, PA, NP, urgent 

care, hospital, or nursing home...) When possible be specific


@  -No


Did you speak to anyone other than the patient for history (EMS, parent, family,

police, friend...)? What history was obtained from this source 


@  -No


Did you review nursing and triage notes (agree or disagree)?  Why? 


@  -I reviewed and agree with nursing and triage notes


Were old charts reviewed (outside hosp., previous admission, EMS record, old 

EKG, old radiological studies, urgent care reports/EKG's, nursing home records)?

Report findings 


@  -Cardiology note from 2022 was reviewed.  Patient at that time had 

coronary event ruled out.


Differential Diagnosis (chest pain, altered mental status, abdominal pain women,

abdominal pain men, vaginal bleeding, musculoskeletal, weakness, fever, dyspnea,

syncope, headache, dizziness, GI bleed, back pain, seizure, CVA, palpatations, 

mental health)? 


@  -Differential Chest Pain:


Stable Angina, Unstable Angina, STEMI, NSTEMI Aortic Dissection, Pneumothorax, 

Musculoskeletal, Esophageal Spasm GERD, Cholecystitis, Pancreatitis, Zoster, 

this is not meant to be an all-inclusive list. 





EKG interpreted by me (3pts min.).


@  -My EKG interpretation: Ventricular rate 63, sinus rhythm,.  Interval and 61,

QRS 81, . No ME prolongation, no QTC prolongation, no ST or T-wave 

changes noted.  EKG compared to 2022 showing no changes.  Overall, this 

EKG is unremarkable





X-rays interpreted by me (1pt min.).


@  -no acute processes on chest x-ray


CT interpreted by me (1pt min.).


@  -CT angiography of the chest was ordered for elevated d-dimer.  No acute 

processes noted


U/S interpreted by me (1pt. min.).


@  -None done


What testing was considered but not performed or refused? (CT, X-rays, U/S, labs

)? Why?


@  -None


What meds were considered but not given or refused? Why?


@  -None


Did you discuss the management of the patient with other professionals 

(professionals i.e. , PA, NP, lab, RT, psych nurse, , , 

teacher, , )? Give summary


@  -No


Was smoking cessation discussed for >3mins.?


@  -No


Was critical care preformed (if so, how long)?


@  -No


Were there social determinants of health that impacted care today? How? 

(Homelessness, low income, unemployed, alcoholism, drug addiction, 

transportation, low edu. Level, literacy, decrease access to med. care, longterm, 

rehab)?


@  -No


Was there de-escalation of care discussed even if they declined (Discuss DNR or 

withdrawal of care, Hospice)? DNR status


@  -No


What co-morbidities impacted this encounter? (DM, HTN, Smoking, COPD, CAD, 

Cancer, CVA, ARF, Chemo, Hep., AIDS, mental health diagnosis, sleep apnea, 

morbid obesity)?


@  -None


Was patient admitted / discharged? Hospital course, mention meds given and 

route, prescriptions, significant lab abnormalities, going to OR and other 

pertinent info.


@  -77-year-old male presents emergency department for atypical chest pain.  He 

did have a slightly elevated d-dimer.  CT angiography was obtained showing no 

PEs.  Laboratory evaluation obtained.  This shows elevated white count 20.2 

however he has history of chronically elevated white blood cell count, no 

obvious source.  Patient not have any localizing symptoms of infection.  Rest of

labs within acceptable limits.  Troponin is negative.  Patient observed in 

emergency department for 3 hours and 40 minutes.  Reevaluated at bedside at 204 

and found with stable medical condition.  Patient will be discharged.  Patient 

also has a secondary complaint of headache however patient has chronic 

headaches.  His neurologic exam is benign.  No concern for CNS infection.


Undiagnosed new problem with uncertain prognosis?


@  -No


Drug Therapy requiring intensive monitoring for toxicity (Heparin, Nitro, 

Insulin, Cardizem)?


@  -No


Were any procedures done?


@  -No


Diagnosis/symptom?  Acute, or Chronic, or Acute on Chronic?  Uncomplicated 

(without systemic symptoms) or Complicated (systemic symptoms)?


@  -1.  Atypical chest pain, no high-risk features


Side effects of treatment?


@  -No


Exacerbation, Progression, or Severe Exacerbation?


@  -No


Poses a threat to life or bodily function? How? (Chest pain, USA, MI, pneumonia,

PE, COPD, DKA, ARF, appy, cholecystitis, CVA, Diverticulitis, Homicidal, 

Suicidal, threat to staff... and all critical care pts)


@  -No








- Lab Data


Result diagrams: 


                                 23 23:00





                                 23 23:00


                                   Lab Results











  23 Range/Units





  23:00 23:00 23:00 


 


WBC  20.2 H    (3.8-10.6)  k/uL


 


RBC  4.27 L    (4.30-5.90)  m/uL


 


Hgb  12.8 L    (13.0-17.5)  gm/dL


 


Hct  39.4    (39.0-53.0)  %


 


MCV  92.2    (80.0-100.0)  fL


 


MCH  29.9    (25.0-35.0)  pg


 


MCHC  32.5    (31.0-37.0)  g/dL


 


RDW  14.8    (11.5-15.5)  %


 


Plt Count  119 L    (150-450)  k/uL


 


MPV  8.6    


 


Neutrophils %  19    %


 


Lymphocytes %  74    %


 


Monocytes %  3    %


 


Eosinophils %  1    %


 


Basophils %  1    %


 


Neutrophils #  3.7    (1.3-7.7)  k/uL


 


Lymphocytes #  15.0 H    (1.0-4.8)  k/uL


 


Monocytes #  0.5    (0-1.0)  k/uL


 


Eosinophils #  0.2    (0-0.7)  k/uL


 


Basophils #  0.1    (0-0.2)  k/uL


 


Manual Slide Review  Performed    


 


Polychromasia  Present    


 


PT   11.5   (9.0-12.0)  sec


 


INR   1.1   (<1.2)  


 


APTT   24.1   (22.0-30.0)  sec


 


D-Dimer   0.89 H   (<0.60)  mg/L FEU


 


Sodium    140  (137-145)  mmol/L


 


Potassium    3.5  (3.5-5.1)  mmol/L


 


Chloride    102  ()  mmol/L


 


Carbon Dioxide    29  (22-30)  mmol/L


 


Anion Gap    9  mmol/L


 


BUN    12  (9-20)  mg/dL


 


Creatinine    0.84  (0.66-1.25)  mg/dL


 


Est GFR (CKD-EPI)AfAm    >90  (>60 ml/min/1.73 sqM)  


 


Est GFR (CKD-EPI)NonAf    86  (>60 ml/min/1.73 sqM)  


 


Glucose    164 H  (74-99)  mg/dL


 


Calcium    8.9  (8.4-10.2)  mg/dL


 


Troponin I     (0.000-0.034)  ng/mL


 


Lipase    61  ()  U/L














  23 Range/Units





  23:00 


 


WBC   (3.8-10.6)  k/uL


 


RBC   (4.30-5.90)  m/uL


 


Hgb   (13.0-17.5)  gm/dL


 


Hct   (39.0-53.0)  %


 


MCV   (80.0-100.0)  fL


 


MCH   (25.0-35.0)  pg


 


MCHC   (31.0-37.0)  g/dL


 


RDW   (11.5-15.5)  %


 


Plt Count   (150-450)  k/uL


 


MPV   


 


Neutrophils %   %


 


Lymphocytes %   %


 


Monocytes %   %


 


Eosinophils %   %


 


Basophils %   %


 


Neutrophils #   (1.3-7.7)  k/uL


 


Lymphocytes #   (1.0-4.8)  k/uL


 


Monocytes #   (0-1.0)  k/uL


 


Eosinophils #   (0-0.7)  k/uL


 


Basophils #   (0-0.2)  k/uL


 


Manual Slide Review   


 


Polychromasia   


 


PT   (9.0-12.0)  sec


 


INR   (<1.2)  


 


APTT   (22.0-30.0)  sec


 


D-Dimer   (<0.60)  mg/L FEU


 


Sodium   (137-145)  mmol/L


 


Potassium   (3.5-5.1)  mmol/L


 


Chloride   ()  mmol/L


 


Carbon Dioxide   (22-30)  mmol/L


 


Anion Gap   mmol/L


 


BUN   (9-20)  mg/dL


 


Creatinine   (0.66-1.25)  mg/dL


 


Est GFR (CKD-EPI)AfAm   (>60 ml/min/1.73 sqM)  


 


Est GFR (CKD-EPI)NonAf   (>60 ml/min/1.73 sqM)  


 


Glucose   (74-99)  mg/dL


 


Calcium   (8.4-10.2)  mg/dL


 


Troponin I  0.014  (0.000-0.034)  ng/mL


 


Lipase   ()  U/L














Disposition


Clinical Impression: 


 Chest pain





Disposition: HOME SELF-CARE


Condition: Good


Instructions (If sedation given, give patient instructions):  Chest Pain (ED)


Is patient prescribed a controlled substance at d/c from ED?: No


Referrals: 


Smith Figueroa DO [Primary Care Provider] - 1-2 days


Time of Disposition: 02:06

## 2023-05-05 NOTE — XR
EXAM:

  XR Chest, 1 View

 

CLINICAL HISTORY:

   XR Reason: chest pain

 

TECHNIQUE:

  Frontal view of the chest.

 

COMPARISON:

  December 8, 2022

 

FINDINGS:

  Lungs:  Slight prominent interstitial markings over the lung bases 

suggesting mild emphysema, unchanged.  No acute infiltrate, pneumothorax, 

or pleural effusion is seen.

  Pleural space:  Unremarkable.  No pneumothorax.

  Heart:  Borderline cardiomegaly.

  Mediastinum:  Unremarkable.

  Bones/joints:  Mild osteophytosis in the lower thoracic spine.

  Upper abdomen:  There is no pneumoperitoneum under the diaphragm.

 

IMPRESSION:     

  Slight prominent interstitial markings over the lung bases suggesting 

mild emphysema, unchanged.  No acute infiltrate, pneumothorax, or pleural 

effusion is seen.

## 2023-05-05 NOTE — CT
EXAM:

  CT Angiography Chest With Intravenous Contrast

 

CLINICAL HISTORY:

   CT Reason: positive D-dimer

 

TECHNIQUE:

  Axial computed tomographic angiography images of the chest with 

intravenous contrast.  CTDI is 12.9 mGy and DLP is 408 mGy-cm.  This CT 

exam was performed using one or more of the following dose reduction 

techniques: automated exposure control, adjustment of the mA and/or kV 

according to patient size, and/or use of iterative reconstruction 

technique.

  MIP reconstructed images were created and reviewed.

 

COMPARISON:

  No relevant prior studies available.

 

FINDINGS:

  Pulmonary arteries:  The pulmonary arterial tree is well opacified with 

contrast.  There is motion artifact blurring the lower lobe branches 

bilaterally.  No pulmonary emboli are identified.

  Aorta:  The thoracic aorta is mildly calcified but nondilated.  There 

is no aneurysm or dissection.

  Lungs:  Unremarkable.  No mass.  No consolidation.

  Pleural space:  Prominent interstitial markings in the lung bases, 

blurred by motion.  Probable mild emphysema, less likely mild edema.  No 

pneumothorax or pleural effusion is seen.

  Heart:  The heart is mildly enlarged.  Moderate coronary calcification 

is present.  No pericardial effusion.  No evidence of RV dysfunction.

  Bones/joints:  Mild degenerative changes in the spine.  No acute 

fracture or destructive bone lesion is seen.  No dislocation.

  Soft tissues:  Unremarkable.

  Lymph nodes:  Unremarkable.  No enlarged lymph nodes.

 

IMPRESSION:     

1.  Prominent interstitial markings in the lung bases, blurred by motion. 

 Probable mild emphysema, less likely mild edema.  No pneumothorax or 

pleural effusion is seen.

2.  The pulmonary arterial tree is well opacified with contrast.  There 

is motion artifact blurring the lower lobe branches bilaterally.  No 

pulmonary emboli are identified.

3.  The thoracic aorta is mildly calcified but nondilated.  There is no 

aneurysm or dissection.

4.  The heart is mildly enlarged.  Moderate coronary calcification is 

present.  No pericardial effusion.

## 2023-08-29 ENCOUNTER — HOSPITAL ENCOUNTER (EMERGENCY)
Dept: HOSPITAL 47 - EC | Age: 78
Discharge: HOME | End: 2023-08-29
Payer: MEDICARE

## 2023-08-29 VITALS — TEMPERATURE: 98.1 F | HEART RATE: 47 BPM | RESPIRATION RATE: 16 BRPM

## 2023-08-29 VITALS — DIASTOLIC BLOOD PRESSURE: 79 MMHG | SYSTOLIC BLOOD PRESSURE: 133 MMHG

## 2023-08-29 DIAGNOSIS — I10: ICD-10-CM

## 2023-08-29 DIAGNOSIS — Z88.0: ICD-10-CM

## 2023-08-29 DIAGNOSIS — H01.006: ICD-10-CM

## 2023-08-29 DIAGNOSIS — Z88.6: ICD-10-CM

## 2023-08-29 DIAGNOSIS — Z79.899: ICD-10-CM

## 2023-08-29 DIAGNOSIS — Z79.1: ICD-10-CM

## 2023-08-29 DIAGNOSIS — I25.10: ICD-10-CM

## 2023-08-29 DIAGNOSIS — Z88.1: ICD-10-CM

## 2023-08-29 DIAGNOSIS — R51.9: ICD-10-CM

## 2023-08-29 DIAGNOSIS — E11.9: ICD-10-CM

## 2023-08-29 DIAGNOSIS — R10.9: ICD-10-CM

## 2023-08-29 DIAGNOSIS — I67.82: Primary | ICD-10-CM

## 2023-08-29 DIAGNOSIS — K21.9: ICD-10-CM

## 2023-08-29 DIAGNOSIS — M19.90: ICD-10-CM

## 2023-08-29 DIAGNOSIS — R00.1: ICD-10-CM

## 2023-08-29 DIAGNOSIS — Z79.4: ICD-10-CM

## 2023-08-29 LAB
ALBUMIN SERPL-MCNC: 3.9 G/DL (ref 3.5–5)
ALP SERPL-CCNC: 104 U/L (ref 38–126)
ALT SERPL-CCNC: 19 U/L (ref 4–49)
AMYLASE SERPL-CCNC: 44 U/L (ref 30–110)
ANION GAP SERPL CALC-SCNC: 10 MMOL/L
APTT BLD: 23.9 SEC (ref 22–30)
AST SERPL-CCNC: 25 U/L (ref 17–59)
BUN SERPL-SCNC: 15 MG/DL (ref 9–20)
CALCIUM SPEC-MCNC: 8.9 MG/DL (ref 8.4–10.2)
CELLS COUNTED: 100
CHLORIDE SERPL-SCNC: 100 MMOL/L (ref 98–107)
CO2 SERPL-SCNC: 25 MMOL/L (ref 22–30)
EOSINOPHIL # BLD MANUAL: 0.2 K/UL (ref 0–0.7)
ERYTHROCYTE [DISTWIDTH] IN BLOOD BY AUTOMATED COUNT: 3.9 M/UL (ref 4.3–5.9)
ERYTHROCYTE [DISTWIDTH] IN BLOOD: 14.4 % (ref 11.5–15.5)
GLUCOSE SERPL-MCNC: 192 MG/DL (ref 74–99)
HCT VFR BLD AUTO: 36.3 % (ref 39–53)
HGB BLD-MCNC: 12.5 GM/DL (ref 13–17.5)
INR PPP: 1.1 (ref ?–1.2)
LIPASE SERPL-CCNC: 77 U/L (ref 23–300)
LYMPHOCYTES # BLD MANUAL: 15.83 K/UL (ref 1–4.8)
MAGNESIUM SPEC-SCNC: 1.8 MG/DL (ref 1.6–2.3)
MCH RBC QN AUTO: 32 PG (ref 25–35)
MCHC RBC AUTO-ENTMCNC: 34.4 G/DL (ref 31–37)
MCV RBC AUTO: 92.9 FL (ref 80–100)
MONOCYTES # BLD MANUAL: 0.81 K/UL (ref 0–1)
NEUTROPHILS NFR BLD MANUAL: 17 %
NEUTS SEG # BLD MANUAL: 3.45 K/UL (ref 1.3–7.7)
NT-PROBNP SERPL-MCNC: 666 PG/ML
PH UR: 6 [PH] (ref 5–8)
PLATELET # BLD AUTO: 109 K/UL (ref 150–450)
POTASSIUM SERPL-SCNC: 4 MMOL/L (ref 3.5–5.1)
PROT SERPL-MCNC: 6 G/DL (ref 6.3–8.2)
PT BLD: 11.9 SEC (ref 9–12)
SODIUM SERPL-SCNC: 135 MMOL/L (ref 137–145)
SP GR UR: 1.01 (ref 1–1.03)
UROBILINOGEN UR QL STRIP: 2 MG/DL (ref ?–2)
WBC # BLD AUTO: 20.3 K/UL (ref 3.8–10.6)

## 2023-08-29 PROCEDURE — 83880 ASSAY OF NATRIURETIC PEPTIDE: CPT

## 2023-08-29 PROCEDURE — 36415 COLL VENOUS BLD VENIPUNCTURE: CPT

## 2023-08-29 PROCEDURE — 93005 ELECTROCARDIOGRAM TRACING: CPT

## 2023-08-29 PROCEDURE — 85730 THROMBOPLASTIN TIME PARTIAL: CPT

## 2023-08-29 PROCEDURE — 85025 COMPLETE CBC W/AUTO DIFF WBC: CPT

## 2023-08-29 PROCEDURE — 71046 X-RAY EXAM CHEST 2 VIEWS: CPT

## 2023-08-29 PROCEDURE — 74177 CT ABD & PELVIS W/CONTRAST: CPT

## 2023-08-29 PROCEDURE — 84484 ASSAY OF TROPONIN QUANT: CPT

## 2023-08-29 PROCEDURE — 81003 URINALYSIS AUTO W/O SCOPE: CPT

## 2023-08-29 PROCEDURE — 86140 C-REACTIVE PROTEIN: CPT

## 2023-08-29 PROCEDURE — 85610 PROTHROMBIN TIME: CPT

## 2023-08-29 PROCEDURE — 70450 CT HEAD/BRAIN W/O DYE: CPT

## 2023-08-29 PROCEDURE — 82150 ASSAY OF AMYLASE: CPT

## 2023-08-29 PROCEDURE — 83690 ASSAY OF LIPASE: CPT

## 2023-08-29 PROCEDURE — 99285 EMERGENCY DEPT VISIT HI MDM: CPT

## 2023-08-29 PROCEDURE — 83735 ASSAY OF MAGNESIUM: CPT

## 2023-08-29 PROCEDURE — 80053 COMPREHEN METABOLIC PANEL: CPT

## 2023-08-29 PROCEDURE — 96374 THER/PROPH/DIAG INJ IV PUSH: CPT

## 2023-08-29 NOTE — XR
EXAMINATION TYPE: XR chest 2V

 

DATE OF EXAM: 8/29/2023 4:13 PM

 

COMPARISON: Chest radiographs from 5/4/2023

 

TECHNIQUE: XR chest 2V Frontal and lateral views of the chest.

 

CLINICAL INDICATION:Male, 78 years old with history of Chest Pain; 

 

FINDINGS: 

Lungs/Pleura: There is no evidence of pleural effusion, focal consolidation, or pneumothorax.  

Pulmonary vascularity: Unremarkable.

Heart/mediastinum: Cardiomediastinal silhouette is unremarkable.

Musculoskeletal: No acute osseous pathology.

 

 

IMPRESSION: 

Low lung volumes with a generalized hazy appearance which could represent atelectasis versus pulmonar
y edema correlate with serum BNP.

## 2023-08-29 NOTE — CT
EXAMINATION TYPE: CT brain wo con

 

DATE OF EXAM: 8/29/2023

 

COMPARISON: 5/1/2023

 

HISTORY: pt c/o persistent headache. pt was just injected with IV contrast 1 hr prior. ordering Dr dinh wanted the scan

 

CT DLP: 1194.4 mGycm

 

Unenhanced CT of the brain was performed.  

 

The ventricles, basal cisterns and sulci overlying the cerebral convexities demonstrate mild enlargem
ent. Contrast noted from recent CT abdomen pelvis.

 

There is no evidence for intracranial hemorrhage or sulcal effacement.  

 

There is decreased attenuation about the periventricular white matter and deep white matter of both c
erebral hemispheres, compatible with chronic small vessel ischemia. Differential diagnosis does inclu
de demyelination. 

 

No mass effects are seen.No midline shift.

 

Osseous calvarium is intact.  

 

If symptoms persist consider MRI.  

 

IMPRESSION:

 

1. Age related atrophic and chronic small vessel ischemic change without acute intracranial process s
een at this time.

## 2023-08-29 NOTE — CT
EXAMINATION TYPE: CT abdomen pelvis w con

 

DATE OF EXAM: 8/29/2023

 

COMPARISON: 6/30/2022

 

HISTORY: RUQ/epigastric pain

 

CT DLP: 1488.5 mGycm

 

CONTRAST: 

CT scan of the abdomen and pelvis is performed without Oral Contrast and with IV Contrast, patient in
jected with 100 mL of Isovue 300.

 

FINDINGS: 

LUNG BASES-: No visible nodule.  No infiltrate. There is evidence of cardiomegaly. Small hiatal herni
a.

 

LIVER/GB: There is evidence of gallbladder sludge or small stones. No space occupying hepatic lesion.
 Biliary tree is of normal caliber. 

 

PANCREAS:  No inflammation.  No distinct mass. 

 

SPLEEN:  No splenic enlargement.  No lesion seen. 

 

ADRENALS:  No nodule.  No thickening. 

 

KIDNEYS/BLADDER:  No hydronephrosis.  No nephrolithiasis.  No distinct renal mass.  Urinary bladder g
rossly unremarkable. 

 

BOWEL: Normal appendix.  Normal bowel caliber.  No inflammation.

 

GENITAL ORGANS:  No gross abnormality. 

 

LYMPH NODES:  No greater than 1cm abdominal or pelvic lymph nodes are appreciated.

 

AORTA: No significant abnormality. 

 

OSSEOUS STRUCTURES:  No significant abnormality is seen. 

 

OTHER:  No significant additional abnormality is seen. 

 

IMPRESSION: 

1. No acute process seen

## 2023-08-29 NOTE — ED
General Adult HPI





- General


Chief complaint: Chest Pain


Stated complaint: Chest Pain


Source: patient, EMS


Mode of arrival: EMS


Limitations: no limitations





- History of Present Illness


Initial comments: 





's patient is a 78-year-old man who presents with a number of complaints that 

had come on this morning.  The patient states that she had gotten up from his 

chair and then when he was walking he noticed that he had a numb feeling over 

the left anterior chest.  There is also some right sided upper abdominal 

discomfort.  He states the pain has been intermittent, lasts about 15 minutes at

a time, is sharp.  He has not noted worsening or relieving factors.  The 

symptoms are symptoms he has been experiencing intermittently going back a 

number of weeks.  He did not note any diaphoresis, dyspnea, palpitations, 

lightheadedness, nausea or vomiting.  The patient did mention some headache to 

than nursing staff when I question him about this he states that he has been 

dealing with sinus infection as well as an eye infection for a few weeks.  He 

states that he sees Dr. Montez in relation to the eye infection, and he uses an

antibiotic ointment and steroid drop.  He states that he feels that his eye is 

about 75% better.  He states that the headache is consistent with what they have

been calling sinusitis and treating and that he has an appointment with the ENT 

physician coming up.


Onset/Timin


-: hour(s)


Location: chest, abdomen


Radiation: non-radiation


Quality: other (A numb feeling)


Consistency: now resolved


Improves with: none


Worsens with: none


Associated Symptoms: chest pain


Treatments Prior to Arrival: none





- Related Data


                                Home Medications











 Medication  Instructions  Recorded  Confirmed


 


Montelukast Sodium [Singulair] 10 mg PO DAILY 17


 


Fluticasone Nasal Spray [Flonase 1 spr EA NOSTRIL DAILY PRN 21





Nasal Spray]   


 


Atorvastatin [Lipitor] 80 mg PO DAILY 21


 


SILVER sulfADIAZINE CREAM 1 applic TOPICAL BID PRN 21





[Silvadene Cream]   


 


Omeprazole 40 mg PO DAILY 10/20/21 08/29/23


 


lisinopriL [Zestril] 20 mg PO DAILY 10/20/21 08/29/23


 


Brimonidine Tartrate/Timolol 1 drop BOTH EYES BID 21





[Combigan 0.2%-0.5% Eye Drops]   


 


Latanoprost/Pf [Latanoprost 0.005% 1 drop BOTH EYES AS DIRECTED 21





Eye Drop]   


 


Insulin Aspart Prot/Insuln Asp 15 units SQ HS 22





[Relion Novolog Mix 70-30 Vial]   


 


Insulin Aspart Prot/Insuln Asp 25 units SQ DAILY 22





[Relion Novolog Mix 70-30 Vial]   


 


Meclizine [Antivert] 25 mg PO TID PRN 22


 


ALPRAZolam [Xanax] 0.25 mg PO HS 23


 


Cetirizine HCl [Zyrtec] 10 mg PO DAILY PRN 23


 


Erythromycin 0.5% Ophth Oint 1 drop LEFT EYE HS 23





(5gm/Gm)   


 


Gabapentin [Neurontin] 300 mg PO BID 23


 


HYDROcodone/APAP 5-325MG [Norco 1 tab PO Q6HR 23





5-325]   


 


Ketorolac 0.5% Ophth Soln [Acular 1 drop RIGHT EYE AS DIRECTED 23





0.5%]   


 


Mupirocin 2% Oint [Bactroban 2% 1 applic TOPICAL BID PRN 23





Oint]   


 


prednisoLONE ACETATE 1% OPHTH 1 drop LEFT EYE BID 23





[Pred Forte 1%]   








                                  Previous Rx's











 Medication  Instructions  Recorded


 


Isosorbide Mononitrate ER [Imdur] 30 mg PO DAILY #30 tab 21


 


Nitroglycerin Sl Tabs [Nitrostat] 0.4 mg SUBLINGUAL Q5M PRN  tab 22











                                    Allergies











Allergy/AdvReac Type Severity Reaction Status Date / Time


 


amoxicillin [From Augmentin] Allergy  Rash/Hives Verified 23 17:01


 


clavulanic acid Allergy  Rash/Hives Verified 23 17:01





[From Augmentin]     














Review of Systems


ROS Statement: 


Those systems with pertinent positive or pertinent negative responses have been 

documented in the HPI.





ROS Other: All systems not noted in ROS Statement are negative.


Constitutional: Denies: fever, chills, weakness


Eyes: Reports: eye discharge.  Denies: eye pain, vision change


ENT: Reports: congestion.  Denies: ear pain, throat pain


Respiratory: Denies: cough, dyspnea


Cardiovascular: Reports: as per HPI, chest pain.  Denies: palpitations, 

orthopnea, edema, syncope


Gastrointestinal: Reports: as per HPI, abdominal pain.  Denies: nausea, 

vomiting, diarrhea


Genitourinary: Denies: dysuria, hematuria


Musculoskeletal: Denies: back pain


Skin: Denies: rash


Neurological: Denies: headache, weakness, numbness





Past Medical History


Past Medical History: Coronary Artery Disease (CAD), Cancer, Diabetes Mellitus, 

GERD/Reflux, Hypertension, Osteoarthritis (OA)


Additional Past Medical History / Comment(s): NIDDM type II, 1999 LEUKEMIA (CLL-

remission), sinus problems, seasonal allergies, tinnitis bilaterally, OA hands, 

R wrist carpal tunnel, past fx with L elbow, R rotator cuff tendon problems, L 

rotator cuff tear, left knee pain


History of Any Multi-Drug Resistant Organisms: None Reported


Past Surgical History: Heart Catheterization, Orthopedic Surgery


Additional Past Surgical History / Comment(s): LEFT HAND thumb tendon repair. L 

knee surgery.


Past Anesthesia/Blood Transfusion Reactions: No Reported Reaction


Past Psychological History: No Psychological Hx Reported


Smoking Status: Never smoker


Past Alcohol Use History: None Reported


Past Drug Use History: None Reported





- Past Family History


  ** Father


Family Medical History: CVA/TIA


Additional Family Medical History / Comment(s): Father  at the age of 85yrs.





  ** Mother


Family Medical History: Diabetes Mellitus


Additional Family Medical History / Comment(s): Mother  at the age of 90yrs.





General Exam


Limitations: no limitations


General appearance: alert, in no apparent distress


Head exam: Present: atraumatic, normocephalic


Eye exam: Present: PERRL, EOMI, other (The patient does appear to have some 

blepharitis of the left lids.  Very minimal conjunctival injection.  There is no

globe pain.  There is no pain with extraocular movement.  He states his vision 

has markedly improved)


ENT exam: Present: normal oropharynx


Neck exam: Present: normal inspection, full ROM.  Absent: tenderness, 

meningismus


Respiratory exam: Present: normal lung sounds bilaterally.  Absent: respiratory 

distress, wheezes, rales, rhonchi, stridor


Cardiovascular Exam: Present: normal rhythm, bradycardia, normal heart sounds.  

Absent: systolic murmur, diastolic murmur, rubs, gallop


GI/Abdominal exam: Present: soft.  Absent: distended, tenderness, guarding, 

rebound, rigid, mass


Extremities exam: Present: normal inspection, normal capillary refill.  Absent: 

pedal edema, calf tenderness


Back exam: Present: normal inspection.  Absent: CVA tenderness (R), CVA 

tenderness (L)


Neurological exam: Present: alert


Skin exam: Present: warm, dry, intact, normal color.  Absent: rash





Course


                                   Vital Signs











  23





  15:01 16:06 17:59


 


Temperature 98.3 F  97.9 F


 


Pulse Rate 53 L 47 L 46 L


 


Respiratory 18 18 18





Rate   


 


Blood Pressure 131/59 132/71 137/62


 


O2 Sat by Pulse 99 97 96





Oximetry   














  23





  20:02 20:53


 


Temperature 98.1 F 


 


Pulse Rate 47 L 


 


Respiratory 16 





Rate  


 


Blood Pressure  133/79


 


O2 Sat by Pulse 99 





Oximetry  














EKG Findings





- EKG Results:


EKG: interpreted by ERMD, sinus rhythm


EKG shows: bradycardia (Rate 51 bpm)





- Blocks, Axis, Hypertrophy, ST Abn:


QRS axis and voltage: left axis deviation (-30 to -90) (Borderline left axis)


Chamber hypertrophy or enlargement: only voltage criteria for left ventricular 

hypertrophy





Medical Decision Making





- Medical Decision Making











The patient had chest x-ray which I interpreted as negative for acute 

infiltrate, pneumothorax, acute bony injury


The patient had computed tomography scan of the abdomen and pelvis which I in

terpreted as negative for obstruction, free air.


The patient had CT of the brain which I interpreted as negative for acute bony 

injury or intracranial hemorrhage.





Was pt. sent in by a medical professional or institution (, PA, NP, urgent 

care, hospital, or nursing home...) When possible be specific


@  -[No]


Did you speak to anyone other than the patient for history (EMS, parent, family,

 police, friend...)? What history was obtained from this source 


@  -[No]


Did you review nursing and triage notes (agree or disagree)?  Why? 


@  -[I reviewed and agree with nursing and triage notes]


Were old charts reviewed (outside hosp., previous admission, EMS record, old EK

G, old radiological studies, urgent care reports/EKG's, nursing home records)? 

Report findings 


@  -[No old charts were reviewed]


Differential Diagnosis (chest pain, altered mental status, abdominal pain women,

abdominal pain men, vaginal bleeding, weakness, fever, dyspnea, syncope, 

headache, dizziness, GI bleed, back pain, seizure, CVA, palpatations, mental he

alth, musculoskeletal)? 


@  -[Differential Headache:


Migraine, tension, cluster, carbon monoxide, central venous thrombosis, pension 

karma temporal arteritis, acute closure glaucoma, intercranial hemorrhage, 

mastoiditis, sinusitis, head injury, this is not meant to be an all-inclusive 

list.


Differential Abdominal Pain Women:


Appendicitis, Cholecystitis, diverticulosis, ischemic bowel, pancreatitis, 

hepatitis, UTI, gastroenteritis, AAA, incarcerated hernia, bowel obstruction, c

onstipation, inflammatory bowel, hepatitis, peptic ulcer disease, splenic 

infarction, perforated viscus, vulvitis, ovarian torsion, PID, kidney stone, 

placenta abruption, this is not meant to be an all-inclusive list


EKG interpreted by me (3pts min.).


@  -[I interpreted As above]


X-rays interpreted by me (1pt min.).


@  -[I interpreted as above


CT interpreted by me (1pt min.).


@  -[I interpreted as above


U/S interpreted by me (1pt. min.).


@  -[None done]


What testing was considered but not performed or refused? (CT, X-rays, U/S, 

labs)? Why?


@  -[None]


What meds were considered but not given or refused? Why?


@  -[None]


Did you discuss the management of the patient with other professionals 

(professionals i.e. , PA, NP, lab, RT, psych nurse, , , 

teacher, , )? Give summary


@  -[No]


Was smoking cessation discussed for >3mins.?


@  -[No]


Was critical care preformed (if so, how long)?


@  -[No]


Were there social determinants of health that impacted care today? How? 

(Homelessness, low income, unemployed, alcoholism, drug addiction, 

transportation, low edu. Level, literacy, decrease access to med. care, skilled nursing, 

rehab)?


@  -[No]


Was there de-escalation of care discussed even if they declined (Discuss DNR or 

withdrawal of care, Hospice)? DNR status


@  -[No]


What co-morbidities impacted this encounter? (DM, HTN, Smoking, COPD, CAD, 

Cancer, CVA, ARF, Chemo, Hep., AIDS, mental health diagnosis, sleep apnea, 

morbid obesity)?


@  -[None]


Was patient admitted / discharged? Hospital course, mention meds given and 

route, prescriptions, significant lab abnormalities, going to OR and other 

pertinent info.


@  -[This patient is a 78-year-old man presenting with mild exacerbation of 

chronic headache, for which he is scheduled to see the specialist coming up.  

The patient's workup related to that is unremarkable.  No signs of more serious 

etiology such as cavernous sinus thrombosis.  At this point recommend patient to

 continue with the specialist as scheduled though may benefit from seeing 

neurology, and this was discussed.


The patient also having left upper quadrant abdominal pain and at this point 

there does not appear to be serious underlying condition related to that, though

 will have patient have close follow-up and discussed the return parameters 

related to abdominal pain as well.


Undiagnosed new problem with uncertain prognosis?


@  -[No]


Drug Therapy requiring intensive monitoring for toxicity (Heparin, Nitro, 

Insulin, Cardizem)?


@  -[No]


Were any procedures done?


@  -[No]


Diagnosis/symptom?


@  -[Acute on chronic headache


Acute left upper quadrant abdominal pain


Acute, or Chronic, or Acute on Chronic?


@  -[default]


Uncomplicated (without systemic symptoms) or Complicated (systemic symptoms)?


@  -[Uncomplicated


Side effects of treatment?


@  -[No]


Exacerbation, Progression, or Severe Exacerbation?


@  -[No]


Poses a threat to life or bodily function? How? (Chest pain, USA, MI, pneumonia,

 PE, COPD, DKA, ARF, appy, cholecystitis, CVA, Diverticulitis, Homicidal, 

Suicidal, threat to staff... and all critical care pts)


@  -[No]





- Lab Data


Result diagrams: 


                                 23 15:27





                                 23 15:27


                                   Lab Results











  23 Range/Units





  15:27 15: 15: 


 


WBC  20.3 H    (3.8-10.6)  k/uL


 


RBC  3.90 L    (4.30-5.90)  m/uL


 


Hgb  12.5 L    (13.0-17.5)  gm/dL


 


Hct  36.3 L    (39.0-53.0)  %


 


MCV  92.9    (80.0-100.0)  fL


 


MCH  32.0    (25.0-35.0)  pg


 


MCHC  34.4    (31.0-37.0)  g/dL


 


RDW  14.4    (11.5-15.5)  %


 


Plt Count  109 L    (150-450)  k/uL


 


MPV  8.5    


 


Neutrophils % (Manual)  17    %


 


Lymphocytes % (Manual)  78    %


 


Monocytes % (Manual)  4    %


 


Eosinophils % (Manual)  1    %


 


Neutrophils # (Manual)  3.45    (1.3-7.7)  k/uL


 


Lymphocytes # (Manual)  15.83 H    (1.0-4.8)  k/uL


 


Monocytes # (Manual)  0.81    (0-1.0)  k/uL


 


Eosinophils # (Manual)  0.20    (0-0.7)  k/uL


 


Nucleated RBCs  0    (0-0)  /100 WBC


 


Manual Slide Review  Performed    


 


PT   11.9   (9.0-12.0)  sec


 


INR   1.1   (<1.2)  


 


APTT   23.9   (22.0-30.0)  sec


 


Sodium    135 L  (137-145)  mmol/L


 


Potassium    4.0  (3.5-5.1)  mmol/L


 


Chloride    100  ()  mmol/L


 


Carbon Dioxide    25  (22-30)  mmol/L


 


Anion Gap    10  mmol/L


 


BUN    15  (9-20)  mg/dL


 


Creatinine    0.70  (0.66-1.25)  mg/dL


 


Est GFR (CKD-EPI)AfAm    >90  (>60 ml/min/1.73 sqM)  


 


Est GFR (CKD-EPI)NonAf    >90  (>60 ml/min/1.73 sqM)  


 


Glucose    192 H  (74-99)  mg/dL


 


Calcium    8.9  (8.4-10.2)  mg/dL


 


Magnesium    1.8  (1.6-2.3)  mg/dL


 


Total Bilirubin    1.3  (0.2-1.3)  mg/dL


 


AST    25  (17-59)  U/L


 


ALT    19  (4-49)  U/L


 


Alkaline Phosphatase    104  ()  U/L


 


Troponin I     (0.000-0.034)  ng/mL


 


C-Reactive Protein     (<1.0)  mg/dL


 


NT-Pro-B Natriuret Pep    666  pg/mL


 


Total Protein    6.0 L  (6.3-8.2)  g/dL


 


Albumin    3.9  (3.5-5.0)  g/dL


 


Amylase     ()  U/L


 


Lipase     ()  U/L


 


Urine Color     


 


Urine Appearance     (Clear)  


 


Urine pH     (5.0-8.0)  


 


Ur Specific Gravity     (1.001-1.035)  


 


Urine Protein     (Negative)  


 


Urine Glucose (UA)     (Negative)  


 


Urine Ketones     (Negative)  


 


Urine Blood     (Negative)  


 


Urine Nitrite     (Negative)  


 


Urine Bilirubin     (Negative)  


 


Urine Urobilinogen     (<2.0)  mg/dL


 


Ur Leukocyte Esterase     (Negative)  














  23 Range/Units





  15:27 15:27 17:03 


 


WBC     (3.8-10.6)  k/uL


 


RBC     (4.30-5.90)  m/uL


 


Hgb     (13.0-17.5)  gm/dL


 


Hct     (39.0-53.0)  %


 


MCV     (80.0-100.0)  fL


 


MCH     (25.0-35.0)  pg


 


MCHC     (31.0-37.0)  g/dL


 


RDW     (11.5-15.5)  %


 


Plt Count     (150-450)  k/uL


 


MPV     


 


Neutrophils % (Manual)     %


 


Lymphocytes % (Manual)     %


 


Monocytes % (Manual)     %


 


Eosinophils % (Manual)     %


 


Neutrophils # (Manual)     (1.3-7.7)  k/uL


 


Lymphocytes # (Manual)     (1.0-4.8)  k/uL


 


Monocytes # (Manual)     (0-1.0)  k/uL


 


Eosinophils # (Manual)     (0-0.7)  k/uL


 


Nucleated RBCs     (0-0)  /100 WBC


 


Manual Slide Review     


 


PT     (9.0-12.0)  sec


 


INR     (<1.2)  


 


APTT     (22.0-30.0)  sec


 


Sodium     (137-145)  mmol/L


 


Potassium     (3.5-5.1)  mmol/L


 


Chloride     ()  mmol/L


 


Carbon Dioxide     (22-30)  mmol/L


 


Anion Gap     mmol/L


 


BUN     (9-20)  mg/dL


 


Creatinine     (0.66-1.25)  mg/dL


 


Est GFR (CKD-EPI)AfAm     (>60 ml/min/1.73 sqM)  


 


Est GFR (CKD-EPI)NonAf     (>60 ml/min/1.73 sqM)  


 


Glucose     (74-99)  mg/dL


 


Calcium     (8.4-10.2)  mg/dL


 


Magnesium     (1.6-2.3)  mg/dL


 


Total Bilirubin     (0.2-1.3)  mg/dL


 


AST     (17-59)  U/L


 


ALT     (4-49)  U/L


 


Alkaline Phosphatase     ()  U/L


 


Troponin I  <0.012    (0.000-0.034)  ng/mL


 


C-Reactive Protein     (<1.0)  mg/dL


 


NT-Pro-B Natriuret Pep     pg/mL


 


Total Protein     (6.3-8.2)  g/dL


 


Albumin     (3.5-5.0)  g/dL


 


Amylase   44   ()  U/L


 


Lipase   77   ()  U/L


 


Urine Color    Yellow  


 


Urine Appearance    Clear  (Clear)  


 


Urine pH    6.0  (5.0-8.0)  


 


Ur Specific Gravity    1.009  (1.001-1.035)  


 


Urine Protein    Negative  (Negative)  


 


Urine Glucose (UA)    Trace H  (Negative)  


 


Urine Ketones    Negative  (Negative)  


 


Urine Blood    Negative  (Negative)  


 


Urine Nitrite    Negative  (Negative)  


 


Urine Bilirubin    Negative  (Negative)  


 


Urine Urobilinogen    2.0  (<2.0)  mg/dL


 


Ur Leukocyte Esterase    Negative  (Negative)  














  23 Range/Units





  19:40 


 


WBC   (3.8-10.6)  k/uL


 


RBC   (4.30-5.90)  m/uL


 


Hgb   (13.0-17.5)  gm/dL


 


Hct   (39.0-53.0)  %


 


MCV   (80.0-100.0)  fL


 


MCH   (25.0-35.0)  pg


 


MCHC   (31.0-37.0)  g/dL


 


RDW   (11.5-15.5)  %


 


Plt Count   (150-450)  k/uL


 


MPV   


 


Neutrophils % (Manual)   %


 


Lymphocytes % (Manual)   %


 


Monocytes % (Manual)   %


 


Eosinophils % (Manual)   %


 


Neutrophils # (Manual)   (1.3-7.7)  k/uL


 


Lymphocytes # (Manual)   (1.0-4.8)  k/uL


 


Monocytes # (Manual)   (0-1.0)  k/uL


 


Eosinophils # (Manual)   (0-0.7)  k/uL


 


Nucleated RBCs   (0-0)  /100 WBC


 


Manual Slide Review   


 


PT   (9.0-12.0)  sec


 


INR   (<1.2)  


 


APTT   (22.0-30.0)  sec


 


Sodium   (137-145)  mmol/L


 


Potassium   (3.5-5.1)  mmol/L


 


Chloride   ()  mmol/L


 


Carbon Dioxide   (22-30)  mmol/L


 


Anion Gap   mmol/L


 


BUN   (9-20)  mg/dL


 


Creatinine   (0.66-1.25)  mg/dL


 


Est GFR (CKD-EPI)AfAm   (>60 ml/min/1.73 sqM)  


 


Est GFR (CKD-EPI)NonAf   (>60 ml/min/1.73 sqM)  


 


Glucose   (74-99)  mg/dL


 


Calcium   (8.4-10.2)  mg/dL


 


Magnesium   (1.6-2.3)  mg/dL


 


Total Bilirubin   (0.2-1.3)  mg/dL


 


AST   (17-59)  U/L


 


ALT   (4-49)  U/L


 


Alkaline Phosphatase   ()  U/L


 


Troponin I   (0.000-0.034)  ng/mL


 


C-Reactive Protein  <0.5  (<1.0)  mg/dL


 


NT-Pro-B Natriuret Pep   pg/mL


 


Total Protein   (6.3-8.2)  g/dL


 


Albumin   (3.5-5.0)  g/dL


 


Amylase   ()  U/L


 


Lipase   ()  U/L


 


Urine Color   


 


Urine Appearance   (Clear)  


 


Urine pH   (5.0-8.0)  


 


Ur Specific Gravity   (1.001-1.035)  


 


Urine Protein   (Negative)  


 


Urine Glucose (UA)   (Negative)  


 


Urine Ketones   (Negative)  


 


Urine Blood   (Negative)  


 


Urine Nitrite   (Negative)  


 


Urine Bilirubin   (Negative)  


 


Urine Urobilinogen   (<2.0)  mg/dL


 


Ur Leukocyte Esterase   (Negative)  














Disposition


Clinical Impression: 


 Headache, Abdominal pain





Disposition: HOME SELF-CARE


Condition: Good


Instructions (If sedation given, give patient instructions):  Acute Headache 

(ED)


Is patient prescribed a controlled substance at d/c from ED?: No


Referrals: 


Smith Figueroa DO [Primary Care Provider] - 1-2 days


Evette Montaño MD [REFERRING] - 1-2 days

## 2023-09-21 ENCOUNTER — HOSPITAL ENCOUNTER (OUTPATIENT)
Dept: HOSPITAL 47 - RADCTMAIN | Age: 78
Discharge: HOME | End: 2023-09-21
Payer: MEDICARE

## 2023-09-21 DIAGNOSIS — J31.1: Primary | ICD-10-CM

## 2023-09-21 PROCEDURE — 70486 CT MAXILLOFACIAL W/O DYE: CPT

## 2023-09-21 NOTE — CT
EXAMINATION TYPE: CT sinus wo con

 

DATE OF EXAM: 9/21/2023

 

COMPARISON: 6/29/2022

 

HISTORY: chronic sinusitis

 

CT DLP: 464 mGycm

 

CONTRAST: 0 mL of Isovue 300

 

The paranasal sinuses are examined in the axial plane at 2 mm thick sections.  Reconstructed images i
n the coronal plane were obtained.  

 

Findings:

 

The maxillary sinuses are clear.  The ethmoid air cells are clear.  The sphenoid sinuses are clear.  
The frontal sinuses are clear.  

 

The septum is evaluated.  There is septal deviation to the right.

The ostiomeatal units are patent. Small aneta bullosa are present

 

Mastoid air cells are clear. No acute fractures are identified.

 

IMPRESSION:

1.  No suspicious changes to suggest acute or chronic sinusitis.

## 2023-11-07 ENCOUNTER — HOSPITAL ENCOUNTER (EMERGENCY)
Dept: HOSPITAL 47 - EC | Age: 78
Discharge: HOME | End: 2023-11-07
Payer: MEDICARE

## 2023-11-07 VITALS — SYSTOLIC BLOOD PRESSURE: 153 MMHG | RESPIRATION RATE: 17 BRPM | DIASTOLIC BLOOD PRESSURE: 70 MMHG | HEART RATE: 52 BPM

## 2023-11-07 VITALS — TEMPERATURE: 97 F

## 2023-11-07 DIAGNOSIS — M19.042: ICD-10-CM

## 2023-11-07 DIAGNOSIS — Z88.0: ICD-10-CM

## 2023-11-07 DIAGNOSIS — R42: ICD-10-CM

## 2023-11-07 DIAGNOSIS — R53.1: Primary | ICD-10-CM

## 2023-11-07 DIAGNOSIS — I25.10: ICD-10-CM

## 2023-11-07 DIAGNOSIS — E11.9: ICD-10-CM

## 2023-11-07 DIAGNOSIS — Z88.1: ICD-10-CM

## 2023-11-07 DIAGNOSIS — I10: ICD-10-CM

## 2023-11-07 DIAGNOSIS — K21.9: ICD-10-CM

## 2023-11-07 DIAGNOSIS — R07.89: ICD-10-CM

## 2023-11-07 DIAGNOSIS — Z79.899: ICD-10-CM

## 2023-11-07 DIAGNOSIS — Z79.4: ICD-10-CM

## 2023-11-07 LAB
ALBUMIN SERPL-MCNC: 3.9 G/DL (ref 3.5–5)
ALP SERPL-CCNC: 106 U/L (ref 38–126)
ALT SERPL-CCNC: 17 U/L (ref 4–49)
ANION GAP SERPL CALC-SCNC: 11 MMOL/L
APTT BLD: 24.2 SEC (ref 22–30)
AST SERPL-CCNC: 18 U/L (ref 17–59)
BUN SERPL-SCNC: 19 MG/DL (ref 9–20)
CALCIUM SPEC-MCNC: 9.4 MG/DL (ref 8.4–10.2)
CELLS COUNTED: 100
CHLORIDE SERPL-SCNC: 102 MMOL/L (ref 98–107)
CO2 SERPL-SCNC: 25 MMOL/L (ref 22–30)
ERYTHROCYTE [DISTWIDTH] IN BLOOD BY AUTOMATED COUNT: 3.99 M/UL (ref 4.3–5.9)
ERYTHROCYTE [DISTWIDTH] IN BLOOD: 13.8 % (ref 11.5–15.5)
GLUCOSE SERPL-MCNC: 190 MG/DL (ref 74–99)
HCT VFR BLD AUTO: 37.5 % (ref 39–53)
HGB BLD-MCNC: 12.8 GM/DL (ref 13–17.5)
INR PPP: 1.1 (ref ?–1.2)
LIPASE SERPL-CCNC: 124 U/L (ref 23–300)
LYMPHOCYTES # BLD MANUAL: 12.35 K/UL (ref 1–4.8)
MAGNESIUM SPEC-SCNC: 1.8 MG/DL (ref 1.6–2.3)
MCH RBC QN AUTO: 32.1 PG (ref 25–35)
MCHC RBC AUTO-ENTMCNC: 34.2 G/DL (ref 31–37)
MCV RBC AUTO: 93.8 FL (ref 80–100)
MONOCYTES # BLD MANUAL: 1.04 K/UL (ref 0–1)
NEUTROPHILS NFR BLD MANUAL: 22 %
NEUTS SEG # BLD MANUAL: 4 K/UL (ref 1.3–7.7)
NT-PROBNP SERPL-MCNC: 125 PG/ML
PLATELET # BLD AUTO: 99 K/UL (ref 150–450)
POTASSIUM SERPL-SCNC: 4.3 MMOL/L (ref 3.5–5.1)
PROT SERPL-MCNC: 6.1 G/DL (ref 6.3–8.2)
PT BLD: 11.8 SEC (ref 10–12.5)
SODIUM SERPL-SCNC: 138 MMOL/L (ref 137–145)
WBC # BLD AUTO: 17.4 K/UL (ref 3.8–10.6)

## 2023-11-07 PROCEDURE — 85730 THROMBOPLASTIN TIME PARTIAL: CPT

## 2023-11-07 PROCEDURE — 96374 THER/PROPH/DIAG INJ IV PUSH: CPT

## 2023-11-07 PROCEDURE — 85025 COMPLETE CBC W/AUTO DIFF WBC: CPT

## 2023-11-07 PROCEDURE — 71045 X-RAY EXAM CHEST 1 VIEW: CPT

## 2023-11-07 PROCEDURE — 99285 EMERGENCY DEPT VISIT HI MDM: CPT

## 2023-11-07 PROCEDURE — 85610 PROTHROMBIN TIME: CPT

## 2023-11-07 PROCEDURE — 83735 ASSAY OF MAGNESIUM: CPT

## 2023-11-07 PROCEDURE — 83690 ASSAY OF LIPASE: CPT

## 2023-11-07 PROCEDURE — 36415 COLL VENOUS BLD VENIPUNCTURE: CPT

## 2023-11-07 PROCEDURE — 83880 ASSAY OF NATRIURETIC PEPTIDE: CPT

## 2023-11-07 PROCEDURE — 93005 ELECTROCARDIOGRAM TRACING: CPT

## 2023-11-07 PROCEDURE — 84484 ASSAY OF TROPONIN QUANT: CPT

## 2023-11-07 PROCEDURE — 80053 COMPREHEN METABOLIC PANEL: CPT

## 2023-11-07 PROCEDURE — 96375 TX/PRO/DX INJ NEW DRUG ADDON: CPT

## 2023-11-07 NOTE — XR
EXAM:

  XR Chest, 1 View

 

CLINICAL HISTORY:  chest pain

 

TECHNIQUE:

  Frontal view of the chest.

 

COMPARISON:

  5/4/23.

 

FINDINGS:

  Lungs:  Unremarkable.  No consolidation.

  Pleural space:  Unremarkable.  No pneumothorax.

  Heart:  Unremarkable.  No cardiomegaly.

  Mediastinum:  Unremarkable.

  Bones/joints:  Unremarkable.

  Upper abdomen:  Dilatation visualized colon are dilated, measuring up 

to 6.5 cm, suggest colonic ileus.

 

IMPRESSION:     

 

Dilatation visualized: Of measuring up to 6.5 cm, suggest colonic ileus.  

 

Lungs are clear.

## 2023-11-07 NOTE — ED
Chest Pain HPI





- General


Chief Complaint: Chest Pain


Stated Complaint: Chest Pain


Time Seen by Provider: 23 02:18


Source: patient, EMS, RN notes reviewed, old records reviewed


Mode of arrival: EMS





- History of Present Illness


Initial Comments: 





This is a 78-year-old male to the emergency department for evaluation.  Patient 

presents today for evaluation of dizziness.  Dizziness and chest pain.  Patient 

has recurrent visits to the ER today for evaluation of chest pain with prior 

history of chest pain.  Patient complaining of dizziness with recent diagnosis 

of sinus infection patient has seen a sinus infection.  Nose and throat doctor 

and is being treated for that.  He believes that is the cause of his dizziness. 

He is also complaining of some headaches


MD Complaint: chest pain, other (Dizziness nausea headache)


-: days(s)


Pain Location: substernal, left chest


Pain Radiation: none


Severity: mild


Severity scale (1-10): 2


Quality: heaviness


Consistency: intermittent


Improves With: nothing


Worsens With: nothing


Other Symptoms: cough, palpitations


Treatments Prior to Arrival: none





- Related Data


                                Home Medications











 Medication  Instructions  Recorded  Confirmed


 


Montelukast Sodium [Singulair] 10 mg PO DAILY 17


 


Fluticasone Nasal Spray [Flonase 1 spr EA NOSTRIL DAILY PRN 21





Nasal Spray]   


 


Atorvastatin [Lipitor] 80 mg PO DAILY 21


 


SILVER sulfADIAZINE CREAM 1 applic TOPICAL BID PRN 21





[Silvadene Cream]   


 


Omeprazole 40 mg PO DAILY 10/20/21 08/29/23


 


lisinopriL [Zestril] 20 mg PO DAILY 10/20/21 08/29/23


 


Brimonidine Tartrate/Timolol 1 drop BOTH EYES BID 21





[Combigan 0.2%-0.5% Eye Drops]   


 


Latanoprost/Pf [Latanoprost 0.005% 1 drop BOTH EYES AS DIRECTED 21





Eye Drop]   


 


Insulin Aspart Prot/Insuln Asp 15 units SQ HS 22





[Relion Novolog Mix 70-30 Vial]   


 


Insulin Aspart Prot/Insuln Asp 25 units SQ DAILY 22





[Relion Novolog Mix 70-30 Vial]   


 


Meclizine [Antivert] 25 mg PO TID PRN 22


 


ALPRAZolam [Xanax] 0.25 mg PO HS 23


 


Cetirizine HCl [Zyrtec] 10 mg PO DAILY PRN 23


 


Erythromycin 0.5% Ophth Oint 1 drop LEFT EYE HS 23





(5gm/Gm)   


 


Gabapentin [Neurontin] 300 mg PO BID 23


 


HYDROcodone/APAP 5-325MG [Norco 1 tab PO Q6HR 23





5-325]   


 


Ketorolac 0.5% Ophth Soln [Acular 1 drop RIGHT EYE AS DIRECTED 23





0.5%]   


 


Mupirocin 2% Oint [Bactroban 2% 1 applic TOPICAL BID PRN 23





Oint]   


 


prednisoLONE ACETATE 1% OPHTH 1 drop LEFT EYE BID 23





[Pred Forte 1%]   








                                  Previous Rx's











 Medication  Instructions  Recorded


 


Isosorbide Mononitrate ER [Imdur] 30 mg PO DAILY #30 tab 21


 


Nitroglycerin Sl Tabs [Nitrostat] 0.4 mg SUBLINGUAL Q5M PRN  tab 22











                                    Allergies











Allergy/AdvReac Type Severity Reaction Status Date / Time


 


amoxicillin [From Augmentin] Allergy  Rash/Hives Verified 23 17:01


 


clavulanic acid Allergy  Rash/Hives Verified 23 17:01





[From Augmentin]     














Review of Systems


ROS Statement: 


Those systems with pertinent positive or pertinent negative responses have been 

documented in the HPI.





ROS Other: All systems not noted in ROS Statement are negative.





EKG Findings





- EKG Comments:


EKG Findings:: EKG is sinus bradycardia 54  QRS 90 





Past Medical History


Past Medical History: Coronary Artery Disease (CAD), Cancer, Diabetes Mellitus, 

GERD/Reflux, Hypertension, Osteoarthritis (OA)


Additional Past Medical History / Comment(s): NIDDM type II, 1999 LEUKEMIA (CLL-

remission), sinus problems, seasonal allergies, tinnitis bilaterally, OA hands, 

R wrist carpal tunnel, past fx with L elbow, R rotator cuff tendon problems, L 

rotator cuff tear, left knee pain


History of Any Multi-Drug Resistant Organisms: None Reported


Past Surgical History: Heart Catheterization, Orthopedic Surgery


Additional Past Surgical History / Comment(s): LEFT HAND thumb tendon repair. L 

knee surgery.


Past Anesthesia/Blood Transfusion Reactions: No Reported Reaction


Past Psychological History: No Psychological Hx Reported


Smoking Status: Never smoker


Past Alcohol Use History: None Reported


Past Drug Use History: None Reported





- Past Family History


  ** Father


Family Medical History: CVA/TIA


Additional Family Medical History / Comment(s): Father  at the age of 85yrs.





  ** Mother


Family Medical History: Diabetes Mellitus


Additional Family Medical History / Comment(s): Mother  at the age of 90yrs.





General Exam


General appearance: alert, in no apparent distress


Head exam: Present: atraumatic, normocephalic, normal inspection


Eye exam: Present: normal appearance, PERRL, EOMI.  Absent: scleral icterus, 

conjunctival injection, periorbital swelling


ENT exam: Present: normal exam, mucous membranes moist


Neck exam: Present: normal inspection.  Absent: tenderness, meningismus, 

lymphadenopathy


Respiratory exam: Present: normal lung sounds bilaterally.  Absent: respiratory 

distress, wheezes, rales, rhonchi, stridor


Cardiovascular Exam: Present: regular rate, normal rhythm, normal heart sounds. 

Absent: systolic murmur, diastolic murmur, rubs, gallop, clicks


GI/Abdominal exam: Present: soft, normal bowel sounds.  Absent: distended, 

tenderness, guarding, rebound, rigid


Extremities exam: Present: normal inspection, full ROM, normal capillary refill.

 Absent: tenderness, pedal edema, joint swelling, calf tenderness


Back exam: Present: normal inspection


Neurological exam: Present: alert, oriented X3, CN II-XII intact


Psychiatric exam: Present: normal affect, normal mood


Skin exam: Present: warm, dry, intact, normal color.  Absent: rash





Course


                                   Vital Signs











  23





  02:18 02:30 03:30


 


Temperature 97 F L  


 


Pulse Rate 56 L 52 L 52 L


 


Respiratory 17 15 17





Rate   


 


Blood Pressure 139/67 139/67 130/66


 


O2 Sat by Pulse 98 99 98





Oximetry   














  23





  04:00 04:30 05:28


 


Temperature   


 


Pulse Rate 52 L 54 L 52 L


 


Respiratory 17 18 17





Rate   


 


Blood Pressure 130/66 149/73 153/70


 


O2 Sat by Pulse 96 98 98





Oximetry   














- Reevaluation(s)


Reevaluation #1: 





23 02:56


Record is reviewed


Reevaluation #2: 





23 02:56


Patient symptoms unchanged





Patient does feel improved no chest pain


Reevaluation #3: 





23 02:57


A patient is informed results questions answered


Reevaluation #4: 





23 02:57


Was pt. sent in by a medical professional or institution (, PA, NP, urgent 

care, hospital, or nursing home...) When possible be specific


@  -no


Did you speak to anyone other than the patient for history (EMS, parent, family,

police, friend...)? What history was obtained from this source 


@  -no


Did you review nursing and triage notes (agree or disagree)?  Why? 


@  -agree


Are old charts reviewed (outside hosp., previous admission, EMS record, old EKG,

old radiological studies, urgent care reports/EKG's, nursing home records)? 

Report findings 


@  -yes


Differential Diagnosis (chest pain, altered mental status, abdominal pain women,

abdominal pain men, vaginal bleeding, weakness, fever, dyspnea, syncope, 

headache, dizziness, GI bleed, back pain, seizure, CVA, palpatations, mental 

health, musculoskeletal)? 


@  -prior


EKG interpreted by me (3pts min.).


@  -yes


X-rays interpreted by me (1pt min.).


@  -yes


CT interpreted by me (1pt min.).


@  -no


U/S interpreted by me (1pt. min.).


@  -no


What testing was considered but not performed or refused? (CT, X-rays, U/S, 

labs)? Why?


@  -none


What meds were considered but not given or refused? Why?


@  -none


Did you discuss the management of the patient with other professionals 

(professionals i.e. NIMISHA Johnston, NP, lab, RT, psych nurse, , , 

teacher, , )? Give summary


@  -no


Was smoking cessation discussed for >3mins.?


@  -no


Was critical care preformed (if so, how long)?


@  -no


Were there social determinants of health that impacted care today? How? (Homel

essness, low income, unemployed, alcoholism, drug addiction, transportation, low

edu. Level, literacy, decrease access to med. care, alf, rehab)?


@  -none


Was there de-escalation of care discussed even if they declined (Discuss DNR or 

withdrawal of care, Hospice)? DNR status


@  -no


What co-morbidities impacted this encounter? (DM, HTN, Smoking, COPD, CAD, 

Cancer, CVA, ARF, Chemo, Hep., AIDS, mental health diagnosis, sleep apnea, 

morbid obesity)?


@  -none


Was patient admitted / discharged? Hospital course, mention meds given and 

route, prescriptions, significant lab abnormalities, going to OR and other 

pertinent info.


@  - 78 female to the emergency department with weakness nausea vomiting 

dizziness and chest pain.  No acute findings were both found here in the ER 

patient feels well throughout ER stay.  Patient can be discharged home


Discharge


Undiagnosed new problem with uncertain prognosis?


@  -no


Drug Therapy requiring intensive monitoring for toxicity (Heparin, Nitro, 

Insulin, Cardizem)?


@  -no


Were any procedures done?


@  -no


Diagnosis/symptom?


@  -Chest pain weakness dizziness


Acute, or Chronic, or Acute on Chronic?


@  -Acute


Uncomplicated (without systemic symptoms) or Complicated (systemic symptoms)?


@  -Complicated


Side effects of treatment?


@  -no


Exacerbation, Progression, or Severe Exacerbation?


@  -exacerbation


Poses a threat to life or bodily function? How? (Chest pain, USA, MI, pneumonia,

PE, COPD, DKA, ARF, appy, cholecystitis, CVA, Diverticulitis, Homicidal, 

Suicidal, threat to staff... and all critical care pts)


@  -yes


Reevaluation #5: 





23 02:57


Differential Chest Pain:


Stable Angina, Unstable Angina, STEMI, NSTEMI Aortic Dissection, Pneumothorax, 

Musculoskeletal, Esophageal Spasm GERD, Cholecystitis, Pancreatitis, Zoster, 

this is not meant to be an all-inclusive list. 





Differential Dizziness:


Benign paroxysmal positional Vertigo, Menieres disease, otitis media, acoustic 

neuroma, vertebrobasilar insufficiency, cerebellar stroke, encephalitis, 

hypovolemic, arrhythmia, coronary artery syndrome, anemia, this is not meant to 

be an all-inclusive list





Chest Pain MDM





- MDM





78 female to the emergency department with weakness nausea vomiting dizziness 

and chest pain.  No acute findings were both found here in the ER patient feels 

well throughout ER stay.  Patient can be discharged home





Disposition


Clinical Impression: 


 Weakness, Dizziness, Chest pain





Disposition: HOME SELF-CARE


Condition: Fair


Instructions (If sedation given, give patient instructions):  Chest Pain (ED)


Is patient prescribed a controlled substance at d/c from ED?: No


Referrals: 


Smith Figueroa DO [Primary Care Provider] - 1-2 days


Time of Disposition: 04:30

## 2024-01-01 ENCOUNTER — HOSPITAL ENCOUNTER (EMERGENCY)
Dept: HOSPITAL 47 - EC | Age: 79
Discharge: HOME | End: 2024-01-01
Payer: MEDICARE

## 2024-01-01 VITALS — RESPIRATION RATE: 18 BRPM | HEART RATE: 57 BPM | SYSTOLIC BLOOD PRESSURE: 144 MMHG | DIASTOLIC BLOOD PRESSURE: 71 MMHG

## 2024-01-01 VITALS — TEMPERATURE: 98.1 F

## 2024-01-01 DIAGNOSIS — S00.03XA: ICD-10-CM

## 2024-01-01 DIAGNOSIS — R00.1: ICD-10-CM

## 2024-01-01 DIAGNOSIS — Z23: ICD-10-CM

## 2024-01-01 DIAGNOSIS — Z79.1: ICD-10-CM

## 2024-01-01 DIAGNOSIS — S80.212A: ICD-10-CM

## 2024-01-01 DIAGNOSIS — Z79.4: ICD-10-CM

## 2024-01-01 DIAGNOSIS — Z88.0: ICD-10-CM

## 2024-01-01 DIAGNOSIS — Z79.899: ICD-10-CM

## 2024-01-01 DIAGNOSIS — K21.9: ICD-10-CM
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
## 2024-01-01 DIAGNOSIS — I10: ICD-10-CM

## 2024-01-01 DIAGNOSIS — I25.10: ICD-10-CM

## 2024-01-01 DIAGNOSIS — Z88.8: ICD-10-CM

## 2024-01-01 DIAGNOSIS — S46.912A: Primary | ICD-10-CM

## 2024-01-01 DIAGNOSIS — M19.90: ICD-10-CM

## 2024-01-01 DIAGNOSIS — E11.9: ICD-10-CM

## 2024-01-01 LAB
ANION GAP SERPL CALC-SCNC: 13 MMOL/L
APTT BLD: 21.7 SEC (ref 22–30)
BUN SERPL-SCNC: 14 MG/DL (ref 9–20)
CALCIUM SPEC-MCNC: 9.2 MG/DL (ref 8.4–10.2)
CELLS COUNTED: 100
CHLORIDE SERPL-SCNC: 103 MMOL/L (ref 98–107)
CO2 SERPL-SCNC: 21 MMOL/L (ref 22–30)
ERYTHROCYTE [DISTWIDTH] IN BLOOD BY AUTOMATED COUNT: 4.26 M/UL (ref 4.3–5.9)
ERYTHROCYTE [DISTWIDTH] IN BLOOD: 13.7 % (ref 11.5–15.5)
GLUCOSE SERPL-MCNC: 207 MG/DL (ref 74–99)
HCT VFR BLD AUTO: 40.3 % (ref 39–53)
HGB BLD-MCNC: 13.6 GM/DL (ref 13–17.5)
INR PPP: 1.1 (ref ?–1.2)
LYMPHOCYTES # BLD MANUAL: 14.6 K/UL (ref 1–4.8)
MCH RBC QN AUTO: 32 PG (ref 25–35)
MCHC RBC AUTO-ENTMCNC: 33.8 G/DL (ref 31–37)
MCV RBC AUTO: 94.7 FL (ref 80–100)
MONOCYTES # BLD MANUAL: 0.8 K/UL (ref 0–1)
NEUTROPHILS NFR BLD MANUAL: 23 %
NEUTS SEG # BLD MANUAL: 4.6 K/UL (ref 1.3–7.7)
PLATELET # BLD AUTO: 89 K/UL (ref 150–450)
POTASSIUM SERPL-SCNC: 4.1 MMOL/L (ref 3.5–5.1)
PT BLD: 11.5 SEC (ref 10–12.5)
SODIUM SERPL-SCNC: 137 MMOL/L (ref 137–145)
WBC # BLD AUTO: 20 K/UL (ref 3.8–10.6)

## 2024-01-01 PROCEDURE — 73502 X-RAY EXAM HIP UNI 2-3 VIEWS: CPT

## 2024-01-01 PROCEDURE — 90715 TDAP VACCINE 7 YRS/> IM: CPT

## 2024-01-01 PROCEDURE — 85610 PROTHROMBIN TIME: CPT

## 2024-01-01 PROCEDURE — 99285 EMERGENCY DEPT VISIT HI MDM: CPT

## 2024-01-01 PROCEDURE — 73560 X-RAY EXAM OF KNEE 1 OR 2: CPT

## 2024-01-01 PROCEDURE — 80048 BASIC METABOLIC PNL TOTAL CA: CPT

## 2024-01-01 PROCEDURE — 85730 THROMBOPLASTIN TIME PARTIAL: CPT

## 2024-01-01 PROCEDURE — 36415 COLL VENOUS BLD VENIPUNCTURE: CPT

## 2024-01-01 PROCEDURE — 96361 HYDRATE IV INFUSION ADD-ON: CPT

## 2024-01-01 PROCEDURE — 72125 CT NECK SPINE W/O DYE: CPT

## 2024-01-01 PROCEDURE — 85025 COMPLETE CBC W/AUTO DIFF WBC: CPT

## 2024-01-01 PROCEDURE — 70450 CT HEAD/BRAIN W/O DYE: CPT

## 2024-01-01 PROCEDURE — 96374 THER/PROPH/DIAG INJ IV PUSH: CPT

## 2024-01-01 PROCEDURE — 71045 X-RAY EXAM CHEST 1 VIEW: CPT

## 2024-01-01 PROCEDURE — 90471 IMMUNIZATION ADMIN: CPT

## 2024-01-01 PROCEDURE — 72128 CT CHEST SPINE W/O DYE: CPT

## 2024-01-01 NOTE — XR
EXAMINATION TYPE: XR Hip LT and AP Pelvis

 

DATE OF EXAM: 1/1/2024 7:17 PM

 

CLINICAL INDICATION:Male, 78 years old with history of pain fall; PHH

 

COMPARISON: None.

 

TECHNIQUE: XR Hip LT and AP Pelvis; hip was examined in the frontal and lateral projections and a AP 
pelvis. 

 

FINDINGS: No evidence for acute process, joint dislocation or significant soft tissue swelling. Osteo
phyte formation of the superior acetabulum of the hips with joint space narrowing. Multilevel degener
ation changes of the spine.

 

IMPRESSION:

1. No evidence for acute process.

2. Moderate bilateral hip osteoarthrosis.

## 2024-01-01 NOTE — ED
General Adult HPI





- General


Chief complaint: Fall


Stated complaint: Fall


Time Seen by Provider: 24 16:14


Source: patient, RN notes reviewed, old records reviewed


Mode of arrival: EMS


Limitations: no limitations





- History of Present Illness


Initial comments: 





Patient is a 78 year old male presents emergency Department complaining of a 

fall.  Patient has a history of retinal issues, hypertension, diabetes, CAD.  Is

not on blood thinners.  States he ambulates with a walker at baseline.  Was 

attempting to remove water from the back of his trunk of his car when he stepped

on monitor something slippery marvin his balance falling onto his left side.  At 

the back of his head.  Currently is complaining of mild pain in the back of his 

head, mid back, little bit in the neck.  Also complaining of pain of the left 

shoulder but has good range of motion as well as left hip and knee with some 

reduced range of motion secondary to pain.  Denies any chest pain or shortness 

breath.  Denies any abdominal pain, nausea, vomiting.  Otherwise resting 

comfortably.  Presents for further evaluation at this time.  Is not up-to-date 

on tetanus.  Did not lose consciousness.





- Related Data


                                Home Medications











 Medication  Instructions  Recorded  Confirmed


 


Montelukast Sodium [Singulair] 10 mg PO DAILY 17


 


Fluticasone Nasal Spray [Flonase 1 spr EA NOSTRIL DAILY PRN 21





Nasal Spray]   


 


Atorvastatin [Lipitor] 80 mg PO DAILY 21


 


SILVER sulfADIAZINE CREAM 1 applic TOPICAL BID PRN 21





[Silvadene Cream]   


 


Omeprazole 40 mg PO DAILY 10/20/21 08/29/23


 


lisinopriL [Zestril] 20 mg PO DAILY 10/20/21 08/29/23


 


Brimonidine Tartrate/Timolol 1 drop BOTH EYES BID 21





[Combigan 0.2%-0.5% Eye Drops]   


 


Latanoprost/Pf [Latanoprost 0.005% 1 drop BOTH EYES AS DIRECTED 21





Eye Drop]   


 


Insulin Aspart Prot/Insuln Asp 15 units SQ HS 22





[Relion Novolog Mix 70-30 Vial]   


 


Insulin Aspart Prot/Insuln Asp 25 units SQ DAILY 22





[Relion Novolog Mix 70-30 Vial]   


 


Meclizine [Antivert] 25 mg PO TID PRN 22


 


ALPRAZolam [Xanax] 0.25 mg PO HS 23


 


Cetirizine HCl [Zyrtec] 10 mg PO DAILY PRN 23


 


Erythromycin 0.5% Ophth Oint 1 drop LEFT EYE HS 23





(5gm/Gm)   


 


Gabapentin [Neurontin] 300 mg PO BID 23


 


HYDROcodone/APAP 5-325MG [Norco 1 tab PO Q6HR 23





5-325]   


 


Ketorolac 0.5% Ophth Soln [Acular 1 drop RIGHT EYE AS DIRECTED 23





0.5%]   


 


Mupirocin 2% Oint [Bactroban 2% 1 applic TOPICAL BID PRN 23





Oint]   


 


prednisoLONE ACETATE 1% OPHTH 1 drop LEFT EYE BID 23





[Pred Forte 1%]   








                                  Previous Rx's











 Medication  Instructions  Recorded


 


Isosorbide Mononitrate ER [Imdur] 30 mg PO DAILY #30 tab 21


 


Nitroglycerin Sl Tabs [Nitrostat] 0.4 mg SUBLINGUAL Q5M PRN  tab 22











                                    Allergies











Allergy/AdvReac Type Severity Reaction Status Date / Time


 


amoxicillin [From Augmentin] Allergy  Rash/Hives Verified 24 16:17


 


clavulanic acid Allergy  Rash/Hives Verified 24 16:17





[From Augmentin]     














Review of Systems


ROS Statement: 


Those systems with pertinent positive or pertinent negative responses have been 

documented in the HPI.


Review of Systems:


CONST: Denies fever 


EYES: Denies blurry vision 


ENT: Denies nasal congestion  


C/V:  Denies Chest pain


RESP: Denies shortness of breath 


GI: Denies abdominal pain 


: Denies dysuria  


SKIN: Denies rash.


MSK: Endorses back pain, left shoulder pain, left hip pain.


NEURO: Denies headache 


ROS Other: All systems not noted in ROS Statement are negative.





Past Medical History


Past Medical History: Coronary Artery Disease (CAD), Cancer, Diabetes Mellitus, 

GERD/Reflux, Hypertension, Osteoarthritis (OA)


Additional Past Medical History / Comment(s): NIDDM type II, 1999 LEUKEMIA (CLL-

remission), sinus problems, seasonal allergies, tinnitis bilaterally, OA hands, 

R wrist carpal tunnel, past fx with L elbow, R rotator cuff tendon problems, L 

rotator cuff tear, left knee pain


History of Any Multi-Drug Resistant Organisms: None Reported


Past Surgical History: Heart Catheterization, Orthopedic Surgery


Additional Past Surgical History / Comment(s): LEFT HAND thumb tendon repair. L 

knee surgery.


Past Anesthesia/Blood Transfusion Reactions: No Reported Reaction


Past Psychological History: No Psychological Hx Reported


Smoking Status: Never smoker


Past Alcohol Use History: None Reported


Past Drug Use History: None Reported





- Past Family History


  ** Father


Family Medical History: CVA/TIA


Additional Family Medical History / Comment(s): Father  at the age of 85yrs.





  ** Mother


Family Medical History: Diabetes Mellitus


Additional Family Medical History / Comment(s): Mother  at the age of 90yrs.





General Exam





- General Exam Comments


Initial Comments: 





General: Appears in no acute distress.


HEAD: Small contusion to the posterior scalp with no obvious bleeding.  Negative

desai sign.  Negative raccoon eyes.


EYES:  PERRLA, EOMI, conjunctiva normal, no discharge.  Pupils are 2 mm equal 

bilaterally.


ENT:  Hearing grossly intact, normal oropharynx.


RESPIRATORY:  Clear breath sounds bilaterally.  No wheezes, rales, or rhonchi.  


C/V:  Regular rate and rhythm. S1 and S2 auscultated, no edema, peripheral 

pulses 2+ and intact throughout


ABD:  Abd is soft, nontender, nondistended


EXT: Normal range of motion, no obvious deformity.  Tenderness to palpation over

the left hip, anterior left knee, as well as anterior left shoulder at the 

distal clavicle.  Very mild tenderness palpation of the midline thoracic spine 

with no obvious step-offs or deformities.  No significant C-spine, L-spine tende

rness to palpation in the midline.  Pelvis is stable.  Normal range of motion of

all 4 extremities.


SKIN: Small Abrasion over the anterior left knee and a small contusion of the 

posterior occiput


NEURO: Alert and oriented x 4.  Moving all 4 extremities.  GCS of 15.


Limitations: no limitations





Course


                                   Vital Signs











  24





  16:13 18:30


 


Temperature 98.1 F 


 


Pulse Rate 66 57 L


 


Respiratory 20 18





Rate  


 


Blood Pressure 138/89 144/71


 


O2 Sat by Pulse 99 97





Oximetry  














Medical Decision Making





- Medical Decision Making





Was pt. sent in by a medical professional or institution (, PA, NP, urgent 

care, hospital, or nursing home...) When possible be specific


@  -No


Did you speak to anyone other than the patient for history (EMS, parent, family,

police, friend...)? What history was obtained from this source 


@  -No


Did you review nursing and triage notes (agree or disagree)?  Why? 


@  -I reviewed and agree with nursing and triage notes


Were old charts reviewed (outside hosp., previous admission, EMS record, old 

EKG, old radiological studies, urgent care reports/EKG's, nursing home records)?

Report findings 


@  -Old charts reviewed


Differential Diagnosis (chest pain, altered mental status, abdominal pain women,

abdominal pain men, vaginal bleeding, weakness, fever, dyspnea, syncope, 

headache, dizziness, GI bleed, back pain, seizure, CVA, palpatations, mental 

health, musculoskeletal)? 


@  -Differential Musculoskeletal


Muscular strain, contusion, ligament sprain, fracture, arthritis, septic 

arthritis, bursitis, cellulitis, muscle spasm, nerve compression, DVT, arterial 

occlusion, herpes zoster, electrolyte abnormality, tumor.... This is not meant 

to be in all inclusive list.  Also includes intracranial injury, trauma.


EKG interpreted by me (3pts min.).


@  -None done


X-rays interpreted by me (1pt min.).


@  -X-rays of the chest, hip and pelvis, knee negative for any obvious traumatic

injury.


CT interpreted by me (1pt min.).


@  -CT the head, C-spine, T-spine negative for any obvious acute fracture or 

injury.  Degenerative changes present.


U/S interpreted by me (1pt. min.).


@  -None done


What testing was considered but not performed or refused? (CT, X-rays, U/S, 

labs)? Why?


@  -None


What meds were considered but not given or refused? Why?


@  -None


Did you discuss the management of the patient with other professionals 

(professionals i.e. , PA, NP, lab, RT, psych nurse, , , 

teacher, , )? Give summary


@  -No


Was smoking cessation discussed for >3mins.?


@  -No


Was critical care preformed (if so, how long)?


@  -No


Were there social determinants of health that impacted care today? How? 

(Homelessness, low income, unemployed, alcoholism, drug addiction, 

transportation, low edu. Level, literacy, decrease access to med. care, half-way, 

rehab)?


@  -No


Was there de-escalation of care discussed even if they declined (Discuss DNR or 

withdrawal of care, Hospice)? DNR status


@  -No


What co-morbidities impacted this encounter? (DM, HTN, Smoking, COPD, CAD, 

Cancer, CVA, ARF, Chemo, Hep., AIDS, mental health diagnosis, sleep apnea, 

morbid obesity)?


@  -None


Was patient admitted / discharged? Hospital course, mention meds given and 

route, prescriptions, significant lab abnormalities, going to OR and other 

pertinent info.


@  -Based on the patient's presentation and physical exam, he is a 78-year-old 

who suffered a mechanical fall from standing is not on blood thinners with no 

loss of consciousness.  Suffering from muscular skeletal pain.  Does not meet 

criteria for any trauma activation.  We will obtain imaging as well as basic 

labs.  He was in agreement this plan.  Patient will be administered 1 L fluid 

bolus, tetanus booster, as well as IV morphine.  He was in agreement this plan. 

Vital signs are within acceptable limits.





Patient's labs remarkable for a leukocytosis of 20 however patient does have 

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  This is within his baseline. 

Remainder the patient's labs within acceptable limits.  Imaging negative for any

traumatic injury.  Cervical collar was removed.  I discussed results with the 

patient, and he expressed understanding.  He'll be discharged home at this time.

 Strict return precautions discussed.





 I instructed the patient to follow up with their PCP in the next 1-3 days.  I 

explained that the patient should return to the emergency department if they 

experience any worsening symptoms. Strict return precautions were discussed with

the patient. The patient expressed understanding of these instructions. I 

answered all questions that the patient had. The patient was discharged home in 

[good] condition with their prescriptions and follow up information.


Undiagnosed new problem with uncertain prognosis?


@  -No


Drug Therapy requiring intensive monitoring for toxicity (Heparin, Nitro, 

Insulin, Cardizem)?


@  -No


Were any procedures done?


@  -No





Diagnosis/symptom?


@  -Muscle strain, contusion, abrasion, fall


Acute, or Chronic, or Acute on Chronic?


@  -Acute


Uncomplicated (without systemic symptoms) or Complicated (systemic symptoms)?


@  -Complicated


Side effects of treatment?


@  -none


Exacerbation, Progression, or Severe Exacerbation]


@  -no


Poses a threat to life or bodily function?


@  -no





- Lab Data


Result diagrams: 


                                 24 17:15





                                 24 17:15


                                   Lab Results











  24 Range/Units





  17:15 17:15 17:15 


 


WBC  20.0 H    (3.8-10.6)  k/uL


 


RBC  4.26 L    (4.30-5.90)  m/uL


 


Hgb  13.6    (13.0-17.5)  gm/dL


 


Hct  40.3    (39.0-53.0)  %


 


MCV  94.7    (80.0-100.0)  fL


 


MCH  32.0    (25.0-35.0)  pg


 


MCHC  33.8    (31.0-37.0)  g/dL


 


RDW  13.7    (11.5-15.5)  %


 


Plt Count  89 L    (150-450)  k/uL


 


MPV  8.3    


 


Neutrophils %  NP    


 


Neutrophils % (Manual)  23    %


 


Lymphocytes %  NP    


 


Lymphocytes % (Manual)  73    %


 


Monocytes %  NP    


 


Monocytes % (Manual)  4    %


 


Eosinophils %  NP    


 


Basophils %  NP    


 


Neutrophils #  NP    


 


Neutrophils # (Manual)  4.60    (1.3-7.7)  k/uL


 


Lymphocytes #  NP    


 


Lymphocytes # (Manual)  14.60 H    (1.0-4.8)  k/uL


 


Monocytes #  NP    


 


Monocytes # (Manual)  0.80    (0-1.0)  k/uL


 


Eosinophils #  NP    


 


Basophils #  NP    


 


Nucleated RBCs  0    (0-0)  /100 WBC


 


Manual Slide Review  Performed    


 


PT   11.5   (10.0-12.5)  sec


 


INR   1.1   (<1.2)  


 


APTT   21.7 L   (22.0-30.0)  sec


 


Sodium    137  (137-145)  mmol/L


 


Potassium    4.1  (3.5-5.1)  mmol/L


 


Chloride    103  ()  mmol/L


 


Carbon Dioxide    21 L  (22-30)  mmol/L


 


Anion Gap    13  mmol/L


 


BUN    14  (9-20)  mg/dL


 


Creatinine    0.70  (0.66-1.25)  mg/dL


 


Est GFR (CKD-EPI)AfAm    >90  (>60 ml/min/1.73 sqM)  


 


Est GFR (CKD-EPI)NonAf    >90  (>60 ml/min/1.73 sqM)  


 


Glucose    207 H  (74-99)  mg/dL


 


Calcium    9.2  (8.4-10.2)  mg/dL














- EKG Data


-: EKG Interpreted by Me


EKG Comments: 





12-lead Electrocardiogram Interpretation Note





EKG was reviewed and interpreted by myself. 12-lead ECG performed at 1805 is 

interpreted by me as revealing sinus bradycardia at a rate of 53 beats per 

minute.  Axis is normal.  AZ interval is 176 ms, QRS duration is 101 ms, QTc is 

408 ms..  There were no ST or T wave abnormalities to suggest myocardial 

ischemia or injury. R wave progression across the precordium was satisfactory. 

By my interpretation this EKG is non-diagnostic for acute ischemia.





Disposition


Clinical Impression: 


 Fall, Abrasion, Contusion, Muscle strain





Disposition: HOME SELF-CARE


Condition: Good


Instructions (If sedation given, give patient instructions):  Fall Prevention 

for Older Adults (ED)


Is patient prescribed a controlled substance at d/c from ED?: No


Referrals: 


Smith Figueroa DO [Primary Care Provider] - 1-2 days


Time of Disposition: 19:54

## 2024-01-01 NOTE — XR
EXAMINATION TYPE: XR chest 1V portable

 

DATE OF EXAM: 1/1/2024 7:18 PM

 

CLINICAL INDICATION:Male, 78 years old with history of fall, pain; [facility]

 

COMPARISON: Chest radiographs from 11/7/2023.

 

TECHNIQUE: XR chest 1V portable Frontal view of the chest.

 

FINDINGS: 

Lungs/Pleura: There is no evidence of pleural effusion, focal consolidation, or pneumothorax.  

Pulmonary vascularity: Unremarkable.

Heart/mediastinum: Cardiomediastinal silhouette is enlarged and stable.

Musculoskeletal: No acute osseous pathology.

 

IMPRESSION: 

1.  No acute cardiopulmonary disease/process.

2.  Cardiomegaly.

## 2024-01-01 NOTE — XR
EXAMINATION TYPE: XR knee limited LT

 

DATE OF EXAM: 1/1/2024 7:17 PM

 

CLINICAL INDICATION:Male, 78 years old with history of pain fall; [facility]

 

COMPARISON: None.

 

TECHNIQUE: XR knee limited LT; examined in Frontal, lateral and oblique projections.

 

FINDINGS:   No evidence of any acute osseous pathology, soft tissue swelling, or joint effusion is no
santosh. Atherosclerosis of the arterial vasculature.

 Tricompartmental osteophyte formation involving the femoral condyles, tibial plateau and patella. Mi
ld joint space narrowing.

 

IMPRESSION: 

1. No acute osseous pathology.

2. Moderate tricompartmental osteoarthritic changes.

## 2024-01-01 NOTE — CT
EXAMINATION TYPE: CT brain cspine wo con, CT thoracic spine wo con

CT DLP: 2977.0 mGycm combined with thorax, Automated exposure control for dose reduction was used.

 

DATE OF EXAM: 1/1/2024 6:07 PM

 

COMPARISON: 8/29/2023, 5/5/2023, 8/29/2023.

 

CLINICAL INDICATION:Male, 78 years old with history of pain fall; fall, neck and back pain.

 

TECHNIQUE: 

Brain: Multiple axial CT images of the brain were obtained without IV contrast. 

Cspine: Axial CT images from the skull base to the inferior aspect of T2 we obtained without intraven
ous contrast. Coronal and sagittal reformatted images were also reviewed. 

Thoracic spine: Axial imaging of the thoracic spine with sagittal coronal reformats.

 

FINDINGS:

 

Brain:

Extra-axial spaces: No abnormal extra-axial fluid collections.

Ventricular system: Dilatation in proportion to cerebral atrophy.

Cerebral parenchyma: Cerebral atrophy. No acute intraparenchymal hemorrhage or mass effect.  The gray
-white junction is well differentiated. Scattered hypoattenuating areas are seen within the white mat
ter.  

Cerebellum: Unremarkable.

Mass effect: No evidence of midline shift.

Intracranial vasculature: Atherosclerotic calcifications of the intracranial vessels.

Soft tissues: Normal.

Calvarium/osseous structures: No depressed skull fracture.

Paranasal sinuses and mastoid air cells: Clear.

Visualized orbits: Posttreatment changes to the left globe. Right aphakia.

 

spine:

Fracture: None.

Osseous structures: Multilevel degenerative disc disease changes with endplate spurring and disc oste
ophyte complex's. Scattered Schmorl's nodes disc vacuum phenomenon and bridging syndesmophytes are se
en throughout the thoracic spine. Pseudoarthrosis of the spinous processes in the lower spine.

Vertebral alignment: Within normal limits.

Spinal canal/Neural Foramina: Disc osteophyte complexes at C3-C6. With at least mild spinal canal jesse
nosis. L1-L2 moderate spinal canal stenosis secondary disc bulge and facet joint arthropathy. Facet j
oint uncovertebral joint arthropathy scattered throughout the cervical spine with varying degrees of 
neural foraminal stenosis. Neural foraminal stenosis worse at C3-C4 bilaterally with moderate to neel
re and T9-T10 and T10-T11 and T11-T12 with mild to moderate left and moderate to severe right T10-T11


Neck soft tissues: Prevertebral soft tissues are within normal limits.

Other: The airway is patent. No evidence for focal consolidation, pneumothorax or pleural effusion wi
thin the lungs.

 

IMPRESSION:

1.  No acute intracranial process.

2.  Nonspecific white matter changes, likely secondary to chronic small vessel ischemic disease.

3.  No evidence of cervical spine or thoracic fracture.

4.  Moderate to severe multilevel degenerative disc disease multilevel neural foraminal stenosis wors
e at T10-T11 on the right at C3-C4 bilaterally

5.  Findings suggestive of Baastrup's disease.

6.  Moderate L1-L2 spinal canal stenosis secondary disc bulge and facet joint arthropathy.

## 2024-05-11 ENCOUNTER — HOSPITAL ENCOUNTER (EMERGENCY)
Dept: HOSPITAL 47 - EC | Age: 79
LOS: 1 days | Discharge: HOME | End: 2024-05-12
Payer: MEDICARE

## 2024-05-11 VITALS — TEMPERATURE: 98.1 F

## 2024-05-11 DIAGNOSIS — K76.0: ICD-10-CM

## 2024-05-11 DIAGNOSIS — K80.20: Primary | ICD-10-CM

## 2024-05-11 DIAGNOSIS — Z88.0: ICD-10-CM

## 2024-05-11 DIAGNOSIS — Z88.8: ICD-10-CM

## 2024-05-11 DIAGNOSIS — R00.1: ICD-10-CM

## 2024-05-11 PROCEDURE — 96374 THER/PROPH/DIAG INJ IV PUSH: CPT

## 2024-05-11 PROCEDURE — 93005 ELECTROCARDIOGRAM TRACING: CPT

## 2024-05-11 PROCEDURE — 83690 ASSAY OF LIPASE: CPT

## 2024-05-11 PROCEDURE — 82150 ASSAY OF AMYLASE: CPT

## 2024-05-11 PROCEDURE — 80053 COMPREHEN METABOLIC PANEL: CPT

## 2024-05-11 PROCEDURE — 99285 EMERGENCY DEPT VISIT HI MDM: CPT

## 2024-05-11 PROCEDURE — 83605 ASSAY OF LACTIC ACID: CPT

## 2024-05-11 PROCEDURE — 85025 COMPLETE CBC W/AUTO DIFF WBC: CPT

## 2024-05-11 PROCEDURE — 36415 COLL VENOUS BLD VENIPUNCTURE: CPT

## 2024-05-11 PROCEDURE — 81003 URINALYSIS AUTO W/O SCOPE: CPT

## 2024-05-11 PROCEDURE — 74177 CT ABD & PELVIS W/CONTRAST: CPT

## 2024-05-11 NOTE — ED
Abdominal Pain HPI





- General


Chief Complaint: Abdominal Pain


Stated Complaint: Abd pain


Time Seen by Provider: 24 23:47


Source: patient, EMS, RN notes reviewed


Mode of arrival: EMS


Limitations: no limitations





- History of Present Illness


Initial Comments: 


78-year-old male presented to the ER via EMS with a chief complaint of left 

lower quadrant abdominal pain.  He states this been going on for the past 3 

days.  He states it is now radiating to his back.  He denies any nausea, 

vomiting, diarrhea or constipation.  Denies any hematochezia or bright red blood

per school.  He states he has been taking Imodium and prescribed Norco without 

relief.  He denies any fevers.  No history of diverticulitis.  No urinary 

complaints.  Denies chest pain, shortness of breath, headache or peripheral 

edema.





- Related Data


                                Home Medications











 Medication  Instructions  Recorded  Confirmed


 


Montelukast Sodium [Singulair] 10 mg PO DAILY 17


 


Fluticasone Nasal Spray [Flonase 1 spr EA NOSTRIL DAILY PRN 21





Nasal Spray]   


 


Atorvastatin [Lipitor] 80 mg PO DAILY 21


 


SILVER sulfADIAZINE CREAM 1 applic TOPICAL BID PRN 21





[Silvadene Cream]   


 


Omeprazole 40 mg PO DAILY 10/20/21 08/29/23


 


lisinopriL [Zestril] 20 mg PO DAILY 10/20/21 08/29/23


 


Brimonidine Tartrate/Timolol 1 drop BOTH EYES BID 21





[Combigan 0.2%-0.5% Eye Drops]   


 


Latanoprost/Pf [Latanoprost 0.005% 1 drop BOTH EYES AS DIRECTED 21





Eye Drop]   


 


Insulin Aspart Prot/Insuln Asp 15 units SQ HS 22





[Relion NovoLOG Mix 70-30 Vial]   


 


Insulin Aspart Prot/Insuln Asp 25 units SQ DAILY 22





[Relion NovoLOG Mix 70-30 Vial]   


 


Meclizine [Antivert] 25 mg PO TID PRN 22


 


ALPRAZolam [Xanax] 0.25 mg PO HS 23


 


Cetirizine HCl [Zyrtec] 10 mg PO DAILY PRN 23


 


Erythromycin 0.5% Ophth Oint 1 drop LEFT EYE HS 23





(5gm/Gm)   


 


Gabapentin [Neurontin] 300 mg PO BID 23


 


HYDROcodone/APAP 5-325MG [Norco 1 tab PO Q6HR 23





5-325]   


 


Ketorolac 0.5% Ophth Soln [Acular 1 drop RIGHT EYE AS DIRECTED 23





0.5%]   


 


Mupirocin 2% Oint [Bactroban 2% 1 applic TOPICAL BID PRN 23





Oint]   


 


prednisoLONE ACETATE 1% OPHTH 1 drop LEFT EYE BID 23





[Pred Forte 1%]   








                                  Previous Rx's











 Medication  Instructions  Recorded


 


Isosorbide Mononitrate ER [Imdur] 30 mg PO DAILY #30 tab 21


 


Nitroglycerin Sl Tabs [Nitrostat] 0.4 mg SUBLINGUAL Q5M PRN  tab 22











                                    Allergies











Allergy/AdvReac Type Severity Reaction Status Date / Time


 


amoxicillin [From Augmentin] Allergy  Rash/Hives Verified 24 23:36


 


clavulanic acid Allergy  Rash/Hives Verified 24 23:36





[From Augmentin]     














Review of Systems


ROS Statement: 


Those systems with pertinent positive or pertinent negative responses have been 

documented in the HPI.





ROS Other: All systems not noted in ROS Statement are negative.





Past Medical History


Past Medical History: Coronary Artery Disease (CAD), Cancer, Diabetes Mellitus, 

GERD/Reflux, Hypertension, Osteoarthritis (OA)


Additional Past Medical History / Comment(s): NIDDM type II, 1999 LEUKEMIA 

(CLL-remission), sinus problems, seasonal allergies, tinnitis bilaterally, OA 

hands, R wrist carpal tunnel, past fx with L elbow, R rotator cuff tendon 

problems, L rotator cuff tear, left knee pain


History of Any Multi-Drug Resistant Organisms: None Reported


Past Surgical History: Heart Catheterization, Orthopedic Surgery


Additional Past Surgical History / Comment(s): LEFT HAND thumb tendon repair. L 

knee surgery.


Past Anesthesia/Blood Transfusion Reactions: No Reported Reaction


Past Psychological History: No Psychological Hx Reported


Smoking Status: Never smoker


Past Alcohol Use History: None Reported


Past Drug Use History: None Reported





- Past Family History


  ** Father


Family Medical History: CVA/TIA


Additional Family Medical History / Comment(s): Father  at the age of 85yrs.





  ** Mother


Family Medical History: Diabetes Mellitus


Additional Family Medical History / Comment(s): Mother  at the age of 90yrs.





General Exam


General appearance: alert, in no apparent distress


Head exam: Present: atraumatic, normocephalic, normal inspection


Respiratory exam: Present: normal lung sounds bilaterally.  Absent: respiratory 

distress, wheezes, rales, rhonchi, stridor


Cardiovascular Exam: Present: regular rate, normal rhythm, normal heart sounds. 

Absent: systolic murmur, diastolic murmur, rubs, gallop, clicks


GI/Abdominal exam: Present: soft, tenderness (Suprapubic and left lower 

quadrant), normal bowel sounds


Back exam: Present: other (No CVA tenderness bilaterally)


Neurological exam: Present: alert, oriented X3, CN II-XII intact


Psychiatric exam: Present: normal affect, normal mood


Skin exam: Present: warm, dry, intact, normal color.  Absent: rash





Course


                                   Vital Signs











  24





  23:33 01:00 02:00


 


Temperature 98.1 F  


 


Pulse Rate 53 L 50 L 49 L


 


Respiratory 18 16 20





Rate   


 


Blood Pressure 176/70 120/77 157/67


 


O2 Sat by Pulse 96 97 98





Oximetry   














  24





  03:00 03:30


 


Temperature  


 


Pulse Rate 50 L 47 L


 


Respiratory 17 17





Rate  


 


Blood Pressure 159/66 147/69


 


O2 Sat by Pulse 97 96





Oximetry  














Medical Decision Making





- Medical Decision Making


Was pt. sent in by a medical professional or institution (, PA, NP, urgent 

care, hospital, or nursing home...) When possible be specific


@  -No


Did you speak to anyone other than the patient for history (EMS, parent, family,

police, friend...)? What history was obtained from this source 


@  -No


Did you review nursing and triage notes (agree or disagree)?  Why? 


@  -I reviewed and agree with nursing and triage notes


Were old charts reviewed (outside hosp., previous admission, EMS record, old 

EKG, old radiological studies, urgent care reports/EKG's, nursing home records)?

Report findings 


@  -No old charts were reviewed


Differential Diagnosis (chest pain, altered mental status, abdominal pain women,

abdominal pain men, vaginal bleeding, weakness, fever, dyspnea, syncope, 

headache, dizziness, GI bleed, back pain, seizure, CVA, palpatations, mental 

health, musculoskeletal)? 


@  -Differential Abdominal Pain Men:Appendicitis, cholecystitis, diverticulosis,

ischemic bowel, pancreatitis, hepatitis, UTI, gastroenteritis, AAA, incarcerated

hernia, bowel obstruction, constipation, inflammatory bowel, hepatitis, peptic 

ulcer disease, splenic infarction, perforated viscus, testicular torsion, this 

is not meant to be an all-inclusive list


EKG interpreted by me (3pts min.).


@  -As above


X-rays interpreted by me (1pt min.).


@  -None done


CT interpreted by me (1pt min.).


@  - CT abdomen pelvis performed significant for cholelithiasis without evidence

of obstruction.  There is a nonspecific stranding around perinephric spaces.  No

evidence of bowel obstruction with moderate stool in the colon.


U/S interpreted by me (1pt. min.).


@  -None done


What testing was considered but not performed or refused? (CT, X-rays, U/S, 

labs)? Why?


@  -None


What meds were considered but not given or refused? Why?


@  -None


Did you discuss the management of the patient with other professionals 

(professionals i.e. , PA, NP, lab, RT, psych nurse, , , 

teacher, , )? Give summary


@  -No


Was smoking cessation discussed for >3mins.?


@  -No


Was critical care preformed (if so, how long)?


@  -No


Were there social determinants of health that impacted care today? How? 

(Homelessness, low income, unemployed, alcoholism, drug addiction, 

transportation, low edu. Level, literacy, decrease access to med. care, custodial, 

rehab)?


@  -No


Was there de-escalation of care discussed even if they declined (Discuss DNR or 

withdrawal of care, Hospice)? DNR status


@  -No


What co-morbidities impacted this encounter? (DM, HTN, Smoking, COPD, CAD, 

Cancer, CVA, ARF, Chemo, Hep., AIDS, mental health diagnosis, sleep apnea, 

morbid obesity)?


@  -None


Was patient admitted / discharged? Hospital course, mention meds given and 

route, prescriptions, significant lab abnormalities, going to OR and other 

pertinent info.


@  -Discharge.  78-year-old male presenting to the ER with chief complaint of 

left lower quadrant abdominal pain.  History and physical exam completed.  

Vitals stable.  Patient in no signs of acute distress and nontoxic-appearing.  

Normal bowel sounds with tenderness to left lower quadrant.  Laboratory studies 

obtained significant for white blood cell count 14 which is believed to be 

elevated due to history of leukemia. HGB 12.1 appear to be patients baseline. 

CMP unremarkable.  Urine without signs of infection.  CT abdomen pelvis 

performed significant for cholelithiasis without evidence of obstruction.  There

is a nonspecific stranding around perinephric spaces.  No evidence of bowel 

obstruction with moderate stool in the colon.  Symptomatic treatment in the ER. 

Upon reevaluation, patient resting comfortably in exam room in no signs acute di

stress.  Results discussed with patient, all questions answered.  Advise follow-

up with PCP.  Return parameters discussed.  Patient discharged stable condition.

 Patient verbally expressed understanding and agreement with care plan. Case 

discussed with ED attending, Dr. Roth


Undiagnosed new problem with uncertain prognosis?


@  -No


Drug Therapy requiring intensive monitoring for toxicity (Heparin, Nitro, 

Insulin, Cardizem)?


@  -No


Were any procedures done?


@  -No


Diagnosis/symptom?


@  -Abdominal pain


Acute, or Chronic, or Acute on Chronic?


@  -Acute


Uncomplicated (without systemic symptoms) or Complicated (systemic symptoms)?


@  -Uncomplicated


Side effects of treatment?


@  -No


Exacerbation, Progression, or Severe Exacerbation?


@  -No


Poses a threat to life or bodily function? How? (Chest pain, USA, MI, pneumonia,

PE, COPD, DKA, ARF, appy, cholecystitis, CVA, Diverticulitis, Homicidal, 

Suicidal, threat to staff... and all critical care pts)


@  -No








- Lab Data


Result diagrams: 


                                 24 00:27





                                 24 00:27


                                   Lab Results











  24 Range/Units





  00:27 00:27 00:27 


 


WBC  14.4 H    (3.8-10.6)  k/uL


 


RBC  3.92 L    (4.30-5.90)  m/uL


 


Hgb  12.1 L    (13.0-17.5)  gm/dL


 


Hct  36.2 L    (39.0-53.0)  %


 


MCV  92.2    (80.0-100.0)  fL


 


MCH  30.9    (25.0-35.0)  pg


 


MCHC  33.5    (31.0-37.0)  g/dL


 


RDW  13.7    (11.5-15.5)  %


 


Plt Count  100 L    (150-450)  k/uL


 


MPV  8.9    


 


Neutrophils %  Not Reportable    


 


Neutrophils % (Manual)  24    %


 


Lymphocytes %  Not Reportable    


 


Lymphocytes % (Manual)  72    %


 


Monocytes %  Not Reportable    


 


Monocytes % (Manual)  1    %


 


Eosinophils %  Not Reportable    


 


Eosinophils % (Manual)  3    %


 


Basophils %  Not Reportable    


 


Neutrophils #  Not Reportable    


 


Neutrophils # (Manual)  3.46    (1.3-7.7)  k/uL


 


Lymphocytes #  Not Reportable    


 


Lymphocytes # (Manual)  10.37 H    (1.0-4.8)  k/uL


 


Monocytes #  Not Reportable    


 


Monocytes # (Manual)  0.14    (0-1.0)  k/uL


 


Eosinophils #  Not Reportable    


 


Eosinophils # (Manual)  0.43    (0-0.7)  k/uL


 


Basophils #  Not Reportable    


 


Nucleated RBCs  0    (0-0)  /100 WBC


 


Manual Slide Review  Performed    


 


Anisocytosis (manual)  Present    


 


Sodium   137   (137-145)  mmol/L


 


Potassium   4.1   (3.5-5.1)  mmol/L


 


Chloride   107   ()  mmol/L


 


Carbon Dioxide   24   (22-30)  mmol/L


 


Anion Gap   6   mmol/L


 


BUN   18   (9-20)  mg/dL


 


Creatinine   0.72   (0.66-1.25)  mg/dL


 


Est GFR (CKD-EPI)AfAm   >90   (>60 ml/min/1.73 sqM)  


 


Est GFR (CKD-EPI)NonAf   >90   (>60 ml/min/1.73 sqM)  


 


Glucose   121 H   (74-99)  mg/dL


 


Plasma Lactic Acid Robert    0.8  (0.7-2.0)  mmol/L


 


Calcium   8.7   (8.4-10.2)  mg/dL


 


Total Bilirubin   0.8   (0.2-1.3)  mg/dL


 


AST   25   (17-59)  U/L


 


ALT   17   (4-49)  U/L


 


Alkaline Phosphatase   89   ()  U/L


 


Total Protein   5.7 L   (6.3-8.2)  g/dL


 


Albumin   3.6   (3.5-5.0)  g/dL


 


Amylase   42   ()  U/L


 


Lipase   92   ()  U/L


 


Urine Color     


 


Urine Appearance     (Clear)  


 


Urine pH     (5.0-8.0)  


 


Ur Specific Gravity     (1.001-1.035)  


 


Urine Protein     (Negative)  


 


Urine Glucose (UA)     (Negative)  


 


Urine Ketones     (Negative)  


 


Urine Blood     (Negative)  


 


Urine Nitrite     (Negative)  


 


Urine Bilirubin     (Negative)  


 


Urine Urobilinogen     (<2.0)  mg/dL


 


Ur Leukocyte Esterase     (Negative)  














  24 Range/Units





  03:03 


 


WBC   (3.8-10.6)  k/uL


 


RBC   (4.30-5.90)  m/uL


 


Hgb   (13.0-17.5)  gm/dL


 


Hct   (39.0-53.0)  %


 


MCV   (80.0-100.0)  fL


 


MCH   (25.0-35.0)  pg


 


MCHC   (31.0-37.0)  g/dL


 


RDW   (11.5-15.5)  %


 


Plt Count   (150-450)  k/uL


 


MPV   


 


Neutrophils %   


 


Neutrophils % (Manual)   %


 


Lymphocytes %   


 


Lymphocytes % (Manual)   %


 


Monocytes %   


 


Monocytes % (Manual)   %


 


Eosinophils %   


 


Eosinophils % (Manual)   %


 


Basophils %   


 


Neutrophils #   


 


Neutrophils # (Manual)   (1.3-7.7)  k/uL


 


Lymphocytes #   


 


Lymphocytes # (Manual)   (1.0-4.8)  k/uL


 


Monocytes #   


 


Monocytes # (Manual)   (0-1.0)  k/uL


 


Eosinophils #   


 


Eosinophils # (Manual)   (0-0.7)  k/uL


 


Basophils #   


 


Nucleated RBCs   (0-0)  /100 WBC


 


Manual Slide Review   


 


Anisocytosis (manual)   


 


Sodium   (137-145)  mmol/L


 


Potassium   (3.5-5.1)  mmol/L


 


Chloride   ()  mmol/L


 


Carbon Dioxide   (22-30)  mmol/L


 


Anion Gap   mmol/L


 


BUN   (9-20)  mg/dL


 


Creatinine   (0.66-1.25)  mg/dL


 


Est GFR (CKD-EPI)AfAm   (>60 ml/min/1.73 sqM)  


 


Est GFR (CKD-EPI)NonAf   (>60 ml/min/1.73 sqM)  


 


Glucose   (74-99)  mg/dL


 


Plasma Lactic Acid Robert   (0.7-2.0)  mmol/L


 


Calcium   (8.4-10.2)  mg/dL


 


Total Bilirubin   (0.2-1.3)  mg/dL


 


AST   (17-59)  U/L


 


ALT   (4-49)  U/L


 


Alkaline Phosphatase   ()  U/L


 


Total Protein   (6.3-8.2)  g/dL


 


Albumin   (3.5-5.0)  g/dL


 


Amylase   ()  U/L


 


Lipase   ()  U/L


 


Urine Color  Yellow  


 


Urine Appearance  Clear  (Clear)  


 


Urine pH  5.5  (5.0-8.0)  


 


Ur Specific Gravity  1.050 H  (1.001-1.035)  


 


Urine Protein  Trace H  (Negative)  


 


Urine Glucose (UA)  Trace H  (Negative)  


 


Urine Ketones  Negative  (Negative)  


 


Urine Blood  Negative  (Negative)  


 


Urine Nitrite  Negative  (Negative)  


 


Urine Bilirubin  Negative  (Negative)  


 


Urine Urobilinogen  2.0  (<2.0)  mg/dL


 


Ur Leukocyte Esterase  Negative  (Negative)  














- EKG Data


-: EKG Interpreted by Me


EKG Comments: 





EKG taken at 23: 36 showing sinus bradycardia with no acute ST segment or T wave

abnormalities.  Ventricular rate 51, AL interval 192, QRS duration 100, QT/QTc 

427/403.





- Radiology Data


Radiology results: report reviewed, image reviewed





Disposition


Clinical Impression: 


 Abdominal pain





Disposition: HOME SELF-CARE


Condition: Stable


Instructions (If sedation given, give patient instructions):  Abdominal Pain 

(ED)


Additional Instructions: 


Follow-up with PCP. Return to ER for any new or worsening symptoms.


Is patient prescribed a controlled substance at d/c from ED?: No


Referrals: 


Smith Figueroa DO [Primary Care Provider] - 1-2 days


Time of Disposition: 03:47

## 2024-05-12 VITALS — SYSTOLIC BLOOD PRESSURE: 147 MMHG | DIASTOLIC BLOOD PRESSURE: 69 MMHG | RESPIRATION RATE: 17 BRPM | HEART RATE: 47 BPM

## 2024-05-12 LAB
ALBUMIN SERPL-MCNC: 3.6 G/DL (ref 3.5–5)
ALP SERPL-CCNC: 89 U/L (ref 38–126)
ALT SERPL-CCNC: 17 U/L (ref 4–49)
AMYLASE SERPL-CCNC: 42 U/L (ref 30–110)
ANION GAP SERPL CALC-SCNC: 6 MMOL/L
AST SERPL-CCNC: 25 U/L (ref 17–59)
BUN SERPL-SCNC: 18 MG/DL (ref 9–20)
CALCIUM SPEC-MCNC: 8.7 MG/DL (ref 8.4–10.2)
CELLS COUNTED: 100
CHLORIDE SERPL-SCNC: 107 MMOL/L (ref 98–107)
CO2 SERPL-SCNC: 24 MMOL/L (ref 22–30)
EOSINOPHIL # BLD MANUAL: 0.43 K/UL (ref 0–0.7)
ERYTHROCYTE [DISTWIDTH] IN BLOOD BY AUTOMATED COUNT: 3.92 M/UL (ref 4.3–5.9)
ERYTHROCYTE [DISTWIDTH] IN BLOOD: 13.7 % (ref 11.5–15.5)
GLUCOSE SERPL-MCNC: 121 MG/DL (ref 74–99)
HCT VFR BLD AUTO: 36.2 % (ref 39–53)
HGB BLD-MCNC: 12.1 GM/DL (ref 13–17.5)
LIPASE SERPL-CCNC: 92 U/L (ref 23–300)
LYMPHOCYTES # BLD MANUAL: 10.37 K/UL (ref 1–4.8)
MCH RBC QN AUTO: 30.9 PG (ref 25–35)
MCHC RBC AUTO-ENTMCNC: 33.5 G/DL (ref 31–37)
MCV RBC AUTO: 92.2 FL (ref 80–100)
MONOCYTES # BLD MANUAL: 0.14 K/UL (ref 0–1)
NEUTROPHILS NFR BLD MANUAL: 24 %
NEUTS SEG # BLD MANUAL: 3.46 K/UL (ref 1.3–7.7)
PH UR: 5.5 [PH] (ref 5–8)
PLATELET # BLD AUTO: 100 K/UL (ref 150–450)
POTASSIUM SERPL-SCNC: 4.1 MMOL/L (ref 3.5–5.1)
PROT SERPL-MCNC: 5.7 G/DL (ref 6.3–8.2)
SODIUM SERPL-SCNC: 137 MMOL/L (ref 137–145)
SP GR UR: 1.05 (ref 1–1.03)
UROBILINOGEN UR QL STRIP: 2 MG/DL (ref ?–2)
WBC # BLD AUTO: 14.4 K/UL (ref 3.8–10.6)

## 2024-05-12 RX ADMIN — ACETAMINOPHEN STA MG: 325 TABLET, FILM COATED ORAL at 00:27

## 2024-05-12 RX ADMIN — MORPHINE SULFATE ONE MG: 2 INJECTION, SOLUTION INTRAMUSCULAR; INTRAVENOUS at 03:07

## 2024-05-12 NOTE — CT
EXAM:

  CT Abdomen and Pelvis With Intravenous Contrast

 

CLINICAL HISTORY:

  ITS.REASON CT Reason: abdominal pain

 

TECHNIQUE:

  Axial computed tomography images of the abdomen and pelvis with 

intravenous contrast.  CTDI is 26.8 mGy and DLP is 1418 mGy-cm.  This CT 

exam was performed using one or more of the following dose reduction 

techniques: automated exposure control, adjustment of the mA and/or kV 

according to patient size, and/or use of iterative reconstruction 

technique.

 

COMPARISON:

  8/29/2023.

 

FINDINGS:

  Lung bases:  COPD.  Scarring and subsegmental atelectasis noted at the 

lung bases.

  Heart:  There is cardiomegaly.

 

 ABDOMEN:

  Liver:  Fatty liver.

  Gallbladder and bile ducts:  There is cholelithiasis.  No ductal 

dilation.

  Pancreas:  See below.  

  Spleen:  Spleen enhances uniformly.

  Adrenals:  The adrenal glands, the head, body, tail of the pancreas are 

unremarkable.

  Kidneys and ureters:  Nonspecific stranding about the perinephric 

spaces.

  Stomach and bowel:  Moderate quantity of ingested material in the 

stomach.  Moderate quantity of stool throughout the colon.  No mucosal 

thickening.  No bowel obstruction.

 

 PELVIS:

  Appendix:  No findings to suggest acute appendicitis.

  Bladder:  Unremarkable.  No mass.

  Reproductive:  Unremarkable as visualized.

 

 ABDOMEN and PELVIS:

  Intraperitoneal space:  Unremarkable.  No free air.  No significant 

fluid collection.

  Bones/joints:  Levoscoliosis of the lumbar spine.  No acute fracture.  

No dislocation.  No spondylolysis.

  Soft tissues:  Ischiorectal fat is clean.

  Vasculature:  Portal vein is patent.  Flow is noted within the celiac, 

SMA, the renal arteries, and CELI.  No abdominal aortic aneurysm.

  Lymph nodes:  Unremarkable.  No retroperitoneal adenopathy.

  Other findings:  ASCVD.  Multilevel vacuum disks are noted.

 

IMPRESSION:     

1.  Cardiomegaly.

2.  Fatty liver.

3.  Cholelithiasis.

4.  Nonspecific stranding about the perinephric spaces.

5.  No bowel obstruction.

6.  Diverticulosis without

## 2025-01-30 ENCOUNTER — HOSPITAL ENCOUNTER (EMERGENCY)
Dept: HOSPITAL 47 - EC | Age: 80
Discharge: HOME | End: 2025-01-30
Payer: MEDICARE

## 2025-01-30 VITALS — DIASTOLIC BLOOD PRESSURE: 90 MMHG | HEART RATE: 72 BPM | RESPIRATION RATE: 18 BRPM | SYSTOLIC BLOOD PRESSURE: 190 MMHG

## 2025-01-30 VITALS — TEMPERATURE: 98 F

## 2025-01-30 DIAGNOSIS — M79.652: Primary | ICD-10-CM

## 2025-01-30 DIAGNOSIS — Z88.0: ICD-10-CM
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
## 2025-01-30 DIAGNOSIS — Z88.1: ICD-10-CM

## 2025-01-30 DIAGNOSIS — I10: ICD-10-CM

## 2025-01-30 LAB
ALBUMIN SERPL-MCNC: 4.2 G/DL (ref 3.5–5)
ALP SERPL-CCNC: 122 U/L (ref 38–126)
ALT SERPL-CCNC: 18 U/L (ref 4–49)
ANION GAP SERPL CALC-SCNC: 10 MMOL/L
APTT BLD: 22.4 SEC (ref 22–30)
AST SERPL-CCNC: 22 U/L (ref 17–59)
BUN SERPL-SCNC: 17 MG/DL (ref 9–20)
CALCIUM SPEC-MCNC: 9.3 MG/DL (ref 8.4–10.2)
CELLS COUNTED: 100
CHLORIDE SERPL-SCNC: 99 MMOL/L (ref 98–107)
CO2 SERPL-SCNC: 26 MMOL/L (ref 22–30)
EOSINOPHIL # BLD MANUAL: 0.13 K/UL (ref 0–0.7)
ERYTHROCYTE [DISTWIDTH] IN BLOOD BY AUTOMATED COUNT: 3.99 M/UL (ref 4.3–5.9)
ERYTHROCYTE [DISTWIDTH] IN BLOOD: 14 % (ref 11.5–15.5)
GLUCOSE SERPL-MCNC: 189 MG/DL (ref 74–99)
HCT VFR BLD AUTO: 36.6 % (ref 39–53)
HGB BLD-MCNC: 12.8 GM/DL (ref 13–17.5)
INR PPP: 1.1 (ref ?–1.2)
LYMPHOCYTES # BLD MANUAL: 7.79 K/UL (ref 1–4.8)
MCH RBC QN AUTO: 32 PG (ref 25–35)
MCHC RBC AUTO-ENTMCNC: 34.8 G/DL (ref 31–37)
MCV RBC AUTO: 91.8 FL (ref 80–100)
NEUTROPHILS NFR BLD MANUAL: 40 %
NEUTS SEG # BLD MANUAL: 5.28 K/UL (ref 1.3–7.7)
PLATELET # BLD AUTO: 128 K/UL (ref 150–450)
POTASSIUM SERPL-SCNC: 4.2 MMOL/L (ref 3.5–5.1)
PROT SERPL-MCNC: 6.4 G/DL (ref 6.3–8.2)
PT BLD: 11.6 SEC (ref 10–12.5)
SODIUM SERPL-SCNC: 135 MMOL/L (ref 137–145)
WBC # BLD AUTO: 13.2 K/UL (ref 3.8–10.6)

## 2025-01-30 PROCEDURE — 93005 ELECTROCARDIOGRAM TRACING: CPT

## 2025-01-30 PROCEDURE — 72125 CT NECK SPINE W/O DYE: CPT

## 2025-01-30 PROCEDURE — 71046 X-RAY EXAM CHEST 2 VIEWS: CPT

## 2025-01-30 PROCEDURE — 85025 COMPLETE CBC W/AUTO DIFF WBC: CPT

## 2025-01-30 PROCEDURE — 85610 PROTHROMBIN TIME: CPT

## 2025-01-30 PROCEDURE — 85730 THROMBOPLASTIN TIME PARTIAL: CPT

## 2025-01-30 PROCEDURE — 96360 HYDRATION IV INFUSION INIT: CPT

## 2025-01-30 PROCEDURE — 70450 CT HEAD/BRAIN W/O DYE: CPT

## 2025-01-30 PROCEDURE — 36415 COLL VENOUS BLD VENIPUNCTURE: CPT

## 2025-01-30 PROCEDURE — 84484 ASSAY OF TROPONIN QUANT: CPT

## 2025-01-30 PROCEDURE — 99285 EMERGENCY DEPT VISIT HI MDM: CPT

## 2025-01-30 PROCEDURE — 80053 COMPREHEN METABOLIC PANEL: CPT

## 2025-01-30 RX ADMIN — NICARDIPINE HYDROCHLORIDE ONE MLS/HR: 2.5 INJECTION INTRAVENOUS at 16:56

## 2025-01-30 RX ADMIN — HYDROCODONE BITARTRATE AND ACETAMINOPHEN STA EACH: 5; 325 TABLET ORAL at 16:57

## 2025-01-30 NOTE — XR
EXAMINATION TYPE: XR femur LT

 

DATE OF EXAM: 1/30/2025 4:48 PM

 

COMPARISON: None. 

 

CLINICAL INDICATION: Male, 79 years old with history of fall, left thigh pain, pain

 

TECHNIQUE: 2 view(s) obtained.

 

FINDINGS: 

Left femoral head articulates with the acetabulum. Joint space is mildly narrowed. No acute fractures
 or dislocations are evident. Mild degenerative joint changes are at the left knee. Vascular calcific
ation is present. Follow up exams can be performed as clinically indicated.

 

IMPRESSION:

1.  Mild degenerative joint changes.

2. No acute osseous abnormality radiographically apparent.

 

[facility], Workstation: [facility], 1/30/2025 5:06 PM

## 2025-01-30 NOTE — XR
EXAMINATION TYPE: XR chest 2V

 

DATE OF EXAM: 1/30/2025 4:48 PM

 

COMPARISON: None. 

 

CLINICAL INDICATION: Male, 79 years old with history of fall, dizziness,

 

TECHNIQUE: XR chest 2V view(s) obtained.

 

FINDINGS:  

The heart size is normal.  

The pulmonary vasculature is normal.

Mild infiltrates through the left peripheral lung. Correlate for atelectasis or pneumonia..

 

IMPRESSION:  

1. Mild peripheral mid lung infiltrate. Correlate for atelectasis or pneumonia

 

[facility], Workstation: [facility], 1/30/2025 5:07 PM

## 2025-01-30 NOTE — CT
EXAMINATION TYPE: CT brain fawadine wo con

 

DATE OF EXAM: 1/30/2025 4:42 PM

 

COMPARISON: None. 

 

CLINICAL INDICATION: Male, 79 years old with history of Trauma, fall, pain

 

TECHNIQUE: CT of the brain is performed utilizing 3 mm thick sections through the posterior fossa and
 3 mm thick sections through the remaining calvarium.  Study is performed within 24 hours of arrival 
to the hospital.

Contrast used: mL of , (none if empty)

CT DLP: 1354.6 mGycm, Automated exposure control for dose reduction was used.

 

FINDINGS:

No abnormal hyperdensity is present to suggest an acute intracranial hemorrhage.

No mass lesion is evident.

No acute infarcts are evident.  There is some periventricular white matter hypodensity, likely on the
 basis of chronic white matter ischemic changes

Ventricles and sulci are appropriate for the patient age.  

 

Paranasal sinuses and mastoid air cells within the field-of-view are clear. 

 

IMPRESSIONS:

1. No acute intracranial process. Follow-up MRI can be performed as clinically indicated.

 

==============================================================

CT cervical spine.

 

COMPARISON: None

 

TECHNIQUE: CT of the cervical spine is performed in the axial plane at 2 mm thick sections.  Reconstr
ucted images in the coronal, and sagittal plane are reviewed on the computer. 

 

FINDINGS:

No acute fractures are evident.

Vertebral body alignment is normal.

Loss of disc height is present notably at C3-4 and C5-6

Vertebral body heights are preserved.

Endplate spurring is present from the inferior endplate of C5. AP spinal canal stenosis is not presen
t.

Severe foraminal stenosis from uncovertebral hypertrophy and some facet hypertrophy is present at the
 C3-4 level. Right foraminal narrowing is present C4-5 bilateral foraminal narrowing is present C5-6 


 

IMPRESSION:

1. Degenerative disc changes and foraminal stenosis as discussed above.

2. No acute osseous abnormality radiographically apparent

 

[facility], Workstation: [facility], 1/30/2025 5:00 PM

## 2025-01-30 NOTE — ED
General Adult HPI





- General


Chief complaint: Fall


Stated complaint: weakness


Time Seen by Provider: 25 15:35


Source: patient, EMS


Mode of arrival: EMS


Limitations: no limitations





- History of Present Illness


Initial comments: 


Patient is a 79-year-old gentleman history of CAD, diabetes presenting today for

mechanical fall.  Patient states that he was stepping out of his back door when 

his foot got caught in the door jam causing him to slip and fall.  Landed on his

back.  He does not think he hit his head however he now has a headache so 

wonders if he may have.  Does not believe he lost consciousness so is unsure.  

Currently endorses left thigh pain.  Was unable to get up and upon EMS arrival 

he was helped to stand but states that he is very shaky upon standing.  EMS 

reports the patient is had multiple falls recently however patient states that 

he only had 1 fall about 2 weeks ago which was caused by slip on ice.  Patient 

endorses intermittent dizziness that he states is been ongoing ever since he had

retinal surgery about a year ago.  Currently endorses mild dizziness, worsens 

with standing.  He denies any fevers, no chest pain, shortness of breath, 

abdominal pain, vomiting, diarrhea, back pain, neck pain, numbness or weakness 

in his extremities, slurred speech, saddle anesthesia or bladder bowel 

incontinence.  Patient takes Norco at home for pain typically and currently 

endorses left thigh/left hip pain.








- Related Data


                                Home Medications











 Medication  Instructions  Recorded  Confirmed


 


Montelukast Sodium [Singulair] 10 mg PO DAILY 17


 


Fluticasone Nasal Spray [Flonase 2 spr EA NOSTRIL DAILY 21





Nasal Spray]   


 


Atorvastatin [Lipitor] 80 mg PO DAILY 21


 


Omeprazole 40 mg PO DAILY 10/20/21 01/30/25


 


Insulin Aspart Prot/Insuln Asp 15 units SQ HS 22





[Relion NovoLOG Mix 70-30 Vial]   


 


Insulin Aspart Prot/Insuln Asp 25 units SQ DAILY 22





[Relion NovoLOG Mix 70-30 Vial]   


 


Meclizine [Antivert] 25 mg PO TID 22


 


ALPRAZolam [Xanax] 0.25 mg PO DAILY PRN 23


 


HYDROcodone/APAP 5-325MG [Norco 1 tab PO Q6HR 23





5-325]   


 


Baclofen 10 - 20 mg PO DAILY 25


 


Sucralfate [Carafate] 1 gm PO ACHS 25


 


cefuroxime axetiL [Ceftin] 500 mg PO BID 25











                                    Allergies











Allergy/AdvReac Type Severity Reaction Status Date / Time


 


amoxicillin [From Augmentin] Allergy  Rash/Hives Verified 25 18:29


 


clavulanic acid Allergy  Rash/Hives Verified 25 18:29





[From Augmentin]     














Review of Systems


ROS Statement: 


Those systems with pertinent positive or pertinent negative responses have been 

documented in the HPI.





ROS Other: All systems not noted in ROS Statement are negative.





Past Medical History


Past Medical History: Coronary Artery Disease (CAD), Cancer, Diabetes Mellitus, 

GERD/Reflux, Hypertension, Osteoarthritis (OA)


Additional Past Medical History / Comment(s): NIDDM type II, 1999 LEUKEMIA (CLL-

remission), sinus problems, seasonal allergies, tinnitis bilaterally, OA hands, 

R wrist carpal tunnel, past fx with L elbow, R rotator cuff tendon problems, L 

rotator cuff tear, left knee pain


History of Any Multi-Drug Resistant Organisms: None Reported


Past Surgical History: Heart Catheterization, Orthopedic Surgery


Additional Past Surgical History / Comment(s): LEFT HAND thumb tendon repair. L 

knee surgery.


Past Anesthesia/Blood Transfusion Reactions: No Reported Reaction


Past Psychological History: No Psychological Hx Reported


Smoking Status: Never smoker


Past Alcohol Use History: None Reported


Past Drug Use History: None Reported





- Past Family History


  ** Father


Family Medical History: CVA/TIA


Additional Family Medical History / Comment(s): Father  at the age of 85yrs.





  ** Mother


Family Medical History: Diabetes Mellitus


Additional Family Medical History / Comment(s): Mother  at the age of 90yrs.





General Exam





- General Exam Comments


Initial Comments: 





PE:


CONSTITUTIONAL: [no apparent distress, well appearing]


SKIN: [warm, dry, no jaundice, hives or petechiae]


EYES:[ pupils are equally round, extraocular movements intact without nystagmus,

 clear conjunctiva, non-icteric sclera]


HENT: [normocephalic, atraumatic, moist mucus membranes, oropharynx clear 

without exudates]


NECK: , [Full range of motion, normal appearance, no midline spinal TTP]


PULMONARY: [clear to auscultation without wheezes, rhonchi, or rales, normal 

excursion, no accessory muscle use and no stridor]


CARDIOVASCULAR:[ regular rate, rhythm, normal S1 and S2. No appreciated murmurs,

 rubs or gallops. Strong radial pulses with intact distal perfusion. No lower 

extremity edema]


GASTROINTESTINAL: [soft, active bowel sounds throughout, non-tender, non-

distended, no palpable masses, no rebound or guarding. No hepatosplenomegaly]


GENITOURINARY: 


MUSCULOSKELETAL: [Extremities  have no gross deformity, no edema, redness, or 

swelling.  TTP left thigh without gross deformity, able to range through full 

ROM, no midline spinal TTP, exam limited as patient examined in hallway]


NEUROLOGIC: [_a/o x 3, GCS 15, normal mentation and speech. Moves all 

extremities x 4 without motor or sensory deficit]


PSYCHIATRIC:[ _normal mood and affect, thought process is clear and linear]











Limitations: no limitations





Course


                                   Vital Signs











  25





  15:18 17:03 19:51


 


Temperature 98.0 F  98.0 F


 


Pulse Rate 64 52 L 72


 


Respiratory 17 17 18





Rate   


 


Blood Pressure 161/82 148/82 190/90


 


O2 Sat by Pulse 100 98 96





Oximetry   














EKG Findings





- EKG Comments:


EKG Findings:: Sinus bradycardia rate 57 bpm CA interval 176 ms QRS duration 92 

ms QT/QTc 4 9/4 3 ms, left axis deviation, LVH noted, no ST elevations or 

depressions, no STEMICompared to EKG performed on 2024, T wave in V1 

appears taller than EKG noted in prior EKG showed but appears more prominent 

than prior EKG, otherwise no new ST elevations or depressions, transfer text





Medical Decision Making





- Medical Decision Making


Was pt. sent in by a medical professional or institution (, PA, NP, urgent 

care, hospital, or nursing home...) When possible be specific


@ -No


Did you speak to anyone other than the patient for history (EMS, parent, family,

police, friend...)? What history was obtained from this source 


@ -No


Did you review nursing and triage notes (agree or disagree)? Why? 


@ -I reviewed nursing and triage notes








Differential Diagnosis (chest pain, altered mental status, abdominal pain women,

abdominal pain men, vaginal bleeding, weakness, fever, dyspnea, syncope, 

headache, dizziness, GI bleed, back pain, seizure, CVA, palpatations, mental 

health, musculoskeletal)? 





Differential diagnosis remains broad however top considerations include 

contusion, fracture, sprain; In regards to pt's intermittent dizziness, top 

considerations include BPPV, meniere's disease, vertebrobasilar insufficiency, 

cerebellar stroke, ACS, orthostatc hypotension; this is not an all inclusive 

list, of note, pt has negative HINTS exam and no cerebellar signs on neuro exam 

(negative finger to nose, heel to shin) and dizziness appears positional, 

therefor I do not feel CTA indicated at this point








EKG interpreted by me (3pts min.).


@ -As above


X-rays interpreted by me (1pt min.).


@ -Tiny possible infiltrate left midlung field, otherwise no gross 

consolidations,fractures or cardiomegaly On review XR femur, I see no evidence 

of fracture or dislocation 


CT interpreted by me (1pt min.).


Reviewed CT brain CT C-spine, I see no evidence of fracture, hemorrhage or mass 

effect on CT brain, I see no evidence of malalignment or fracture on CT C-spine 

I do see chronic changes on CT C-spine


U/S interpreted by me (1pt. min.).


@ -None done


What testing was considered but not performed or refused? (CT, X-rays, U/S, 

labs)? Why?


@ -None


What meds were considered but not given or refused? Why?


@ -None


Did you discuss the management of the patient with other professionals 

(professionals i.e. , PA, NP, lab, RT, psych nurse, , , 

teacher, , )? Give summary


@ -No


Was smoking cessation discussed for >3mins.?


@ -No


Was critical care preformed (if so, how long)?


@ -No


Were there social determinants of health that impacted care today? How? 

(Homelessness, low income, unemployed, alcoholism, drug addiction, 

transportation, low edu. Level, literacy, decrease access to med. care, nursing home, 

rehab)?


@ -No


Was there de-escalation of care discussed even if they declined (Discuss DNR or 

withdrawal of care, Hospice)? 


@ -No


What co-morbidities impacted this encounter? (DM, HTN, Smoking, COPD, CAD, 

Cancer, CVA, ARF, Chemo, Hep., AIDS, mental health diagnosis, sleep apnea, 

morbid obesity)?


HTN


Was patient admitted / discharged? Hospital course, mention meds given and rou

te, prescriptions, significant lab abnormalities, going to OR and other 

pertinent info.


Discharged-  patient seen and assessed in hallway bed limiting exam.  He is a 

pleasant 79-year-old gentleman presenting today for mechanical slip and fall, 

with resultant left thigh pain  On my assessment patient well-appearing resting 

comfortably no acute distress.  Head is atraumatic, no midline spinal tenderness

to palpation, tenderness with patient of left left thigh, otherwise overall 

reassuring exam.  Discussed with patient plan for CT brain, C-spine given age 

and unsure if head injury;  chest x-ray, x-ray of the left femur, basic labs,  

EKG and home [facility] for pain control.  Patient agreeable plan of care.





Labs and imaging reviewed. Grossly within normal limits. Abnormal values not 

concerning for acute pathology related to presenting complaint.  I I updated 

patient to findings.  Including potential infiltrate versus atelectasis on chest

x-ray, he denies symptoms of pneumonia at this point, I suspect x-ray findings 

secondary to atelectasis.





Patient was hypertensive upon sitting with his orthostatic vital signs, did drop

upon standing however patient states that his symptoms have since improved and 

he feels ready to go home. Per RN he was able to ambulate safely.   He states he

is going to take his home lisinopril upon returning home this evening and  take 

an extra dose tomorrow morning.  His labs do not show signs of endorgan damage 

concerning for hypertensive emergency.  He states he has been in increased 

amount of stress recently as well due to the recent death of his son and his 

dog.  Patient feels comfortable with discharge will be discharged this time. 





In my medical judgment there is currently no evidence of an immediate life-

threatening or surgical condition.  Discharge is therefore indicated at this 

time.  





Discharge treatment instructions, follow up instructions, and appropriate 

emergency department return precautions were discussed with the patient and/or 

medical decision maker. Patient and/or medical decision maker expressed 

understanding of and agreed with the treatment plan, follow up instructions, and

emergency department return precaution. All patient's and/or medical decision 

maker's questions were answered.








Undiagnosed new problem with uncertain prognosis?


@ -No.


Drug Therapy requiring intensive monitoring for toxicity (Heparin, Nitro, 

Insulin, Cardizem)?


@ -No


Were any procedures done?


@ -No


Diagnosis/symptom?


Mechanical trip and fall


Acute, or Chronic, or Acute on Chronic?


@Acute


Uncomplicated (without systemic symptoms) or Complicated (systemic symptoms)?


Uncomplicated


Side effects of treatment?


@ -No


Exacerbation, Progression, or Severe Exacerbation?


@ -No


Poses a threat to life or bodily function? How? (Chest pain, USA, MI, pneumonia,

PE, COPD, DKA, ARF, appy, cholecystitis, CVA, Diverticulitis, Homicidal, 

Suicidal, threat to staff... and all critical care pts)


@ -No








- Lab Data


Result diagrams: 


                                 25 17:00





                                 25 17:00


                                   Lab Results











  25 Range/Units





  17:00 17:00 17:00 


 


WBC  13.2 H    (3.8-10.6)  k/uL


 


RBC  3.99 L    (4.30-5.90)  m/uL


 


Hgb  12.8 L    (13.0-17.5)  gm/dL


 


Hct  36.6 L    (39.0-53.0)  %


 


MCV  91.8    (80.0-100.0)  fL


 


MCH  32.0    (25.0-35.0)  pg


 


MCHC  34.8    (31.0-37.0)  g/dL


 


RDW  14.0    (11.5-15.5)  %


 


Plt Count  128 L    (150-450)  k/uL


 


MPV  7.8    


 


Neutrophils % (Manual)  40    %


 


Lymphocytes % (Manual)  59    %


 


Eosinophils % (Manual)  1    %


 


Neutrophils # (Manual)  5.28    (1.3-7.7)  k/uL


 


Lymphocytes # (Manual)  7.79 H    (1.0-4.8)  k/uL


 


Eosinophils # (Manual)  0.13    (0-0.7)  k/uL


 


Nucleated RBCs  0    (0-0)  /100 WBC


 


Manual Slide Review  Performed    


 


RBC Morphology  Normal    


 


PT   11.6   (10.0-12.5)  sec


 


INR   1.1   (<1.2)  


 


APTT   22.4   (22.0-30.0)  sec


 


Sodium    135 L  (137-145)  mmol/L


 


Potassium    4.2  (3.5-5.1)  mmol/L


 


Chloride    99  ()  mmol/L


 


Carbon Dioxide    26  (22-30)  mmol/L


 


Anion Gap    10  mmol/L


 


BUN    17  (9-20)  mg/dL


 


Creatinine    0.91  (0.66-1.25)  mg/dL


 


Est GFR (CKD-EPI)AfAm    >90  (>60 ml/min/1.73 sqM)  


 


Est GFR (CKD-EPI)NonAf    81  (>60 ml/min/1.73 sqM)  


 


Glucose    189 H  (74-99)  mg/dL


 


Calcium    9.3  (8.4-10.2)  mg/dL


 


Total Bilirubin    1.0  (0.2-1.3)  mg/dL


 


AST    22  (17-59)  U/L


 


ALT    18  (4-49)  U/L


 


Alkaline Phosphatase    122  ()  U/L


 


Troponin I     (0.000-0.034)  ng/mL


 


Total Protein    6.4  (6.3-8.2)  g/dL


 


Albumin    4.2  (3.5-5.0)  g/dL














  25 Range/Units





  17:00 


 


WBC   (3.8-10.6)  k/uL


 


RBC   (4.30-5.90)  m/uL


 


Hgb   (13.0-17.5)  gm/dL


 


Hct   (39.0-53.0)  %


 


MCV   (80.0-100.0)  fL


 


MCH   (25.0-35.0)  pg


 


MCHC   (31.0-37.0)  g/dL


 


RDW   (11.5-15.5)  %


 


Plt Count   (150-450)  k/uL


 


MPV   


 


Neutrophils % (Manual)   %


 


Lymphocytes % (Manual)   %


 


Eosinophils % (Manual)   %


 


Neutrophils # (Manual)   (1.3-7.7)  k/uL


 


Lymphocytes # (Manual)   (1.0-4.8)  k/uL


 


Eosinophils # (Manual)   (0-0.7)  k/uL


 


Nucleated RBCs   (0-0)  /100 WBC


 


Manual Slide Review   


 


RBC Morphology   


 


PT   (10.0-12.5)  sec


 


INR   (<1.2)  


 


APTT   (22.0-30.0)  sec


 


Sodium   (137-145)  mmol/L


 


Potassium   (3.5-5.1)  mmol/L


 


Chloride   ()  mmol/L


 


Carbon Dioxide   (22-30)  mmol/L


 


Anion Gap   mmol/L


 


BUN   (9-20)  mg/dL


 


Creatinine   (0.66-1.25)  mg/dL


 


Est GFR (CKD-EPI)AfAm   (>60 ml/min/1.73 sqM)  


 


Est GFR (CKD-EPI)NonAf   (>60 ml/min/1.73 sqM)  


 


Glucose   (74-99)  mg/dL


 


Calcium   (8.4-10.2)  mg/dL


 


Total Bilirubin   (0.2-1.3)  mg/dL


 


AST   (17-59)  U/L


 


ALT   (4-49)  U/L


 


Alkaline Phosphatase   ()  U/L


 


Troponin I  <0.012  (0.000-0.034)  ng/mL


 


Total Protein   (6.3-8.2)  g/dL


 


Albumin   (3.5-5.0)  g/dL














Disposition


Clinical Impression: 


 Fall, Hypertension





Disposition: HOME SELF-CARE


Instructions (If sedation given, give patient instructions):  Fall Prevention 

(ED)


Additional Instructions: 


Every disease is a spectrum and a small chance still exists that a serious 

condition could develop, for this reason, please monitor yourself closely for 

new, changing or worsening symptoms, changes in vision, numbness, weakness 

slurred speech or other strokelike symptoms, chest pain, shortness of breath 

swelling in your legs, fever, inability to tolerate/keep down fluids or your 

medications, inability to follow up with outpatient providers as instructed and 

should you experience these symptoms or should you have any further concerns for

your wellbeing please return to the ED or call 911 immediately. 





PLEASE call your primary care physician as soon as possible to arrange / discuss

plan for followup appointment. Appointment in the next 1-3 days is strongly 

encouraged if possible. 


PLEASE let us know here before you leave if there is anything further we can do 

to be of any assistance.


Take care and feel Better!





Is patient prescribed a controlled substance at d/c from ED?: No


Referrals: 


Smith Figueroa DO [Primary Care Provider] - 1-2 days

## 2025-02-13 ENCOUNTER — HOSPITAL ENCOUNTER (INPATIENT)
Dept: HOSPITAL 47 - EC | Age: 80
LOS: 14 days | Discharge: NURSING, REHAB OR LONG-TERM CARE | DRG: 871 | End: 2025-02-27
Attending: HOSPITALIST | Admitting: HOSPITALIST
Payer: MEDICARE

## 2025-02-13 VITALS — BODY MASS INDEX: 26.1 KG/M2

## 2025-02-13 DIAGNOSIS — Z79.899: ICD-10-CM

## 2025-02-13 DIAGNOSIS — G93.41: ICD-10-CM

## 2025-02-13 DIAGNOSIS — I21.A1: ICD-10-CM

## 2025-02-13 DIAGNOSIS — I50.9: ICD-10-CM

## 2025-02-13 DIAGNOSIS — M19.042: ICD-10-CM

## 2025-02-13 DIAGNOSIS — L98.492: ICD-10-CM

## 2025-02-13 DIAGNOSIS — D69.6: ICD-10-CM

## 2025-02-13 DIAGNOSIS — Z88.1: ICD-10-CM

## 2025-02-13 DIAGNOSIS — E87.8: ICD-10-CM

## 2025-02-13 DIAGNOSIS — F11.90: ICD-10-CM

## 2025-02-13 DIAGNOSIS — K80.20: ICD-10-CM
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
## 2025-02-13 DIAGNOSIS — I08.2: ICD-10-CM

## 2025-02-13 DIAGNOSIS — E78.5: ICD-10-CM

## 2025-02-13 DIAGNOSIS — C91.11: ICD-10-CM

## 2025-02-13 DIAGNOSIS — L97.512: ICD-10-CM

## 2025-02-13 DIAGNOSIS — A41.9: Primary | ICD-10-CM

## 2025-02-13 DIAGNOSIS — M19.041: ICD-10-CM

## 2025-02-13 DIAGNOSIS — Z79.82: ICD-10-CM

## 2025-02-13 DIAGNOSIS — T38.0X5A: ICD-10-CM

## 2025-02-13 DIAGNOSIS — E87.3: ICD-10-CM

## 2025-02-13 DIAGNOSIS — E11.65: ICD-10-CM

## 2025-02-13 DIAGNOSIS — E11.621: ICD-10-CM

## 2025-02-13 DIAGNOSIS — Z79.84: ICD-10-CM

## 2025-02-13 DIAGNOSIS — J18.9: ICD-10-CM

## 2025-02-13 DIAGNOSIS — D64.9: ICD-10-CM

## 2025-02-13 DIAGNOSIS — I25.10: ICD-10-CM

## 2025-02-13 DIAGNOSIS — J96.01: ICD-10-CM

## 2025-02-13 DIAGNOSIS — Z79.02: ICD-10-CM

## 2025-02-13 DIAGNOSIS — E87.6: ICD-10-CM

## 2025-02-13 DIAGNOSIS — Z88.8: ICD-10-CM

## 2025-02-13 DIAGNOSIS — N17.9: ICD-10-CM

## 2025-02-13 DIAGNOSIS — Z79.4: ICD-10-CM

## 2025-02-13 DIAGNOSIS — Z11.52: ICD-10-CM

## 2025-02-13 DIAGNOSIS — F05: ICD-10-CM

## 2025-02-13 DIAGNOSIS — Z83.3: ICD-10-CM

## 2025-02-13 DIAGNOSIS — I11.0: ICD-10-CM

## 2025-02-13 LAB
ALBUMIN SERPL-MCNC: 3.8 G/DL (ref 3.5–5)
ALP SERPL-CCNC: 132 U/L (ref 38–126)
ALT SERPL-CCNC: 21 U/L (ref 4–49)
ANION GAP SERPL CALC-SCNC: 13 MMOL/L
APTT BLD: 22.4 SEC (ref 22–30)
AST SERPL-CCNC: 35 U/L (ref 17–59)
BASOPHILS # BLD MANUAL: 0.13 K/UL (ref 0–0.2)
BUN SERPL-SCNC: 15 MG/DL (ref 9–20)
CALCIUM SPEC-MCNC: 8.9 MG/DL (ref 8.4–10.2)
CELLS COUNTED: 100
CHLORIDE SERPL-SCNC: 99 MMOL/L (ref 98–107)
CO2 SERPL-SCNC: 25 MMOL/L (ref 22–30)
EOSINOPHIL # BLD MANUAL: 0.13 K/UL (ref 0–0.7)
ERYTHROCYTE [DISTWIDTH] IN BLOOD BY AUTOMATED COUNT: 3.69 M/UL (ref 4.3–5.9)
ERYTHROCYTE [DISTWIDTH] IN BLOOD: 14.4 % (ref 11.5–15.5)
GLUCOSE BLD-MCNC: 124 MG/DL (ref 70–110)
GLUCOSE SERPL-MCNC: 121 MG/DL (ref 74–99)
HCT VFR BLD AUTO: 32.9 % (ref 39–53)
HGB BLD-MCNC: 11.7 GM/DL (ref 13–17.5)
INR PPP: 1.1 (ref ?–1.2)
LYMPHOCYTES # BLD MANUAL: 5.02 K/UL (ref 1–4.8)
MCH RBC QN AUTO: 31.7 PG (ref 25–35)
MCHC RBC AUTO-ENTMCNC: 35.5 G/DL (ref 31–37)
MCV RBC AUTO: 89.2 FL (ref 80–100)
MONOCYTES # BLD MANUAL: 0.4 K/UL (ref 0–1)
NEUTROPHILS NFR BLD MANUAL: 55 %
NEUTS SEG # BLD MANUAL: 7.5 K/UL (ref 1.3–7.7)
NT-PROBNP SERPL-MCNC: 631 PG/ML
PH UR: 7 [PH] (ref 5–8)
PLATELET # BLD AUTO: 137 K/UL (ref 150–450)
POTASSIUM SERPL-SCNC: 3.2 MMOL/L (ref 3.5–5.1)
PROT SERPL-MCNC: 6.1 G/DL (ref 6.3–8.2)
PROT UR QL: (no result)
PT BLD: 12.1 SEC (ref 10–12.5)
RBC UR QL: 6 /HPF (ref 0–5)
SODIUM SERPL-SCNC: 137 MMOL/L (ref 137–145)
SP GR UR: 1.01 (ref 1–1.03)
UROBILINOGEN UR QL STRIP: <2 MG/DL (ref ?–2)
WBC # BLD AUTO: 13.2 K/UL (ref 3.8–10.6)
WBC #/AREA URNS HPF: 1 /HPF (ref 0–5)

## 2025-02-13 PROCEDURE — 84145 PROCALCITONIN (PCT): CPT

## 2025-02-13 PROCEDURE — 82805 BLOOD GASES W/O2 SATURATION: CPT

## 2025-02-13 PROCEDURE — 96361 HYDRATE IV INFUSION ADD-ON: CPT

## 2025-02-13 PROCEDURE — 86431 RHEUMATOID FACTOR QUANT: CPT

## 2025-02-13 PROCEDURE — 51702 INSERT TEMP BLADDER CATH: CPT

## 2025-02-13 PROCEDURE — 86255 FLUORESCENT ANTIBODY SCREEN: CPT

## 2025-02-13 PROCEDURE — 70450 CT HEAD/BRAIN W/O DYE: CPT

## 2025-02-13 PROCEDURE — 87040 BLOOD CULTURE FOR BACTERIA: CPT

## 2025-02-13 PROCEDURE — 99291 CRITICAL CARE FIRST HOUR: CPT

## 2025-02-13 PROCEDURE — 94640 AIRWAY INHALATION TREATMENT: CPT

## 2025-02-13 PROCEDURE — 87070 CULTURE OTHR SPECIMN AEROBIC: CPT

## 2025-02-13 PROCEDURE — 85025 COMPLETE CBC W/AUTO DIFF WBC: CPT

## 2025-02-13 PROCEDURE — 96375 TX/PRO/DX INJ NEW DRUG ADDON: CPT

## 2025-02-13 PROCEDURE — 80320 DRUG SCREEN QUANTALCOHOLS: CPT

## 2025-02-13 PROCEDURE — 86038 ANTINUCLEAR ANTIBODIES: CPT

## 2025-02-13 PROCEDURE — 96365 THER/PROPH/DIAG IV INF INIT: CPT

## 2025-02-13 PROCEDURE — 84484 ASSAY OF TROPONIN QUANT: CPT

## 2025-02-13 PROCEDURE — 80048 BASIC METABOLIC PNL TOTAL CA: CPT

## 2025-02-13 PROCEDURE — 96366 THER/PROPH/DIAG IV INF ADDON: CPT

## 2025-02-13 PROCEDURE — 94760 N-INVAS EAR/PLS OXIMETRY 1: CPT

## 2025-02-13 PROCEDURE — 96372 THER/PROPH/DIAG INJ SC/IM: CPT

## 2025-02-13 PROCEDURE — 96368 THER/DIAG CONCURRENT INF: CPT

## 2025-02-13 PROCEDURE — 93005 ELECTROCARDIOGRAM TRACING: CPT

## 2025-02-13 PROCEDURE — 96367 TX/PROPH/DG ADDL SEQ IV INF: CPT

## 2025-02-13 PROCEDURE — 76770 US EXAM ABDO BACK WALL COMP: CPT

## 2025-02-13 PROCEDURE — 36600 WITHDRAWAL OF ARTERIAL BLOOD: CPT

## 2025-02-13 PROCEDURE — 82565 ASSAY OF CREATININE: CPT

## 2025-02-13 PROCEDURE — 93922 UPR/L XTREMITY ART 2 LEVELS: CPT

## 2025-02-13 PROCEDURE — 87636 SARSCOV2 & INF A&B AMP PRB: CPT

## 2025-02-13 PROCEDURE — 36415 COLL VENOUS BLD VENIPUNCTURE: CPT

## 2025-02-13 PROCEDURE — 93306 TTE W/DOPPLER COMPLETE: CPT

## 2025-02-13 PROCEDURE — 86235 NUCLEAR ANTIGEN ANTIBODY: CPT

## 2025-02-13 PROCEDURE — 71046 X-RAY EXAM CHEST 2 VIEWS: CPT

## 2025-02-13 PROCEDURE — 80053 COMPREHEN METABOLIC PANEL: CPT

## 2025-02-13 PROCEDURE — 85730 THROMBOPLASTIN TIME PARTIAL: CPT

## 2025-02-13 PROCEDURE — 83735 ASSAY OF MAGNESIUM: CPT

## 2025-02-13 PROCEDURE — 83880 ASSAY OF NATRIURETIC PEPTIDE: CPT

## 2025-02-13 PROCEDURE — 81001 URINALYSIS AUTO W/SCOPE: CPT

## 2025-02-13 PROCEDURE — 87449 NOS EACH ORGANISM AG IA: CPT

## 2025-02-13 PROCEDURE — 71045 X-RAY EXAM CHEST 1 VIEW: CPT

## 2025-02-13 PROCEDURE — 83036 HEMOGLOBIN GLYCOSYLATED A1C: CPT

## 2025-02-13 PROCEDURE — 85610 PROTHROMBIN TIME: CPT

## 2025-02-13 PROCEDURE — 51798 US URINE CAPACITY MEASURE: CPT

## 2025-02-13 PROCEDURE — 80306 DRUG TEST PRSMV INSTRMNT: CPT

## 2025-02-13 PROCEDURE — 71275 CT ANGIOGRAPHY CHEST: CPT

## 2025-02-13 PROCEDURE — 83605 ASSAY OF LACTIC ACID: CPT

## 2025-02-13 PROCEDURE — 82272 OCCULT BLD FECES 1-3 TESTS: CPT

## 2025-02-13 RX ADMIN — AZITHROMYCIN MONOHYDRATE STA MLS/HR: 500 INJECTION, POWDER, LYOPHILIZED, FOR SOLUTION INTRAVENOUS at 23:04

## 2025-02-13 RX ADMIN — NICARDIPINE HYDROCHLORIDE ONE MLS/HR: 2.5 INJECTION INTRAVENOUS at 18:55

## 2025-02-13 NOTE — ED
General Adult HPI





- General


Chief complaint: Nausea/Vomiting/Diarrhea


Stated complaint: altered mental status


Time Seen by Provider: 25 18:35


Source: EMS


Mode of arrival: EMS


Limitations: no limitations





- History of Present Illness


Initial comments: 





79-year-old male with past medical history of coronary artery disease, diabetes,

hypertension who presents emergency department after an episode of altered 

mental status.  EMS arrived to the house to find the patient confused.  He was 

only oriented to self.  Patient ended up having an episode of vomiting.  He 

denied having any chest pain.  Patient was hypoxic in the low 80s.  Patient pl

aced on oxygen and began improving upon his commute into the hospital.  Patient 

arrives alert and oriented x 3.  Does admit to feeling shortness of breath.  No 

abdominal pain.  Patient denies any additional symptoms to include headache, 

visual disturbance, lateralizing weakness.  Patient does not remember the events

of what happened at the house today. no other alleviating, precipitating or 

modifying factors





- Related Data


                                Home Medications











 Medication  Instructions  Recorded  Confirmed


 


Montelukast Sodium [Singulair] 10 mg PO DAILY 17


 


Fluticasone Nasal Spray [Flonase 2 spr EA NOSTRIL DAILY 21





Nasal Spray]   


 


Atorvastatin [Lipitor] 80 mg PO DAILY 21


 


Omeprazole 40 mg PO DAILY 10/20/21 02/13/25


 


Meclizine [Antivert] 25 mg PO TID 22


 


ALPRAZolam [Xanax] 0.25 mg PO DAILY PRN 23


 


HYDROcodone/APAP 5-325MG [Norco 1 tab PO Q6HR 23





5-325]   


 


Baclofen 10 - 20 mg PO DAILY 25


 


Sucralfate [Carafate] 1 gm PO ACHS 25


 


cefuroxime axetiL [Ceftin] 500 mg PO BID 25


 


Insulin NPH Hum/Reg Insulin Hm 15 unit SQ HS 25





[NovoLIN 70-30 100 Unit/ml Vial]   


 


Insulin NPH Hum/Reg Insulin Hm 25 unit SQ DAILY 25





[NovoLIN 70-30 100 Unit/ml Vial]   


 


lisinopriL [Zestril] 20 mg PO BID 25











                                    Allergies











Allergy/AdvReac Type Severity Reaction Status Date / Time


 


amoxicillin [From Augmentin] Allergy  Rash/Hives Verified 25 20:14


 


clavulanic acid Allergy  Rash/Hives Verified 25 20:14





[From Augmentin]     














Review of Systems


ROS Statement: 


Those systems with pertinent positive or pertinent negative responses have been 

documented in the HPI.





ROS Other: All systems not noted in ROS Statement are negative.





Past Medical History


Past Medical History: Coronary Artery Disease (CAD), Cancer, Diabetes Mellitus, 

GERD/Reflux, Hypertension, Osteoarthritis (OA)


Additional Past Medical History / Comment(s): NIDDM type II, 1999 LEUKEMIA (CLL-

remission), sinus problems, seasonal allergies, tinnitis bilaterally, OA hands, 

R wrist carpal tunnel, past fx with L elbow, R rotator cuff tendon problems, L 

rotator cuff tear, left knee pain


History of Any Multi-Drug Resistant Organisms: None Reported


Past Surgical History: Heart Catheterization, Orthopedic Surgery


Additional Past Surgical History / Comment(s): LEFT HAND thumb tendon repair. L 

knee surgery.


Past Anesthesia/Blood Transfusion Reactions: No Reported Reaction


Past Psychological History: No Psychological Hx Reported


Smoking Status: Never smoker


Past Alcohol Use History: None Reported


Past Drug Use History: None Reported





- Past Family History


  ** Father


Family Medical History: CVA/TIA


Additional Family Medical History / Comment(s): Father  at the age of 85yrs.





  ** Mother


Family Medical History: Diabetes Mellitus


Additional Family Medical History / Comment(s): Mother  at the age of 90yrs.





General Exam


Limitations: no limitations


General appearance: alert, in no apparent distress


Head exam: Present: atraumatic, normocephalic, normal inspection


Eye exam: Present: normal appearance, PERRL, EOMI.  Absent: scleral icterus, 

conjunctival injection, periorbital swelling


ENT exam: Present: normal exam, mucous membranes moist


Neck exam: Present: normal inspection.  Absent: tenderness, meningismus, 

lymphadenopathy


Respiratory exam: Present: decreased breath sounds.  Absent: respiratory 

distress, wheezes, rales, rhonchi, stridor


Cardiovascular Exam: Present: normal rhythm, tachycardia, normal heart sounds.  

Absent: systolic murmur, diastolic murmur, rubs, gallop, clicks


GI/Abdominal exam: Present: soft, normal bowel sounds.  Absent: distended, 

tenderness, guarding, rebound, rigid


Extremities exam: Present: normal inspection, full ROM, normal capillary refill.

 Absent: tenderness, pedal edema, joint swelling, calf tenderness


Back exam: Present: normal inspection


Neurological exam: Present: alert, oriented X3, CN II-XII intact


Psychiatric exam: Present: normal affect, normal mood


Skin exam: Present: warm, dry, intact, normal color.  Absent: rash





Course


                                   Vital Signs











  25





  18:32 19:35 22:56


 


Temperature 99.5 F  


 


Pulse Rate 113 H 112 H 73


 


Respiratory 20 18 16





Rate   


 


Blood Pressure 148/75 117/79 111/71


 


O2 Sat by Pulse 86 L 95 97





Oximetry   














  25





  00:00 02:12 03:25


 


Temperature   


 


Pulse Rate 81 61 67


 


Respiratory 16 16 16





Rate   


 


Blood Pressure 139/77 146/67 145/67


 


O2 Sat by Pulse 96 96 98





Oximetry   














  25





  05:26 08:00 08:27


 


Temperature 98.7 F  97.9 F


 


Pulse Rate 59 L  76


 


Respiratory 16  18





Rate   


 


Blood Pressure 147/72  136/76


 


O2 Sat by Pulse 100 95 94 L





Oximetry   














  25





  10:00 11:19 13:09


 


Temperature   


 


Pulse Rate 90 93 102 H


 


Respiratory 18 18 22





Rate   


 


Blood Pressure 154/65 130/84 


 


O2 Sat by Pulse 93 L 89 L 94 L





Oximetry   














  25





  13:33 14:39 14:50


 


Temperature   


 


Pulse Rate 110 H 104 H 84


 


Respiratory 22 18 18





Rate   


 


Blood Pressure 174/94 144/95 165/75


 


O2 Sat by Pulse 93 L 94 L 98





Oximetry   














  25





  15:48 16:54 16:55


 


Temperature   99.2 F


 


Pulse Rate 73 65 71


 


Respiratory 18 18 





Rate   


 


Blood Pressure  156/70 


 


O2 Sat by Pulse 96 94 L 





Oximetry   














Medical Decision Making





- Medical Decision Making





Was pt. sent in by a medical professional or institution (, PA, NP, urgent 

care, hospital, or nursing home...) When possible be specific


@  -No


Did you speak to anyone other than the patient for history (EMS, parent, family,

police, friend...)? What history was obtained from this source 


@  -Spoke with EMS for history


Did you review nursing and triage notes (agree or disagree)?  Why? 


@  -I reviewed and agree with nursing and triage notes


Were old charts reviewed (outside hosp., previous admission, EMS record, old 

EKG, old radiological studies, urgent care reports/EKG's, nursing home records)?

Report findings 


@  -No old charts were reviewed


Differential Diagnosis (chest pain, altered mental status, abdominal pain women,

abdominal pain men, vaginal bleeding, weakness, fever, dyspnea, syncope, 

headache, dizziness, GI bleed, back pain, seizure, CVA, palpatations, mental 

health, musculoskeletal)? 


@  -Differential Altered Mental Status:


Hypoglycemia, DKA, hypercapnia, ETOH, overdose, CO poisoning, trauma, myxedema 

coma, HTN encephalopathy, infection, encephalitis, psychosis, intercranial 

hemorrhage, hepatic encephalopathy, meningitis, CVA, this is not meant to be an 

all-inclusive list





EKG interpreted by me (3pts min.).


@  -Yes and demonstrates sinus tachycardia with a rate of 118.  KS interval 182.

 .  QTc of 384.  No acute ST segment elevation.


X-rays interpreted by me (1pt min.).


@  -Yes which demonstrates no acute process


CT interpreted by me (1pt min.).


@  -Yes which demonstrates multifocal pneumonia


U/S interpreted by me (1pt. min.).


@  -None done


What testing was considered but not performed or refused? (CT, X-rays, U/S, 

labs)? Why?


@  -None


What meds were considered but not given or refused? Why?


@  -None


Did you discuss the management of the patient with other professionals 

(professionals i.e. , PA, NP, lab, RT, psych nurse, , , 

teacher, , )? Give summary


@  -Spoke with Dr. Baez for the admission


Was smoking cessation discussed for >3mins.?


@  -No


Was critical care preformed (if so, how long)?


@  -Yes, 35 minutes for hypoxic respiratory failure


Were there social determinants of health that impacted care today? How? 

(Homelessness, low income, unemployed, alcoholism, drug addiction, 

transportation, low edu. Level, literacy, decrease access to med. care, [facility], 

rehab)?


@  -No


Was there de-escalation of care discussed even if they declined (Discuss DNR or 

withdrawal of care, Hospice)? DNR status


@  -No


What co-morbidities impacted this encounter? (DM, HTN, Smoking, COPD, CAD, 

Cancer, CVA, ARF, Chemo, Hep., AIDS, mental health diagnosis, sleep apnea, 

morbid obesity)?


@  -Diabetes mellitus


Was patient admitted / discharged? Hospital course, mention meds given and rou

te, prescriptions, significant lab abnormalities, going to OR and other 

pertinent info.


@  -Upon arrival patient seen and evaluated in trauma 2.  Patient placed on 

continuous pulse ox and cardiac monitoring.  Twelve-lead EKG is obtained.  

Laboratory studies are conducted.  Patient is alert and oriented x 3 at this 

time.  No confusion.  NIH is 0.  Patient does go for chest x-ray.  CT of the 

brain and CT of the chest are performed.  Patient does have identification of 

multifocal pneumonia.  He is initiated on antibiotics.  He remains on oxygen.  I

recommended admission due to his initial confused state, multifocal pneumonia 

with hypoxia.  Patient was agreeable to admission.  Spoke with Dr. Baez for the 

admission


Undiagnosed new problem with uncertain prognosis?


@  -No


Drug Therapy requiring intensive monitoring for toxicity (Heparin, Nitro, 

Insulin, Cardizem)?


@  -No


Were any procedures done?


@  -No


Diagnosis/symptom?


@  -Acute transient encephalopathy, acute hypoxia, multifocal pneumonia


Acute, or Chronic, or Acute on Chronic?


@  -Acute


Uncomplicated (without systemic symptoms) or Complicated (systemic symptoms)?


@  -Complicated


Side effects of treatment?


@  -No


Exacerbation, Progression, or Severe Exacerbation?


@  -No


Poses a threat to life or bodily function? How? (Chest pain, USA, MI, pneumonia,

PE, COPD, DKA, ARF, appy, cholecystitis, CVA, Diverticulitis, Homicidal, 

Suicidal, threat to staff... and all critical care pts)


@  -Yes this patient was significantly altered on scene








- Lab Data


Result diagrams: 


                                 02/15/25 06:52





                                 02/15/25 06:52


                                   Lab Results











  25 Range/Units





  18:54 18:54 18:54 


 


WBC  13.2 H    (3.8-10.6)  k/uL


 


RBC  3.69 L    (4.30-5.90)  m/uL


 


Hgb  11.7 L    (13.0-17.5)  gm/dL


 


Hct  32.9 L    (39.0-53.0)  %


 


MCV  89.2    (80.0-100.0)  fL


 


MCH  31.7    (25.0-35.0)  pg


 


MCHC  35.5    (31.0-37.0)  g/dL


 


RDW  14.4    (11.5-15.5)  %


 


Plt Count  137 L    (150-450)  k/uL


 


MPV  8.2    


 


Neutrophils % (Manual)  55    %


 


Band Neuts % (Manual)  2    %


 


Lymphocytes % (Manual)  38    %


 


Monocytes % (Manual)  3    %


 


Eosinophils % (Manual)  1    %


 


Basophils % (Manual)  1    %


 


Neutrophils # (Manual)  7.50    (1.3-7.7)  k/uL


 


Lymphocytes # (Manual)  5.02 H    (1.0-4.8)  k/uL


 


Monocytes # (Manual)  0.40    (0-1.0)  k/uL


 


Eosinophils # (Manual)  0.13    (0-0.7)  k/uL


 


Basophils # (Manual)  0.13    (0-0.2)  k/uL


 


Nucleated RBCs  0    (0-0)  /100 WBC


 


Manual Slide Review  Performed    


 


PT   12.1   (10.0-12.5)  sec


 


INR   1.1   (<1.2)  


 


APTT   22.4   (22.0-30.0)  sec


 


Sodium     (137-145)  mmol/L


 


Potassium     (3.5-5.1)  mmol/L


 


Chloride     ()  mmol/L


 


Carbon Dioxide     (22-30)  mmol/L


 


Anion Gap     mmol/L


 


BUN     (9-20)  mg/dL


 


Creatinine     (0.66-1.25)  mg/dL


 


Est GFR (CKD-EPI)AfAm     (>60 ml/min/1.73 sqM)  


 


Est GFR (CKD-EPI)NonAf     (>60 ml/min/1.73 sqM)  


 


Glucose     (74-99)  mg/dL


 


POC Glucose (mg/dL)     ()  mg/dL


 


POC Glu Operater ID     


 


Plasma Lactic Acid Robert     (0.7-2.0)  mmol/L


 


Calcium     (8.4-10.2)  mg/dL


 


Total Bilirubin     (0.2-1.3)  mg/dL


 


AST     (17-59)  U/L


 


ALT     (4-49)  U/L


 


Alkaline Phosphatase     ()  U/L


 


Troponin I     (0.000-0.034)  ng/mL


 


NT-Pro-B Natriuret Pep     pg/mL


 


Total Protein     (6.3-8.2)  g/dL


 


Albumin     (3.5-5.0)  g/dL


 


Urine Color     


 


Urine Appearance     (Clear)  


 


Urine pH     (5.0-8.0)  


 


Ur Specific Gravity     (1.001-1.035)  


 


Urine Protein     (Negative)  


 


Urine Glucose (UA)     (Negative)  


 


Urine Ketones     (Negative)  


 


Urine Blood     (Negative)  


 


Urine Nitrite     (Negative)  


 


Urine Bilirubin     (Negative)  


 


Urine Urobilinogen     (<2.0)  mg/dL


 


Ur Leukocyte Esterase     (Negative)  


 


Urine RBC     (0-5)  /hpf


 


Urine WBC     (0-5)  /hpf


 


Urine Opiates Screen    Detected H  (NotDetected)  


 


Ur Oxycodone Screen    Not Detected  (NotDetected)  


 


Urine Methadone Screen    Not Detected  (NotDetected)  


 


Ur Barbiturates Screen    Not Detected  (NotDetected)  


 


U Tricyclic Antidepress    Not Detected  (NotDetected)  


 


Ur Phencyclidine Scrn    Not Detected  (NotDetected)  


 


Ur Amphetamines Screen    Not Detected  (NotDetected)  


 


U Methamphetamines Scrn    Not Detected  (NotDetected)  


 


U Benzodiazepines Scrn    Not Detected  (NotDetected)  


 


Urine Cocaine Screen    Not Detected  (NotDetected)  


 


U Marijuana (THC) Screen    Not Detected  (NotDetected)  


 


Serum Alcohol     mg/dL


 


Influenza Type A (PCR)     (Not Detectd)  


 


Influenza Type B (PCR)     (Not Detectd)  


 


RSV (PCR)     (Not Detectd)  


 


SARS-CoV-2 (PCR)     (Not Detectd)  














  25 Range/Units





  18:54 18:54 18:55 


 


WBC     (3.8-10.6)  k/uL


 


RBC     (4.30-5.90)  m/uL


 


Hgb     (13.0-17.5)  gm/dL


 


Hct     (39.0-53.0)  %


 


MCV     (80.0-100.0)  fL


 


MCH     (25.0-35.0)  pg


 


MCHC     (31.0-37.0)  g/dL


 


RDW     (11.5-15.5)  %


 


Plt Count     (150-450)  k/uL


 


MPV     


 


Neutrophils % (Manual)     %


 


Band Neuts % (Manual)     %


 


Lymphocytes % (Manual)     %


 


Monocytes % (Manual)     %


 


Eosinophils % (Manual)     %


 


Basophils % (Manual)     %


 


Neutrophils # (Manual)     (1.3-7.7)  k/uL


 


Lymphocytes # (Manual)     (1.0-4.8)  k/uL


 


Monocytes # (Manual)     (0-1.0)  k/uL


 


Eosinophils # (Manual)     (0-0.7)  k/uL


 


Basophils # (Manual)     (0-0.2)  k/uL


 


Nucleated RBCs     (0-0)  /100 WBC


 


Manual Slide Review     


 


PT     (10.0-12.5)  sec


 


INR     (<1.2)  


 


APTT     (22.0-30.0)  sec


 


Sodium  137    (137-145)  mmol/L


 


Potassium  3.2 L    (3.5-5.1)  mmol/L


 


Chloride  99    ()  mmol/L


 


Carbon Dioxide  25    (22-30)  mmol/L


 


Anion Gap  13    mmol/L


 


BUN  15    (9-20)  mg/dL


 


Creatinine  0.90    (0.66-1.25)  mg/dL


 


Est GFR (CKD-EPI)AfAm  >90    (>60 ml/min/1.73 sqM)  


 


Est GFR (CKD-EPI)NonAf  82    (>60 ml/min/1.73 sqM)  


 


Glucose  121 H    (74-99)  mg/dL


 


POC Glucose (mg/dL)     ()  mg/dL


 


POC Glu Operater ID     


 


Plasma Lactic Acid Robert     (0.7-2.0)  mmol/L


 


Calcium  8.9    (8.4-10.2)  mg/dL


 


Total Bilirubin  1.5 H    (0.2-1.3)  mg/dL


 


AST  35    (17-59)  U/L


 


ALT  21    (4-49)  U/L


 


Alkaline Phosphatase  132 H    ()  U/L


 


Troponin I   0.043 H*   (0.000-0.034)  ng/mL


 


NT-Pro-B Natriuret Pep  631    pg/mL


 


Total Protein  6.1 L    (6.3-8.2)  g/dL


 


Albumin  3.8    (3.5-5.0)  g/dL


 


Urine Color    Colorless  


 


Urine Appearance    Clear  (Clear)  


 


Urine pH    7.0  (5.0-8.0)  


 


Ur Specific Gravity    1.013  (1.001-1.035)  


 


Urine Protein    1+ H  (Negative)  


 


Urine Glucose (UA)    Trace H  (Negative)  


 


Urine Ketones    Negative  (Negative)  


 


Urine Blood    Small H  (Negative)  


 


Urine Nitrite    Negative  (Negative)  


 


Urine Bilirubin    Negative  (Negative)  


 


Urine Urobilinogen    <2.0  (<2.0)  mg/dL


 


Ur Leukocyte Esterase    Negative  (Negative)  


 


Urine RBC    6 H  (0-5)  /hpf


 


Urine WBC    1  (0-5)  /hpf


 


Urine Opiates Screen     (NotDetected)  


 


Ur Oxycodone Screen     (NotDetected)  


 


Urine Methadone Screen     (NotDetected)  


 


Ur Barbiturates Screen     (NotDetected)  


 


U Tricyclic Antidepress     (NotDetected)  


 


Ur Phencyclidine Scrn     (NotDetected)  


 


Ur Amphetamines Screen     (NotDetected)  


 


U Methamphetamines Scrn     (NotDetected)  


 


U Benzodiazepines Scrn     (NotDetected)  


 


Urine Cocaine Screen     (NotDetected)  


 


U Marijuana (THC) Screen     (NotDetected)  


 


Serum Alcohol  <10    mg/dL


 


Influenza Type A (PCR)     (Not Detectd)  


 


Influenza Type B (PCR)     (Not Detectd)  


 


RSV (PCR)     (Not Detectd)  


 


SARS-CoV-2 (PCR)     (Not Detectd)  














  25 Range/Units





  18:55 19:31 19:32 


 


WBC     (3.8-10.6)  k/uL


 


RBC     (4.30-5.90)  m/uL


 


Hgb     (13.0-17.5)  gm/dL


 


Hct     (39.0-53.0)  %


 


MCV     (80.0-100.0)  fL


 


MCH     (25.0-35.0)  pg


 


MCHC     (31.0-37.0)  g/dL


 


RDW     (11.5-15.5)  %


 


Plt Count     (150-450)  k/uL


 


MPV     


 


Neutrophils % (Manual)     %


 


Band Neuts % (Manual)     %


 


Lymphocytes % (Manual)     %


 


Monocytes % (Manual)     %


 


Eosinophils % (Manual)     %


 


Basophils % (Manual)     %


 


Neutrophils # (Manual)     (1.3-7.7)  k/uL


 


Lymphocytes # (Manual)     (1.0-4.8)  k/uL


 


Monocytes # (Manual)     (0-1.0)  k/uL


 


Eosinophils # (Manual)     (0-0.7)  k/uL


 


Basophils # (Manual)     (0-0.2)  k/uL


 


Nucleated RBCs     (0-0)  /100 WBC


 


Manual Slide Review     


 


PT     (10.0-12.5)  sec


 


INR     (<1.2)  


 


APTT     (22.0-30.0)  sec


 


Sodium     (137-145)  mmol/L


 


Potassium     (3.5-5.1)  mmol/L


 


Chloride     ()  mmol/L


 


Carbon Dioxide     (22-30)  mmol/L


 


Anion Gap     mmol/L


 


BUN     (9-20)  mg/dL


 


Creatinine     (0.66-1.25)  mg/dL


 


Est GFR (CKD-EPI)AfAm     (>60 ml/min/1.73 sqM)  


 


Est GFR (CKD-EPI)NonAf     (>60 ml/min/1.73 sqM)  


 


Glucose     (74-99)  mg/dL


 


POC Glucose (mg/dL)   124 H   ()  mg/dL


 


POC Glu Operater ID   Michael Malik   


 


Plasma Lactic Acid Robert    1.0  (0.7-2.0)  mmol/L


 


Calcium     (8.4-10.2)  mg/dL


 


Total Bilirubin     (0.2-1.3)  mg/dL


 


AST     (17-59)  U/L


 


ALT     (4-49)  U/L


 


Alkaline Phosphatase     ()  U/L


 


Troponin I     (0.000-0.034)  ng/mL


 


NT-Pro-B Natriuret Pep     pg/mL


 


Total Protein     (6.3-8.2)  g/dL


 


Albumin     (3.5-5.0)  g/dL


 


Urine Color     


 


Urine Appearance     (Clear)  


 


Urine pH     (5.0-8.0)  


 


Ur Specific Gravity     (1.001-1.035)  


 


Urine Protein     (Negative)  


 


Urine Glucose (UA)     (Negative)  


 


Urine Ketones     (Negative)  


 


Urine Blood     (Negative)  


 


Urine Nitrite     (Negative)  


 


Urine Bilirubin     (Negative)  


 


Urine Urobilinogen     (<2.0)  mg/dL


 


Ur Leukocyte Esterase     (Negative)  


 


Urine RBC     (0-5)  /hpf


 


Urine WBC     (0-5)  /hpf


 


Urine Opiates Screen     (NotDetected)  


 


Ur Oxycodone Screen     (NotDetected)  


 


Urine Methadone Screen     (NotDetected)  


 


Ur Barbiturates Screen     (NotDetected)  


 


U Tricyclic Antidepress     (NotDetected)  


 


Ur Phencyclidine Scrn     (NotDetected)  


 


Ur Amphetamines Screen     (NotDetected)  


 


U Methamphetamines Scrn     (NotDetected)  


 


U Benzodiazepines Scrn     (NotDetected)  


 


Urine Cocaine Screen     (NotDetected)  


 


U Marijuana (THC) Screen     (NotDetected)  


 


Serum Alcohol     mg/dL


 


Influenza Type A (PCR)  Not Detected    (Not Detectd)  


 


Influenza Type B (PCR)  Not Detected    (Not Detectd)  


 


RSV (PCR)  Not Detected    (Not Detectd)  


 


SARS-CoV-2 (PCR)  Not Detected    (Not Detectd)  














Disposition


Clinical Impression: 


 Hypoxia, Pneumonia, Vomiting, Altered mental status





Disposition: ADMITTED AS IP TO THIS [facility]


Condition: Stable


Is patient prescribed a controlled substance at d/c from ED?: No


Time of Disposition: 22:14


Decision to Admit Reason: Admit from EC


Decision Date: 25


Decision Time: 22:14

## 2025-02-13 NOTE — CT
EXAMINATION TYPE: CT chest angio for PE

CT DLP: 696.7 mGycm, Automated exposure control for dose reduction was used.

 

DATE OF EXAM: 2/13/2025 8:10 PM

 

COMPARISON: Chest radiograph from same day. CTA chest 10/22/2021

 

CLINICAL INDICATION:Male, 79 years old with history of hypoxia; Hypoxia.

 

TECHNIQUE/CONTRAST: 

CTA scan of the thorax is performed with IV Contrast, patient injected with 80 ml mL of Isovue 370, p
ulmonary embolism protocol.  MIP images are created and reviewed.  

 

FINDINGS: 

 

Pulmonary Artery: There is no evidence for a filling defect within the pulmonary vasculature to sugge
st acute pulmonary embolism.  The pulmonary artery is of normal size. 

Lungs/Pleura: No pleural effusion or pneumothorax. Scattered groundglass opacities throughout both aaron
ngs. 

Airway: Large airways are patent. Trace secretions within the visualized trachea.

Heart: Heart is within normal limits for size.. No pericardial effusion. Coronary artery calcificatio
ns.

Vasculature: No evidence of aortic aneurysm.

Mediastinum: No evidence of adenopathy.

Musculoskeletal: Mild degenerative disc disease changes are present throughout the thoracolumbar spin
e. No acute osseous abnormality.

Soft Tissues: Unremarkable.

Lower neck: No significant findings.

Upper Abdomen: Cholelithiasis. Fluid identified within the visualized esophagus..

 

IMPRESSION:

1. No evidence of pulmonary embolism.

2. Diffuse patchy groundglass pulmonary opacities suggests atypical pulmonary infection.

3. Cholelithiasis.

4. Fluid identified within the visualized esophagus. Correlate for GERD.

 

[facility], Workstation: [id number], 2/13/2025 8:21 PM

## 2025-02-13 NOTE — CT
EXAMINATION TYPE: CT brain wo con

CT DLP: 1753 mGycm, Automated exposure control for dose reduction was used.

 

DATE OF EXAM: 2/13/2025 8:10 PM

 

COMPARISON: CT brain C-spine 1/30/2025, 1/1/2024, 3/7/2022, CT brain 6/29/2022

 

CLINICAL INDICATION:Male, 79 years old with history of Altered mental status, AMS.

 

TECHNIQUE: 

Brain: Multiple axial CT images of the brain were obtained without IV contrast. . Coronal and sagitta
l reformats reviewed.

 

FINDINGS:

 

Brain:

Extra-axial spaces: No abnormal extra-axial fluid collections.

Ventricular system: Dilatation in proportion to cerebral atrophy.

Cerebral parenchyma: Cerebral atrophy. No acute intraparenchymal hemorrhage or mass effect.  The gray
-white junction is well differentiated. Scattered hypoattenuating areas are seen within the periventr
icular white matter.  

Cerebellum: Unremarkable.

Mass effect: No evidence of midline shift.

Intracranial vasculature: Atherosclerotic calcifications of the intracranial vessels.

Soft tissues: Normal.

Calvarium/osseous structures: No depressed skull fracture.

Paranasal sinuses and mastoid air cells: Clear

Visualized orbits: Right aphakia. Postsurgical changes with similar hyperdense material identified wi
thin the left globe.

 

 

IMPRESSION:

1. No acute intracranial process.

2. Age-related atrophy with nonspecific white matter changes, likely secondary to chronic small vesse
l ischemic disease.

 

[facility], Workstation: [id number], 2/13/2025 8:15 PM

## 2025-02-13 NOTE — XR
EXAMINATION TYPE: XR chest 2V

 

DATE OF EXAM: 2/13/2025 7:09 PM

 

COMPARISON: Chest radiographs from 1/30/2025

 

TECHNIQUE: XR chest 2V Frontal and lateral views of the chest.

 

CLINICAL INDICATION:Male, 79 years old with history of altered mental status; 

 

FINDINGS: 

Lungs/Pleura: There is no evidence of pleural effusion, focal consolidation, or pneumothorax.  Chroni
c senescent parenchymal change.

Pulmonary vascularity: Unremarkable.

Heart/mediastinum: Cardiomediastinal silhouette is prominent in size.

Musculoskeletal: Multiple level degenerative disc disease changes seen throughout the spine.

 

IMPRESSION: 

No acute cardiopulmonary disease/process.

 

 

[facility], Workstation: [id number], 2/13/2025 7:15 PM Statement Selected

## 2025-02-14 LAB
ALBUMIN SERPL-MCNC: 3.9 G/DL (ref 3.5–5)
ALP SERPL-CCNC: 130 U/L (ref 38–126)
ALT SERPL-CCNC: 23 U/L (ref 4–49)
ANION GAP SERPL CALC-SCNC: 10 MMOL/L
AST SERPL-CCNC: 43 U/L (ref 17–59)
BASOPHILS # BLD AUTO: 0.1 K/UL (ref 0–0.2)
BASOPHILS NFR BLD AUTO: 0 %
BUN SERPL-SCNC: 15 MG/DL (ref 9–20)
CALCIUM SPEC-MCNC: 8.9 MG/DL (ref 8.4–10.2)
CHLORIDE SERPL-SCNC: 98 MMOL/L (ref 98–107)
CO2 BLDA-SCNC: 28 MMOL/L (ref 19–24)
CO2 SERPL-SCNC: 30 MMOL/L (ref 22–30)
EOSINOPHIL # BLD AUTO: 0.3 K/UL (ref 0–0.7)
EOSINOPHIL NFR BLD AUTO: 2 %
ERYTHROCYTE [DISTWIDTH] IN BLOOD BY AUTOMATED COUNT: 3.92 M/UL (ref 4.3–5.9)
ERYTHROCYTE [DISTWIDTH] IN BLOOD: 14.5 % (ref 11.5–15.5)
GLUCOSE BLD-MCNC: 136 MG/DL (ref 70–110)
GLUCOSE BLD-MCNC: 138 MG/DL (ref 70–110)
GLUCOSE BLD-MCNC: 191 MG/DL (ref 70–110)
GLUCOSE BLD-MCNC: 209 MG/DL (ref 70–110)
GLUCOSE BLD-MCNC: 214 MG/DL (ref 70–110)
GLUCOSE BLD-MCNC: 229 MG/DL (ref 70–110)
GLUCOSE SERPL-MCNC: 136 MG/DL (ref 74–99)
HCO3 BLDA-SCNC: 27 MMOL/L (ref 21–25)
HCT VFR BLD AUTO: 35.9 % (ref 39–53)
HGB BLD-MCNC: 12.4 GM/DL (ref 13–17.5)
LYMPHOCYTES # SPEC AUTO: 9.2 K/UL (ref 1–4.8)
LYMPHOCYTES NFR SPEC AUTO: 56 %
MCH RBC QN AUTO: 31.7 PG (ref 25–35)
MCHC RBC AUTO-ENTMCNC: 34.5 G/DL (ref 31–37)
MCV RBC AUTO: 91.7 FL (ref 80–100)
MONOCYTES # BLD AUTO: 0.7 K/UL (ref 0–1)
MONOCYTES NFR BLD AUTO: 4 %
NEUTROPHILS # BLD AUTO: 5.6 K/UL (ref 1.3–7.7)
NEUTROPHILS NFR BLD AUTO: 34 %
PCO2 BLDA: 42 MMHG (ref 35–45)
PH BLDA: 7.41 [PH] (ref 7.35–7.45)
PLATELET # BLD AUTO: 136 K/UL (ref 150–450)
PO2 BLDA: 62 MMHG (ref 83–108)
POTASSIUM SERPL-SCNC: 3.4 MMOL/L (ref 3.5–5.1)
PROT SERPL-MCNC: 6.3 G/DL (ref 6.3–8.2)
SODIUM SERPL-SCNC: 138 MMOL/L (ref 137–145)
WBC # BLD AUTO: 16.4 K/UL (ref 3.8–10.6)

## 2025-02-14 RX ADMIN — PANTOPRAZOLE SODIUM SCH MG: 40 TABLET, DELAYED RELEASE ORAL at 08:32

## 2025-02-14 RX ADMIN — ASPIRIN 81 MG CHEWABLE TABLET SCH MG: 81 TABLET CHEWABLE at 09:03

## 2025-02-14 RX ADMIN — POTASSIUM CHLORIDE SCH MEQ: 20 TABLET, EXTENDED RELEASE ORAL at 03:22

## 2025-02-14 RX ADMIN — AZITHROMYCIN SCH MG: 500 TABLET, FILM COATED ORAL at 08:33

## 2025-02-14 RX ADMIN — HYDROCODONE BITARTRATE AND ACETAMINOPHEN SCH EACH: 5; 325 TABLET ORAL at 08:32

## 2025-02-14 RX ADMIN — INSULIN ASPART SCH: 100 INJECTION, SOLUTION INTRAVENOUS; SUBCUTANEOUS at 08:13

## 2025-02-14 RX ADMIN — MONTELUKAST SODIUM SCH MG: 10 TABLET, FILM COATED ORAL at 08:31

## 2025-02-14 RX ADMIN — ATORVASTATIN CALCIUM SCH MG: 80 TABLET, FILM COATED ORAL at 08:31

## 2025-02-14 RX ADMIN — METOPROLOL SUCCINATE SCH MG: 25 TABLET, EXTENDED RELEASE ORAL at 09:03

## 2025-02-14 RX ADMIN — FUROSEMIDE STA MG: 10 INJECTION, SOLUTION INTRAMUSCULAR; INTRAVENOUS at 20:30

## 2025-02-14 RX ADMIN — HYDROCODONE BITARTRATE AND ACETAMINOPHEN SCH EACH: 5; 325 TABLET ORAL at 02:26

## 2025-02-14 RX ADMIN — ENOXAPARIN SODIUM SCH MG: 40 INJECTION SUBCUTANEOUS at 08:33

## 2025-02-14 RX ADMIN — HEPARIN SODIUM SCH MLS/HR: 10000 INJECTION, SOLUTION INTRAVENOUS at 09:07

## 2025-02-14 RX ADMIN — POTASSIUM CHLORIDE STA MEQ: 20 TABLET, EXTENDED RELEASE ORAL at 08:31

## 2025-02-14 RX ADMIN — HEPARIN SODIUM ONE UNIT: 1000 INJECTION, SOLUTION INTRAVENOUS; SUBCUTANEOUS at 09:05

## 2025-02-14 RX ADMIN — CEFAZOLIN SCH MLS/HR: 330 INJECTION, POWDER, FOR SOLUTION INTRAMUSCULAR; INTRAVENOUS at 03:22

## 2025-02-14 NOTE — P.CONS
History of Present Illness





- Reason for Consult


Consult date: 25


wound care





- History of Present Illness


This is a 79-year-old patient with history of Coronary artery disease, diabetes,

hypertension, with a reoccurring ulceration to the right plantar foot.  Patient 

states that he has been dealing with the ulceration for the last 5 years.  He 

normally sees podiatry.  However due to his inability to drive he has not been 

going to the podiatrist regularly.  Patient also states that he utilizes some 

type of salve to the site which then will heal but reopen after a few months.  

Patient has a ulceration to the fifth metatarsal head dorsal aspect measuring 

approximately 1.2 x 1.3 x 0.3 cm wound edges are not attached to the wound base 

granulation seen in the wound bed however significant amount of slough and 

nonviable tissue present.  Periwound has significant callus.  No tunneling. Mi

nimal undermining noted.





Review Of Systems:


Constitutional: No fever, no chills, no night sweats.  No weight change.  No 

weakness, fatigue or lethargy.  No daytime sleepiness.


Integumentary:reports wounds, no lesions.  No rash or pruritus.  No unusual 

bruising.  No change in hair or nails.





Physical exam:


General Appearance: Alert, cooperative, no distress, appears stated age.


Skin: See HPI all other Skin color, texture, tugor normal, no rashes or lesions.


Neurologic: Alert oriented x3 





Assessment:


1.  Nonpressure ulceration with fat layer exposure right foot


2.  Diabetic foot ulcer





Plan:


1.  Apply honey gel and bordered foam.  Nonweightbearing to the right forefoot. 

Utilize walker.  Patient would benefit from debridement and advanced wound care 

and wound care center.  We be happy to see him upon discharge.  Patient states 

that he is unable to come related to inability to drive.





Thank you for the consultation any questions please contact the wound care 

center





DNP note has been reviewed and discussed with Dr. Orellana and the impression 

and plan of care has been directed as dictated. 








Past Medical History


Past Medical History: Coronary Artery Disease (CAD), Cancer, Diabetes Mellitus, 

GERD/Reflux, Hypertension, Osteoarthritis (OA)


Additional Past Medical History / Comment(s): NIDDM type II, 1999 LEUKEMIA (CLL-

remission), sinus problems, seasonal allergies, tinnitis bilaterally, OA hands, 

R wrist carpal tunnel, past fx with L elbow, R rotator cuff tendon problems, L 

rotator cuff tear, left knee pain


History of Any Multi-Drug Resistant Organisms: None Reported


Past Surgical History: Heart Catheterization, Orthopedic Surgery


Additional Past Surgical History / Comment(s): LEFT HAND thumb tendon repair. L 

knee surgery.


Past Anesthesia/Blood Transfusion Reactions: No Reported Reaction


Past Psychological History: No Psychological Hx Reported


Smoking Status: Never smoker


Past Alcohol Use History: None Reported


Past Drug Use History: None Reported





- Past Family History


  ** Father


Family Medical History: CVA/TIA


Additional Family Medical History / Comment(s): Father  at the age of 85yrs.





  ** Mother


Family Medical History: Diabetes Mellitus


Additional Family Medical History / Comment(s): Mother  at the age of 90yrs.





Medications and Allergies


                                Home Medications











 Medication  Instructions  Recorded  Confirmed  Type


 


Montelukast Sodium [Singulair] 10 mg PO DAILY 17 History


 


Fluticasone Nasal Spray [Flonase 2 spr EA NOSTRIL DAILY 21 

History





Nasal Spray]    


 


Atorvastatin [Lipitor] 80 mg PO DAILY 21 History


 


Omeprazole 40 mg PO DAILY 10/20/21 02/13/25 History


 


Meclizine [Antivert] 25 mg PO TID 22 History


 


ALPRAZolam [Xanax] 0.25 mg PO DAILY PRN 23 History


 


HYDROcodone/APAP 5-325MG [Norco 1 tab PO Q6HR 23 History





5-325]    


 


Baclofen 10 - 20 mg PO DAILY 25 History


 


Sucralfate [Carafate] 1 gm PO ACHS 25 History


 


cefuroxime axetiL [Ceftin] 500 mg PO BID 25 History


 


Insulin NPH Hum/Reg Insulin Hm 15 unit SQ HS 25 History





[NovoLIN 70-30 100 Unit/ml Vial]    


 


Insulin NPH Hum/Reg Insulin Hm 25 unit SQ DAILY 25 History





[NovoLIN 70-30 100 Unit/ml Vial]    


 


lisinopriL [Zestril] 20 mg PO BID 25 History








                                    Allergies











Allergy/AdvReac Type Severity Reaction Status Date / Time


 


amoxicillin [From Augmentin] Allergy  Rash/Hives Verified 25 20:14


 


clavulanic acid Allergy  Rash/Hives Verified 25 20:14





[From Augmentin]     














Physical Exam


Vitals: 


                                   Vital Signs











  Temp Pulse Resp BP Pulse Ox


 


 25 08:27  97.9 F  76  18  136/76  94 L


 


 25 08:00      95


 


 25 05:26  98.7 F  59 L  16  147/72  100


 


 25 03:25   67  16  145/67  98


 


 25 02:12   61  16  146/67  96


 


 25 00:00   81  16  139/77  96


 


 25 22:56   73  16  111/71  97


 


 25 19:35   112 H  18  117/79  95


 


 25 18:32  99.5 F  113 H  20  148/75  86 L








                                Intake and Output











 25





 22:59 06:59 14:59


 


Other:   


 


  Weight 99.79 kg  














Results


CBC & Chem 7: 


                                 25 07:30





                                 25 07:30


Labs: 


                  Abnormal Lab Results - Last 24 Hours (Table)











  25 Range/Units





  18:54 18:54 18:54 


 


WBC  13.2 H    (3.8-10.6)  k/uL


 


RBC  3.69 L    (4.30-5.90)  m/uL


 


Hgb  11.7 L    (13.0-17.5)  gm/dL


 


Hct  32.9 L    (39.0-53.0)  %


 


Plt Count  137 L    (150-450)  k/uL


 


Lymphocytes #     (1.0-4.8)  k/uL


 


Lymphocytes # (Manual)  5.02 H    (1.0-4.8)  k/uL


 


Potassium    3.2 L  (3.5-5.1)  mmol/L


 


Glucose    121 H  (74-99)  mg/dL


 


POC Glucose (mg/dL)     ()  mg/dL


 


Total Bilirubin    1.5 H  (0.2-1.3)  mg/dL


 


Alkaline Phosphatase    132 H  ()  U/L


 


Troponin I     (0.000-0.034)  ng/mL


 


Total Protein    6.1 L  (6.3-8.2)  g/dL


 


Urine Protein     (Negative)  


 


Urine Glucose (UA)     (Negative)  


 


Urine Blood     (Negative)  


 


Urine RBC     (0-5)  /hpf


 


Urine Opiates Screen   Detected H   (NotDetected)  














  25 Range/Units





  18:54 18:55 19:31 


 


WBC     (3.8-10.6)  k/uL


 


RBC     (4.30-5.90)  m/uL


 


Hgb     (13.0-17.5)  gm/dL


 


Hct     (39.0-53.0)  %


 


Plt Count     (150-450)  k/uL


 


Lymphocytes #     (1.0-4.8)  k/uL


 


Lymphocytes # (Manual)     (1.0-4.8)  k/uL


 


Potassium     (3.5-5.1)  mmol/L


 


Glucose     (74-99)  mg/dL


 


POC Glucose (mg/dL)    124 H  ()  mg/dL


 


Total Bilirubin     (0.2-1.3)  mg/dL


 


Alkaline Phosphatase     ()  U/L


 


Troponin I  0.043 H*    (0.000-0.034)  ng/mL


 


Total Protein     (6.3-8.2)  g/dL


 


Urine Protein   1+ H   (Negative)  


 


Urine Glucose (UA)   Trace H   (Negative)  


 


Urine Blood   Small H   (Negative)  


 


Urine RBC   6 H   (0-5)  /hpf


 


Urine Opiates Screen     (NotDetected)  














  25 Range/Units





  00:39 02:24 03:10 


 


WBC     (3.8-10.6)  k/uL


 


RBC     (4.30-5.90)  m/uL


 


Hgb     (13.0-17.5)  gm/dL


 


Hct     (39.0-53.0)  %


 


Plt Count     (150-450)  k/uL


 


Lymphocytes #     (1.0-4.8)  k/uL


 


Lymphocytes # (Manual)     (1.0-4.8)  k/uL


 


Potassium     (3.5-5.1)  mmol/L


 


Glucose     (74-99)  mg/dL


 


POC Glucose (mg/dL)   136 H   ()  mg/dL


 


Total Bilirubin     (0.2-1.3)  mg/dL


 


Alkaline Phosphatase     ()  U/L


 


Troponin I  0.091 H*   0.078 H*  (0.000-0.034)  ng/mL


 


Total Protein     (6.3-8.2)  g/dL


 


Urine Protein     (Negative)  


 


Urine Glucose (UA)     (Negative)  


 


Urine Blood     (Negative)  


 


Urine RBC     (0-5)  /hpf


 


Urine Opiates Screen     (NotDetected)  














  25 Range/Units





  07:30 07:30 08:09 


 


WBC  16.4 H    (3.8-10.6)  k/uL


 


RBC  3.92 L    (4.30-5.90)  m/uL


 


Hgb  12.4 L    (13.0-17.5)  gm/dL


 


Hct  35.9 L    (39.0-53.0)  %


 


Plt Count  136 L    (150-450)  k/uL


 


Lymphocytes #  9.2 H    (1.0-4.8)  k/uL


 


Lymphocytes # (Manual)     (1.0-4.8)  k/uL


 


Potassium   3.4 L   (3.5-5.1)  mmol/L


 


Glucose   136 H   (74-99)  mg/dL


 


POC Glucose (mg/dL)    138 H  ()  mg/dL


 


Total Bilirubin   1.4 H   (0.2-1.3)  mg/dL


 


Alkaline Phosphatase   130 H   ()  U/L


 


Troponin I     (0.000-0.034)  ng/mL


 


Total Protein     (6.3-8.2)  g/dL


 


Urine Protein     (Negative)  


 


Urine Glucose (UA)     (Negative)  


 


Urine Blood     (Negative)  


 


Urine RBC     (0-5)  /hpf


 


Urine Opiates Screen     (NotDetected)  














Assessment and Plan


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 

exposed


Current Visit: Yes   Status: Acute   Code(s): L97.512 - NON-PRS CHRONIC ULCER 

OTH PRT RIGHT FOOT W FAT LAYER EXPOSED   SNOMED Code(s): 54980039271670471


   





(2) Type 2 diabetes mellitus with foot ulcer


Current Visit: Yes   Status: Acute   Code(s): E11.621 - TYPE 2 DIABETES MELLITUS

WITH FOOT ULCER; L97.509 - NON-PRESSURE CHRONIC ULCER OTH PRT UNSP FOOT W UNSP 

SEVERITY   SNOMED Code(s): 8301095277781

## 2025-02-14 NOTE — US
EXAMINATION TYPE: US arterial LE single level

 

DATE OF EXAM: 2/14/2025 9:56 AM

 

COMPARISONS: None.

 

CLINICAL INDICATION: Male, 79 years old with history of possible PAD;

 

TECHNIQUE: Systolic pressures were taken of the upper and lower extremity arteries with ankle-brachia
l indices and toe brachial indices calculated bilaterally.

 

History of:

Smoker: No 

Hypertension:  Yes

Diabetic:  Yes

Hyperlipidemia:  Yes

TIA/CVA:  No

Previous Vascular Surgery:  No

CAD:  Yes

MI:  No

Vascular Ulcers:  Right

Claudication:  Yes

Gangrene:  No

 

FINDINGS:

Doppler Waveforms: 

Right: Multiphasic

Left: Multiphasic

 

========================================================

Brachial Artery systolic pressure:

Right: 133

Left: 133

 

Posterior Tibial artery systolic pressure:

Right: CNO

Left: CNO

 

Dorsalis Pedis artery systolic pressure:

Right: CNO

Left: CNO

 

Toe artery systolic pressure:

Right: XX 

Left: XX 

========================================================

 

Ankle-Brachial Indices:

Right: CNO

Left: CNO

 

 

 

 

IMPRESSION:

Noncompressible vessels ankle brachial indices unable to be obtained.

 

[facility], Workstation: [id number], 2/14/2025 10:00 AM

## 2025-02-14 NOTE — XR
EXAMINATION TYPE: XR chest 1V portable

 

DATE OF EXAM: 2/14/2025 5:52 AM

 

COMPARISON: Chest radiograph from one day prior.

 

CLINICAL INDICATION: Male, 79 years old with history of pneumonia; [facility]

 

TECHNIQUE: XR chest 1V portable Frontal view of the chest.

 

FINDINGS: 

Lungs/Pleura: Multifocal airspace opacities. No evidence of pneumothorax or pleural effusion. 

Pulmonary vascularity: Unremarkable.

Heart/mediastinum: Cardiomediastinal silhouette is unremarkable.

Musculoskeletal: No acute osseous pathology.

 

IMPRESSION: 

Bilateral midlung airspace opacities concerning for pneumonia

 

[facility], Workstation: [id number], 2/14/2025 7:54 AM

## 2025-02-14 NOTE — P.HPIM
History of Present Illness


H&P Date: 25





Patient is a 79-year-old male with past medical history significant for coronary

artery disease, insulin-dependent diabetes mellitus, hypertension presents to 

the emergency department today due to altered mental status.  Patient is unsure 

of the events surrounding his admission so most of the history is obtained via 

records and hospital staff.  Patient was having shortness of breath at home and 

his wife called EMS.  When EMS arrived to his house he was oriented only to 

self.  He was found to be hypoxic in the low 80s and was placed on supplemental 

oxygen.  Patient denies wearing oxygen at home.  Upon arrival to the hospital 

oxygen saturation had improved and he was AO x 3.  He also had an episode of 

vomiting.  Patient reports that he has been feeling sick recently with myalgias,

cough productive of greenish sputum, actively being treated for sinus infection.

 He denies nausea/vomiting, abdominal pain, chest pain, dyspnea, weight loss.  


 


Initial vitals: /75,  bpm, T 99.5 F, RR 20, O2 saturation 86% on 

room air


Initial labs: WBCs 13.2, RBC 3.69, hemoglobin 11.7, hematocrit 32.9, platelets 

137, sodium 137, potassium 3.2, chloride 99, CO2 25, BUN 15, creatinine 0.9, 

total bilirubin 1.5, alkaline phosphatase 132, troponin x 2 0.043 and 0.091, 

total protein 6.1; urinalysis 1+ protein, trace glucose


Initial EKG: Sinus tachycardia with ventricular rate of 118 bpm, QTc 384 ms, 

left axis deviation, left ventricular hypertrophy


Initial chest x-ray: No acute cardiopulmonary disease/process


Initial CTA chest: No evidence of pulmonary embolism, diffuse patchy groundglass

pulmonary opacities suggestive of atypical pulmonary infection, cholelithiasis, 

fluid identified within the visualized esophagus


Initial brain CT: No acute intracranial process; age-related atrophy with 

nonspecific white matter changes, likely secondary to chronic small vessel 

ischemic changes


 


ED documentation reviewed.  Given: Zithromax 500 mg IV x 1, ceftriaxone 2 g IV x

1 0.9% saline 500 mL x 1


 


Review of systems:


Pertinent positives and negatives as discussed in HPI, a complete review of s

ystems was performed and all other systems are negative.


 


Social history:


Tobacco: None reported


Alcohol: None reported


Recreational drugs: None reported


Travel: None reported


Sick contacts: None reported


 


Physical examination:


Vital signs reviewed


General: Nontoxic, no distress, appears stated age, well-appearing


Derm: Warm, dry, intact


Head: Atraumatic, normocephalic, symmetric


Eyes: EOMI, anicteric sclera


Mouth: No lip lesion, mucus membranes moist


Cardiovascular: S1-S2 regular, no murmur


Lungs: Diffuse ronchi and rales greatest in mid lung fields, no accessory muscle

use


Abdominal: Soft, non-tender to palpation


Extremities: No cyanosis, clubbing, or pedal edema; left foot cold to ankle; 

ulcer on plantar surface of right foot overlying 5th metatarsal appearing dry 

without surrounding erythema or discharge


Neuro: Alert, oriented x 4, gross neurological examination did not reveal any 

focal deficits.  Cranial nerves II to XII grossly intact.  


Psych: Appropriate affect and mood


 


Assessment and Plan:


Patient is a 79-year-old male with past medical history significant for coronary

artery disease, insulin-dependent diabetes mellitus, hypertension admitted for 

acute hypoxic respiratory failure secondary to pneumonia.





Active


#.  Acute hypoxic respiratory failure secondary to pneumonia, community acquired


#.  Sepsis, secondary to pneumonia


#.  Leukocytosis, secondary to above


Continue with azithromycin 500 mg PO daily


Continue with Rocephin 2 g IV daily


Continue with DuoNebs every 4 hours as needed


Continue with 0.9% saline  cc/h


Continue oxygen supplementation to maintain oxygen saturation >94%


Blood culture pending


Obtain sputum culture


Obtain Legionella antigen


Check procalcitonin levels and consider repeat 4-plex testing





#.  Elevated troponin, likely type 2 MI in setting of sepsis and pneumonia


Troponin x 2 0.043, 0.091


No evidence of pulmonary embolism


EKG shows no ST elevations or depressions


Patient denying chest discomfort or SOB


Continue to trend troponin


Cardiology consulted





#.  Hypokalemia


Replete with potassium chloride 40 mEq PO x 2 and monitor for resolution





#.  Elevated bilirubin


#.  Elevated alkaline phosphatase


Repeat CMP in the morning





#.  Normocytic anemia, at baseline


#.  Thrombocytopenia


Monitor CBC





#.  Ulcer on plantar surface of right foot


Consult wound management





Chronic


#.  Hypertension


Continue with lisinopril 20 mg PO twice daily





#.  Insulin-dependent diabetes mellitus


Continue with insulin sliding scale


Accu-Cheks ACHS


Monitor for hypoglycemia





#.  Coronary artery disease


Continue with Lipitor 80 mg PO daily





DVT prophylaxis: Lovenox subcutaneous 40 mg daily


GI prophylaxis: Omeprazole 40 mg daily


 


The patient is admitted with an anticipated less than 2 midnight stay for 

evaluation of acute hypoxic respiratory failure secondary to pneumonia.





CODE STATUS: Full code


Anticipated discharge place: Home





Past Medical History


Past Medical History: Coronary Artery Disease (CAD), Cancer, Diabetes Mellitus, 

GERD/Reflux, Hypertension, Osteoarthritis (OA)


Additional Past Medical History / Comment(s): NIDDM type II, 1999 LEUKEMIA (CLL-

remission), sinus problems, seasonal allergies, tinnitis bilaterally, OA hands, 

R wrist carpal tunnel, past fx with L elbow, R rotator cuff tendon problems, L 

rotator cuff tear, left knee pain


History of Any Multi-Drug Resistant Organisms: None Reported


Past Surgical History: Heart Catheterization, Orthopedic Surgery


Additional Past Surgical History / Comment(s): LEFT HAND thumb tendon repair. L 

knee surgery.


Past Anesthesia/Blood Transfusion Reactions: No Reported Reaction


Past Psychological History: No Psychological Hx Reported


Smoking Status: Never smoker


Past Alcohol Use History: None Reported


Past Drug Use History: None Reported





- Past Family History


  ** Father


Family Medical History: CVA/TIA


Additional Family Medical History / Comment(s): Father  at the age of 85yrs.





  ** Mother


Family Medical History: Diabetes Mellitus


Additional Family Medical History / Comment(s): Mother  at the age of 90yrs.





Medications and Allergies


                                Home Medications











 Medication  Instructions  Recorded  Confirmed  Type


 


Montelukast Sodium [Singulair] 10 mg PO DAILY 17 History


 


Fluticasone Nasal Spray [Flonase 2 spr EA NOSTRIL DAILY 21 

History





Nasal Spray]    


 


Atorvastatin [Lipitor] 80 mg PO DAILY 21 History


 


Omeprazole 40 mg PO DAILY 10/20/21 02/13/25 History


 


Meclizine [Antivert] 25 mg PO TID 22 History


 


ALPRAZolam [Xanax] 0.25 mg PO DAILY PRN 23 History


 


HYDROcodone/APAP 5-325MG [Norco 1 tab PO Q6HR 23 History





5-325]    


 


Baclofen 10 - 20 mg PO DAILY 25 History


 


Sucralfate [Carafate] 1 gm PO ACHS 25 History


 


cefuroxime axetiL [Ceftin] 500 mg PO BID 25 History


 


Insulin NPH Hum/Reg Insulin Hm 15 unit SQ HS 25 History





[NovoLIN 70-30 100 Unit/ml Vial]    


 


Insulin NPH Hum/Reg Insulin Hm 25 unit SQ DAILY 25 History





[NovoLIN 70-30 100 Unit/ml Vial]    


 


lisinopriL [Zestril] 20 mg PO BID 25 History








                                    Allergies











Allergy/AdvReac Type Severity Reaction Status Date / Time


 


amoxicillin [From Augmentin] Allergy  Rash/Hives Verified 25 20:14


 


clavulanic acid Allergy  Rash/Hives Verified 25 20:14





[From Augmentin]     














Physical Exam


Vitals: 


                                   Vital Signs











  Temp Pulse Resp BP Pulse Ox


 


 25 22:56   73  16  111/71  97


 


 25 19:35   112 H  18  117/79  95


 


 25 18:32  99.5 F  113 H  20  148/75  86 L








                                Intake and Output











 25





 14:59 22:59 06:59


 


Other:   


 


  Weight  99.79 kg 














Results


CBC & Chem 7: 


                                 25 18:54





                                 25 18:54


Labs: 


                  Abnormal Lab Results - Last 24 Hours (Table)











  25 Range/Units





  18:54 18:54 18:54 


 


WBC  13.2 H    (3.8-10.6)  k/uL


 


RBC  3.69 L    (4.30-5.90)  m/uL


 


Hgb  11.7 L    (13.0-17.5)  gm/dL


 


Hct  32.9 L    (39.0-53.0)  %


 


Plt Count  137 L    (150-450)  k/uL


 


Lymphocytes # (Manual)  5.02 H    (1.0-4.8)  k/uL


 


Potassium    3.2 L  (3.5-5.1)  mmol/L


 


Glucose    121 H  (74-99)  mg/dL


 


POC Glucose (mg/dL)     ()  mg/dL


 


Total Bilirubin    1.5 H  (0.2-1.3)  mg/dL


 


Alkaline Phosphatase    132 H  ()  U/L


 


Troponin I     (0.000-0.034)  ng/mL


 


Total Protein    6.1 L  (6.3-8.2)  g/dL


 


Urine Protein     (Negative)  


 


Urine Glucose (UA)     (Negative)  


 


Urine Blood     (Negative)  


 


Urine RBC     (0-5)  /hpf


 


Urine Opiates Screen   Detected H   (NotDetected)  














  25 Range/Units





  18:54 18:55 19:31 


 


WBC     (3.8-10.6)  k/uL


 


RBC     (4.30-5.90)  m/uL


 


Hgb     (13.0-17.5)  gm/dL


 


Hct     (39.0-53.0)  %


 


Plt Count     (150-450)  k/uL


 


Lymphocytes # (Manual)     (1.0-4.8)  k/uL


 


Potassium     (3.5-5.1)  mmol/L


 


Glucose     (74-99)  mg/dL


 


POC Glucose (mg/dL)    124 H  ()  mg/dL


 


Total Bilirubin     (0.2-1.3)  mg/dL


 


Alkaline Phosphatase     ()  U/L


 


Troponin I  0.043 H*    (0.000-0.034)  ng/mL


 


Total Protein     (6.3-8.2)  g/dL


 


Urine Protein   1+ H   (Negative)  


 


Urine Glucose (UA)   Trace H   (Negative)  


 


Urine Blood   Small H   (Negative)  


 


Urine RBC   6 H   (0-5)  /hpf


 


Urine Opiates Screen     (NotDetected)  














Thrombosis Risk Factor Assmnt





- DVT/VTE Prophylaxis


DVT/VTE Prophylaxis: Pharmacologic Prophylaxis ordered





- Choose All That Apply


Each Factor Represents 1 point: Obesity (BMI >25), Sepsis (< 1month), Serious 

lung disease incl. pneumonia (< 1month)


Each Risk Factor Represents 3 Points: Age 75 years or older


Thrombosis Risk Factor Assessment Total Risk Factor Score: 6


Thrombosis Risk Factor Assessment Level: High Risk

## 2025-02-14 NOTE — P.CRDCN
History of Present Illness


Consult date: 25


Reason for Consult (text): 





Elevated troponin


History of present illness: 





This is a 79-year-old male patient of Dr. Diamond with past medical history of 

coronary artery disease, hypertension, dyslipidemia.  We have been asked to 

evaluate the patient for elevated troponins.  Patient was brought into the 

hospital by EMS due to confusion.  He was found to be hypoxic in the low 80s.  

He was placed on oxygen and this improved his mental status.  Patient states 

that his wife found that he was babbling and he also had a high fever at home.  

Patient denies chest pain, no shortness of breath. Blood pressure 147/72, heart 

rate 59, pulse ox 100% on 3 L nasal cannula.  Patient is status post 500 cc 

bolus of IV fluids, potassium replacement and IV antibiotics.  Patient is seen 

today in the emergency center waiting for bed on the cardiac stepdown unit.





-EKG: Sinus tachycardia at 118 bpm, LVH.


-Chest x-ray: No acute process.


-CTA of the chest reveals no PE.  Diffuse patchy groundglass pulmonary opacities

suggest atypical pulmonary infection.  Cholelithiasis.  Fluid identified within 

the visualized esophagus.  Correlate for GERD.


-CT brain: No acute process.  Chronic small vessel ischemic changes.


-Laboratory studies: WBC 13.2, hemoglobin 11.7, platelet count 137.  Potassium 

3.2, sodium 137, BUN 15 creatinine 0.9.  Troponin 0.043, 0.091 and 0.078.  

proBNP 631.  Alkaline phosphatase 132 and total bilirubin 1.5.  Urine drug 

screen positive for opiates.  Serum alcohol less than 10.  Cepheid viral panel 

negative.


-Home cardiac medications: Atorvastatin 80 mg daily, lisinopril 20 mg twice 

daily.


-Cardiac catheterization performed 10/20/2021 revealed 20% in the mid circumflex

and 50% in the mid RCA.


-Echocardiogram performed 2021 revealed normal EF.


-Lexiscan Cardiolite stress test performed 10/20/2021 revealed small basal 

lateral reversible defect.





Review Of Systems:


At the time of my exam:


CONSTITUTIONAL: Denies fever or chills.


HEENT: Denies blurred vision, vision changes, or eye pain. Denies hemoptysis 


CARDIOVASCULAR: Denies chest pain. Denies orthopnea. Denies PND. Denies 

palpitations


RESPIRATORY: Denies shortness of breath. 


GASTROINTESTINAL: Denies abdominal pain. Denies nausea or vomiting. 


HEMATOLOGIC: Denies bleeding disorders.


GENITOURINARY:  Denies any blood in urine.


SKIN: Denies puritis. Denies rash.





Physical examination:


Gen: This is a 79-year-old male in no acute distress


VS: reviewed


HEENT: Head is atraumatic, normocephalic. Pupils equal, round. Sclerae is 

anicteric. 


NECK: Supple. No JVD. 


LUNGS: Diminished bilaterally.  No intercostal retractions.


HEART: Regular rate and rhythm.  Systolic murmur. 


ABDOMEN: Soft  No tenderness.


EXTREMITIES: No pedal edema.  No calf tenderness.


NEUROLOGICAL: Patient is awake, alert and oriented x3.


 


Assessment:


Hypoxic metabolic encephalopathy, resolved


NSTEMI type II secondary to pneumonia and sepsis


Nonobstructive coronary artery disease on previous cardiac catheterization


Hypertension


Dyslipidemia


Ulcer right foot, chronic


Diabetes





Plan:


Resume patient's home cardiac medications


Start patient on aspirin 81 mg daily


Start patient on heparin drip for 48 hours


Add metoprolol succinate 25 mg daily


Obtain 2-D echocardiogram and Doppler study to assess cardiac structure and 

function


Further recommendations to follow based upon clinical course


Thank you kindly for this consultation.





Nurse practitioner note has been reviewed, I agree with documented findings and 

plan of care.  Patient was seen and examined.





Past Medical History


Past Medical History: Coronary Artery Disease (CAD), Cancer, Diabetes Mellitus, 

GERD/Reflux, Hypertension, Osteoarthritis (OA)


Additional Past Medical History / Comment(s): NIDDM type II, 1999 LEUKEMIA (CLL-

remission), sinus problems, seasonal allergies, tinnitis bilaterally, OA hands, 

R wrist carpal tunnel, past fx with L elbow, R rotator cuff tendon problems, L 

rotator cuff tear, left knee pain


History of Any Multi-Drug Resistant Organisms: None Reported


Past Surgical History: Heart Catheterization, Orthopedic Surgery


Additional Past Surgical History / Comment(s): LEFT HAND thumb tendon repair. L 

knee surgery.


Past Anesthesia/Blood Transfusion Reactions: No Reported Reaction


Past Psychological History: No Psychological Hx Reported


Smoking Status: Never smoker


Past Alcohol Use History: None Reported


Past Drug Use History: None Reported





- Past Family History


  ** Father


Family Medical History: CVA/TIA


Additional Family Medical History / Comment(s): Father  at the age of 85yrs.





  ** Mother


Family Medical History: Diabetes Mellitus


Additional Family Medical History / Comment(s): Mother  at the age of 90yrs.





Medications and Allergies


                                Home Medications











 Medication  Instructions  Recorded  Confirmed  Type


 


Montelukast Sodium [Singulair] 10 mg PO DAILY 17 History


 


Fluticasone Nasal Spray [Flonase 2 spr EA NOSTRIL DAILY 21 Histor

y





Nasal Spray]    


 


Atorvastatin [Lipitor] 80 mg PO DAILY 21 History


 


Omeprazole 40 mg PO DAILY 10/20/21 02/13/25 History


 


Meclizine [Antivert] 25 mg PO TID 22 History


 


ALPRAZolam [Xanax] 0.25 mg PO DAILY PRN 23 History


 


HYDROcodone/APAP 5-325MG [Norco 1 tab PO Q6HR 23 History





5-325]    


 


Baclofen 10 - 20 mg PO DAILY 25 History


 


Sucralfate [Carafate] 1 gm PO ACHS 25 History


 


cefuroxime axetiL [Ceftin] 500 mg PO BID 25 History


 


Insulin NPH Hum/Reg Insulin Hm 15 unit SQ HS 25 History





[NovoLIN 70-30 100 Unit/ml Vial]    


 


Insulin NPH Hum/Reg Insulin Hm 25 unit SQ DAILY 25 History





[NovoLIN 70-30 100 Unit/ml Vial]    


 


lisinopriL [Zestril] 20 mg PO BID 25 History








                                    Allergies











Allergy/AdvReac Type Severity Reaction Status Date / Time


 


amoxicillin [From Augmentin] Allergy  Rash/Hives Verified 25 20:14


 


clavulanic acid Allergy  Rash/Hives Verified 25 20:14





[From Augmentin]     














Physical Exam


Vitals: 


                                   Vital Signs











  Temp Pulse Resp BP Pulse Ox


 


 25 05:26  98.7 F  59 L  16  147/72  100


 


 25 03:25   67  16  145/67  98


 


 25 02:12   61  16  146/67  96


 


 25 00:00   81  16  139/77  96


 


 25 22:56   73  16  111/71  97


 


 25 19:35   112 H  18  117/79  95


 


 25 18:32  99.5 F  113 H  20  148/75  86 L








                                Intake and Output











 25





 22:59 06:59 14:59


 


Other:   


 


  Weight 99.79 kg  














Results





                                 25 07:30





                                 25 07:30


                                 Cardiac Enzymes











  25 Range/Units





  18:54 18:54 00:39 


 


AST  35    (17-59)  U/L


 


Troponin I   0.043 H*  0.091 H*  (0.000-0.034)  ng/mL














  25 Range/Units





  03:10 


 


AST   (17-59)  U/L


 


Troponin I  0.078 H*  (0.000-0.034)  ng/mL








                                   Coagulation











  25 Range/Units





  18:54 


 


PT  12.1  (10.0-12.5)  sec


 


APTT  22.4  (22.0-30.0)  sec








                                       CBC











  25 Range/Units





  18:54 


 


WBC  13.2 H  (3.8-10.6)  k/uL


 


RBC  3.69 L  (4.30-5.90)  m/uL


 


Hgb  11.7 L  (13.0-17.5)  gm/dL


 


Hct  32.9 L  (39.0-53.0)  %


 


Plt Count  137 L  (150-450)  k/uL








                          Comprehensive Metabolic Panel











  25 Range/Units





  18:54 


 


Sodium  137  (137-145)  mmol/L


 


Potassium  3.2 L  (3.5-5.1)  mmol/L


 


Chloride  99  ()  mmol/L


 


Carbon Dioxide  25  (22-30)  mmol/L


 


BUN  15  (9-20)  mg/dL


 


Creatinine  0.90  (0.66-1.25)  mg/dL


 


Glucose  121 H  (74-99)  mg/dL


 


Calcium  8.9  (8.4-10.2)  mg/dL


 


AST  35  (17-59)  U/L


 


ALT  21  (4-49)  U/L


 


Alkaline Phosphatase  132 H  ()  U/L


 


Total Protein  6.1 L  (6.3-8.2)  g/dL


 


Albumin  3.8  (3.5-5.0)  g/dL








                               Current Medications











Generic Name Dose Route Start Last Admin





  Trade Name Freq  PRN Reason Stop Dose Admin


 


Hydrocodone Bitart/Acetaminophen  1 each  25 08:00 





  Hydrocodone/Apap 5-325mg 1 Each Tab  PO  





  Q6H MAYRA  


 


Albuterol/Ipratropium  3 ml  25 22:10 





  Ipratropium-Albuterol 3 Ml Neb  INHALATION  





  RT-Q4H PRN  





  shortness of breath  


 


Alprazolam  0.25 mg  25 02:07 





  Alprazolam 0.25 Mg Tab  PO  





  DAILY PRN  





  Anxiety  


 


Atorvastatin Calcium  80 mg  25 09:00 





  Atorvastatin 80 Mg Tab  PO  





  DAILY MAYRA  


 


Azithromycin  500 mg  25 09:00 





  Azithromycin 500 Mg Tab  PO  02/15/25 09:01 





  DAILY MAYRA  





  Protocol  


 


Dextrose/Water  25 ml  25 02:01 





  Dextrose 50% Syringe 50 Ml  IVP  





  PER PROTOCOL PRN  





  Hypoglycemia  





  Protocol  


 


Dextrose/Water  50 ml  25 02:01 





  Dextrose 50% Syringe 50 Ml  IVP  





  PER PROTOCOL PRN  





  Hypoglycemia  





  Protocol  


 


Enoxaparin Sodium  40 mg  25 09:00 





  Enoxaparin 40 Mg/0.4 Ml Syringe  SQ  





  DAILY MAYRA  


 


Ceftriaxone Sodium 2 gm/  50 mls @ 100 mls/hr  25 21:00 





  Sodium Chloride  IVPB  25 21:29 





  Q24H [facility]  





  Protocol  


 


Sodium Chloride  1,000 mls @ 130 mls/hr  25 03:00  25 03:22





  Saline 0.9%  IV   130 mls/hr





  .[id number] MAYRA   Administration


 


Insulin Aspart  0 unit  25 07:30 





  Insulin Aspart (Novolog) 100 Unit/Ml Vial  SQ  





  ACHS [facility]  





  Protocol  


 


Lisinopril  20 mg  25 09:00 





  Lisinopril 20 Mg Tab  PO  





  BID [facility]  


 


Miscellaneous Information  1 each  25 21:33 





  Pneumonia Protocol Utilized 1 Each Misc  PO  





  ONCE PRN  





  Per Protocol  


 


Montelukast Sodium  10 mg  25 09:00 





  Montelukast 10 Mg Tab  PO  





  DAILY [facility]  


 


Pantoprazole Sodium  40 mg  25 09:00 





  Pantoprazole 40 Mg Tablet  PO  





  DAILY [facility]  








                                Intake and Output











 25





 22:59 06:59 14:59


 


Other:   


 


  Weight 99.79 kg  








                                        





                                 25 18:54 





                                 25 18:54

## 2025-02-14 NOTE — XR
EXAMINATION TYPE: XR chest 1V portable

 

DATE OF EXAM: 2/14/2025 8:21 PM

 

COMPARISON: None. 

 

CLINICAL INDICATION: Male, 79 years old with history of Interval change, hypoxia,

 

TECHNIQUE: XR chest 1V portable view(s) obtained.

 

FINDINGS:  

The heart size is normal.  

The pulmonary vasculature is normal.  

There is worsening of the bilateral perihilar infiltrates more so on the left.  

 

 

IMPRESSION:  

1. Worsening perihilar infiltrates more noticeably on the left. Pneumonia and pulmonary edema could b
e considered.

 

[facility], Workstation: [id number], 2/14/2025 8:42 PM

## 2025-02-15 LAB
ALBUMIN SERPL-MCNC: 3.5 G/DL (ref 3.5–5)
ALP SERPL-CCNC: 114 U/L (ref 38–126)
ALT SERPL-CCNC: 25 U/L (ref 4–49)
ANION GAP SERPL CALC-SCNC: 10 MMOL/L
AST SERPL-CCNC: 62 U/L (ref 17–59)
BASOPHILS # BLD AUTO: 0.1 K/UL (ref 0–0.2)
BASOPHILS NFR BLD AUTO: 1 %
BUN SERPL-SCNC: 19 MG/DL (ref 9–20)
CALCIUM SPEC-MCNC: 8.4 MG/DL (ref 8.4–10.2)
CHLORIDE SERPL-SCNC: 95 MMOL/L (ref 98–107)
CO2 SERPL-SCNC: 30 MMOL/L (ref 22–30)
EOSINOPHIL # BLD AUTO: 0.1 K/UL (ref 0–0.7)
EOSINOPHIL NFR BLD AUTO: 1 %
ERYTHROCYTE [DISTWIDTH] IN BLOOD BY AUTOMATED COUNT: 3.83 M/UL (ref 4.3–5.9)
ERYTHROCYTE [DISTWIDTH] IN BLOOD: 14 % (ref 11.5–15.5)
GLUCOSE BLD-MCNC: 203 MG/DL (ref 70–110)
GLUCOSE BLD-MCNC: 227 MG/DL (ref 70–110)
GLUCOSE BLD-MCNC: 230 MG/DL (ref 70–110)
GLUCOSE BLD-MCNC: 296 MG/DL (ref 70–110)
GLUCOSE SERPL-MCNC: 165 MG/DL (ref 74–99)
HCT VFR BLD AUTO: 35.5 % (ref 39–53)
HGB BLD-MCNC: 11.9 GM/DL (ref 13–17.5)
INR PPP: 1.2 (ref ?–1.2)
LYMPHOCYTES # SPEC AUTO: 8.8 K/UL (ref 1–4.8)
LYMPHOCYTES NFR SPEC AUTO: 52 %
MAGNESIUM SPEC-SCNC: 1.8 MG/DL (ref 1.6–2.3)
MCH RBC QN AUTO: 31 PG (ref 25–35)
MCHC RBC AUTO-ENTMCNC: 33.6 G/DL (ref 31–37)
MCV RBC AUTO: 92.5 FL (ref 80–100)
MONOCYTES # BLD AUTO: 0.7 K/UL (ref 0–1)
MONOCYTES NFR BLD AUTO: 4 %
NEUTROPHILS # BLD AUTO: 6.7 K/UL (ref 1.3–7.7)
NEUTROPHILS NFR BLD AUTO: 40 %
PLATELET # BLD AUTO: 155 K/UL (ref 150–450)
POTASSIUM SERPL-SCNC: 3.3 MMOL/L (ref 3.5–5.1)
PROT SERPL-MCNC: 5.8 G/DL (ref 6.3–8.2)
PT BLD: 12.4 SEC (ref 10–12.5)
SODIUM SERPL-SCNC: 135 MMOL/L (ref 137–145)
WBC # BLD AUTO: 16.9 K/UL (ref 3.8–10.6)

## 2025-02-15 RX ADMIN — INSULIN DETEMIR SCH UNIT: 100 INJECTION, SOLUTION SUBCUTANEOUS at 20:49

## 2025-02-15 RX ADMIN — IPRATROPIUM BROMIDE AND ALBUTEROL SULFATE PRN ML: .5; 3 SOLUTION RESPIRATORY (INHALATION) at 08:09

## 2025-02-15 RX ADMIN — SODIUM CHLORIDE SCH MLS/HR: 9 INJECTION, SOLUTION INTRAVENOUS at 12:42

## 2025-02-15 RX ADMIN — POTASSIUM CHLORIDE STA MEQ: 20 TABLET, EXTENDED RELEASE ORAL at 14:42

## 2025-02-15 RX ADMIN — SODIUM CHLORIDE SCH MLS/HR: 9 INJECTION, SOLUTION INTRAVENOUS at 00:32

## 2025-02-15 RX ADMIN — CEFEPIME HYDROCHLORIDE SCH MLS/HR: 2 INJECTION, POWDER, FOR SOLUTION INTRAVENOUS at 00:06

## 2025-02-15 NOTE — CA
Transthoracic Echo Report 

 Name: Wade Wagoner 

 MRN:    [id number] 

 Age:    79     Gender:     M 

 

 :    1945 

 Exam Date:     2025 10:03 

 Exam Location: [facility] 

 Ht (in):     72     Wt (lb):     220 

 Ordering Physician:        Dora Gill 

 Attending/Referring Phys:         [id number], Bridget 

 Technician         Whitney Garcia RDCS 

 Procedure CPT: 

 Indications:       nstemi 

 

 Cardiac Hx: 

 Technical Quality:      Technically difficult study 

 Contrast 1:                                Total Dose (mL): 

 Contrast 2:                                Total Dose (mL): 

 

 MEASUREMENTS  (Male / Female) Normal Values 

 2D ECHO 

 LV Diastolic Diameter PLAX        6.0 cm                4.2 - 5.9 / 3.9 - 5.3 cm 

 LV Systolic Diameter PLAX         4.2 cm                 

 IVS Diastolic Thickness           1.3 cm                0.6 - 1.0 / 0.6 - 0.9 cm 

 LVPW Diastolic Thickness          1.4 cm                0.6 - 1.0 / 0.6 - 0.9 cm 

 LV Relative Wall Thickness        0.4                    

 RV Internal Dim ED PLAX           3.1 cm                 

 LA Systolic Diameter LX           3.6 cm                3.0 - 4.0 / 2.7 - 3.8 cm 

 LV Diastolic Volume MOD BP        127.3 cm???             67 - 155 / 56 - 104 cm??? 

 LV Systolic Volume MOD BP         48.7 cm???              22 - 58 / 19 - 49 cm??? 

 LV Ejection Fraction MOD BP       61.7 %                >= 55  % 

 LV Cardiac Index MOD BP           2764.7 cm???/min???m???      

 LV Diastolic Volume MOD 4C        139.9 cm???              

 LV Systolic Volume MOD 4C         57.7 cm???               

 LV Ejection Fraction MOD 4C       58.7 %                 

 LV Cardiac Index MOD 4C           2890.3 cm???/min???m???      

 LV Diastolic Length 4C            8.6 cm                 

 LV Systolic Length 4C             6.9 cm                 

 LV Diastolic Volume MOD 2C        104.5 cm???              

 LV Systolic Volume MOD 2C         38.6 cm???               

 LV Ejection Fraction MOD 2C       63.1 %                 

 LV Cardiac Index MOD 2C           2318.5 cm???/min???m???      

 LV Diastolic Length 2C            7.7 cm                 

 LV Systolic Length 2C             6.5 cm                 

 

 M-MODE 

 Aortic Root Diameter MM           3.8 cm                 

 

 DOPPLER 

 MV Area PHT                       3.3 cm???                

 Mitral E Point Velocity           62.4 cm/s              

 Mitral A Point Velocity           98.3 cm/s              

 Mitral E to A Ratio               0.6                    

 MV Deceleration Time              232.7 ms               

 TR Peak Velocity                  256.7 cm/s             

 TR Peak Gradient                  26.4 mmHg              

 Right Ventricular Systolic Press  31.4 mmHg              

 

 

 FINDINGS 

 Left Ventricle 

 Left ventricular ejection fraction is estimated at 50-55 %.  Mildly increased  

 septal wall thickness. Mildly increased left ventricular diastolic diameter.   

 Inferolateral wall hypokinesia 

 

 Right Ventricle 

 Normal right ventricular size. Right ventricular systolic pressure within  

 normal limits. 

 

 Right Atrium 

 Normal right atrial size. No right atrial thrombus or mass seen. 

 

 Left Atrium 

 Left atrium not well visualized. 

 

 Mitral Valve 

 Structurally normal mitral valve. No mitral stenosis, regurgitation or  

 prolapse. 

 

 Aortic Valve 

 Trileaflet aortic valve. Aortic valve sclerosis. Trace to mild aortic  

 regurgitation. 

 

 Tricuspid Valve 

 Structurally normal tricuspid valve. Mild tricuspid regurgitation. 

 

 Pulmonic Valve 

 Structurally normal pulmonic valve. Trace pulmonic regurgitation. 

 

 Pericardium 

 No pericardial effusion. 

 

 Aorta 

 Mild aortic dilatation at the level of the sinuses of valsalva 38 mm 

 

 CONCLUSIONS 

 LVEF 55% 

 Inferolateral wall hypokinesia 

 No significant valvular dysfunction 

 Normal RV size and systolic function 

 Previewed by:  

 Dr Juve Trevizo 

 (Electronically Signed) 

 Final Date:      15 February 2025 14:18

## 2025-02-15 NOTE — P.PN
Subjective


Progress Note Date: 02/15/25





Hospital Course: 


79-year-old male with past medical history significant for coronary artery dis

ease, insulin-dependent diabetes mellitus, hypertension presents to the 

emergency department today due to altered mental status. 


Initial vitals: /75,  bpm, T 99.5 F, RR 20, O2 saturation 86% on 

room air


Initial labs: WBCs 13.2, RBC 3.69, hemoglobin 11.7, hematocrit 32.9, platelets 

137, sodium 137, potassium 3.2, chloride 99, CO2 25, BUN 15, creatinine 0.9, 

total bilirubin 1.5, alkaline phosphatase 132, troponin x 2 0.043 and 0.091, 

total protein 6.1; urinalysis 1+ protein, trace glucose


Initial EKG: Sinus tachycardia with ventricular rate of 118 bpm, QTc 384 ms, 

left axis deviation, left ventricular hypertrophy


Initial chest x-ray: No acute cardiopulmonary disease/process


Initial CTA chest: No evidence of pulmonary embolism, diffuse patchy groundglass

pulmonary opacities suggestive of atypical pulmonary infection, cholelithiasis, 

fluid identified within the visualized esophagus


Initial brain CT: No acute intracranial process; age-related atrophy with 

nonspecific white matter changes, likely secondary to chronic small vessel 

ischemic changes





Arterial Dopplers of lower extremity showed noncompressible vessels LAKESHA unable 

to be obtained.  No concern for ischemic.  Troponin continue to worsen.  

Cardiology following.  Started patient on heparin drip.  Echocardiogram showed 

LVEF 55%.  Respiratory function continue to worsen.  Antibiotics were broadened.

 Pulmonology also consulted.  On Airvo.








Subjective: 


Seen and examined at bedside.  Overnight, patient was more hypoxic, antibiotics 

were broadened.  Now requiring Airvo.





Pertinent positives and negatives as discussed above, a complete review of 

systems was performed and all other systems are negative.








Vitals Signs Reviewed. 





General: Nontoxic, no distress, appears at stated age, tired appearing


Derm: Warm, dry


Head: Atraumatic, normocephalic, symmetric


Eyes: EOMI, no lid lag, anicteric sclera


Mouth: No lip lesion, mucus membranes moist


Cardiovascular: S1S2 reg, no murmur


Lungs: Bilateral rales, no accessory muscle use, supplemental oxygen


Abdominal: Soft, nontender to palpation, no guarding, no appreciable 

organomegaly


Ext: No gross muscle atrophy, no edema, no contractures


Neuro: CN II-XI grossly intact, no focal neuro deficits


Psych: Alert, oriented, appropriate affect





Data Reviewed Today: 


Pertinent Labs: WBC 16.9, hemoglobin 11.9, sodium 135, potassium 3.  3, 

creatinine 1.06, blood sugars range between 2-3 2-96, A1c 6.8, proBNP 2700, 

procalcitonin 0.21.


Imaging: Chest x-ray independently interpreted, bilateral interstitial opacities

worsened from yesterday





Assessment and Plan:


Active:


Acute hypoxic respiratory failure


Multifocal pneumonia


Acute NSTEMI


-Continue to wean oxygen, patient currently on Airvo


-On IV cefepime 2 g every 8 hours, IV vancomycin 1500 mg every 12 hours, monitor

for renal toxicity


-Was given 80 mg IV Lasix overnight


-Pulmonology consulted, discussed plan


-Patient continued on heparin drip, monitor APTT, monitor for bleeding


-Continue aspirin 81 mg, atorvastatin 80 mg





Diabetes


Hyperglycemia


-Start Levemir 10 units nightly, continue sliding scale insulin, monitor for 

hypoglycemia





Hypokalemia


-20 mill equivalents of oral potassium given today





Right plantar foot ulcer


-Wound care following





Hyperbilirubinemia


-Normal right upper quadrant pain








Chronic:


Hypertension


History of CAD


History of CLL, in remission





DVT ppx: Heparin drip





Code status: Full code





Anticipated discharge place: Pending clinical course


Anticipated discharge time: Pending clinical course





Objective





- Vital Signs


Vital signs: 


                                   Vital Signs











Temp  99.1 F   02/15/25 12:00


 


Pulse  61   02/15/25 13:53


 


Resp  20   02/15/25 13:53


 


BP  131/64   02/15/25 12:00


 


Pulse Ox  98   02/15/25 12:00


 


FiO2  65   02/15/25 12:00








                                 Intake & Output











 02/14/25 02/15/25 02/15/25





 18:59 06:59 18:59


 


Intake Total   770


 


Output Total 1250 2700 


 


Balance -1250 -2700 770


 


Weight 99.79 kg 131.5 kg 


 


Intake:   


 


  Intake, IV Titration   250





  Amount   


 


    Heparin Sod,Pork in 0.45%   250





    NaCl 25,000 unit In 0.45   





    % NaCl 1 250ml.bag @ 10.   





    021 UNITS/KG/HR 10 mls/hr   





    IV .Q24H [facility] Rx#:   





    [id number]   


 


  Oral   520


 


Output:   


 


  Urine 1250 2700 


 


    Uretheral (Eid) 300  


 


Other:   


 


  Voiding Method  Indwelling Catheter Indwelling Catheter


 


  # Voids 5  














- Labs


CBC & Chem 7: 


                                 02/15/25 06:52





                                 02/15/25 06:52


Labs: 


                  Abnormal Lab Results - Last 24 Hours (Table)











  02/14/25 02/14/25 02/14/25 Range/Units





  15:11 17:49 20:02 


 


WBC     (3.8-10.6)  k/uL


 


RBC     (4.30-5.90)  m/uL


 


Hgb     (13.0-17.5)  gm/dL


 


Hct     (39.0-53.0)  %


 


Lymphocytes #     (1.0-4.8)  k/uL


 


INR     (<1.2)  


 


APTT  49.5 H    (22.0-30.0)  sec


 


ABG pO2    62 L  ()  mmHg


 


ABG HCO3    27 H  (21-25)  mmol/L


 


ABG Total CO2    28 H  (19-24)  mmol/L


 


ABG O2 Saturation    91.5 L  (94-97)  %


 


Hemoglobin    11.6 L  (13.0-17.5)  gm/dL


 


Sodium     (137-145)  mmol/L


 


Potassium     (3.5-5.1)  mmol/L


 


Chloride     ()  mmol/L


 


Glucose     (74-99)  mg/dL


 


POC Glucose (mg/dL)   229 H   ()  mg/dL


 


Hemoglobin A1c     (<=6.0)  %


 


Total Bilirubin     (0.2-1.3)  mg/dL


 


AST     (17-59)  U/L


 


Total Protein     (6.3-8.2)  g/dL














  02/14/25 02/15/25 02/15/25 Range/Units





  20:11 05:58 06:52 


 


WBC     (3.8-10.6)  k/uL


 


RBC     (4.30-5.90)  m/uL


 


Hgb     (13.0-17.5)  gm/dL


 


Hct     (39.0-53.0)  %


 


Lymphocytes #     (1.0-4.8)  k/uL


 


INR     (<1.2)  


 


APTT     (22.0-30.0)  sec


 


ABG pO2     ()  mmHg


 


ABG HCO3     (21-25)  mmol/L


 


ABG Total CO2     (19-24)  mmol/L


 


ABG O2 Saturation     (94-97)  %


 


Hemoglobin     (13.0-17.5)  gm/dL


 


Sodium     (137-145)  mmol/L


 


Potassium     (3.5-5.1)  mmol/L


 


Chloride     ()  mmol/L


 


Glucose     (74-99)  mg/dL


 


POC Glucose (mg/dL)  214 H  203 H   ()  mg/dL


 


Hemoglobin A1c    6.9 H  (<=6.0)  %


 


Total Bilirubin     (0.2-1.3)  mg/dL


 


AST     (17-59)  U/L


 


Total Protein     (6.3-8.2)  g/dL














  02/15/25 02/15/25 02/15/25 Range/Units





  06:52 06:52 06:52 


 


WBC  16.9 H    (3.8-10.6)  k/uL


 


RBC  3.83 L    (4.30-5.90)  m/uL


 


Hgb  11.9 L    (13.0-17.5)  gm/dL


 


Hct  35.5 L    (39.0-53.0)  %


 


Lymphocytes #  8.8 H    (1.0-4.8)  k/uL


 


INR   1.2 H   (<1.2)  


 


APTT     (22.0-30.0)  sec


 


ABG pO2     ()  mmHg


 


ABG HCO3     (21-25)  mmol/L


 


ABG Total CO2     (19-24)  mmol/L


 


ABG O2 Saturation     (94-97)  %


 


Hemoglobin     (13.0-17.5)  gm/dL


 


Sodium    135 L  (137-145)  mmol/L


 


Potassium    3.3 L  (3.5-5.1)  mmol/L


 


Chloride    95 L  ()  mmol/L


 


Glucose    165 H  (74-99)  mg/dL


 


POC Glucose (mg/dL)     ()  mg/dL


 


Hemoglobin A1c     (<=6.0)  %


 


Total Bilirubin    1.6 H  (0.2-1.3)  mg/dL


 


AST    62 H  (17-59)  U/L


 


Total Protein    5.8 L  (6.3-8.2)  g/dL














  02/15/25 Range/Units





  11:06 


 


WBC   (3.8-10.6)  k/uL


 


RBC   (4.30-5.90)  m/uL


 


Hgb   (13.0-17.5)  gm/dL


 


Hct   (39.0-53.0)  %


 


Lymphocytes #   (1.0-4.8)  k/uL


 


INR   (<1.2)  


 


APTT   (22.0-30.0)  sec


 


ABG pO2   ()  mmHg


 


ABG HCO3   (21-25)  mmol/L


 


ABG Total CO2   (19-24)  mmol/L


 


ABG O2 Saturation   (94-97)  %


 


Hemoglobin   (13.0-17.5)  gm/dL


 


Sodium   (137-145)  mmol/L


 


Potassium   (3.5-5.1)  mmol/L


 


Chloride   ()  mmol/L


 


Glucose   (74-99)  mg/dL


 


POC Glucose (mg/dL)  296 H  ()  mg/dL


 


Hemoglobin A1c   (<=6.0)  %


 


Total Bilirubin   (0.2-1.3)  mg/dL


 


AST   (17-59)  U/L


 


Total Protein   (6.3-8.2)  g/dL








                      Microbiology - Last 24 Hours (Table)











 02/13/25 22:08 Blood Culture - Preliminary





 Blood

## 2025-02-15 NOTE — XR
EXAMINATION TYPE: XR chest 1V portable

 

DATE OF EXAM: 2/15/2025 8:37 AM

 

COMPARISON: Chest radiograph from one day prior.

 

CLINICAL INDICATION: Male, 79 years old with history of hypoxia; PHH

 

TECHNIQUE: XR chest 1V portable Frontal view of the chest.

 

FINDINGS: 

Lungs/Pleura: Improved aeration of lungs on today's exam with persistent airspace opacities scattered
 throughout the lungs. No evidence of pneumothorax or large pleural effusion. 

Pulmonary vascularity: Unremarkable.

Heart/mediastinum: Cardiomediastinal silhouette is unremarkable.

Musculoskeletal: No acute osseous pathology.

 

IMPRESSION: 

Improved aeration of the lungs with persistent multifocal airspace opacities.

 

[facility], Workstation: [id number], 2/15/2025 8:40 AM

## 2025-02-15 NOTE — P.CNPUL
History of Present Illness


Consult date: 02/15/25


Requesting physician: Sia Baez


Reason for consult: dyspnea, chest pain, hypoxemia, abnormal CXR/CT


Chief complaint: Shortness of breath.


History of present illness: 





Pulmonary consult dated February 15, 2025.





79-year-old male who presented to the emergency department, on , 

complaining of nausea, vomiting, diarrhea, and mental status changes.  The 

patient was brought into the emergency department, by EMS, and apparently he was

quite confused, and disoriented, when they initially evaluated him.  He 

apparently was having GI episodes, including vomiting, nausea, and diarrhea.  He

denies having any chest pain or shortness of breath although his saturations 

were in the low 80s.  He was placed on oxygen therapy, and brought into the 

hospital.  When he was in the ER, he was apparently alert and oriented x 3.  He 

was feeling a bit short of breath.  We were consulted today, because the 

patient's respiratory status has declined, and he is currently on Airvo, with 

settings of 60 L/min, and an FiO2 of 75%.  The patient was thought to have a 

non-ST segment elevation myocardial infarction, and pneumonia.  He was on IV 

heparin, and saline at 20 cc an hour, as well as vancomycin, and cefepime.  His 

procalcitonin level was 0.21.  Current laboratory data includes a white count of

16.9, hemoglobin 11.9, hematocrit 35.5, and a platelet count of 155,000.  A 

blood gas done yesterday shows a pO2 of 62, pCO2 42, and a pH of 7.41.  This 

apparently was on 100% oxygen.  Sodium 135, potassium 3.3, chloride 95, CO2 30, 

anion gap 10, BUN 19, creatinine 1.06.  Glucose 296.  Hemoglobin A1c was 6.9.  

Albumin was 3.5.  N-terminal proBNP was 2750.  Blood cultures were negative.  

Chest x-ray showed bilateral multifocal airspace opacities, which have improved.





Review of Systems





REVIEW OF SYSTEMS:  


CONSTITUTIONAL: Acute mental status changes.


NEUROLOGIC: [ Negative.]


HEENT:  [ Negative.]


CARDIAC:  [Negative.]


PULMONARY: Shortness of breath.


GI: Nausea, vomiting, and diarrhea.


:  [Negative.]


RHEUMATOLOGIC: [ Negative.]


IMMUNOLOGIC: [ Negative.]


ENDOCRINE:  [Negative.  ] 


DERMATOLOGIC: [Negative.]








Past Medical History


Past Medical History: Coronary Artery Disease (CAD), Cancer, Diabetes Mellitus, 

GERD/Reflux, Hypertension, Osteoarthritis (OA)


Additional Past Medical History / Comment(s): NIDDM type II,  LEUKEMIA (CLL-

remission), sinus problems, seasonal allergies, tinnitis bilaterally, OA hands, 

R wrist carpal tunnel, past fx with L elbow, R rotator cuff tendon problems, L 

rotator cuff tear, left knee pain


History of Any Multi-Drug Resistant Organisms: None Reported


Past Surgical History: Heart Catheterization, Orthopedic Surgery


Additional Past Surgical History / Comment(s): LEFT HAND thumb tendon repair. L 

knee surgery.


Past Anesthesia/Blood Transfusion Reactions: No Reported Reaction


Past Psychological History: No Psychological Hx Reported


Smoking Status: Never smoker


Past Alcohol Use History: None Reported


Past Drug Use History: None Reported





- Past Family History


  ** Father


Family Medical History: CVA/TIA


Additional Family Medical History / Comment(s): Father  at the age of 85yrs.





  ** Mother


Family Medical History: Diabetes Mellitus


Additional Family Medical History / Comment(s): Mother  at the age of 90yrs.





Medications and Allergies


                                Home Medications











 Medication  Instructions  Recorded  Confirmed  Type


 


Montelukast Sodium [Singulair] 10 mg PO DAILY 17 History


 


Fluticasone Nasal Spray [Flonase 2 spr EA NOSTRIL DAILY 21 

History





Nasal Spray]    


 


Atorvastatin [Lipitor] 80 mg PO DAILY 21 History


 


Omeprazole 40 mg PO DAILY 10/20/21 02/13/25 History


 


Meclizine [Antivert] 25 mg PO TID 22 History


 


ALPRAZolam [Xanax] 0.25 mg PO DAILY PRN 23 History


 


HYDROcodone/APAP 5-325MG [Norco 1 tab PO Q6HR 23 History





5-325]    


 


Baclofen 10 - 20 mg PO DAILY 25 History


 


Sucralfate [Carafate] 1 gm PO ACHS 25 History


 


cefuroxime axetiL [Ceftin] 500 mg PO BID 25 History


 


Insulin NPH Hum/Reg Insulin Hm 15 unit SQ HS 25 History





[NovoLIN 70-30 100 Unit/ml Vial]    


 


Insulin NPH Hum/Reg Insulin Hm 25 unit SQ DAILY 25 History





[NovoLIN 70-30 100 Unit/ml Vial]    


 


lisinopriL [Zestril] 20 mg PO BID 25 History








                                    Allergies











Allergy/AdvReac Type Severity Reaction Status Date / Time


 


amoxicillin [From Augmentin] Allergy  Rash/Hives Verified 25 20:14


 


clavulanic acid Allergy  Rash/Hives Verified 25 20:14





[From Augmentin]     














Physical Exam


Osteopathic Statement: *.  No significant issues noted on an osteopathic 

structural exam other than those noted in the History and Physical/Consult.


Vitals: 


                                   Vital Signs











  Temp Pulse Pulse Resp BP BP Pulse Ox


 


 02/15/25 12:00  99.1 F   61    131/64  98


 


 02/15/25 11:32        97


 


 02/15/25 08:13       


 


 02/15/25 08:00  100.3 F H   81  24   134/68  100


 


 02/15/25 03:51        98


 


 02/15/25 03:18  99 F   80  24   146/69  95


 


 02/15/25 00:35  100 F H   71  24    99


 


 25 23:56        99


 


 25 22:58  101.9 F H      


 


 25 22:24  102.8 F H   100  24   170/80  99


 


 25 20:41        95


 


 25 20:04     24    96


 


 25 19:49     24    92 L


 


 25 19:40  98.3 F    28 H   191/70  75 L


 


 25 17:55    70  16   172/74  92 L


 


 25 16:55  99.2 F  71     


 


 25 16:54   65   18  156/70   94 L


 


 25 15:48   73   18    96


 


 25 14:50   84   18  165/75   98


 


 25 14:39   104 H   18  144/95   94 L














  FiO2


 


 02/15/25 12:00  65


 


 02/15/25 11:32  70


 


 02/15/25 08:13  70


 


 02/15/25 08:00  72


 


 02/15/25 03:51  75


 


 02/15/25 03:18  78


 


 02/15/25 00:35  85


 


 25 23:56  80


 


 25 22:58 


 


 25 22:24  85


 


 25 20:41  85


 


 25 20:04 


 


 25 19:49 


 


 25 19:40 


 


 25 17:55 


 


 25 16:55 


 


 25 16:54 


 


 25 15:48 


 


 25 14:50 


 


 25 14:39 








                                Intake and Output











 02/14/25 02/15/25 02/15/25





 22:59 06:59 14:59


 


Intake Total   590


 


Output Total 2200 500 


 


Balance -2200 -500 590


 


Intake:   


 


  Intake, IV Titration   250





  Amount   


 


    Heparin Sod,Pork in 0.45%   250





    NaCl 25,000 unit In 0.45   





    % NaCl 1 250ml.bag @ 10.   





    021 UNITS/KG/HR 10 mls/hr   





    IV .Q24H [facility] Rx#:   





    [id number]   


 


  Oral   340


 


Output:   


 


  Urine 2200 500 


 


Other:   


 


  Voiding Method Indwelling Catheter Indwelling Catheter Indwelling Catheter


 


  Weight 99.79 kg 131.5 kg 














No acute distress, oriented 3.  The patient is currently on Airvo.  Settings 

are 60 L/min with an FiO2 of 75%.  The patient is able to speak in full se

ntences without difficulty.





HEENT examination is grossly unremarkable.  Mucous membranes are moist.  No oral

lesions.





Neck supple.  Full range of motion.  No adenopathy thyromegaly or neck vein dist

ention.





Cardiovascular examination reveals regular rhythm rate.  S1-S2 normal.  No S3 or

S4.  No discernible murmur noted.  Heart sounds are distant.





Lungs reveal mild scattered rhonchi.  No wheezes.  No crackles.  Breath sounds 

equal bilaterally.





Abdomen soft bowel sounds are heard.  No masses or tenderness.





Extremities are intact.  No cyanosis clubbing or edema.





Skin is without rash or lesion.





Neurologic examination is brief but nonfocal.





Results





- Laboratory Findings


CBC and BMP: 


                                 02/15/25 06:52





                                 02/15/25 06:52


ABG











ABG pH  7.41  (7.35-7.45)   25  20:02    


 


ABG pCO2  42 mmHg (35-45)   25  20:02    


 


ABG pO2  62 mmHg ()  L  25  20:02    


 


ABG O2 Saturation  91.5 % (94-97)  L  25  20:02    





PT/INR, D-dimer











PT  12.4 sec (10.0-12.5)   02/15/25  06:52    


 


INR  1.2  (<1.2)  H  02/15/25  06:52    








Abnormal lab findings: 


                                  Abnormal Labs











  25





  18:54 18:54 18:54


 


WBC  13.2 H  


 


RBC  3.69 L  


 


Hgb  11.7 L  


 


Hct  32.9 L  


 


Plt Count  137 L  


 


Lymphocytes #   


 


Lymphocytes # (Manual)  5.02 H  


 


INR   


 


APTT   


 


ABG pO2   


 


ABG HCO3   


 


ABG Total CO2   


 


ABG O2 Saturation   


 


Hemoglobin   


 


Sodium   


 


Potassium    3.2 L


 


Chloride   


 


Glucose    121 H


 


POC Glucose (mg/dL)   


 


Hemoglobin A1c   


 


Total Bilirubin    1.5 H


 


AST   


 


Alkaline Phosphatase    132 H


 


Troponin I   


 


Total Protein    6.1 L


 


Urine Protein   


 


Urine Glucose (UA)   


 


Urine Blood   


 


Urine RBC   


 


Urine Opiates Screen   Detected H 














  25





  18:54 18:55 19:31


 


WBC   


 


RBC   


 


Hgb   


 


Hct   


 


Plt Count   


 


Lymphocytes #   


 


Lymphocytes # (Manual)   


 


INR   


 


APTT   


 


ABG pO2   


 


ABG HCO3   


 


ABG Total CO2   


 


ABG O2 Saturation   


 


Hemoglobin   


 


Sodium   


 


Potassium   


 


Chloride   


 


Glucose   


 


POC Glucose (mg/dL)    124 H


 


Hemoglobin A1c   


 


Total Bilirubin   


 


AST   


 


Alkaline Phosphatase   


 


Troponin I  0.043 H*  


 


Total Protein   


 


Urine Protein   1+ H 


 


Urine Glucose (UA)   Trace H 


 


Urine Blood   Small H 


 


Urine RBC   6 H 


 


Urine Opiates Screen   














  25





  00:39 02:24 03:10


 


WBC   


 


RBC   


 


Hgb   


 


Hct   


 


Plt Count   


 


Lymphocytes #   


 


Lymphocytes # (Manual)   


 


INR   


 


APTT   


 


ABG pO2   


 


ABG HCO3   


 


ABG Total CO2   


 


ABG O2 Saturation   


 


Hemoglobin   


 


Sodium   


 


Potassium   


 


Chloride   


 


Glucose   


 


POC Glucose (mg/dL)   136 H 


 


Hemoglobin A1c   


 


Total Bilirubin   


 


AST   


 


Alkaline Phosphatase   


 


Troponin I  0.091 H*   0.078 H*


 


Total Protein   


 


Urine Protein   


 


Urine Glucose (UA)   


 


Urine Blood   


 


Urine RBC   


 


Urine Opiates Screen   














  25





  07:30 07:30 08:09


 


WBC  16.4 H  


 


RBC  3.92 L  


 


Hgb  12.4 L  


 


Hct  35.9 L  


 


Plt Count  136 L  


 


Lymphocytes #  9.2 H  


 


Lymphocytes # (Manual)   


 


INR   


 


APTT   


 


ABG pO2   


 


ABG HCO3   


 


ABG Total CO2   


 


ABG O2 Saturation   


 


Hemoglobin   


 


Sodium   


 


Potassium   3.4 L 


 


Chloride   


 


Glucose   136 H 


 


POC Glucose (mg/dL)    138 H


 


Hemoglobin A1c   


 


Total Bilirubin   1.4 H 


 


AST   


 


Alkaline Phosphatase   130 H 


 


Troponin I   


 


Total Protein   


 


Urine Protein   


 


Urine Glucose (UA)   


 


Urine Blood   


 


Urine RBC   


 


Urine Opiates Screen   














  25





  12:59 13:45 15:11


 


WBC   


 


RBC   


 


Hgb   


 


Hct   


 


Plt Count   


 


Lymphocytes #   


 


Lymphocytes # (Manual)   


 


INR   


 


APTT    49.5 H


 


ABG pO2   


 


ABG HCO3   


 


ABG Total CO2   


 


ABG O2 Saturation   


 


Hemoglobin   


 


Sodium   


 


Potassium   


 


Chloride   


 


Glucose   


 


POC Glucose (mg/dL)  209 H  191 H 


 


Hemoglobin A1c   


 


Total Bilirubin   


 


AST   


 


Alkaline Phosphatase   


 


Troponin I   


 


Total Protein   


 


Urine Protein   


 


Urine Glucose (UA)   


 


Urine Blood   


 


Urine RBC   


 


Urine Opiates Screen   














  25





  17:49 20:02 20:11


 


WBC   


 


RBC   


 


Hgb   


 


Hct   


 


Plt Count   


 


Lymphocytes #   


 


Lymphocytes # (Manual)   


 


INR   


 


APTT   


 


ABG pO2   62 L 


 


ABG HCO3   27 H 


 


ABG Total CO2   28 H 


 


ABG O2 Saturation   91.5 L 


 


Hemoglobin   11.6 L 


 


Sodium   


 


Potassium   


 


Chloride   


 


Glucose   


 


POC Glucose (mg/dL)  229 H   214 H


 


Hemoglobin A1c   


 


Total Bilirubin   


 


AST   


 


Alkaline Phosphatase   


 


Troponin I   


 


Total Protein   


 


Urine Protein   


 


Urine Glucose (UA)   


 


Urine Blood   


 


Urine RBC   


 


Urine Opiates Screen   














  02/15/25 02/15/25 02/15/25





  05:58 06:52 06:52


 


WBC    16.9 H


 


RBC    3.83 L


 


Hgb    11.9 L


 


Hct    35.5 L


 


Plt Count   


 


Lymphocytes #    8.8 H


 


Lymphocytes # (Manual)   


 


INR   


 


APTT   


 


ABG pO2   


 


ABG HCO3   


 


ABG Total CO2   


 


ABG O2 Saturation   


 


Hemoglobin   


 


Sodium   


 


Potassium   


 


Chloride   


 


Glucose   


 


POC Glucose (mg/dL)  203 H  


 


Hemoglobin A1c   6.9 H 


 


Total Bilirubin   


 


AST   


 


Alkaline Phosphatase   


 


Troponin I   


 


Total Protein   


 


Urine Protein   


 


Urine Glucose (UA)   


 


Urine Blood   


 


Urine RBC   


 


Urine Opiates Screen   














  02/15/25 02/15/25 02/15/25





  06:52 06:52 11:06


 


WBC   


 


RBC   


 


Hgb   


 


Hct   


 


Plt Count   


 


Lymphocytes #   


 


Lymphocytes # (Manual)   


 


INR  1.2 H  


 


APTT   


 


ABG pO2   


 


ABG HCO3   


 


ABG Total CO2   


 


ABG O2 Saturation   


 


Hemoglobin   


 


Sodium   135 L 


 


Potassium   3.3 L 


 


Chloride   95 L 


 


Glucose   165 H 


 


POC Glucose (mg/dL)    296 H


 


Hemoglobin A1c   


 


Total Bilirubin   1.6 H 


 


AST   62 H 


 


Alkaline Phosphatase   


 


Troponin I   


 


Total Protein   5.8 L 


 


Urine Protein   


 


Urine Glucose (UA)   


 


Urine Blood   


 


Urine RBC   


 


Urine Opiates Screen   














- Diagnostic Findings


Chest x-ray: image reviewed


CT scan - chest: image reviewed





Assessment and Plan


Assessment: 





Acute hypoxemic respiratory failure, likely on the basis of pneumonia versus 

fluid overload.





Non-ST segment elevation myocardial infarction.





History of coronary artery disease.





History of diabetes mellitus.





History of hypertension.





History of gastroesophageal reflux disease.





History of chronic lymphocytic leukemia, in remission.





History of osteoarthritis.


Plan: 





Plan dated February 15, 2025.





The patient is seen today in room 369.  We are asked to see this patient, for 

worsening respiratory status.  The patient is currently on Airvo, 60 L/min, with

an FiO2 of 75%.  He is receiving IV heparin, for presumed non-ST segment 

elevation myocardial infarction.  The patient continues on vancomycin and 

cefepime for possible pneumonia.  He was admitted on .  His 

procalcitonin level was normal at 0.21.  The patient is getting 0.9 at 20 cc an 

hour.  Labs, x-rays, and all medications are reviewed.  We will continue to 

follow the patient, make recommendations where appropriate.  55 minutes was 

spent with this patient, including taking his history, examining the patient, 

reviewing pertinent laboratory data, x-rays, and medications, as well as 

discussing the diagnosis, treatment, prognosis, with the patient, the patient's 

nurse.  We also spoke to the primary service, about this patient.  We will 

continue to follow.  Dictation was produced using Dragon dictation software.  

Please excuse any grammatical, word or spelling errors.








Time with Patient: Greater than 30

## 2025-02-16 LAB
ALBUMIN SERPL-MCNC: 3.2 G/DL (ref 3.5–5)
ALP SERPL-CCNC: 128 U/L (ref 38–126)
ALT SERPL-CCNC: 28 U/L (ref 4–49)
ANION GAP SERPL CALC-SCNC: 7 MMOL/L
AST SERPL-CCNC: 104 U/L (ref 17–59)
BASOPHILS # BLD AUTO: 0.1 K/UL (ref 0–0.2)
BASOPHILS NFR BLD AUTO: 0 %
BUN SERPL-SCNC: 24 MG/DL (ref 9–20)
CALCIUM SPEC-MCNC: 8.6 MG/DL (ref 8.4–10.2)
CHLORIDE SERPL-SCNC: 100 MMOL/L (ref 98–107)
CO2 SERPL-SCNC: 29 MMOL/L (ref 22–30)
EOSINOPHIL # BLD AUTO: 0.2 K/UL (ref 0–0.7)
EOSINOPHIL NFR BLD AUTO: 1 %
ERYTHROCYTE [DISTWIDTH] IN BLOOD BY AUTOMATED COUNT: 3.6 M/UL (ref 4.3–5.9)
ERYTHROCYTE [DISTWIDTH] IN BLOOD: 13.9 % (ref 11.5–15.5)
GLUCOSE BLD-MCNC: 171 MG/DL (ref 70–110)
GLUCOSE BLD-MCNC: 171 MG/DL (ref 70–110)
GLUCOSE BLD-MCNC: 225 MG/DL (ref 70–110)
GLUCOSE BLD-MCNC: 323 MG/DL (ref 70–110)
GLUCOSE SERPL-MCNC: 155 MG/DL (ref 74–99)
HCT VFR BLD AUTO: 33.4 % (ref 39–53)
HGB BLD-MCNC: 11.3 GM/DL (ref 13–17.5)
LYMPHOCYTES # SPEC AUTO: 9.4 K/UL (ref 1–4.8)
LYMPHOCYTES NFR SPEC AUTO: 50 %
MCH RBC QN AUTO: 31.5 PG (ref 25–35)
MCHC RBC AUTO-ENTMCNC: 34 G/DL (ref 31–37)
MCV RBC AUTO: 92.8 FL (ref 80–100)
MONOCYTES # BLD AUTO: 0.7 K/UL (ref 0–1)
MONOCYTES NFR BLD AUTO: 4 %
NEUTROPHILS # BLD AUTO: 7.9 K/UL (ref 1.3–7.7)
NEUTROPHILS NFR BLD AUTO: 42 %
PLATELET # BLD AUTO: 156 K/UL (ref 150–450)
POTASSIUM SERPL-SCNC: 3.5 MMOL/L (ref 3.5–5.1)
PROT SERPL-MCNC: 5.5 G/DL (ref 6.3–8.2)
SODIUM SERPL-SCNC: 136 MMOL/L (ref 137–145)
WBC # BLD AUTO: 18.7 K/UL (ref 3.8–10.6)

## 2025-02-16 RX ADMIN — HYDROCORTISONE SODIUM SUCCINATE SCH MG: 100 INJECTION, POWDER, FOR SOLUTION INTRAMUSCULAR; INTRAVENOUS at 12:12

## 2025-02-16 NOTE — P.PN
Subjective


Progress Note Date: 02/16/25


Principal diagnosis: 





Respiratory failure.





Pulmonary consult dated February 15, 2025.





79-year-old male who presented to the emergency department, on February 13, 

complaining of nausea, vomiting, diarrhea, and mental status changes.  The 

patient was brought into the emergency department, by EMS, and apparently he was

quite confused, and disoriented, when they initially evaluated him.  He 

apparently was having GI episodes, including vomiting, nausea, and diarrhea.  He

denies having any chest pain or shortness of breath although his saturations 

were in the low 80s.  He was placed on oxygen therapy, and brought into the 

hospital.  When he was in the ER, he was apparently alert and oriented x 3.  He 

was feeling a bit short of breath.  We were consulted today, because the newton gonzalez's respiratory status has declined, and he is currently on Airvo, with 

settings of 60 L/min, and an FiO2 of 75%.  The patient was thought to have a 

non-ST segment elevation myocardial infarction, and pneumonia.  He was on IV 

heparin, and saline at 20 cc an hour, as well as vancomycin, and cefepime.  His 

procalcitonin level was 0.21.  Current laboratory data includes a white count of

16.9, hemoglobin 11.9, hematocrit 35.5, and a platelet count of 155,000.  A 

blood gas done yesterday shows a pO2 of 62, pCO2 42, and a pH of 7.41.  This 

apparently was on 100% oxygen.  Sodium 135, potassium 3.3, chloride 95, CO2 30, 

anion gap 10, BUN 19, creatinine 1.06.  Glucose 296.  Hemoglobin A1c was 6.9.  

Albumin was 3.5.  N-terminal proBNP was 2750.  Blood cultures were negative.  

Chest x-ray showed bilateral multifocal airspace opacities, which have improved.





Progress note dated February 16, 2025.





79-year-old male seen in consultation yesterday.  Please see my note above.  The

patient is seen today in room 369.  He continues on Airvo, 60 L/min and FiO2 of 

75%.  In addition, the patient continues on cefepime and vancomycin, his 

procalcitonin level was normal at 0.21.  He is also on IV heparin.  He states he

feels about the same today as he did yesterday, no better, and no worse.  White 

count 18.7, hemoglobin 9.3, hematocrit 33.4, platelet count 156,000.  PTT is 

56.4.  Sodium 136, potassium 3.5, chlorides 100, CO2 29, BUN 24, creatinine 

0.93.  Glucose 171.  Calcium 8.6.  Bilirubin 1.5.  Albumin 3.2.





Objective





- Vital Signs


Vital signs: 


                                   Vital Signs











Temp  98.2 F   02/16/25 11:16


 


Pulse  66   02/16/25 11:16


 


Resp  20   02/16/25 11:16


 


BP  118/63   02/16/25 11:16


 


Pulse Ox  98   02/16/25 11:16


 


FiO2  75   02/16/25 08:57








                                 Intake & Output











 02/15/25 02/16/25 02/16/25





 18:59 06:59 18:59


 


Intake Total 950  180


 


Output Total 400  


 


Balance 550  180


 


Weight  124.5 kg 


 


Intake:   


 


  Intake, IV Titration 250  





  Amount   


 


    Heparin Sod,Pork in 0.45% 250  





    NaCl 25,000 unit In 0.45   





    % NaCl 1 250ml.bag @ 10.   





    021 UNITS/KG/HR 10 mls/hr   





    IV .Q24H [facility] Rx#:   





    [id number]   


 


  Oral 700  180


 


Output:   


 


  Urine 400  


 


Other:   


 


  Voiding Method Indwelling Catheter Indwelling Catheter Indwelling Catheter














- Exam





No acute distress, oriented 3.  The patient is currently on Airvo.  Settings 

are 60 L/min with an FiO2 of 75%.  The patient is able to speak in full 

sentences without difficulty.





HEENT examination is grossly unremarkable.  Mucous membranes are moist.  No oral

lesions.





Neck supple.  Full range of motion.  No adenopathy thyromegaly or neck vein 

distention.





Cardiovascular examination reveals regular rhythm rate.  S1-S2 normal.  No S3 or

S4.  No discernible murmur noted.  Heart sounds are distant.





Lungs reveal mild scattered rhonchi.  No wheezes.  No crackles.  Breath sounds 

equal bilaterally.





Abdomen soft bowel sounds are heard.  No masses or tenderness.





Extremities are intact.  No cyanosis clubbing or edema.





Skin is without rash or lesion.





Neurologic examination is brief but nonfocal.





- Labs


CBC & Chem 7: 


                                 02/16/25 08:30





                                 02/16/25 08:30


Labs: 


                  Abnormal Lab Results - Last 24 Hours (Table)











  02/15/25 02/15/25 02/15/25 Range/Units





  06:52 16:21 20:28 


 


WBC     (3.8-10.6)  k/uL


 


RBC     (4.30-5.90)  m/uL


 


Hgb     (13.0-17.5)  gm/dL


 


Hct     (39.0-53.0)  %


 


Neutrophils #     (1.3-7.7)  k/uL


 


Lymphocytes #     (1.0-4.8)  k/uL


 


APTT     (22.0-30.0)  sec


 


Sodium     (137-145)  mmol/L


 


BUN     (9-20)  mg/dL


 


Glucose     (74-99)  mg/dL


 


POC Glucose (mg/dL)   227 H  230 H  ()  mg/dL


 


Hemoglobin A1c  6.9 H    (<=6.0)  %


 


Total Bilirubin     (0.2-1.3)  mg/dL


 


AST     (17-59)  U/L


 


Alkaline Phosphatase     ()  U/L


 


Total Protein     (6.3-8.2)  g/dL


 


Albumin     (3.5-5.0)  g/dL














  02/16/25 02/16/25 02/16/25 Range/Units





  06:13 08:30 08:30 


 


WBC     (3.8-10.6)  k/uL


 


RBC     (4.30-5.90)  m/uL


 


Hgb     (13.0-17.5)  gm/dL


 


Hct     (39.0-53.0)  %


 


Neutrophils #     (1.3-7.7)  k/uL


 


Lymphocytes #     (1.0-4.8)  k/uL


 


APTT    56.4 H  (22.0-30.0)  sec


 


Sodium   136 L   (137-145)  mmol/L


 


BUN   24 H   (9-20)  mg/dL


 


Glucose   155 H   (74-99)  mg/dL


 


POC Glucose (mg/dL)  171 H    ()  mg/dL


 


Hemoglobin A1c     (<=6.0)  %


 


Total Bilirubin   1.5 H   (0.2-1.3)  mg/dL


 


AST   104 H   (17-59)  U/L


 


Alkaline Phosphatase   128 H   ()  U/L


 


Total Protein   5.5 L   (6.3-8.2)  g/dL


 


Albumin   3.2 L   (3.5-5.0)  g/dL














  02/16/25 02/16/25 Range/Units





  08:30 11:09 


 


WBC  18.7 H   (3.8-10.6)  k/uL


 


RBC  3.60 L   (4.30-5.90)  m/uL


 


Hgb  11.3 L   (13.0-17.5)  gm/dL


 


Hct  33.4 L   (39.0-53.0)  %


 


Neutrophils #  7.9 H   (1.3-7.7)  k/uL


 


Lymphocytes #  9.4 H   (1.0-4.8)  k/uL


 


APTT    (22.0-30.0)  sec


 


Sodium    (137-145)  mmol/L


 


BUN    (9-20)  mg/dL


 


Glucose    (74-99)  mg/dL


 


POC Glucose (mg/dL)   171 H  ()  mg/dL


 


Hemoglobin A1c    (<=6.0)  %


 


Total Bilirubin    (0.2-1.3)  mg/dL


 


AST    (17-59)  U/L


 


Alkaline Phosphatase    ()  U/L


 


Total Protein    (6.3-8.2)  g/dL


 


Albumin    (3.5-5.0)  g/dL








                      Microbiology - Last 24 Hours (Table)











 02/13/25 22:08 Blood Culture - Preliminary





 Blood 














Assessment and Plan


Assessment: 





Acute hypoxemic respiratory failure, likely on the basis of pneumonia versus 

fluid overload.





Non-ST segment elevation myocardial infarction.





History of coronary artery disease.





History of diabetes mellitus.





History of hypertension.





History of gastroesophageal reflux disease.





History of chronic lymphocytic leukemia, in remission.





History of osteoarthritis.


Plan: 





Plan dated February 15, 2025.





The patient is seen today in room 369.  We are asked to see this patient, for 

worsening respiratory status.  The patient is currently on Airvo, 60 L/min, with

an FiO2 of 75%.  He is receiving IV heparin, for presumed non-ST segment 

elevation myocardial infarction.  The patient continues on vancomycin and 

cefepime for possible pneumonia.  He was admitted on February 13.  His 

procalcitonin level was normal at 0.21.  The patient is getting 0.9 at 20 cc an 

hour.  Labs, x-rays, and all medications are reviewed.  We will continue to 

follow the patient, make recommendations where appropriate.  55 minutes was 

spent with this patient, including taking his history, examining the patient, 

reviewing pertinent laboratory data, x-rays, and medications, as well as 

discussing the diagnosis, treatment, prognosis, with the patient, the patient's 

nurse.  We also spoke to the primary service, about this patient.  We will 

continue to follow.  Dictation was produced using Dragon dictation software.  

Please excuse any grammatical, word or spelling errors.





Plan dated February 16, 2025.





The patient is seen today in room 369.  The patient continues on IV heparin, 

cefepime, vancomycin, and the Airvo, 60 L/min, with the FiO2 set at 75%.  The 

patient's procalcitonin level was normal at 0.21.  All labs, x-rays, and 

medications are reviewed.  The patient states that he is feeling about the same 

today as he did yesterday when we saw him.  He states he is no better, and 

certainly no worse.  50 minutes was spent with this patient, which included 

obtaining additional history, examining the patient, reviewing pertinent 

laboratory data, x-rays, and medications, as well as discussing the diagnosis, t

reatment, prognosis, with the patient, and the patient's bedside nurse.  

Dictation was produced using Dragon dictation software.  Please excuse any 

grammatical, word or spelling errors.








Time with Patient: Greater than 30

## 2025-02-16 NOTE — P.PN
Subjective


Progress Note Date: 02/16/25





Hospital Course: 


79-year-old male with past medical history significant for coronary artery dis

ease, insulin-dependent diabetes mellitus, hypertension presents to the 

emergency department today due to altered mental status. 


Initial vitals: /75,  bpm, T 99.5 F, RR 20, O2 saturation 86% on 

room air


Initial labs: WBCs 13.2, RBC 3.69, hemoglobin 11.7, hematocrit 32.9, platelets 

137, sodium 137, potassium 3.2, chloride 99, CO2 25, BUN 15, creatinine 0.9, 

total bilirubin 1.5, alkaline phosphatase 132, troponin x 2 0.043 and 0.091, 

total protein 6.1; urinalysis 1+ protein, trace glucose


Initial EKG: Sinus tachycardia with ventricular rate of 118 bpm, QTc 384 ms, 

left axis deviation, left ventricular hypertrophy


Initial chest x-ray: No acute cardiopulmonary disease/process


Initial CTA chest: No evidence of pulmonary embolism, diffuse patchy groundglass

pulmonary opacities suggestive of atypical pulmonary infection, cholelithiasis, 

fluid identified within the visualized esophagus


Initial brain CT: No acute intracranial process; age-related atrophy with 

nonspecific white matter changes, likely secondary to chronic small vessel 

ischemic changes





Arterial Dopplers of lower extremity showed noncompressible vessels LKAESHA unable 

to be obtained.  No concern for ischemic.  Troponin continue to worsen.  

Cardiology following.  Started patient on heparin drip.  Echocardiogram showed 

LVEF 55%.  Respiratory function continue to worsen.  Antibiotics were broadened.

 Pulmonology also consulted.  On Airvo.








Subjective: 


Seen and examined at bedside.  No acute events overnight.  Continues to be on 

Airvo.





Pertinent positives and negatives as discussed above, a complete review of 

systems was performed and all other systems are negative.








Vitals Signs Reviewed. 





General: Nontoxic, no distress, appears at stated age, tired appearing


Derm: Warm, dry


Head: Atraumatic, normocephalic, symmetric


Eyes: EOMI, no lid lag, anicteric sclera


Mouth: No lip lesion, mucus membranes moist


Cardiovascular: S1S2 reg, no murmur


Lungs: Bilateral rales, no accessory muscle use, supplemental oxygen


Abdominal: Soft, nontender to palpation, no guarding, no appreciable 

organomegaly


Ext: No gross muscle atrophy, no edema, no contractures


Neuro: CN II-XI grossly intact, no focal neuro deficits


Psych: Alert, oriented, appropriate affect





Data Reviewed Today: 


Pertinent Labs: WBC 18.7, hemoglobin 11.3, APTT 56.4, CMP pending, will be 

reviewed when available


Imaging: Chest x-ray independently interpreted, bilateral interstitial opacities

worsened from yesterday





Assessment and Plan:


Patient is critically ill, needs close monitoring.  Prognosis guarded.





Active:


Acute hypoxic respiratory failure


Multifocal pneumonia


Acute NSTEMI


-Continue to wean oxygen, patient currently on Airvo


-On IV cefepime 2 g every 8 hours, IV vancomycin 1500 mg every 12 hours, monitor

for renal toxicity


-MRSA nares pending, discontinue IV vancomycin if negative


-I will start steroids given severe pneumonia, started on hydrocortisone 50 IV 

every 6 hours


-Pulmonology following


-Patient continued on heparin drip, monitor APTT, monitor for bleeding


-Continue aspirin 81 mg, atorvastatin 80 mg





Diabetes


Hyperglycemia


-Continue Levemir 10 units nightly, continue sliding scale insulin, monitor for 

hypoglycemia





Hypokalemia


- CMP pending





Right plantar foot ulcer


-Wound care following





Hyperbilirubinemia


-no right upper quadrant pain








Chronic:


Hypertension


History of CAD


History of CLL, in remission





DVT ppx: Heparin drip





Code status: Full code





Anticipated discharge place: Pending clinical course


Anticipated discharge time: Pending clinical course





Objective





- Vital Signs


Vital signs: 


                                   Vital Signs











Temp  98.2 F   02/16/25 11:16


 


Pulse  66   02/16/25 11:16


 


Resp  20   02/16/25 11:16


 


BP  118/63   02/16/25 11:16


 


Pulse Ox  98   02/16/25 11:16


 


FiO2  75   02/16/25 08:57








                                 Intake & Output











 02/15/25 02/16/25 02/16/25





 18:59 06:59 18:59


 


Intake Total 950  180


 


Output Total 400  


 


Balance 550  180


 


Weight  124.5 kg 


 


Intake:   


 


  Intake, IV Titration 250  





  Amount   


 


    Heparin Sod,Pork in 0.45% 250  





    NaCl 25,000 unit In 0.45   





    % NaCl 1 250ml.bag @ 10.   





    021 UNITS/KG/HR 10 mls/hr   





    IV .Q24H [facility] Rx#:   





    [id number]   


 


  Oral 700  180


 


Output:   


 


  Urine 400  


 


Other:   


 


  Voiding Method Indwelling Catheter Indwelling Catheter Indwelling Catheter














- Labs


CBC & Chem 7: 


                                 02/16/25 08:30





                                 02/15/25 06:52


Labs: 


                  Abnormal Lab Results - Last 24 Hours (Table)











  02/15/25 02/15/25 02/15/25 Range/Units





  06:52 16:21 20:28 


 


WBC     (3.8-10.6)  k/uL


 


RBC     (4.30-5.90)  m/uL


 


Hgb     (13.0-17.5)  gm/dL


 


Hct     (39.0-53.0)  %


 


APTT     (22.0-30.0)  sec


 


POC Glucose (mg/dL)   227 H  230 H  ()  mg/dL


 


Hemoglobin A1c  6.9 H    (<=6.0)  %














  02/16/25 02/16/25 02/16/25 Range/Units





  06:13 08:30 08:30 


 


WBC    18.7 H  (3.8-10.6)  k/uL


 


RBC    3.60 L  (4.30-5.90)  m/uL


 


Hgb    11.3 L  (13.0-17.5)  gm/dL


 


Hct    33.4 L  (39.0-53.0)  %


 


APTT   56.4 H   (22.0-30.0)  sec


 


POC Glucose (mg/dL)  171 H    ()  mg/dL


 


Hemoglobin A1c     (<=6.0)  %














  02/16/25 Range/Units





  11:09 


 


WBC   (3.8-10.6)  k/uL


 


RBC   (4.30-5.90)  m/uL


 


Hgb   (13.0-17.5)  gm/dL


 


Hct   (39.0-53.0)  %


 


APTT   (22.0-30.0)  sec


 


POC Glucose (mg/dL)  171 H  ()  mg/dL


 


Hemoglobin A1c   (<=6.0)  %








                      Microbiology - Last 24 Hours (Table)











 02/13/25 22:08 Blood Culture - Preliminary





 Blood

## 2025-02-16 NOTE — P.PN
Subjective


Progress Note Date: 02/16/25








This is a 79-year-old male patient of Dr. Diamond with past medical history of 

coronary artery disease, hypertension, dyslipidemia.  We have been asked to 

evaluate the patient for elevated troponins.  Patient was brought into the 

hospital by EMS due to confusion.  He was found to be hypoxic in the low 80s.  

He was placed on oxygen and this improved his mental status.  Patient states 

that his wife found that he was babbling and he also had a high fever at home.  

Patient denies chest pain, no shortness of breath. Blood pressure 147/72, heart 

rate 59, pulse ox 100% on 3 L nasal cannula.  Patient is status post 500 cc 

bolus of IV fluids, potassium replacement and IV antibiotics.  Patient is seen 

today in the emergency center waiting for bed on the cardiac stepdown unit.





-EKG: Sinus tachycardia at 118 bpm, LVH.


-Chest x-ray: No acute process.


-CTA of the chest reveals no PE.  Diffuse patchy groundglass pulmonary opacities

suggest atypical pulmonary infection.  Cholelithiasis.  Fluid identified within 

the visualized esophagus.  Correlate for GERD.


-CT brain: No acute process.  Chronic small vessel ischemic changes.


-Laboratory studies: WBC 13.2, hemoglobin 11.7, platelet count 137.  Potassium 3

.2, sodium 137, BUN 15 creatinine 0.9.  Troponin 0.043, 0.091 and 0.078.  proBNP

631.  Alkaline phosphatase 132 and total bilirubin 1.5.  Urine drug screen 

positive for opiates.  Serum alcohol less than 10.  Cepheid viral panel 

negative.


-Home cardiac medications: Atorvastatin 80 mg daily, lisinopril 20 mg twice 

daily.


-Cardiac catheterization performed 10/20/2021 revealed 20% in the mid circumflex

and 50% in the mid RCA.


-Echocardiogram performed 4/19/2021 revealed normal EF.


-Lexiscan Cardiolite stress test performed 10/20/2021 revealed small basal 

lateral reversible defect.





Progress note


2/15/2025


Patient is seen and examined at bedside this a.m.


Patient continues to be on high flow nasal cannula, somnolent but arousable with

verbal stimuli.  No new cardiovascular events overnight.





2/16/2025


Patient is seen examined bedside this a.m.


No new cardiovascular events overnight


Sinus rhythm on telemetry with intermittent sinus tachycardia.  Continues to be 

on high flow nasal cannula supplemental oxygen 





Physical examination:


Gen: This is a 79-year-old male in no acute distress


VS: reviewed


HEENT: Head is atraumatic, normocephalic. Pupils equal, round. Sclerae is 

anicteric. 


NECK: Supple. No JVD. 


LUNGS: Diminished bilaterally.  Mild crackles audible in bilateral bases, on 

high flow nasal cannula.


HEART: Regular rate and rhythm.  Systolic murmur. 


ABDOMEN: Soft  No tenderness.


EXTREMITIES: No pedal edema.  No calf tenderness.


NEUROLOGICAL: Patient is awake, alert and oriented x3.


 


Assessment:


Hypoxic metabolic encephalopathy, resolved


NSTEMI type II secondary to pneumonia and sepsis


Nonobstructive coronary artery disease on previous cardiac catheterization


Hypertension


Dyslipidemia


Ulcer right foot, chronic


Diabetes





Pertinent cardiac testing


Echo shows EF of 55%, inferolateral wall hypokinesia, no significant valvular 

dysfunction, normal RV size and systolic function.





Plan:


Continue aspirin, Lipitor, metoprolol succinate 25 mg daily.


Discontinue IV heparin drip.  Has completed 48 hours.  I would add Plavix 75 mg 

for NSTEMI management.


I would recommend outpatient Lexiscan nuclear stress test for this patient once 

discharged.  I would recommend outpatient follow-up with cardiology.  If 

Lexiscan stress test is not positive for any infarct or ischemia, may consider 

stopping his Plavix.





At this time patient is cleared from cardiovascular standpoint.  Please r

econsult us in case of any question.








Objective





- Vital Signs


Vital signs: 


                                   Vital Signs











Temp  98.2 F   02/16/25 11:16


 


Pulse  66   02/16/25 14:08


 


Resp  20   02/16/25 14:08


 


BP  118/63   02/16/25 11:16


 


Pulse Ox  98   02/16/25 11:16


 


FiO2  75   02/16/25 13:14








                                 Intake & Output











 02/15/25 02/16/25 02/16/25





 18:59 06:59 18:59


 


Intake Total 950  360


 


Output Total 400  600


 


Balance 550  -240


 


Weight  124.5 kg 


 


Intake:   


 


  Intake, IV Titration 250  





  Amount   


 


    Heparin Sod,Pork in 0.45% 250  





    NaCl 25,000 unit In 0.45   





    % NaCl 1 250ml.bag @ 10.   





    021 UNITS/KG/HR 10 mls/hr   





    IV .Q24H [facility] Rx#:   





    [id number]   


 


  Oral 700  360


 


Output:   


 


  Urine 400  600


 


Other:   


 


  Voiding Method Indwelling Catheter Indwelling Catheter Indwelling Catheter














- Labs


CBC & Chem 7: 


                                 02/16/25 08:30





                                 02/16/25 08:30


Labs: 


                  Abnormal Lab Results - Last 24 Hours (Table)











  02/15/25 02/15/25 02/16/25 Range/Units





  16:21 20:28 06:13 


 


WBC     (3.8-10.6)  k/uL


 


RBC     (4.30-5.90)  m/uL


 


Hgb     (13.0-17.5)  gm/dL


 


Hct     (39.0-53.0)  %


 


Neutrophils #     (1.3-7.7)  k/uL


 


Lymphocytes #     (1.0-4.8)  k/uL


 


APTT     (22.0-30.0)  sec


 


Sodium     (137-145)  mmol/L


 


BUN     (9-20)  mg/dL


 


Glucose     (74-99)  mg/dL


 


POC Glucose (mg/dL)  227 H  230 H  171 H  ()  mg/dL


 


Total Bilirubin     (0.2-1.3)  mg/dL


 


AST     (17-59)  U/L


 


Alkaline Phosphatase     ()  U/L


 


Total Protein     (6.3-8.2)  g/dL


 


Albumin     (3.5-5.0)  g/dL














  02/16/25 02/16/25 02/16/25 Range/Units





  08:30 08:30 08:30 


 


WBC    18.7 H  (3.8-10.6)  k/uL


 


RBC    3.60 L  (4.30-5.90)  m/uL


 


Hgb    11.3 L  (13.0-17.5)  gm/dL


 


Hct    33.4 L  (39.0-53.0)  %


 


Neutrophils #    7.9 H  (1.3-7.7)  k/uL


 


Lymphocytes #    9.4 H  (1.0-4.8)  k/uL


 


APTT   56.4 H   (22.0-30.0)  sec


 


Sodium  136 L    (137-145)  mmol/L


 


BUN  24 H    (9-20)  mg/dL


 


Glucose  155 H    (74-99)  mg/dL


 


POC Glucose (mg/dL)     ()  mg/dL


 


Total Bilirubin  1.5 H    (0.2-1.3)  mg/dL


 


AST  104 H    (17-59)  U/L


 


Alkaline Phosphatase  128 H    ()  U/L


 


Total Protein  5.5 L    (6.3-8.2)  g/dL


 


Albumin  3.2 L    (3.5-5.0)  g/dL














  02/16/25 Range/Units





  11:09 


 


WBC   (3.8-10.6)  k/uL


 


RBC   (4.30-5.90)  m/uL


 


Hgb   (13.0-17.5)  gm/dL


 


Hct   (39.0-53.0)  %


 


Neutrophils #   (1.3-7.7)  k/uL


 


Lymphocytes #   (1.0-4.8)  k/uL


 


APTT   (22.0-30.0)  sec


 


Sodium   (137-145)  mmol/L


 


BUN   (9-20)  mg/dL


 


Glucose   (74-99)  mg/dL


 


POC Glucose (mg/dL)  171 H  ()  mg/dL


 


Total Bilirubin   (0.2-1.3)  mg/dL


 


AST   (17-59)  U/L


 


Alkaline Phosphatase   ()  U/L


 


Total Protein   (6.3-8.2)  g/dL


 


Albumin   (3.5-5.0)  g/dL








                      Microbiology - Last 24 Hours (Table)











 02/13/25 22:08 Blood Culture - Preliminary





 Blood

## 2025-02-16 NOTE — P.PN
Subjective


Progress Note Date: 02/15/25








This is a 79-year-old male patient of Dr. Diamond with past medical history of 

coronary artery disease, hypertension, dyslipidemia.  We have been asked to 

evaluate the patient for elevated troponins.  Patient was brought into the 

hospital by EMS due to confusion.  He was found to be hypoxic in the low 80s.  

He was placed on oxygen and this improved his mental status.  Patient states 

that his wife found that he was babbling and he also had a high fever at home.  

Patient denies chest pain, no shortness of breath. Blood pressure 147/72, heart 

rate 59, pulse ox 100% on 3 L nasal cannula.  Patient is status post 500 cc 

bolus of IV fluids, potassium replacement and IV antibiotics.  Patient is seen 

today in the emergency center waiting for bed on the cardiac stepdown unit.





-EKG: Sinus tachycardia at 118 bpm, LVH.


-Chest x-ray: No acute process.


-CTA of the chest reveals no PE.  Diffuse patchy groundglass pulmonary opacities

suggest atypical pulmonary infection.  Cholelithiasis.  Fluid identified within 

the visualized esophagus.  Correlate for GERD.


-CT brain: No acute process.  Chronic small vessel ischemic changes.


-Laboratory studies: WBC 13.2, hemoglobin 11.7, platelet count 137.  Potassium 3

.2, sodium 137, BUN 15 creatinine 0.9.  Troponin 0.043, 0.091 and 0.078.  proBNP

631.  Alkaline phosphatase 132 and total bilirubin 1.5.  Urine drug screen 

positive for opiates.  Serum alcohol less than 10.  Cepheid viral panel 

negative.


-Home cardiac medications: Atorvastatin 80 mg daily, lisinopril 20 mg twice 

daily.


-Cardiac catheterization performed 10/20/2021 revealed 20% in the mid circumflex

and 50% in the mid RCA.


-Echocardiogram performed 4/19/2021 revealed normal EF.


-Lexiscan Cardiolite stress test performed 10/20/2021 revealed small basal 

lateral reversible defect.





Progress note


2/15/2025


Patient is seen and examined at bedside this a.m.


Patient continues to be on high flow nasal cannula, somnolent but arousable with

verbal stimuli.  No new cardiovascular events overnight.





Physical examination:


Gen: This is a 79-year-old male in no acute distress


VS: reviewed


HEENT: Head is atraumatic, normocephalic. Pupils equal, round. Sclerae is 

anicteric. 


NECK: Supple. No JVD. 


LUNGS: Diminished bilaterally.  Mild crackles audible in bilateral bases, on 

high flow nasal cannula.


HEART: Regular rate and rhythm.  Systolic murmur. 


ABDOMEN: Soft  No tenderness.


EXTREMITIES: No pedal edema.  No calf tenderness.


NEUROLOGICAL: Patient is awake, alert and oriented x3.


 


Assessment:


Hypoxic metabolic encephalopathy, resolved


NSTEMI type II secondary to pneumonia and sepsis


Nonobstructive coronary artery disease on previous cardiac catheterization


Hypertension


Dyslipidemia


Ulcer right foot, chronic


Diabetes





Pertinent cardiac testing


Echo shows EF of 55%, inferolateral wall hypokinesia, no significant valvular 

dysfunction, normal RV size and systolic function.








Plan:


Continue aspirin, Lipitor, metoprolol succinate 25 mg daily.


Continue IV heparin for next 24 hours








Objective





- Vital Signs


Vital signs: 


                                   Vital Signs











Temp  98.2 F   02/16/25 11:16


 


Pulse  66   02/16/25 14:08


 


Resp  20   02/16/25 14:08


 


BP  118/63   02/16/25 11:16


 


Pulse Ox  98   02/16/25 11:16


 


FiO2  75   02/16/25 13:14








                                 Intake & Output











 02/15/25 02/16/25 02/16/25





 18:59 06:59 18:59


 


Intake Total 950  360


 


Output Total 400  600


 


Balance 550  -240


 


Weight  124.5 kg 


 


Intake:   


 


  Intake, IV Titration 250  





  Amount   


 


    Heparin Sod,Pork in 0.45% 250  





    NaCl 25,000 unit In 0.45   





    % NaCl 1 250ml.bag @ 10.   





    021 UNITS/KG/HR 10 mls/hr   





    IV .Q24H [facility] Rx#:   





    [id number]   


 


  Oral 700  360


 


Output:   


 


  Urine 400  600


 


Other:   


 


  Voiding Method Indwelling Catheter Indwelling Catheter Indwelling Catheter














- Labs


CBC & Chem 7: 


                                 02/16/25 08:30





                                 02/16/25 08:30


Labs: 


                  Abnormal Lab Results - Last 24 Hours (Table)











  02/15/25 02/15/25 02/16/25 Range/Units





  16:21 20:28 06:13 


 


WBC     (3.8-10.6)  k/uL


 


RBC     (4.30-5.90)  m/uL


 


Hgb     (13.0-17.5)  gm/dL


 


Hct     (39.0-53.0)  %


 


Neutrophils #     (1.3-7.7)  k/uL


 


Lymphocytes #     (1.0-4.8)  k/uL


 


APTT     (22.0-30.0)  sec


 


Sodium     (137-145)  mmol/L


 


BUN     (9-20)  mg/dL


 


Glucose     (74-99)  mg/dL


 


POC Glucose (mg/dL)  227 H  230 H  171 H  ()  mg/dL


 


Total Bilirubin     (0.2-1.3)  mg/dL


 


AST     (17-59)  U/L


 


Alkaline Phosphatase     ()  U/L


 


Total Protein     (6.3-8.2)  g/dL


 


Albumin     (3.5-5.0)  g/dL














  02/16/25 02/16/25 02/16/25 Range/Units





  08:30 08:30 08:30 


 


WBC    18.7 H  (3.8-10.6)  k/uL


 


RBC    3.60 L  (4.30-5.90)  m/uL


 


Hgb    11.3 L  (13.0-17.5)  gm/dL


 


Hct    33.4 L  (39.0-53.0)  %


 


Neutrophils #    7.9 H  (1.3-7.7)  k/uL


 


Lymphocytes #    9.4 H  (1.0-4.8)  k/uL


 


APTT   56.4 H   (22.0-30.0)  sec


 


Sodium  136 L    (137-145)  mmol/L


 


BUN  24 H    (9-20)  mg/dL


 


Glucose  155 H    (74-99)  mg/dL


 


POC Glucose (mg/dL)     ()  mg/dL


 


Total Bilirubin  1.5 H    (0.2-1.3)  mg/dL


 


AST  104 H    (17-59)  U/L


 


Alkaline Phosphatase  128 H    ()  U/L


 


Total Protein  5.5 L    (6.3-8.2)  g/dL


 


Albumin  3.2 L    (3.5-5.0)  g/dL














  02/16/25 Range/Units





  11:09 


 


WBC   (3.8-10.6)  k/uL


 


RBC   (4.30-5.90)  m/uL


 


Hgb   (13.0-17.5)  gm/dL


 


Hct   (39.0-53.0)  %


 


Neutrophils #   (1.3-7.7)  k/uL


 


Lymphocytes #   (1.0-4.8)  k/uL


 


APTT   (22.0-30.0)  sec


 


Sodium   (137-145)  mmol/L


 


BUN   (9-20)  mg/dL


 


Glucose   (74-99)  mg/dL


 


POC Glucose (mg/dL)  171 H  ()  mg/dL


 


Total Bilirubin   (0.2-1.3)  mg/dL


 


AST   (17-59)  U/L


 


Alkaline Phosphatase   ()  U/L


 


Total Protein   (6.3-8.2)  g/dL


 


Albumin   (3.5-5.0)  g/dL








                      Microbiology - Last 24 Hours (Table)











 02/13/25 22:08 Blood Culture - Preliminary





 Blood

## 2025-02-17 LAB
ALBUMIN SERPL-MCNC: 2.9 G/DL (ref 3.5–5)
ALP SERPL-CCNC: 133 U/L (ref 38–126)
ALT SERPL-CCNC: 29 U/L (ref 4–49)
ANION GAP SERPL CALC-SCNC: 8 MMOL/L
AST SERPL-CCNC: 50 U/L (ref 17–59)
BUN SERPL-SCNC: 23 MG/DL (ref 9–20)
CALCIUM SPEC-MCNC: 8.3 MG/DL (ref 8.4–10.2)
CELLS COUNTED: 100
CHLORIDE SERPL-SCNC: 102 MMOL/L (ref 98–107)
CO2 SERPL-SCNC: 25 MMOL/L (ref 22–30)
ERYTHROCYTE [DISTWIDTH] IN BLOOD BY AUTOMATED COUNT: 3.33 M/UL (ref 4.3–5.9)
ERYTHROCYTE [DISTWIDTH] IN BLOOD: 14.4 % (ref 11.5–15.5)
GLUCOSE BLD-MCNC: 183 MG/DL (ref 70–110)
GLUCOSE BLD-MCNC: 199 MG/DL (ref 70–110)
GLUCOSE BLD-MCNC: 205 MG/DL (ref 70–110)
GLUCOSE BLD-MCNC: 207 MG/DL (ref 70–110)
GLUCOSE BLD-MCNC: 239 MG/DL (ref 70–110)
GLUCOSE SERPL-MCNC: 207 MG/DL (ref 74–99)
HCT VFR BLD AUTO: 30.8 % (ref 39–53)
HGB BLD-MCNC: 10.6 GM/DL (ref 13–17.5)
LYMPHOCYTES # BLD MANUAL: 5.84 K/UL (ref 1–4.8)
MAGNESIUM SPEC-SCNC: 1.9 MG/DL (ref 1.6–2.3)
MCH RBC QN AUTO: 31.7 PG (ref 25–35)
MCHC RBC AUTO-ENTMCNC: 34.3 G/DL (ref 31–37)
MCV RBC AUTO: 92.5 FL (ref 80–100)
MYELOCYTES # BLD MANUAL: 0.13 K/UL
NEUTROPHILS NFR BLD MANUAL: 53 %
NEUTS SEG # BLD MANUAL: 6.73 K/UL (ref 1.3–7.7)
PLATELET # BLD AUTO: 142 K/UL (ref 150–450)
POTASSIUM SERPL-SCNC: 3.4 MMOL/L (ref 3.5–5.1)
PROT SERPL-MCNC: 5.2 G/DL (ref 6.3–8.2)
SODIUM SERPL-SCNC: 135 MMOL/L (ref 137–145)
WBC # BLD AUTO: 12.7 K/UL (ref 3.8–10.6)

## 2025-02-17 RX ADMIN — POTASSIUM CHLORIDE STA MEQ: 20 TABLET, EXTENDED RELEASE ORAL at 11:55

## 2025-02-17 RX ADMIN — FUROSEMIDE SCH MG: 10 INJECTION, SOLUTION INTRAMUSCULAR; INTRAVENOUS at 14:51

## 2025-02-17 RX ADMIN — HEPARIN SODIUM SCH UNIT: 5000 INJECTION, SOLUTION INTRAVENOUS; SUBCUTANEOUS at 16:22

## 2025-02-17 RX ADMIN — CLOPIDOGREL BISULFATE SCH MG: 75 TABLET ORAL at 11:54

## 2025-02-17 RX ADMIN — INSULIN DETEMIR SCH UNIT: 100 INJECTION, SOLUTION SUBCUTANEOUS at 21:11

## 2025-02-17 NOTE — P.PN
Subjective


Progress Note Date: 02/17/25








79-year-old male who presented to the emergency department, on February 13, 

complaining of nausea, vomiting, diarrhea, and mental status changes.  The 

patient was brought into the emergency department, by EMS, and apparently he was

quite confused, and disoriented, when they initially evaluated him.  He a

pparently was having GI episodes, including vomiting, nausea, and diarrhea.  He 

denies having any chest pain or shortness of breath although his saturations 

were in the low 80s.  He was placed on oxygen therapy, and brought into the 

hospital.  When he was in the ER, he was apparently alert and oriented x 3.  He 

was feeling a bit short of breath.  We were consulted today, because the 

patient's respiratory status has declined, and he is currently on Airvo, with 

settings of 60 L/min, and an FiO2 of 75%.  The patient was thought to have a 

non-ST segment elevation myocardial infarction, and pneumonia.  He was on IV 

heparin, and saline at 20 cc an hour, as well as vancomycin, and cefepime.  His 

procalcitonin level was 0.21.  Current laboratory data includes a white count of

16.9, hemoglobin 11.9, hematocrit 35.5, and a platelet count of 155,000.  A 

blood gas done yesterday shows a pO2 of 62, pCO2 42, and a pH of 7.41.  This 

apparently was on 100% oxygen.  Sodium 135, potassium 3.3, chloride 95, CO2 30, 

anion gap 10, BUN 19, creatinine 1.06.  Glucose 296.  Hemoglobin A1c was 6.9.  

Albumin was 3.5.  N-terminal proBNP was 2750.  Blood cultures were negative.  

Chest x-ray showed bilateral multifocal airspace opacities, which have improved.





Progress note dated February 16, 2025.





79-year-old male seen in consultation yesterday.  Please see my note above.  The

patient is seen today in room 369.  He continues on Airvo, 60 L/min and FiO2 of 

75%.  In addition, the patient continues on cefepime and vancomycin, his 

procalcitonin level was normal at 0.21.  He is also on IV heparin.  He states he

feels about the same today as he did yesterday, no better, and no worse.  White 

count 18.7, hemoglobin 9.3, hematocrit 33.4, platelet count 156,000.  PTT is 

56.4.  Sodium 136, potassium 3.5, chlorides 100, CO2 29, BUN 24, creatinine 

0.93.  Glucose 171.  Calcium 8.6.  Bilirubin 1.5.  Albumin 3.2.





On 2/17/2025, the patient is being seen for a follow-up.  The patient remains 

quite hypoxic.  On today's evaluation, the patient remains on Airvo at 60 L with

FiO2 of 70%.  The chest x-ray from 2/14/2025 showed improved aeration of both 

lungs with persistent multifocal airspace opacities.  The patient's blood work 

showed a procalcitonin level of 0.2 at the time of admission x 2 and a proBNP le

chris was 2750.  On today's blood work, sodium levels at 135, potassium level is 

3.4, BUN 23 with a creatinine of 0.8.  LFTs are essentially within normal 

limits.  The patient remains on IV cefepime.  The patient will be started on IV 

Lasix.  The patient is also on DuoNeb nebulized treatments around-the-clock, IV 

hydrocortisone, Levemir insulin 15 units at bedtime and the patient is on 

metoprolol 25 mg p.o. twice a day.  Rest of the medications remain unchanged.  

Echocardiogram was done on 2/14/2025 indicating a ejection fraction of 50 to 

55%, inferolateral wall hypokinesis, no significant valvular abnormalities.  The

CTA of the chest done on 2/13/2025 revealed no pulmonary embolism and diffuse 

patchy groundglass pulmonary opacities could relate to interstitial edema versus

atypical pulmonary infection and the patient also has evidence of 

cholelithiasis.  The viral screen was negative and Legionella urine antigen was 

negative at the time of admission.  Mental status is appropriate at this point.





Objective





- Vital Signs


Vital signs: 


                                   Vital Signs











Temp  98.3 F   02/17/25 09:14


 


Pulse  76   02/17/25 12:26


 


Resp  22   02/17/25 11:52


 


BP  181/77   02/17/25 11:52


 


Pulse Ox  95   02/17/25 11:52


 


FiO2  73   02/17/25 12:16








                                 Intake & Output











 02/16/25 02/17/25 02/17/25





 18:59 06:59 18:59


 


Intake Total 540  


 


Output Total 600 325 325


 


Balance -60 -325 -325


 


Weight  115.5 kg 


 


Intake:   


 


  Oral 540  


 


Output:   


 


  Urine 600 325 325


 


Other:   


 


  Voiding Method Indwelling Catheter Indwelling Catheter Indwelling Catheter


 


  # Bowel Movements  1 














- Exam








No acute distress, oriented 3.  The patient is currently on Airvo.  Settings 

are 60 L/min with an FiO2 of 75%.  The patient is able to speak in full 

sentences without difficulty.





HEENT examination is grossly unremarkable.  Mucous membranes are moist.  No oral

lesions.





Neck supple.  Full range of motion.  No adenopathy thyromegaly or neck vein 

distention.





Cardiovascular examination reveals regular rhythm rate.  S1-S2 normal.  No S3 or

S4.  No discernible murmur noted.  Heart sounds are distant.





Lungs reveal mild scattered rhonchi.  No wheezes.  No crackles.  Breath sounds 

equal bilaterally.





Abdomen soft bowel sounds are heard.  No masses or tenderness.





Extremities are intact.  No cyanosis clubbing or edema.





Skin is without rash or lesion.





Neurologic examination is brief but nonfocal.





- Labs


CBC & Chem 7: 


                                 02/17/25 05:39





                                 02/17/25 12:04


Labs: 


                  Abnormal Lab Results - Last 24 Hours (Table)











  02/16/25 02/16/25 02/17/25 Range/Units





  16:13 20:05 01:16 


 


WBC     (3.8-10.6)  k/uL


 


RBC     (4.30-5.90)  m/uL


 


Hgb     (13.0-17.5)  gm/dL


 


Hct     (39.0-53.0)  %


 


Plt Count     (150-450)  k/uL


 


Lymphocytes # (Manual)     (1.0-4.8)  k/uL


 


Myelocytes # (Manual)     (0)  k/uL


 


Sodium     (137-145)  mmol/L


 


Potassium     (3.5-5.1)  mmol/L


 


BUN     (9-20)  mg/dL


 


Glucose     (74-99)  mg/dL


 


POC Glucose (mg/dL)  225 H  323 H  183 H  ()  mg/dL


 


Calcium     (8.4-10.2)  mg/dL


 


Alkaline Phosphatase     ()  U/L


 


Total Protein     (6.3-8.2)  g/dL


 


Albumin     (3.5-5.0)  g/dL














  02/17/25 02/17/25 02/17/25 Range/Units





  05:39 05:39 06:24 


 


WBC  12.7 H    (3.8-10.6)  k/uL


 


RBC  3.33 L    (4.30-5.90)  m/uL


 


Hgb  10.6 L    (13.0-17.5)  gm/dL


 


Hct  30.8 L    (39.0-53.0)  %


 


Plt Count  142 L    (150-450)  k/uL


 


Lymphocytes # (Manual)  5.84 H    (1.0-4.8)  k/uL


 


Myelocytes # (Manual)  0.13 H    (0)  k/uL


 


Sodium   135 L   (137-145)  mmol/L


 


Potassium   3.4 L   (3.5-5.1)  mmol/L


 


BUN   23 H   (9-20)  mg/dL


 


Glucose   207 H   (74-99)  mg/dL


 


POC Glucose (mg/dL)    207 H  ()  mg/dL


 


Calcium   8.3 L   (8.4-10.2)  mg/dL


 


Alkaline Phosphatase   133 H   ()  U/L


 


Total Protein   5.2 L   (6.3-8.2)  g/dL


 


Albumin   2.9 L   (3.5-5.0)  g/dL














  02/17/25 Range/Units





  11:33 


 


WBC   (3.8-10.6)  k/uL


 


RBC   (4.30-5.90)  m/uL


 


Hgb   (13.0-17.5)  gm/dL


 


Hct   (39.0-53.0)  %


 


Plt Count   (150-450)  k/uL


 


Lymphocytes # (Manual)   (1.0-4.8)  k/uL


 


Myelocytes # (Manual)   (0)  k/uL


 


Sodium   (137-145)  mmol/L


 


Potassium   (3.5-5.1)  mmol/L


 


BUN   (9-20)  mg/dL


 


Glucose   (74-99)  mg/dL


 


POC Glucose (mg/dL)  239 H  ()  mg/dL


 


Calcium   (8.4-10.2)  mg/dL


 


Alkaline Phosphatase   ()  U/L


 


Total Protein   (6.3-8.2)  g/dL


 


Albumin   (3.5-5.0)  g/dL








                      Microbiology - Last 24 Hours (Table)











 02/15/25 18:45 Nasal Screen MRSA/MSSA - Final





 Nasal Swab 


 


 02/13/25 22:08 Blood Culture - Preliminary





 Blood 














Assessment and Plan


Plan: 








Acute hypoxemic respiratory failure, with diffuse bilateral groundglass 

pulmonary filtrates.  Rule out atypical pneumonia versus CHF/interstitial edema.

 The patient remains on Airvo at 60 L and FiO2 of 50%.  Echocardiogram shows a 

preserved LV function.  CAT scan of the chest shows no evidence of any pulm 

embolism and diffuse groundglass bilateral pulmonary filtrates.





Acute Non-ST segment elevation myocardial infarction.





History of coronary artery disease.





History of diabetes mellitus type II currently on Levemir insulin





History of hypertension.





History of gastroesophageal reflux disease.





History of chronic lymphocytic leukemia, in remission.





History of osteoarthritis.





Plan: 





Titrate oxygen flow to maintain saturation above 90%.  Currently on Airvo.


Obtain a follow-up chest x-ray in the morning


Start the patient on Lasix 40 mg IV every 12 hours


IV fluids to KVO


IV cefepime as an empiric antibiotic coverage


proBNP level was elevated at time of admission.  Procalcitonin levels are 

nonelevated.  The viral screen was negative.  Legionella urine antigen was 

negative.


CT of the chest was noted


Echo was noted


Continue rest of the medications


Will continue to follow.

## 2025-02-17 NOTE — P.PN
Subjective


Progress Note Date: 02/17/25


79-year-old male with past medical history significant for coronary artery 

disease, insulin-dependent diabetes mellitus, hypertension presents to the 

emergency department today due to altered mental status.  Patient is unsure of 

the events surrounding his admission so most of the history is obtained via re

cords and hospital staff.  Patient was having shortness of breath at home and 

his wife called EMS.  When EMS arrived to his house he was oriented only to 

self.  He was found to be hypoxic in the low 80s and was placed on supplemental 

oxygen.  Patient denies wearing oxygen at home.  Upon arrival to the hospital 

oxygen saturation had improved and he was AO x 3.  He also had an episode of 

vomiting.  Patient reports that he has been feeling sick recently with myalgias,

cough productive of greenish sputum, actively being treated for sinus infection.

 He denies nausea/vomiting, abdominal pain, chest pain, dyspnea, weight loss.





2/15/25 - Seen and examined at bedside.  Overnight, patient was more hypoxic, 

antibiotics were broadened.  Now requiring Airvo.





2/16/25 - Seen and examined at bedside.  No acute events overnight.  Continues 

to be on Airvo.





2/17/25 - Patient seen and examined at bedside this morning.  Overnight the 

patient became agitated, attempting to get out of his bed and attempting to 

remove his Airvo.  He was given Seroquel 25 mg and was able to calm down.  MRSA 

nares was found to be negative vancomycin was discontinued at this time.





REVIEW OF SYSTEMS: Pertinent positives and negatives noted in HPI. 





Physical Exam:


General: nontoxic, no distress, appears at stated age


Derm: warm, dry, intact


Head: atraumatic, normocephalic, symmetric


Eyes: EOMI, anicteric sclera


Cardiovascular:  S1 S2 reg, no murmur, rubs, or gallops


Lungs: Diminished breath sounds bilaterally, Mild crackles audible in bilateral 

bases, on high flow nasal cannula.


Abdominal: soft, non-tender to palpataion, no appreciable organomegaly


Extremities: no gross muscle atrophy, no edema, no contractures


Neuro: Alert, Oriented, CNII-XII grossly intact, gait normal


Psych: well appearing, appropriate affect





Data Received Today:


Labs: WBCs 12.7, hemoglobin 10.6, hematocrit 30.8, platelet 142; sodium 135, 

potassium 3.4 BUN 23, creatinine 0.1, calcium 8.3, magnesium 1.9, alkaline 

phosphatase 133


Imagining:





Assessment and plan


79-year-old male with past medical history significant for coronary artery 

disease, insulin-dependent diabetes mellitus, hypertension presents to the 

emergency department today due to altered mental status.





#Severe ARDS


#Acute hypoxic respiratory failure


#Multifocal pneumonia


#Acute NSTEMI


-P:F ratio 68


-Continue to wean oxygen, patient currently on Airvo


-On IV cefepime 2 g every 8 hours


-MRSA nares negative, discontinue vancomycin


-Continue on hydrocortisone 50 IV every 6 hours


-Pulmonology following


-Heparin drip discontinued


-Continue aspirin 81 mg, atorvastatin 80 mg, Plavix 75 mg daily


-Recommend patient undergoes outpatient Lexiscan nuclear stress test upon 

discharge and outpatient follow-up with cardiology; if Lexiscan stress test is 

not positive for any infarct or ischemia, may consider stopping Plavix





#Acute onset delirium


-Started on Seroquel 25 mg daily





#Diabetes


#Hyperglycemia


-Continue Levemir 15 units nightly, continue sliding scale insulin, monitor for 

hypoglycemia





#Hypokalemia


-Given 40 mEq once 2/17/2025


-Continue monitor CMP





#Right plantar foot ulcer


-Wound care following





#Hyperbilirubinemia


-no right upper quadrant pain





#Hypertension


-Continue to monitor vital signs





Chronic:


Hypertension


History of CAD


History of CLL, in remission





DVT ppx: heparin sq


Code status: Full code





F: None


E: Replete as needed


N: Heart healthy diet 


A: Ambulatory





Anticipated discharge place: Pending clinical course


Anticipated discharge time: Pending clinical course





Dictation was produced using dragon dictation software. please excuse any 

grammatical, word or spelling errors.








I have seen and evaluated the patient today.  Discussed with the resident and 

agree with the residents finding and plan as documented in the resident's note. 

Changes highlighted in blue font.





Objective





- Vital Signs


Vital signs: 


                                   Vital Signs











Temp  98.9 F   02/17/25 03:44


 


Pulse  97   02/17/25 03:44


 


Resp  18   02/17/25 03:44


 


BP  146/77   02/17/25 03:44


 


Pulse Ox  96   02/17/25 03:44


 


FiO2  80   02/17/25 03:44








                                 Intake & Output











 02/16/25 02/17/25 02/17/25





 18:59 06:59 18:59


 


Intake Total 540  


 


Output Total 600 325 325


 


Balance -60 -325 -325


 


Weight  115.5 kg 


 


Intake:   


 


  Oral 540  


 


Output:   


 


  Urine 600 325 325


 


Other:   


 


  Voiding Method Indwelling Catheter Indwelling Catheter 


 


  # Bowel Movements  1 














- Labs


CBC & Chem 7: 


                                 02/17/25 05:39





                                 02/17/25 12:04


Labs: 


                  Abnormal Lab Results - Last 24 Hours (Table)











  02/16/25 02/16/25 02/16/25 Range/Units





  08:30 08:30 08:30 


 


WBC    18.7 H  (3.8-10.6)  k/uL


 


RBC    3.60 L  (4.30-5.90)  m/uL


 


Hgb    11.3 L  (13.0-17.5)  gm/dL


 


Hct    33.4 L  (39.0-53.0)  %


 


Plt Count     (150-450)  k/uL


 


Neutrophils #    7.9 H  (1.3-7.7)  k/uL


 


Lymphocytes #    9.4 H  (1.0-4.8)  k/uL


 


APTT   56.4 H   (22.0-30.0)  sec


 


Sodium  136 L    (137-145)  mmol/L


 


BUN  24 H    (9-20)  mg/dL


 


Glucose  155 H    (74-99)  mg/dL


 


POC Glucose (mg/dL)     ()  mg/dL


 


Total Bilirubin  1.5 H    (0.2-1.3)  mg/dL


 


AST  104 H    (17-59)  U/L


 


Alkaline Phosphatase  128 H    ()  U/L


 


Total Protein  5.5 L    (6.3-8.2)  g/dL


 


Albumin  3.2 L    (3.5-5.0)  g/dL














  02/16/25 02/16/25 02/16/25 Range/Units





  11:09 16:13 20:05 


 


WBC     (3.8-10.6)  k/uL


 


RBC     (4.30-5.90)  m/uL


 


Hgb     (13.0-17.5)  gm/dL


 


Hct     (39.0-53.0)  %


 


Plt Count     (150-450)  k/uL


 


Neutrophils #     (1.3-7.7)  k/uL


 


Lymphocytes #     (1.0-4.8)  k/uL


 


APTT     (22.0-30.0)  sec


 


Sodium     (137-145)  mmol/L


 


BUN     (9-20)  mg/dL


 


Glucose     (74-99)  mg/dL


 


POC Glucose (mg/dL)  171 H  225 H  323 H  ()  mg/dL


 


Total Bilirubin     (0.2-1.3)  mg/dL


 


AST     (17-59)  U/L


 


Alkaline Phosphatase     ()  U/L


 


Total Protein     (6.3-8.2)  g/dL


 


Albumin     (3.5-5.0)  g/dL














  02/17/25 02/17/25 02/17/25 Range/Units





  01:16 05:39 06:24 


 


WBC   12.7 H   (3.8-10.6)  k/uL


 


RBC   3.33 L   (4.30-5.90)  m/uL


 


Hgb   10.6 L   (13.0-17.5)  gm/dL


 


Hct   30.8 L   (39.0-53.0)  %


 


Plt Count   142 L   (150-450)  k/uL


 


Neutrophils #     (1.3-7.7)  k/uL


 


Lymphocytes #     (1.0-4.8)  k/uL


 


APTT     (22.0-30.0)  sec


 


Sodium     (137-145)  mmol/L


 


BUN     (9-20)  mg/dL


 


Glucose     (74-99)  mg/dL


 


POC Glucose (mg/dL)  183 H   207 H  ()  mg/dL


 


Total Bilirubin     (0.2-1.3)  mg/dL


 


AST     (17-59)  U/L


 


Alkaline Phosphatase     ()  U/L


 


Total Protein     (6.3-8.2)  g/dL


 


Albumin     (3.5-5.0)  g/dL








                      Microbiology - Last 24 Hours (Table)











 02/13/25 22:08 Blood Culture - Preliminary





 Blood

## 2025-02-18 LAB
ANION GAP SERPL CALC-SCNC: 8 MMOL/L
BASOPHILS # BLD AUTO: 0 K/UL (ref 0–0.2)
BASOPHILS NFR BLD AUTO: 0 %
BUN SERPL-SCNC: 34 MG/DL (ref 9–20)
CALCIUM SPEC-MCNC: 8.6 MG/DL (ref 8.4–10.2)
CHLORIDE SERPL-SCNC: 101 MMOL/L (ref 98–107)
CO2 SERPL-SCNC: 32 MMOL/L (ref 22–30)
EOSINOPHIL # BLD AUTO: 0 K/UL (ref 0–0.7)
EOSINOPHIL NFR BLD AUTO: 0 %
ERYTHROCYTE [DISTWIDTH] IN BLOOD BY AUTOMATED COUNT: 3.76 M/UL (ref 4.3–5.9)
ERYTHROCYTE [DISTWIDTH] IN BLOOD: 14.3 % (ref 11.5–15.5)
GLUCOSE BLD-MCNC: 200 MG/DL (ref 70–110)
GLUCOSE BLD-MCNC: 212 MG/DL (ref 70–110)
GLUCOSE BLD-MCNC: 292 MG/DL (ref 70–110)
GLUCOSE BLD-MCNC: 387 MG/DL (ref 70–110)
GLUCOSE BLD-MCNC: 388 MG/DL (ref 70–110)
GLUCOSE SERPL-MCNC: 189 MG/DL (ref 74–99)
HCT VFR BLD AUTO: 34.5 % (ref 39–53)
HGB BLD-MCNC: 11.4 GM/DL (ref 13–17.5)
LYMPHOCYTES # SPEC AUTO: 8.6 K/UL (ref 1–4.8)
LYMPHOCYTES NFR SPEC AUTO: 51 %
MCH RBC QN AUTO: 30.4 PG (ref 25–35)
MCHC RBC AUTO-ENTMCNC: 33.1 G/DL (ref 31–37)
MCV RBC AUTO: 91.8 FL (ref 80–100)
MONOCYTES # BLD AUTO: 0.4 K/UL (ref 0–1)
MONOCYTES NFR BLD AUTO: 3 %
NEUTROPHILS # BLD AUTO: 7.3 K/UL (ref 1.3–7.7)
NEUTROPHILS NFR BLD AUTO: 44 %
PLATELET # BLD AUTO: 158 K/UL (ref 150–450)
POTASSIUM SERPL-SCNC: 3.1 MMOL/L (ref 3.5–5.1)
SODIUM SERPL-SCNC: 141 MMOL/L (ref 137–145)
WBC # BLD AUTO: 16.7 K/UL (ref 3.8–10.6)

## 2025-02-18 RX ADMIN — CEFEPIME HYDROCHLORIDE SCH MLS/HR: 2 INJECTION, POWDER, FOR SOLUTION INTRAVENOUS at 20:58

## 2025-02-18 RX ADMIN — SUCRALFATE SCH GM: 1 TABLET ORAL at 16:42

## 2025-02-18 RX ADMIN — POTASSIUM CHLORIDE STA MEQ: 20 TABLET, EXTENDED RELEASE ORAL at 11:47

## 2025-02-18 NOTE — US
EXAMINATION TYPE: US kidneys/renal and bladder

 

DATE OF EXAM: 2/18/2025

 

Exam done portable

 

COMPARISON: CT 2024

 

CLINICAL INDICATION: Male, 79 years old with history of Ju[place]o;

 

TECHNIQUE: Grayscale imaging of the bilateral kidneys and urinary bladder:

 

FINDINGS:

 

EXAM MEASUREMENTS:

 

Right Kidney:  10.0 x 4.5 x 4.6 cm

Left Kidney: 10.7 x 5.9 x 5.7 cm

 

 

 

Right Kidney: No hydronephrosis or masses seen, inferior pole limited by overlying bowel gas  

Left Kidney: No hydronephrosis or masses seen, inferior pole limited by overlying bowel gas  

Bladder: not distended, carey catheter

 

**Gallbladder: full of stones

 

 

There is no evidence for hydronephrosis at this point in time.  No nephrolithiasis is seen.  No peri
s are identified.  The urinary bladder is under distended with Carey catheter in place which limits e
valuation. Gallbladder is full of stones. No wall thickening or surrounding fluid identified.

 

 

 

IMPRESSION: Limited examination due to overlying bowel gas.

 

1. No evidence for hydronephrosis or nephrolithiasis.

2. Cholelithiasis.

 

 

 

[facility], Workstation: [id number], 2/18/2025 12:25 PM

## 2025-02-18 NOTE — P.PN
Subjective


Progress Note Date: 02/18/25








79-year-old male who presented to the emergency department, on February 13, 

complaining of nausea, vomiting, diarrhea, and mental status changes.  The 

patient was brought into the emergency department, by EMS, and apparently he was

quite confused, and disoriented, when they initially evaluated him.  He a

pparently was having GI episodes, including vomiting, nausea, and diarrhea.  He 

denies having any chest pain or shortness of breath although his saturations 

were in the low 80s.  He was placed on oxygen therapy, and brought into the 

hospital.  When he was in the ER, he was apparently alert and oriented x 3.  He 

was feeling a bit short of breath.  We were consulted today, because the 

patient's respiratory status has declined, and he is currently on Airvo, with 

settings of 60 L/min, and an FiO2 of 75%.  The patient was thought to have a 

non-ST segment elevation myocardial infarction, and pneumonia.  He was on IV 

heparin, and saline at 20 cc an hour, as well as vancomycin, and cefepime.  His 

procalcitonin level was 0.21.  Current laboratory data includes a white count of

16.9, hemoglobin 11.9, hematocrit 35.5, and a platelet count of 155,000.  A 

blood gas done yesterday shows a pO2 of 62, pCO2 42, and a pH of 7.41.  This 

apparently was on 100% oxygen.  Sodium 135, potassium 3.3, chloride 95, CO2 30, 

anion gap 10, BUN 19, creatinine 1.06.  Glucose 296.  Hemoglobin A1c was 6.9.  

Albumin was 3.5.  N-terminal proBNP was 2750.  Blood cultures were negative.  

Chest x-ray showed bilateral multifocal airspace opacities, which have improved.





Progress note dated February 16, 2025.





79-year-old male seen in consultation yesterday.  Please see my note above.  The

patient is seen today in room 369.  He continues on Airvo, 60 L/min and FiO2 of 

75%.  In addition, the patient continues on cefepime and vancomycin, his 

procalcitonin level was normal at 0.21.  He is also on IV heparin.  He states he

feels about the same today as he did yesterday, no better, and no worse.  White 

count 18.7, hemoglobin 9.3, hematocrit 33.4, platelet count 156,000.  PTT is 

56.4.  Sodium 136, potassium 3.5, chlorides 100, CO2 29, BUN 24, creatinine 

0.93.  Glucose 171.  Calcium 8.6.  Bilirubin 1.5.  Albumin 3.2.





On 2/17/2025, the patient is being seen for a follow-up.  The patient remains 

quite hypoxic.  On today's evaluation, the patient remains on Airvo at 60 L with

FiO2 of 70%.  The chest x-ray from 2/14/2025 showed improved aeration of both 

lungs with persistent multifocal airspace opacities.  The patient's blood work 

showed a procalcitonin level of 0.2 at the time of admission x 2 and a proBNP le

chris was 2750.  On today's blood work, sodium levels at 135, potassium level is 

3.4, BUN 23 with a creatinine of 0.8.  LFTs are essentially within normal 

limits.  The patient remains on IV cefepime.  The patient will be started on IV 

Lasix.  The patient is also on DuoNeb nebulized treatments around-the-clock, IV 

hydrocortisone, Levemir insulin 15 units at bedtime and the patient is on 

metoprolol 25 mg p.o. twice a day.  Rest of the medications remain unchanged.  

Echocardiogram was done on 2/14/2025 indicating a ejection fraction of 50 to 

55%, inferolateral wall hypokinesis, no significant valvular abnormalities.  The

CTA of the chest done on 2/13/2025 revealed no pulmonary embolism and diffuse 

patchy groundglass pulmonary opacities could relate to interstitial edema versus

atypical pulmonary infection and the patient also has evidence of 

cholelithiasis.  The viral screen was negative and Legionella urine antigen was 

negative at the time of admission.  Mental status is appropriate at this point.





On 2/18/2025, the patient is still hypoxic, maintained on Airvo at 60 L with an 

FiO2 of 65%.  No significant improvement in oxygenation over the past 24 hours. 

Noted the patient was started on diuretics yesterday.  He remains on IV Lasix 

and the dose has been switched to 40 mg every 24 hours.  Fluid balance -2.9 L 

over the past 24 hours.  The patient has a white cell count of 16.7 with a 

hemoglobin 11.4 and a platelet count of 158.  BUN 34 and the creatinine is up to

1.3.  Sodium levels at 141.  The patient remains on DuoNeb nebulized treatments 

around-the-clock.  Remains on IV cefepime.  Remains on IV Lasix 40 mg every 24 

hours.  Rest of the medications remain essentially unchanged.  Ultrasound of the

kidneys and bladder showed no evidence of any hydronephrosis.  The most recent 

chest x-ray is from 2/15/2025 and it shows improved aeration of the lungs with 

persistent multifocal airspace opacities.  A follow-up chest x-ray is to be 

obtained for tomorrow.Echocardiogram was done on 2/14/2025 indicating a ejection

fraction of 50 to 55%, inferolateral wall hypokinesis, no significant valvular 

abnormalities.  The CTA of the chest done on 2/13/2025 revealed no pulmonary 

embolism and diffuse patchy groundglass pulmonary opacities could relate to 

interstitial edema versus atypical pulmonary infection and the patient also has 

evidence of cholelithiasis.  The viral screen was negative and Legionella urine 

antigen was negative at the time of admission. 





Objective





- Vital Signs


Vital signs: 


                                   Vital Signs











Temp  97.6 F   02/18/25 11:45


 


Pulse  50 L  02/18/25 11:45


 


Resp  19   02/18/25 11:45


 


BP  123/49   02/18/25 11:45


 


Pulse Ox  100   02/18/25 11:45


 


FiO2  65   02/18/25 12:18








                                 Intake & Output











 02/17/25 02/18/25 02/18/25





 18:59 06:59 18:59


 


Intake Total   240


 


Output Total 1465 1525 400


 


Balance -1465 -1525 -160


 


Weight  123.5 kg 


 


Intake:   


 


  Oral   240


 


Output:   


 


  Urine 1465 1525 400


 


Other:   


 


  Voiding Method Indwelling Catheter Indwelling Catheter Indwelling Catheter


 


  # Bowel Movements  1 1














- Exam








No acute distress, oriented 3.  The patient is currently on Airvo.  Settings 

are 50 L/min with an FiO2 of 70 %.  The patient is able to speak in full 

sentences without difficulty.





HEENT examination is grossly unremarkable.  Mucous membranes are moist.  No oral

lesions.





Neck supple.  Full range of motion.  No adenopathy thyromegaly or neck vein 

distention.





Cardiovascular examination reveals regular rhythm rate.  S1-S2 normal.  No S3 or

S4.  No discernible murmur noted.  Heart sounds are distant.





Lungs reveal mild scattered rhonchi.  No wheezes.  No crackles.  Breath sounds 

equal bilaterally.





Abdomen soft bowel sounds are heard.  No masses or tenderness.





Extremities are intact.  No cyanosis clubbing or edema.





Skin is without rash or lesion.





Neurologic examination is brief but nonfocal.





- Labs


CBC & Chem 7: 


                                 02/18/25 06:49





                                 02/18/25 06:49


Labs: 


                  Abnormal Lab Results - Last 24 Hours (Table)











  02/17/25 02/17/25 02/18/25 Range/Units





  16:49 20:05 01:15 


 


WBC     (3.8-10.6)  k/uL


 


RBC     (4.30-5.90)  m/uL


 


Hgb     (13.0-17.5)  gm/dL


 


Hct     (39.0-53.0)  %


 


Potassium     (3.5-5.1)  mmol/L


 


Carbon Dioxide     (22-30)  mmol/L


 


BUN     (9-20)  mg/dL


 


Creatinine     (0.66-1.25)  mg/dL


 


Glucose     (74-99)  mg/dL


 


POC Glucose (mg/dL)  199 H  205 H  212 H  ()  mg/dL














  02/18/25 02/18/25 02/18/25 Range/Units





  06:10 06:49 06:49 


 


WBC   16.7 H   (3.8-10.6)  k/uL


 


RBC   3.76 L   (4.30-5.90)  m/uL


 


Hgb   11.4 L   (13.0-17.5)  gm/dL


 


Hct   34.5 L   (39.0-53.0)  %


 


Potassium    3.1 L  (3.5-5.1)  mmol/L


 


Carbon Dioxide    32 H  (22-30)  mmol/L


 


BUN    34 H  (9-20)  mg/dL


 


Creatinine    1.33 H  (0.66-1.25)  mg/dL


 


Glucose    189 H  (74-99)  mg/dL


 


POC Glucose (mg/dL)  200 H    ()  mg/dL














  02/18/25 Range/Units





  11:33 


 


WBC   (3.8-10.6)  k/uL


 


RBC   (4.30-5.90)  m/uL


 


Hgb   (13.0-17.5)  gm/dL


 


Hct   (39.0-53.0)  %


 


Potassium   (3.5-5.1)  mmol/L


 


Carbon Dioxide   (22-30)  mmol/L


 


BUN   (9-20)  mg/dL


 


Creatinine   (0.66-1.25)  mg/dL


 


Glucose   (74-99)  mg/dL


 


POC Glucose (mg/dL)  387 H  ()  mg/dL








                      Microbiology - Last 24 Hours (Table)











 02/15/25 18:45 Nasal Screen MRSA/MSSA - Final





 Nasal Swab 














Assessment and Plan


Plan: 








Acute hypoxemic respiratory failure, with diffuse bilateral groundglass pul

monary filtrates.  Rule out atypical pneumonia versus CHF/interstitial edema.  

The patient remains on Airvo at 50 L and FiO2 of 70 %.  Echocardiogram shows a 

preserved LV function.  CAT scan of the chest shows no evidence of any pulm 

embolism and diffuse groundglass bilateral pulmonary filtrates.





Acute Non-ST segment elevation myocardial infarction.





History of coronary artery disease.





History of diabetes mellitus type II currently on Levemir insulin





History of hypertension.





History of gastroesophageal reflux disease.





History of chronic lymphocytic leukemia, in remission.





History of osteoarthritis.





Plan: 





Titrate oxygen flow to maintain saturation above 90%.  Currently on Airvo.


Obtain a follow-up chest x-ray in the morning


Start the patient on Lasix 40 mg IV e every 24 hours and the patient remains 

negative fluid balance


IV fluids to KVO


Monitor renal function


Ultrasound the kidneys shows no evidence of any hydronephrosis


IV cefepime as an empiric antibiotic coverage


proBNP level was elevated at time of admission.  Procalcitonin levels are 

nonelevated.  The viral screen was negative.  Legionella urine antigen was 

negative.


CT of the chest was noted


Echo was noted


Continue rest of the medications


Will continue to follow. 


Time with Patient: Greater than 30

## 2025-02-18 NOTE — P.PN
Subjective


Progress Note Date: 02/18/25


79-year-old male with past medical history significant for coronary artery 

disease, insulin-dependent diabetes mellitus, hypertension presents to the 

emergency department today due to altered mental status.  Patient is unsure of 

the events surrounding his admission so most of the history is obtained via re

cords and hospital staff.  Patient was having shortness of breath at home and 

his wife called EMS.  When EMS arrived to his house he was oriented only to 

self.  He was found to be hypoxic in the low 80s and was placed on supplemental 

oxygen.  Patient denies wearing oxygen at home.  Upon arrival to the hospital 

oxygen saturation had improved and he was AO x 3.  He also had an episode of 

vomiting.  Patient reports that he has been feeling sick recently with myalgias,

cough productive of greenish sputum, actively being treated for sinus infection.

 He denies nausea/vomiting, abdominal pain, chest pain, dyspnea, weight loss.





2/15/25 - Seen and examined at bedside.  Overnight, patient was more hypoxic, 

antibiotics were broadened.  Now requiring Airvo.





2/16/25 - Seen and examined at bedside.  No acute events overnight.  Continues 

to be on Airvo.





2/17/25 - Patient seen and examined at bedside this morning.  Overnight the 

patient became agitated, attempting to get out of his bed and attempting to 

remove his Airvo.  He was given Seroquel 25 mg and was able to calm down.  MRSA 

nares was found to be negative vancomycin was discontinued at this time.





2/18/25 - Patient seen and examined at bedside this morning.  No acute events 

overnight.  Continues to be on Airvo with FiO2 of 65%.  Kidney/bladder 

ultrasound completed today, secondary to new onset ROSSY, which showed no evidence

of hydronephrosis or nephrolithiasis, however did show cholelithiasis.  

Lisinopril decreased to 20 mg daily.  Additionally, CBC today significant WBC 

6.7, hemoglobin 11.4, hematocrit 34.5.  CMP today significant for potassium 3.1,

bicarbonate 32, BUN 34, creatinine 1.33. 





REVIEW OF SYSTEMS: Pertinent positives and negatives noted in HPI. 





Physical Exam:


General: nontoxic, no distress, appears at stated age


Derm: warm, dry, intact


Head: atraumatic, normocephalic, symmetric


Eyes: EOMI, anicteric sclera


Cardiovascular:  S1 S2 reg, no murmur, rubs, or gallops


Lungs: Diminished breath sounds bilaterally, Mild crackles audible in bilateral 

bases, on high flow nasal cannula.


Abdominal: soft, non-tender to palpataion, no appreciable organomegaly


Extremities: no gross muscle atrophy, no edema, no contractures


Neuro: Alert, Oriented, CNII-XII grossly intact, gait normal


Psych: well appearing, appropriate affect





Data Received Today:


Labs: WBC 6.7, hemoglobin 11.4, hematocrit 34.5, platelet 158; sodium 141, 

potassium 3.1, chloride 101, bicarbonate 32, BUN 34, creatinine 1.33, calcium 

8.7


Imagining: kidney/bladder ultrasound completed today which showed no evidence of

hydronephrosis or nephrolithiasis, however did show cholelithiasis.





Assessment and plan


79-year-old male with past medical history significant for coronary artery 

disease, insulin-dependent diabetes mellitus, hypertension presents to the 

emergency department today due to altered mental status.





#Severe ARDS


#Acute hypoxic respiratory failure


#Multifocal pneumonia


#Acute NSTEMI


-P:F ratio 68


-Continue to wean oxygen, patient currently on Airvo


-On IV cefepime 2 g every 8 hours


-MRSA nares negative, discontinue vancomycin


-I will start steroids given severe pneumonia, started on hydrocortisone 50 IV 

every 6 hours


-Pulmonology following


-Continue aspirin 81 mg, atorvastatin 80 mg, Plavix 75 mg daily


-Recommend patient undergoes outpatient Lexiscan nuclear stress test upon 

discharge and outpatient follow-up with cardiology; if Lexiscan stress test is 

not positive for any infarct or ischemia, may consider stopping Plavix





#Acute kidney injury


#Hypokalemia


#Contraction alkalosis


-kidney/bladder ultrasound completed today which showed no evidence of 

hydronephrosis or nephrolithiasis, however did show cholelithiasis


-Decrease lisinopril to 20 mg daily


-Decreased Lasix to 40 mg daily


-Gave 40 mEq potassium chloride


-Continue monitor CMP





#Acute onset delirium


-Started on Seroquel 25 mg daily





#Diabetes


#Hyperglycemia


-Continue Levemir 15 units nightly, continue sliding scale insulin, monitor for 

hypoglycemia





#Right plantar foot ulcer


-Wound care following





#Hyperbilirubinemia


-no right upper quadrant pain





#Hypertension


-Continue to monitor vital signs





Chronic:


Hypertension


History of CAD


History of CLL, in remission





DVT ppx: Heparin 5000 units subcu


Code status: Full code





F: None


E: Replete as needed


N: Heart healthy diet 


A: Ambulatory





Anticipated discharge place: Pending clinical course


Anticipated discharge time: Pending clinical course





Dictation was produced using dragon dictation software. please excuse any 

grammatical, word or spelling errors.





Patient is severely ill, needs close monitoring.  Prognosis guarded.


I have seen and evaluated the patient today.  Discussed with the resident and 

agree with the residents finding and plan as documented in the resident's note. 

Changes highlighted in blue font.





Objective





- Vital Signs


Vital signs: 


                                   Vital Signs











Temp  97.6 F   02/18/25 11:45


 


Pulse  50 L  02/18/25 11:45


 


Resp  19   02/18/25 11:45


 


BP  123/49   02/18/25 11:45


 


Pulse Ox  100   02/18/25 11:45


 


FiO2  65   02/18/25 12:18








                                 Intake & Output











 02/17/25 02/18/25 02/18/25





 18:59 06:59 18:59


 


Intake Total   240


 


Output Total 1465 1525 400


 


Balance -1465 -1525 -160


 


Weight  123.5 kg 


 


Intake:   


 


  Oral   240


 


Output:   


 


  Urine 1465 1525 400


 


Other:   


 


  Voiding Method Indwelling Catheter Indwelling Catheter Indwelling Catheter


 


  # Bowel Movements  1 














- Labs


CBC & Chem 7: 


                                 02/18/25 06:49





                                 02/18/25 06:49


Labs: 


                  Abnormal Lab Results - Last 24 Hours (Table)











  02/17/25 02/17/25 02/18/25 Range/Units





  16:49 20:05 01:15 


 


WBC     (3.8-10.6)  k/uL


 


RBC     (4.30-5.90)  m/uL


 


Hgb     (13.0-17.5)  gm/dL


 


Hct     (39.0-53.0)  %


 


Potassium     (3.5-5.1)  mmol/L


 


Carbon Dioxide     (22-30)  mmol/L


 


BUN     (9-20)  mg/dL


 


Creatinine     (0.66-1.25)  mg/dL


 


Glucose     (74-99)  mg/dL


 


POC Glucose (mg/dL)  199 H  205 H  212 H  ()  mg/dL














  02/18/25 02/18/25 02/18/25 Range/Units





  06:10 06:49 06:49 


 


WBC   16.7 H   (3.8-10.6)  k/uL


 


RBC   3.76 L   (4.30-5.90)  m/uL


 


Hgb   11.4 L   (13.0-17.5)  gm/dL


 


Hct   34.5 L   (39.0-53.0)  %


 


Potassium    3.1 L  (3.5-5.1)  mmol/L


 


Carbon Dioxide    32 H  (22-30)  mmol/L


 


BUN    34 H  (9-20)  mg/dL


 


Creatinine    1.33 H  (0.66-1.25)  mg/dL


 


Glucose    189 H  (74-99)  mg/dL


 


POC Glucose (mg/dL)  200 H    ()  mg/dL














  02/18/25 Range/Units





  11:33 


 


WBC   (3.8-10.6)  k/uL


 


RBC   (4.30-5.90)  m/uL


 


Hgb   (13.0-17.5)  gm/dL


 


Hct   (39.0-53.0)  %


 


Potassium   (3.5-5.1)  mmol/L


 


Carbon Dioxide   (22-30)  mmol/L


 


BUN   (9-20)  mg/dL


 


Creatinine   (0.66-1.25)  mg/dL


 


Glucose   (74-99)  mg/dL


 


POC Glucose (mg/dL)  387 H  ()  mg/dL








                      Microbiology - Last 24 Hours (Table)











 02/15/25 18:45 Nasal Screen MRSA/MSSA - Final





 Nasal Swab

## 2025-02-19 LAB
ANION GAP SERPL CALC-SCNC: 11 MMOL/L
BASOPHILS # BLD AUTO: 0.1 K/UL (ref 0–0.2)
BASOPHILS NFR BLD AUTO: 0 %
BUN SERPL-SCNC: 46 MG/DL (ref 9–20)
CALCIUM SPEC-MCNC: 8.8 MG/DL (ref 8.4–10.2)
CHLORIDE SERPL-SCNC: 99 MMOL/L (ref 98–107)
CO2 SERPL-SCNC: 29 MMOL/L (ref 22–30)
EOSINOPHIL # BLD AUTO: 0 K/UL (ref 0–0.7)
EOSINOPHIL NFR BLD AUTO: 0 %
ERYTHROCYTE [DISTWIDTH] IN BLOOD BY AUTOMATED COUNT: 3.66 M/UL (ref 4.3–5.9)
ERYTHROCYTE [DISTWIDTH] IN BLOOD: 14.3 % (ref 11.5–15.5)
GLUCOSE BLD-MCNC: 234 MG/DL (ref 70–110)
GLUCOSE BLD-MCNC: 304 MG/DL (ref 70–110)
GLUCOSE BLD-MCNC: 314 MG/DL (ref 70–110)
GLUCOSE BLD-MCNC: 334 MG/DL (ref 70–110)
GLUCOSE SERPL-MCNC: 234 MG/DL (ref 74–99)
HCT VFR BLD AUTO: 33.9 % (ref 39–53)
HGB BLD-MCNC: 11.3 GM/DL (ref 13–17.5)
LYMPHOCYTES # SPEC AUTO: 9.1 K/UL (ref 1–4.8)
LYMPHOCYTES NFR SPEC AUTO: 48 %
MCH RBC QN AUTO: 30.9 PG (ref 25–35)
MCHC RBC AUTO-ENTMCNC: 33.3 G/DL (ref 31–37)
MCV RBC AUTO: 92.7 FL (ref 80–100)
MONOCYTES # BLD AUTO: 0.6 K/UL (ref 0–1)
MONOCYTES NFR BLD AUTO: 3 %
NEUTROPHILS # BLD AUTO: 9 K/UL (ref 1.3–7.7)
NEUTROPHILS NFR BLD AUTO: 47 %
PLATELET # BLD AUTO: 165 K/UL (ref 150–450)
POTASSIUM SERPL-SCNC: 4.2 MMOL/L (ref 3.5–5.1)
SODIUM SERPL-SCNC: 139 MMOL/L (ref 137–145)
WBC # BLD AUTO: 19 K/UL (ref 3.8–10.6)

## 2025-02-19 RX ADMIN — FUROSEMIDE SCH MG: 10 INJECTION, SOLUTION INTRAMUSCULAR; INTRAVENOUS at 07:37

## 2025-02-19 NOTE — CDI
Documentation Clarification Form



Date: 02/19/2025 11:35:36 AM

From: Flory Rowan RN CCDS 

Phone: [phone]

MRN: [id number]

Admit Date: 02/13/2025 09:33:00 PM

Patient Name: Wade Wagoner

Visit Number: [id number]

Discharge Date:  





ATTENTION: The Clinical Documentation Specialists (CDI) and [facility] Coding Staff 
appreciate your assistance in clarifying documentation. Please respond to the 
clarification below the line at the bottom and electronically sign. The CDI & 
[facility] Coding staff will review the response and follow-up if needed. Please note: 
Queries are made part of the Legal Health Record. If you have any questions, 
please contact the author of this message via ITS.



Doctor: Jason Macias MD



Sepsis is documented in HP, 2/14 but is not noted in subsequent documentation.  
Clarification is requested.



History/Risk Factors: 79 year old male presents to the ED for altered mental 
status. Patient is having shortness of breath at home and his wife called EMS. 
The patient was found to be hypoxic in the low 80s denies home oxygen use. 
Reported that the patient has been feeling sick recently with myalgias, cough 
productive of greenish sputum, actively treated for sinus infection. Medical 
history; CAD, DM and HTN. 2/14, HP.



Clinical Indicators:  

HP, 2/14: Sepsis, secondary to pneumonia.

Cardiology Consult, 2/14: NSTEMI II secondary to pneumonia and sepsis.

Labs, 2/13:  Wbc 13.2; Neutrophils 7.50; Plasma lactic acid calvin 1.0

Lab, 2/14: Procalcitonin 0.21

CXR, 2/13: No acute cardiopulmonary disease/process. 

CHESTAG, 2/13: Diffuse patchy ground glass pulmonary opacities suggests atypical
pulmonary infection. 

 

Treatment:  2/13 Azithromycin IVPB X 1; 2/13 Ceftriaxone IVPB x 1; 2/14 
Zithromax po daily x 2 doses. 2/14 Ceftriaxone vipb x 1; 2/15  2/18 Cefepime 
IVPB Q8H; 2/15 Vancomycin IVPB x 1; 2/15  2/17 Vancomycin IVPB Q12H; 2/18 
Cefepime IVPB Q12H

Fluids: 2/13:0.9NS 500cc IV x 1; 



Please clarify if the Sepsis is:



[  ]  Sepsis confirmed, remains under treatment

[  ]  Sepsis confirmed, resolved

[  ]  Sepsis ruled out

[  ]  Other condition, please specify ______

[  ]  Unable to determine



SIRS Criteria: 2 or more of the following may indicate SIRS   

Temperature         < 96.8F (36C) or > 101.0F (38.3C)

Heart Rate         > 90 bpm

Respiratory Rate      > 20 breaths/min or PaCO2 < 32 mmHg

White Blood Cell Count   > 12,000 or < 4,000 cells/mm3 or > 10% bands



Answered in progress note 2/23/2025 Dr. Macias Acute hypoxic respiratory 
failure and sepsis due to multifocal PNA and ARDS:





(Template Last Revised: March 2021)

___________________________________________________________________________

MTDD

## 2025-02-19 NOTE — XR
EXAMINATION TYPE: XR chest 1V

 

DATE OF EXAM: 2/19/2025 6:25 AM

 

COMPARISON: Chest radiographs from 2/15/2025

 

TECHNIQUE: XR chest 1V Frontal view of the chest.

 

CLINICAL INDICATION:Male, 79 years old with history of Hypoxic respiratory failure; 

 

FINDINGS: 

Lungs/Pleura: No pleural effusion or pneumothorax. Similar scattered airspace opacities throughout th
e lungs. 

Heart/mediastinum: Cardiomediastinal silhouette is enlarged and stable.

Musculoskeletal: No acute osseous pathology.

 

IMPRESSION: 

Similar scattered airspace opacities throughout the lungs which may represent pulmonary edema and/or 
pneumonia.

 

 

[facility], Workstation: [id number], 2/19/2025 8:07 AM

## 2025-02-19 NOTE — P.PN
Subjective


Progress Note Date: 02/19/25


79-year-old male with past medical history significant for coronary artery 

disease, insulin-dependent diabetes mellitus, hypertension presents to the 

emergency department today due to altered mental status.  Patient is unsure of 

the events surrounding his admission so most of the history is obtained via re

cords and hospital staff.  Patient was having shortness of breath at home and 

his wife called EMS.  When EMS arrived to his house he was oriented only to 

self.  He was found to be hypoxic in the low 80s and was placed on supplemental 

oxygen.  Patient denies wearing oxygen at home.  Upon arrival to the hospital 

oxygen saturation had improved and he was AO x 3.  He also had an episode of 

vomiting.  Patient reports that he has been feeling sick recently with myalgias,

cough productive of greenish sputum, actively being treated for sinus infection.

 He denies nausea/vomiting, abdominal pain, chest pain, dyspnea, weight loss.





2/15/25 - Seen and examined at bedside.  Overnight, patient was more hypoxic, 

antibiotics were broadened.  Now requiring Airvo.





2/16/25 - Seen and examined at bedside.  No acute events overnight.  Continues 

to be on Airvo.





2/17/25 - Patient seen and examined at bedside this morning.  Overnight the 

patient became agitated, attempting to get out of his bed and attempting to 

remove his Airvo.  He was given Seroquel 25 mg and was able to calm down.  MRSA 

nares was found to be negative vancomycin was discontinued at this time.





2/18/25 - Patient seen and examined at bedside this morning.  No acute events 

overnight.  Continues to be on Airvo with FiO2 of 65%.  Kidney/bladder 

ultrasound completed today, secondary to new onset ROSSY, which showed no evidence

of hydronephrosis or nephrolithiasis, however did show cholelithiasis.  

Lisinopril decreased to 20 mg daily.  Additionally, CBC today significant WBC 

6.7, hemoglobin 11.4, hematocrit 34.5.  CMP today significant for potassium 3.1,

bicarbonate 32, BUN 34, creatinine 1.33. 





2/19/25 - Patient seen and examined at bedside this morning.  No acute events 

overnight.  Continues to be on Airvo with FiO2 50%.  SpO2 this morning 94%.  He 

is much more alert and awake than he has been previously.  States that this 

morning he is feeling significantly better than he had been in previous days, 

when he first woke up he did not feel great however in the time since then he 

feels well.  He notes having an increasing appetite, however becomes full very 

quickly.





REVIEW OF SYSTEMS: Pertinent positives and negatives noted in HPI. 





Physical Exam:


General: nontoxic, no distress, appears at stated age


Derm: warm, dry, intact


Head: atraumatic, normocephalic, symmetric


Eyes: EOMI, anicteric sclera


Cardiovascular:  S1 S2 reg, no murmur, rubs, or gallops


Lungs: Diminished breath sounds bilaterally, Mild crackles audible in bilateral 

bases, on high flow nasal cannula.


Abdominal: soft, non-tender to palpataion, no appreciable organomegaly


Extremities: no gross muscle atrophy, no edema, no contractures


Neuro: Alert, Oriented, CNII-XII grossly intact, gait normal


Psych: well appearing, appropriate affect





Data Received Today:


Labs: CBC and BMP significant WBC 19, RBC 3.66, Hg 11.3, Hct 33.9, BUN 46, glu 

234.


Imagining: CXR shows bilateral airspace opacities similar to previous CXR.





Assessment and plan


79-year-old male with past medical history significant for coronary artery 

disease, insulin-dependent diabetes mellitus, hypertension presents to the 

emergency department today due to altered mental status.





#Severe ARDS


#Acute hypoxic respiratory failure


#Multifocal pneumonia


#Acute NSTEMI


-Continue to wean oxygen, patient currently on Airvo


-On IV cefepime 2 g every 8 hours (day 5 today)


-MRSA nares negative, discontinue vancomycin


-I will start steroids given severe pneumonia, started on hydrocortisone 50 IV 

every 6 hours


-Pulmonology following


-Continue aspirin 81 mg, atorvastatin 80 mg, Plavix 75 mg daily





#Acute kidney injury, improving


#Hypokalemia, resolved


#Contraction alkalosis, improving


-Decrease lisinopril to 20 mg daily


-Decreased Lasix to 40 mg daily


-Continue monitor CMP





#Acute onset delirium


-Started on Seroquel 25 mg daily





#Debility


-PT/OT consulted





#Diabetes


#Hyperglycemia


-Continue Levemir 15 units nightly, continue sliding scale insulin, monitor for 

hypoglycemia





#Right plantar foot ulcer


-Wound care following





#Hyperbilirubinemia, improved


-No right upper quadrant pain





#Hypertension


-Continue to monitor vital signs





Chronic:


Hypertension


History of CAD


History of CLL, in remission





DVT ppx: Heparin 5000 units subcu


Code status: Full code





F: None


E: Replete as needed


N: Heart healthy diet 


A: Ambulatory





Anticipated discharge place: Pending clinical course


Anticipated discharge time: Pending clinical course





Dictation was produced using dragon dictation software. please excuse any 

grammatical, word or spelling errors.





I have seen and evaluated the patient today.  Discussed with the resident and 

agree with the residents finding and plan as documented in the resident's note. 

Changes highlighted as below.





Objective





- Vital Signs


Vital signs: 


                                   Vital Signs











Temp  97.7 F   02/19/25 07:10


 


Pulse  53 L  02/19/25 07:10


 


Resp  19   02/19/25 07:10


 


BP  141/56   02/19/25 07:10


 


Pulse Ox  94 L  02/19/25 08:20


 


FiO2  55   02/19/25 08:20








                                 Intake & Output











 02/18/25 02/19/25 02/19/25





 18:59 06:59 18:59


 


Intake Total 240  400


 


Output Total 400 200 175


 


Balance -160 -200 225


 


Weight 123.5 kg 123.5 kg 


 


Intake:   


 


  Oral 240  400


 


Output:   


 


  Urine 400 200 175


 


Other:   


 


  Voiding Method Indwelling Catheter Indwelling Catheter Indwelling Catheter


 


  # Bowel Movements 1 2 0














- Labs


CBC & Chem 7: 


                                 02/19/25 08:16





                                 02/19/25 08:16


Labs: 


                  Abnormal Lab Results - Last 24 Hours (Table)











  02/18/25 02/18/25 02/18/25 Range/Units





  06:49 11:33 16:35 


 


Lymphocytes #  8.6 H    (1.0-4.8)  k/uL


 


POC Glucose (mg/dL)   387 H  388 H  ()  mg/dL














  02/18/25 02/19/25 Range/Units





  20:22 06:02 


 


Lymphocytes #    (1.0-4.8)  k/uL


 


POC Glucose (mg/dL)  292 H  234 H  ()  mg/dL








                      Microbiology - Last 24 Hours (Table)











 02/13/25 22:08 Blood Culture - Final





 Blood

## 2025-02-19 NOTE — P.PN
Subjective


Progress Note Date: 02/19/25








79-year-old male who presented to the emergency department, on February 13, 

complaining of nausea, vomiting, diarrhea, and mental status changes.  The 

patient was brought into the emergency department, by EMS, and apparently he was

quite confused, and disoriented, when they initially evaluated him.  He a

pparently was having GI episodes, including vomiting, nausea, and diarrhea.  He 

denies having any chest pain or shortness of breath although his saturations 

were in the low 80s.  He was placed on oxygen therapy, and brought into the 

hospital.  When he was in the ER, he was apparently alert and oriented x 3.  He 

was feeling a bit short of breath.  We were consulted today, because the 

patient's respiratory status has declined, and he is currently on Airvo, with 

settings of 60 L/min, and an FiO2 of 75%.  The patient was thought to have a 

non-ST segment elevation myocardial infarction, and pneumonia.  He was on IV 

heparin, and saline at 20 cc an hour, as well as vancomycin, and cefepime.  His 

procalcitonin level was 0.21.  Current laboratory data includes a white count of

16.9, hemoglobin 11.9, hematocrit 35.5, and a platelet count of 155,000.  A 

blood gas done yesterday shows a pO2 of 62, pCO2 42, and a pH of 7.41.  This 

apparently was on 100% oxygen.  Sodium 135, potassium 3.3, chloride 95, CO2 30, 

anion gap 10, BUN 19, creatinine 1.06.  Glucose 296.  Hemoglobin A1c was 6.9.  

Albumin was 3.5.  N-terminal proBNP was 2750.  Blood cultures were negative.  

Chest x-ray showed bilateral multifocal airspace opacities, which have improved.





Progress note dated February 16, 2025.





79-year-old male seen in consultation yesterday.  Please see my note above.  The

patient is seen today in room 369.  He continues on Airvo, 60 L/min and FiO2 of 

75%.  In addition, the patient continues on cefepime and vancomycin, his 

procalcitonin level was normal at 0.21.  He is also on IV heparin.  He states he

feels about the same today as he did yesterday, no better, and no worse.  White 

count 18.7, hemoglobin 9.3, hematocrit 33.4, platelet count 156,000.  PTT is 

56.4.  Sodium 136, potassium 3.5, chlorides 100, CO2 29, BUN 24, creatinine 

0.93.  Glucose 171.  Calcium 8.6.  Bilirubin 1.5.  Albumin 3.2.





On 2/17/2025, the patient is being seen for a follow-up.  The patient remains 

quite hypoxic.  On today's evaluation, the patient remains on Airvo at 60 L with

FiO2 of 70%.  The chest x-ray from 2/14/2025 showed improved aeration of both 

lungs with persistent multifocal airspace opacities.  The patient's blood work 

showed a procalcitonin level of 0.2 at the time of admission x 2 and a proBNP le

chris was 2750.  On today's blood work, sodium levels at 135, potassium level is 

3.4, BUN 23 with a creatinine of 0.8.  LFTs are essentially within normal 

limits.  The patient remains on IV cefepime.  The patient will be started on IV 

Lasix.  The patient is also on DuoNeb nebulized treatments around-the-clock, IV 

hydrocortisone, Levemir insulin 15 units at bedtime and the patient is on 

metoprolol 25 mg p.o. twice a day.  Rest of the medications remain unchanged.  

Echocardiogram was done on 2/14/2025 indicating a ejection fraction of 50 to 

55%, inferolateral wall hypokinesis, no significant valvular abnormalities.  The

CTA of the chest done on 2/13/2025 revealed no pulmonary embolism and diffuse 

patchy groundglass pulmonary opacities could relate to interstitial edema versus

atypical pulmonary infection and the patient also has evidence of 

cholelithiasis.  The viral screen was negative and Legionella urine antigen was 

negative at the time of admission.  Mental status is appropriate at this point.





On 2/18/2025, the patient is still hypoxic, maintained on Airvo at 60 L with an 

FiO2 of 65%.  No significant improvement in oxygenation over the past 24 hours. 

Noted the patient was started on diuretics yesterday.  He remains on IV Lasix 

and the dose has been switched to 40 mg every 24 hours.  Fluid balance -2.9 L 

over the past 24 hours.  The patient has a white cell count of 16.7 with a 

hemoglobin 11.4 and a platelet count of 158.  BUN 34 and the creatinine is up to

1.3.  Sodium levels at 141.  The patient remains on DuoNeb nebulized treatments 

around-the-clock.  Remains on IV cefepime.  Remains on IV Lasix 40 mg every 24 

hours.  Rest of the medications remain essentially unchanged.  Ultrasound of the

kidneys and bladder showed no evidence of any hydronephrosis.  The most recent 

chest x-ray is from 2/15/2025 and it shows improved aeration of the lungs with 

persistent multifocal airspace opacities.  A follow-up chest x-ray is to be 

obtained for tomorrow.Echocardiogram was done on 2/14/2025 indicating a ejection

fraction of 50 to 55%, inferolateral wall hypokinesis, no significant valvular 

abnormalities.  The CTA of the chest done on 2/13/2025 revealed no pulmonary 

embolism and diffuse patchy groundglass pulmonary opacities could relate to 

interstitial edema versus atypical pulmonary infection and the patient also has 

evidence of cholelithiasis.  The viral screen was negative and Legionella urine 

antigen was negative at the time of admission. 





On 2/19/2025, patient is being seen for a follow-up.  This patient remains 

hypoxic.  As mentioned, the patient has developed diffuse groundglass bilateral 

pulmonary filtrates.  Suspected interstitial edema and based on that the patient

was started on diuretics.  The patient remains on Lasix with a negative fluid 

balance of 2.9 L and -360 cc over the past 24 hours.  Remains on IV Lasix at a 

dose of 40 mg on a daily basis.  On today's blood work, the patient's BUN is 46 

with a creatinine of 1.2.  Sodium levels at 139 and the potassium level is at 

4.2.  The patient has a white cell count of 19 with a hemoglobin of 4.3 and a 

platelet count of 165.  He remains on Airvo.  The current settings are 60 L with

an FiO2 of 55%.  A follow-up chest x-ray was obtained today and the chest x-ray 

is showing scattered airspace opacities throughout the lung and this could 

essentially present pulmonary edema versus atypical pneumonia.  The patient has 

cardiomegaly.  The patient remains on DuoNeb nebulized treatments around-the-

clock.  The patient remains on IV cefepime.  The patient remains on IV 

hydrocortisone.  Remains on Levemir insulin 15 units at bedtime and insulin 

scale coverage.  Remains on metoprolol 25 mg p.o. daily.  He is able to sit up 

on a chair.  Using incentive spirometer.





Objective





- Vital Signs


Vital signs: 


                                   Vital Signs











Temp  97.9 F   02/19/25 11:10


 


Pulse  52 L  02/19/25 11:10


 


Resp  18   02/19/25 11:10


 


BP  134/57   02/19/25 11:10


 


Pulse Ox  97   02/19/25 11:10


 


FiO2  55   02/19/25 13:04








                                 Intake & Output











 02/18/25 02/19/25 02/19/25





 18:59 06:59 18:59


 


Intake Total 240  518


 


Output Total 400 200 175


 


Balance -160 -200 343


 


Weight 123.5 kg 123.5 kg 


 


Intake:   


 


  Oral 240  518


 


Output:   


 


  Urine 400 200 175


 


Other:   


 


  Voiding Method Indwelling Catheter Indwelling Catheter Indwelling Catheter


 


  # Bowel Movements 1 2 0














- Exam








No acute distress, oriented 3.  The patient is currently on Airvo.  Settings 

are 60 L and FiO2 of 55%.  The patient is able to speak in full sentences 

without difficulty.





HEENT examination is grossly unremarkable.  Mucous membranes are moist.  No oral

lesions.





Neck supple.  Full range of motion.  No adenopathy thyromegaly or neck vein 

distention.





Cardiovascular examination reveals regular rhythm rate.  S1-S2 normal.  No S3 or

S4.  No discernible murmur noted.  Heart sounds are distant.





Lungs reveal mild scattered rhonchi.  No wheezes.  Bibasilar crackles are still 

present.  Breath sounds equal bilaterally.





Abdomen soft bowel sounds are heard.  No masses or tenderness.





Extremities are intact.  No cyanosis clubbing or edema.





Skin is without rash or lesion.





Neurologic examination is brief but nonfocal.





- Labs


CBC & Chem 7: 


                                 02/19/25 08:16





                                 02/19/25 08:16


Labs: 


                  Abnormal Lab Results - Last 24 Hours (Table)











  02/18/25 02/18/25 02/18/25 Range/Units





  06:49 16:35 20:22 


 


WBC     (3.8-10.6)  k/uL


 


RBC     (4.30-5.90)  m/uL


 


Hgb     (13.0-17.5)  gm/dL


 


Hct     (39.0-53.0)  %


 


Neutrophils #     (1.3-7.7)  k/uL


 


Lymphocytes #  8.6 H    (1.0-4.8)  k/uL


 


BUN     (9-20)  mg/dL


 


Glucose     (74-99)  mg/dL


 


POC Glucose (mg/dL)   388 H  292 H  ()  mg/dL














  02/19/25 02/19/25 02/19/25 Range/Units





  06:02 08:16 08:16 


 


WBC   19.0 H   (3.8-10.6)  k/uL


 


RBC   3.66 L   (4.30-5.90)  m/uL


 


Hgb   11.3 L   (13.0-17.5)  gm/dL


 


Hct   33.9 L   (39.0-53.0)  %


 


Neutrophils #   9.0 H   (1.3-7.7)  k/uL


 


Lymphocytes #   9.1 H   (1.0-4.8)  k/uL


 


BUN    46 H  (9-20)  mg/dL


 


Glucose    234 H  (74-99)  mg/dL


 


POC Glucose (mg/dL)  234 H    ()  mg/dL














  02/19/25 Range/Units





  11:26 


 


WBC   (3.8-10.6)  k/uL


 


RBC   (4.30-5.90)  m/uL


 


Hgb   (13.0-17.5)  gm/dL


 


Hct   (39.0-53.0)  %


 


Neutrophils #   (1.3-7.7)  k/uL


 


Lymphocytes #   (1.0-4.8)  k/uL


 


BUN   (9-20)  mg/dL


 


Glucose   (74-99)  mg/dL


 


POC Glucose (mg/dL)  314 H  ()  mg/dL








                      Microbiology - Last 24 Hours (Table)











 02/13/25 22:08 Blood Culture - Final





 Blood 














Assessment and Plan


Plan: 








Acute hypoxemic respiratory failure, with diffuse bilateral groundglass 

pulmonary filtrates.  Rule out atypical pneumonia versus CHF/interstitial edema.

 The patient remains on Airvo at 60 L with FiO2 of 55 %.  Echocardiogram shows a

preserved LV function.  CAT scan of the chest shows no evidence of any pulm 

embolism and diffuse groundglass bilateral pulmonary filtrates.  Chest x-ray 

shows unchanged the patient is scattered interstitial pulmonary filtrates 

bilaterally.  Consider interstitial edema versus atypical pneumonia.  Remains on

IV Lasix.  Negative fluid balance.





Acute kidney injury, improving





Acute Non-ST segment elevation myocardial infarction.





coronary artery disease.





History of diabetes mellitus type II currently on Levemir insulin





History of hypertension.





History of gastroesophageal reflux disease.





History of chronic lymphocytic leukemia, in remission.





History of osteoarthritis.





Plan: 





Titrate oxygen flow to maintain saturation above 90%.  Currently on Airvo.


Follow-up chest x-ray from today was noted


Continue Lasix 40 mg IV e every 24 hours and the patient remains negative fluid 

balance


IV fluids to KVO


Monitor renal function


Ultrasound the kidneys shows no evidence of any hydronephrosis


IV cefepime as an empiric antibiotic coverage


Discontinue hydrocortisone


proBNP level was elevated at time of admission.  Procalcitonin levels are 

nonelevated.  The viral screen was negative.  Legionella urine antigen was 

negative.


CT of the chest was noted


Echo was noted


Continue rest of the medications


Repeat chest x-ray in a.m.


Will continue to follow. 


Time with Patient: Greater than 30

## 2025-02-20 LAB
ANION GAP SERPL CALC-SCNC: 5 MMOL/L
ANION GAP SERPL CALC-SCNC: 7 MMOL/L
BASOPHILS # BLD AUTO: 0.1 K/UL (ref 0–0.2)
BASOPHILS NFR BLD AUTO: 0 %
BUN SERPL-SCNC: 39 MG/DL (ref 9–20)
BUN SERPL-SCNC: 42 MG/DL (ref 9–20)
CALCIUM SPEC-MCNC: 8.6 MG/DL (ref 8.4–10.2)
CALCIUM SPEC-MCNC: 8.6 MG/DL (ref 8.4–10.2)
CHLORIDE SERPL-SCNC: 96 MMOL/L (ref 98–107)
CHLORIDE SERPL-SCNC: 99 MMOL/L (ref 98–107)
CO2 SERPL-SCNC: 35 MMOL/L (ref 22–30)
CO2 SERPL-SCNC: 35 MMOL/L (ref 22–30)
EOSINOPHIL # BLD AUTO: 0.1 K/UL (ref 0–0.7)
EOSINOPHIL NFR BLD AUTO: 1 %
ERYTHROCYTE [DISTWIDTH] IN BLOOD BY AUTOMATED COUNT: 3.78 M/UL (ref 4.3–5.9)
ERYTHROCYTE [DISTWIDTH] IN BLOOD: 13.8 % (ref 11.5–15.5)
GLUCOSE BLD-MCNC: 144 MG/DL (ref 70–110)
GLUCOSE BLD-MCNC: 190 MG/DL (ref 70–110)
GLUCOSE BLD-MCNC: 248 MG/DL (ref 70–110)
GLUCOSE BLD-MCNC: 395 MG/DL (ref 70–110)
GLUCOSE SERPL-MCNC: 165 MG/DL (ref 74–99)
GLUCOSE SERPL-MCNC: 217 MG/DL (ref 74–99)
HCT VFR BLD AUTO: 35.3 % (ref 39–53)
HGB BLD-MCNC: 11.6 GM/DL (ref 13–17.5)
LYMPHOCYTES # SPEC AUTO: 11.8 K/UL (ref 1–4.8)
LYMPHOCYTES NFR SPEC AUTO: 58 %
MCH RBC QN AUTO: 30.6 PG (ref 25–35)
MCHC RBC AUTO-ENTMCNC: 32.9 G/DL (ref 31–37)
MCV RBC AUTO: 93.3 FL (ref 80–100)
MONOCYTES # BLD AUTO: 0.7 K/UL (ref 0–1)
MONOCYTES NFR BLD AUTO: 4 %
NEUTROPHILS # BLD AUTO: 7.3 K/UL (ref 1.3–7.7)
NEUTROPHILS NFR BLD AUTO: 36 %
PLATELET # BLD AUTO: 161 K/UL (ref 150–450)
POTASSIUM SERPL-SCNC: 3 MMOL/L (ref 3.5–5.1)
POTASSIUM SERPL-SCNC: 4 MMOL/L (ref 3.5–5.1)
SODIUM SERPL-SCNC: 138 MMOL/L (ref 137–145)
SODIUM SERPL-SCNC: 139 MMOL/L (ref 137–145)
WBC # BLD AUTO: 20.3 K/UL (ref 3.8–10.6)

## 2025-02-20 RX ADMIN — METHYLPREDNISOLONE SODIUM SUCCINATE SCH MG: 125 INJECTION, POWDER, FOR SOLUTION INTRAMUSCULAR; INTRAVENOUS at 11:45

## 2025-02-20 RX ADMIN — DEXTRAN 70 AND HYPROMELLOSE 2910 PRN DROPS: 1; 3 SOLUTION/ DROPS OPHTHALMIC at 09:33

## 2025-02-20 RX ADMIN — CEFEPIME HYDROCHLORIDE SCH MLS/HR: 2 INJECTION, POWDER, FOR SOLUTION INTRAVENOUS at 17:50

## 2025-02-20 RX ADMIN — POTASSIUM CHLORIDE SCH MEQ: 20 TABLET, EXTENDED RELEASE ORAL at 09:45

## 2025-02-20 NOTE — P.PN
Subjective


Progress Note Date: 02/20/25


79-year-old male with past medical history significant for coronary artery 

disease, insulin-dependent diabetes mellitus, hypertension presents to the 

emergency department today due to altered mental status.  Patient is unsure of 

the events surrounding his admission so most of the history is obtained via re

cords and hospital staff.  Patient was having shortness of breath at home and 

his wife called EMS.  When EMS arrived to his house he was oriented only to 

self.  He was found to be hypoxic in the low 80s and was placed on supplemental 

oxygen.  Patient denies wearing oxygen at home.  Upon arrival to the hospital 

oxygen saturation had improved and he was AO x 3.  He also had an episode of 

vomiting.  Patient reports that he has been feeling sick recently with myalgias,

cough productive of greenish sputum, actively being treated for sinus infection.

 He denies nausea/vomiting, abdominal pain, chest pain, dyspnea, weight loss.





2/15/25 - Seen and examined at bedside.  Overnight, patient was more hypoxic, 

antibiotics were broadened.  Now requiring Airvo.





2/16/25 - Seen and examined at bedside.  No acute events overnight.  Continues 

to be on Airvo.





2/17/25 - Patient seen and examined at bedside this morning.  Overnight the 

patient became agitated, attempting to get out of his bed and attempting to 

remove his Airvo.  He was given Seroquel 25 mg and was able to calm down.  MRSA 

nares was found to be negative vancomycin was discontinued at this time.





2/18/25 - Patient seen and examined at bedside this morning.  No acute events 

overnight.  Continues to be on Airvo with FiO2 of 65%.  Kidney/bladder 

ultrasound completed today, secondary to new onset ROSSY, which showed no evidence

of hydronephrosis or nephrolithiasis, however did show cholelithiasis.  

Lisinopril decreased to 20 mg daily.  Additionally, CBC today significant WBC 

6.7, hemoglobin 11.4, hematocrit 34.5.  CMP today significant for potassium 3.1,

bicarbonate 32, BUN 34, creatinine 1.33. 





2/19/25 - Patient seen and examined at bedside this morning.  No acute events 

overnight.  Continues to be on Airvo with FiO2 55%.  SpO2 this morning 94%.  He 

is much more alert and awake than he has been previously.  States that this 

morning he is feeling significantly better than he had been in previous days, 

when he first woke up he did not feel great however in the time since then he 

feels well.  He notes having an increasing appetite, however becomes full very 

quickly.





2/20/25 - Patient seen and examined at bedside this morning.  No acute events 

overnight.  Continues to be on Airvo with FiO2 55% at 60 L.  SpO2 this morning 

98%.  He is able to sit up in the chair comfortably.  Continues to state that he

he feels better than previous days, however has moments of feeling not quite as 

well.  Potassium was found to be 3.0 on labs drawn today, replaced with 2 doses 

of 40 mEq potassium chloride, will recheck potassium levels this afternoon.  He 

has no acute complaints at this time.





REVIEW OF SYSTEMS: Pertinent positives and negatives noted in HPI. 





Physical Exam:


General: nontoxic, no distress, appears at stated age


Derm: warm, dry, intact


Head: atraumatic, normocephalic, symmetric


Eyes: EOMI, anicteric sclera


Cardiovascular:  S1 S2 reg, no murmur, rubs, or gallops


Lungs: Diminished breath sounds bilaterally, Mild crackles audible in bilateral 

bases, on high flow nasal cannula.


Abdominal: soft, non-tender to palpataion, no appreciable organomegaly


Extremities: no gross muscle atrophy, no edema, no contractures


Neuro: Alert, Oriented, CNII-XII grossly intact, gait normal


Psych: well appearing, appropriate affect





Data Received Today:


Labs: WBCs 20.3, hemoglobin 11.6, hematocrit 35.3, platelet 161; sodium 139, 

potassium 3.0, bicarb 35, BUN 42, creatinine 1.04, calcium 8.6, magnesium 2.4


Imagining: Chest x-ray today independently interpreted looks similarly unchanged

with scattered airspace opacities





Assessment and plan


79-year-old male with past medical history significant for coronary artery 

disease, insulin-dependent diabetes mellitus, hypertension presents to the 

emergency department today due to altered mental status.





#Severe ARDS


#Acute hypoxic respiratory failure


#Multifocal pneumonia


#Acute NSTEMI


-Continue to wean oxygen, patient currently on Airvo


-On IV cefepime 2 g every 8 hours (day 5 today)


-MRSA nares negative, discontinue vancomycin


-I will start steroids given severe pneumonia, started on hydrocortisone 50 IV 

every 6 hours


-Pulmonology following


-Continue aspirin 81 mg, atorvastatin 80 mg, Plavix 75 mg daily





#Acute kidney injury, improving


#Hypokalemia


#Contraction alkalosis, improving


-Decrease lisinopril to 20 mg daily


-Decreased Lasix to 40 mg daily


-CMP today potassium 3.0


-40 mEq potassium chloride every 2 hours (for a total of 2 times), recheck BMP 

this afternoon


-Continue monitor CMP





#Acute onset delirium


-Started on Seroquel 25 mg daily





#Debility


-PT/OT consulted





#Diabetes


#Hyperglycemia


-Continue Levemir 15 units nightly, continue sliding scale insulin, monitor for 

hypoglycemia





#Right plantar foot ulcer


-Wound care following





#Hyperbilirubinemia, improved


-No right upper quadrant pain





#Hypertension


-Continue to monitor vital signs





Chronic:


Hypertension


History of CAD


History of CLL, in remission





DVT ppx: Heparin 5000 units subcu


Code status: Full code





F: None


E: Replete as needed


N: Heart healthy diet 


A: Ambulatory





Anticipated discharge place: Pending clinical course


Anticipated discharge time: Pending clinical course





Dictation was produced using dragon dictation software. please excuse any 

grammatical, word or spelling errors.





I have seen and evaluated the patient today.  Discussed with the resident and 

agree with the residents finding and plan as documented in the resident's note. 

Changes highlighted in blue font.





Check Mag level. 








Objective





- Vital Signs


Vital signs: 


                                   Vital Signs











Temp  97.9 F   02/19/25 20:00


 


Pulse  47 L  02/20/25 04:00


 


Resp  16   02/20/25 04:00


 


BP  119/69   02/20/25 04:00


 


Pulse Ox  98   02/20/25 04:00


 


FiO2  55   02/20/25 04:17








                                 Intake & Output











 02/19/25 02/20/25 02/20/25





 18:59 06:59 18:59


 


Intake Total 636 120 


 


Output Total 1175  


 


Balance -539 120 


 


Weight  123.5 kg 


 


Intake:   


 


  Oral 636 120 


 


Output:   


 


  Urine 1175  


 


Other:   


 


  Voiding Method Indwelling Catheter Indwelling Catheter 


 


  # Bowel Movements 1  














- Labs


CBC & Chem 7: 


                                 02/20/25 07:11





                                 02/20/25 07:11


Labs: 


                  Abnormal Lab Results - Last 24 Hours (Table)











  02/19/25 02/19/25 02/19/25 Range/Units





  08:16 08:16 11:26 


 


WBC  19.0 H    (3.8-10.6)  k/uL


 


RBC  3.66 L    (4.30-5.90)  m/uL


 


Hgb  11.3 L    (13.0-17.5)  gm/dL


 


Hct  33.9 L    (39.0-53.0)  %


 


Neutrophils #  9.0 H    (1.3-7.7)  k/uL


 


Lymphocytes #  9.1 H    (1.0-4.8)  k/uL


 


BUN   46 H   (9-20)  mg/dL


 


Glucose   234 H   (74-99)  mg/dL


 


POC Glucose (mg/dL)    314 H  ()  mg/dL














  02/19/25 02/19/25 02/20/25 Range/Units





  16:30 20:13 06:12 


 


WBC     (3.8-10.6)  k/uL


 


RBC     (4.30-5.90)  m/uL


 


Hgb     (13.0-17.5)  gm/dL


 


Hct     (39.0-53.0)  %


 


Neutrophils #     (1.3-7.7)  k/uL


 


Lymphocytes #     (1.0-4.8)  k/uL


 


BUN     (9-20)  mg/dL


 


Glucose     (74-99)  mg/dL


 


POC Glucose (mg/dL)  334 H  304 H  190 H  ()  mg/dL








                      Microbiology - Last 24 Hours (Table)











 02/13/25 22:08 Blood Culture - Final





 Blood

## 2025-02-20 NOTE — XR
EXAMINATION TYPE: XR chest 1V

 

DATE OF EXAM: 2/20/2025 7:08 AM

 

COMPARISON: Chest radiographs from

 

TECHNIQUE: XR chest 1V Frontal view of the chest.

 

CLINICAL INDICATION:Male, 79 years old with history of Pulmonary edema; 

 

FINDINGS: 

Lungs/Pleura: No pleural effusion or pneumothorax. Similar scattered airspace opacities throughout th
e lungs. 

Heart/mediastinum: Cardiomediastinal silhouette is enlarged and stable.

Musculoskeletal: No acute osseous pathology.

 

IMPRESSION: 

Similar scattered airspace opacities throughout the lungs.

 

[facility], Workstation: [id number], 2/20/2025 8:13 AM

## 2025-02-20 NOTE — P.PN
Subjective


Progress Note Date: 02/20/25








79-year-old male who presented to the emergency department, on February 13, 

complaining of nausea, vomiting, diarrhea, and mental status changes.  The 

patient was brought into the emergency department, by EMS, and apparently he was

quite confused, and disoriented, when they initially evaluated him.  He a

pparently was having GI episodes, including vomiting, nausea, and diarrhea.  He 

denies having any chest pain or shortness of breath although his saturations 

were in the low 80s.  He was placed on oxygen therapy, and brought into the 

hospital.  When he was in the ER, he was apparently alert and oriented x 3.  He 

was feeling a bit short of breath.  We were consulted today, because the 

patient's respiratory status has declined, and he is currently on Airvo, with 

settings of 60 L/min, and an FiO2 of 75%.  The patient was thought to have a 

non-ST segment elevation myocardial infarction, and pneumonia.  He was on IV 

heparin, and saline at 20 cc an hour, as well as vancomycin, and cefepime.  His 

procalcitonin level was 0.21.  Current laboratory data includes a white count of

16.9, hemoglobin 11.9, hematocrit 35.5, and a platelet count of 155,000.  A 

blood gas done yesterday shows a pO2 of 62, pCO2 42, and a pH of 7.41.  This 

apparently was on 100% oxygen.  Sodium 135, potassium 3.3, chloride 95, CO2 30, 

anion gap 10, BUN 19, creatinine 1.06.  Glucose 296.  Hemoglobin A1c was 6.9.  

Albumin was 3.5.  N-terminal proBNP was 2750.  Blood cultures were negative.  

Chest x-ray showed bilateral multifocal airspace opacities, which have improved.





Progress note dated February 16, 2025.





79-year-old male seen in consultation yesterday.  Please see my note above.  The

patient is seen today in room 369.  He continues on Airvo, 60 L/min and FiO2 of 

75%.  In addition, the patient continues on cefepime and vancomycin, his 

procalcitonin level was normal at 0.21.  He is also on IV heparin.  He states he

feels about the same today as he did yesterday, no better, and no worse.  White 

count 18.7, hemoglobin 9.3, hematocrit 33.4, platelet count 156,000.  PTT is 

56.4.  Sodium 136, potassium 3.5, chlorides 100, CO2 29, BUN 24, creatinine 

0.93.  Glucose 171.  Calcium 8.6.  Bilirubin 1.5.  Albumin 3.2.





On 2/17/2025, the patient is being seen for a follow-up.  The patient remains 

quite hypoxic.  On today's evaluation, the patient remains on Airvo at 60 L with

FiO2 of 70%.  The chest x-ray from 2/14/2025 showed improved aeration of both 

lungs with persistent multifocal airspace opacities.  The patient's blood work 

showed a procalcitonin level of 0.2 at the time of admission x 2 and a proBNP le

chris was 2750.  On today's blood work, sodium levels at 135, potassium level is 

3.4, BUN 23 with a creatinine of 0.8.  LFTs are essentially within normal 

limits.  The patient remains on IV cefepime.  The patient will be started on IV 

Lasix.  The patient is also on DuoNeb nebulized treatments around-the-clock, IV 

hydrocortisone, Levemir insulin 15 units at bedtime and the patient is on 

metoprolol 25 mg p.o. twice a day.  Rest of the medications remain unchanged.  

Echocardiogram was done on 2/14/2025 indicating a ejection fraction of 50 to 

55%, inferolateral wall hypokinesis, no significant valvular abnormalities.  The

CTA of the chest done on 2/13/2025 revealed no pulmonary embolism and diffuse 

patchy groundglass pulmonary opacities could relate to interstitial edema versus

atypical pulmonary infection and the patient also has evidence of 

cholelithiasis.  The viral screen was negative and Legionella urine antigen was 

negative at the time of admission.  Mental status is appropriate at this point.





On 2/18/2025, the patient is still hypoxic, maintained on Airvo at 60 L with an 

FiO2 of 65%.  No significant improvement in oxygenation over the past 24 hours. 

Noted the patient was started on diuretics yesterday.  He remains on IV Lasix 

and the dose has been switched to 40 mg every 24 hours.  Fluid balance -2.9 L 

over the past 24 hours.  The patient has a white cell count of 16.7 with a 

hemoglobin 11.4 and a platelet count of 158.  BUN 34 and the creatinine is up to

1.3.  Sodium levels at 141.  The patient remains on DuoNeb nebulized treatments 

around-the-clock.  Remains on IV cefepime.  Remains on IV Lasix 40 mg every 24 

hours.  Rest of the medications remain essentially unchanged.  Ultrasound of the

kidneys and bladder showed no evidence of any hydronephrosis.  The most recent 

chest x-ray is from 2/15/2025 and it shows improved aeration of the lungs with 

persistent multifocal airspace opacities.  A follow-up chest x-ray is to be 

obtained for tomorrow.Echocardiogram was done on 2/14/2025 indicating a ejection

fraction of 50 to 55%, inferolateral wall hypokinesis, no significant valvular 

abnormalities.  The CTA of the chest done on 2/13/2025 revealed no pulmonary 

embolism and diffuse patchy groundglass pulmonary opacities could relate to 

interstitial edema versus atypical pulmonary infection and the patient also has 

evidence of cholelithiasis.  The viral screen was negative and Legionella urine 

antigen was negative at the time of admission. 





On 2/19/2025, patient is being seen for a follow-up.  This patient remains 

hypoxic.  As mentioned, the patient has developed diffuse groundglass bilateral 

pulmonary filtrates.  Suspected interstitial edema and based on that the patient

was started on diuretics.  The patient remains on Lasix with a negative fluid 

balance of 2.9 L and -360 cc over the past 24 hours.  Remains on IV Lasix at a 

dose of 40 mg on a daily basis.  On today's blood work, the patient's BUN is 46 

with a creatinine of 1.2.  Sodium levels at 139 and the potassium level is at 

4.2.  The patient has a white cell count of 19 with a hemoglobin of 4.3 and a 

platelet count of 165.  He remains on Airvo.  The current settings are 60 L with

an FiO2 of 55%.  A follow-up chest x-ray was obtained today and the chest x-ray 

is showing scattered airspace opacities throughout the lung and this could 

essentially present pulmonary edema versus atypical pneumonia.  The patient has 

cardiomegaly.  The patient remains on DuoNeb nebulized treatments around-the-

clock.  The patient remains on IV cefepime.  The patient remains on IV 

hydrocortisone.  Remains on Levemir insulin 15 units at bedtime and insulin 

scale coverage.  Remains on metoprolol 25 mg p.o. daily.  He is able to sit up 

on a chair.  Using incentive spirometer.





On 2/20/2025, patient is being seen for a follow-up.  The patient is still 

dependent on Airvo.  This morning, the patient is on a flow of 55 L and FiO2 

50%.  Fluid balance -410 cc over the past 24 hours.  The patient denies having 

any significant shortness of breath or chest pain.  Nevertheless, repeat chest 

x-ray was done and the findings are essentially unchanged.  The patient 

continues to have diffuse interstitial changes bilaterally along with smaller 

lung volumes and cardiomegaly.  The possibility of acute interstitial p

neumonia/pneumonitis cannot be completely ruled out.  Other possibilities 

include acute interstitial pneumonias, eosinophilic pneumonias, acute lung 

injury.  CHF with fluid overload cannot be completely ruled out and the patient 

is currently on IV Lasix 40 mg every 24 hours.  Remains on bronchodilators.  

Remains on steroids.  No history of connective tissue disease and the peritoneal

disease markers will be also obtained.  Remains on IV cefepime.





Objective





- Vital Signs


Vital signs: 


                                   Vital Signs











Temp  98.2 F   02/20/25 08:10


 


Pulse  58 L  02/20/25 08:20


 


Resp  18   02/20/25 08:20


 


BP  149/58   02/20/25 08:10


 


Pulse Ox  97   02/20/25 08:35


 


FiO2  55   02/20/25 08:35








                                 Intake & Output











 02/19/25 02/20/25 02/20/25





 18:59 06:59 18:59


 


Intake Total 636 120 240


 


Output Total 1175  300


 


Balance -539 120 -60


 


Weight  123.5 kg 


 


Intake:   


 


  Oral 636 120 240


 


Output:   


 


  Urine 1175  300


 


Other:   


 


  Voiding Method Indwelling Catheter Indwelling Catheter Indwelling Catheter


 


  # Bowel Movements 1  














- Exam








No acute distress, oriented 3.  The patient is currently on Airvo.  Settings 

are 55 L and FiO2 of 55%.  The patient is able to speak in full sentences 

without difficulty.





HEENT examination is grossly unremarkable.  Mucous membranes are moist.  No oral

lesions.





Neck supple.  Full range of motion.  No adenopathy thyromegaly or neck vein 

distention.





Cardiovascular examination reveals regular rhythm rate.  S1-S2 normal.  No S3 or

S4.  No discernible murmur noted.  Heart sounds are distant.





Lungs reveal mild scattered rhonchi.  No wheezes.  Bibasilar crackles are still 

present.  Breath sounds equal bilaterally.





Abdomen soft bowel sounds are heard.  No masses or tenderness.





Extremities are intact.  No cyanosis clubbing or edema.





Skin is without rash or lesion.





Neurologic examination is brief but nonfocal.  55





- Labs


CBC & Chem 7: 


                                 02/20/25 07:11





                                 02/20/25 15:11


Labs: 


                  Abnormal Lab Results - Last 24 Hours (Table)











  02/19/25 02/19/25 02/19/25 Range/Units





  11:26 16:30 20:13 


 


WBC     (3.8-10.6)  k/uL


 


RBC     (4.30-5.90)  m/uL


 


Hgb     (13.0-17.5)  gm/dL


 


Hct     (39.0-53.0)  %


 


Lymphocytes #     (1.0-4.8)  k/uL


 


Potassium     (3.5-5.1)  mmol/L


 


Carbon Dioxide     (22-30)  mmol/L


 


BUN     (9-20)  mg/dL


 


Glucose     (74-99)  mg/dL


 


POC Glucose (mg/dL)  314 H  334 H  304 H  ()  mg/dL














  02/20/25 02/20/25 02/20/25 Range/Units





  06:12 07:11 07:11 


 


WBC   20.3 H   (3.8-10.6)  k/uL


 


RBC   3.78 L   (4.30-5.90)  m/uL


 


Hgb   11.6 L   (13.0-17.5)  gm/dL


 


Hct   35.3 L   (39.0-53.0)  %


 


Lymphocytes #   11.8 H   (1.0-4.8)  k/uL


 


Potassium    3.0 L  (3.5-5.1)  mmol/L


 


Carbon Dioxide    35 H  (22-30)  mmol/L


 


BUN    42 H  (9-20)  mg/dL


 


Glucose    165 H  (74-99)  mg/dL


 


POC Glucose (mg/dL)  190 H    ()  mg/dL














Assessment and Plan


Plan: 








Acute hypoxemic respiratory failure, with diffuse bilateral groundglass 

pulmonary filtrates.  Rule out atypical pneumonia versus CHF/interstitial edema.

 The patient remains on Airvo at 55 L with an FiO2 of 50%.  Echocardiogram shows

a preserved LV function.  CAT scan of the chest shows no evidence of any pulm 

embolism and diffuse groundglass bilateral pulmonary filtrates.  Chest x-ray 

shows unchanged the patient is scattered interstitial pulmonary filtrates 

bilaterally.  Consider interstitial edema versus atypical pneumonia.  Rule out 

also acute lung injury, acute interstitial pneumonitis, acute hypersensitive 

pneumonitis, acute eosinophilic pneumonitis.  





Acute kidney injury, creatinine stable at 1.2





Acute Non-ST segment elevation myocardial infarction.





coronary artery disease.





History of diabetes mellitus type II currently on Levemir insulin





History of hypertension.





History of gastroesophageal reflux disease.





History of chronic lymphocytic leukemia, in remission.





History of osteoarthritis.





Plan: 





Titrate oxygen flow to maintain saturation above 90%.  Currently on Airvo.


Follow-up chest x-ray from today was noted, findings are essentially unchanged


Continue Lasix 40 mg IV e every 24 hours and the patient remains negative fluid 

balance


IV fluids to KVO


Monitor renal function


Ultrasound the kidneys shows no evidence of any hydronephrosis


IV cefepime as an empiric antibiotic coverage


Discontinue hydrocortisone and put the patient IV Solu-Medrol 60 mg every 6 

hours


Check rheumatologic markers including RF, CAMRYN, ADRIEN screen and ANCA screen


proBNP level was elevated at time of admission.  Procalcitonin levels are 

nonelevated.  The viral screen was negative.  Legionella urine antigen was 

negative.


CT of the chest was noted


Echo was noted


Continue rest of the medications


Will continue to follow. 


Time with Patient: Greater than 30

## 2025-02-21 LAB
ANION GAP SERPL CALC-SCNC: 7 MMOL/L
BASOPHILS # BLD AUTO: 0.1 K/UL (ref 0–0.2)
BASOPHILS NFR BLD AUTO: 0 %
BUN SERPL-SCNC: 43 MG/DL (ref 9–20)
C-ANCA TITR SER: (no result) TITER
CALCIUM SPEC-MCNC: 8.5 MG/DL (ref 8.4–10.2)
CHLORIDE SERPL-SCNC: 98 MMOL/L (ref 98–107)
CO2 SERPL-SCNC: 32 MMOL/L (ref 22–30)
EOSINOPHIL # BLD AUTO: 0 K/UL (ref 0–0.7)
EOSINOPHIL NFR BLD AUTO: 0 %
ERYTHROCYTE [DISTWIDTH] IN BLOOD BY AUTOMATED COUNT: 3.79 M/UL (ref 4.3–5.9)
ERYTHROCYTE [DISTWIDTH] IN BLOOD: 13.8 % (ref 11.5–15.5)
GLUCOSE BLD-MCNC: 294 MG/DL (ref 70–110)
GLUCOSE BLD-MCNC: 335 MG/DL (ref 70–110)
GLUCOSE BLD-MCNC: 382 MG/DL (ref 70–110)
GLUCOSE BLD-MCNC: 457 MG/DL (ref 70–110)
GLUCOSE SERPL-MCNC: 279 MG/DL (ref 74–99)
HCT VFR BLD AUTO: 35.3 % (ref 39–53)
HGB BLD-MCNC: 11.8 GM/DL (ref 13–17.5)
LYMPHOCYTES # SPEC AUTO: 11.9 K/UL (ref 1–4.8)
LYMPHOCYTES NFR SPEC AUTO: 60 %
MCH RBC QN AUTO: 31.2 PG (ref 25–35)
MCHC RBC AUTO-ENTMCNC: 33.5 G/DL (ref 31–37)
MCV RBC AUTO: 93 FL (ref 80–100)
MONOCYTES # BLD AUTO: 0.3 K/UL (ref 0–1)
MONOCYTES NFR BLD AUTO: 2 %
NEUTROPHILS # BLD AUTO: 7.2 K/UL (ref 1.3–7.7)
NEUTROPHILS NFR BLD AUTO: 36 %
PLATELET # BLD AUTO: 178 K/UL (ref 150–450)
POTASSIUM SERPL-SCNC: 4 MMOL/L (ref 3.5–5.1)
SODIUM SERPL-SCNC: 137 MMOL/L (ref 137–145)
WBC # BLD AUTO: 19.7 K/UL (ref 3.8–10.6)

## 2025-02-21 RX ADMIN — MECLIZINE HYDROCHLORIDE PRN MG: 25 TABLET ORAL at 15:49

## 2025-02-21 NOTE — CDI
Documentation Clarification Form



Date: 02/21/2025 09:05:56 AM

From: Flory Rowan RN CCDS

Phone: [phone]

MRN: [id number]

Admit Date: 02/13/2025 09:33:00 PM

Patient Name: Wade Wagoner

Visit Number: [id number]

Discharge Date:  





ATTENTION: The Clinical Documentation Specialists (CDI) and [facility] Coding Staff 
appreciate your assistance in clarifying documentation. Please respond to the 
clarification below the line at the bottom and electronically sign. The CDI & 
[facility] Coding staff will review the response and follow-up if needed. Please note: 
Queries are made part of the Legal Health Record. If you have any questions, 
please contact the author of this message via ITS.



Doctor: Romeo Armas





Your patient has the documented diagnosis of unspecified CHF 2/20, Pulmonary 
note.  Additional information regarding the type, acuity of CHF is requested. 



History/Risk Factors: 79 year old male presents to the ED with altered mental 
status and shortness of breath. Medical History: DM2, HTN, CAD and Leukemia 
2/14, HP. 



Clinical Indicators: 

VS/Pulse OX: 2/15 B/P 162/60 HR 88 Temp 100.4F oral RR 18 SpO2 93% High Flow 
Oxygen

BNP: 2/13 631;    BNP: 2/15  2750

Echocardiogram Results: EF 55% Inferolateral wall hypokinesia. No significant 
valvular dysfunction. Normal RV size and systolic function. 

Chest X Ray:  2/19 Similar scattered airspace opacities throughout the lungs 
which may represent pulmonary edema and or pneumonia



Treatment:  2/14 Toprol Xl 25mg po Daily  2/14 Lasix 80mg IV x 1; 2/17 2/18 
Lasix IV Q12H; 2/19 Laxix 40mg IV Daily. 



In your professional opinion, can you please clarify the acuity and type of CHF 
if known?



[  ] Acute Diastolic Heart Failure (preserved EF)

[x ] Acute on Chronic Diastolic Heart Failure (preserved EF)

[  ] Other, please specify________________

[  ] Unable to determine



(Template Last Revised: February 2021)

___________________________________________________________________________



MTDD

## 2025-02-21 NOTE — P.PN
Subjective


Progress Note Date: 02/21/25








79-year-old male who presented to the emergency department, on February 13, 

complaining of nausea, vomiting, diarrhea, and mental status changes.  The 

patient was brought into the emergency department, by EMS, and apparently he was

quite confused, and disoriented, when they initially evaluated him.  He a

pparently was having GI episodes, including vomiting, nausea, and diarrhea.  He 

denies having any chest pain or shortness of breath although his saturations 

were in the low 80s.  He was placed on oxygen therapy, and brought into the 

hospital.  When he was in the ER, he was apparently alert and oriented x 3.  He 

was feeling a bit short of breath.  We were consulted today, because the 

patient's respiratory status has declined, and he is currently on Airvo, with 

settings of 60 L/min, and an FiO2 of 75%.  The patient was thought to have a 

non-ST segment elevation myocardial infarction, and pneumonia.  He was on IV 

heparin, and saline at 20 cc an hour, as well as vancomycin, and cefepime.  His 

procalcitonin level was 0.21.  Current laboratory data includes a white count of

16.9, hemoglobin 11.9, hematocrit 35.5, and a platelet count of 155,000.  A 

blood gas done yesterday shows a pO2 of 62, pCO2 42, and a pH of 7.41.  This 

apparently was on 100% oxygen.  Sodium 135, potassium 3.3, chloride 95, CO2 30, 

anion gap 10, BUN 19, creatinine 1.06.  Glucose 296.  Hemoglobin A1c was 6.9.  

Albumin was 3.5.  N-terminal proBNP was 2750.  Blood cultures were negative.  

Chest x-ray showed bilateral multifocal airspace opacities, which have improved.





Progress note dated February 16, 2025.





79-year-old male seen in consultation yesterday.  Please see my note above.  The

patient is seen today in room 369.  He continues on Airvo, 60 L/min and FiO2 of 

75%.  In addition, the patient continues on cefepime and vancomycin, his 

procalcitonin level was normal at 0.21.  He is also on IV heparin.  He states he

feels about the same today as he did yesterday, no better, and no worse.  White 

count 18.7, hemoglobin 9.3, hematocrit 33.4, platelet count 156,000.  PTT is 

56.4.  Sodium 136, potassium 3.5, chlorides 100, CO2 29, BUN 24, creatinine 

0.93.  Glucose 171.  Calcium 8.6.  Bilirubin 1.5.  Albumin 3.2.





On 2/17/2025, the patient is being seen for a follow-up.  The patient remains 

quite hypoxic.  On today's evaluation, the patient remains on Airvo at 60 L with

FiO2 of 70%.  The chest x-ray from 2/14/2025 showed improved aeration of both 

lungs with persistent multifocal airspace opacities.  The patient's blood work 

showed a procalcitonin level of 0.2 at the time of admission x 2 and a proBNP le

chris was 2750.  On today's blood work, sodium levels at 135, potassium level is 

3.4, BUN 23 with a creatinine of 0.8.  LFTs are essentially within normal 

limits.  The patient remains on IV cefepime.  The patient will be started on IV 

Lasix.  The patient is also on DuoNeb nebulized treatments around-the-clock, IV 

hydrocortisone, Levemir insulin 15 units at bedtime and the patient is on 

metoprolol 25 mg p.o. twice a day.  Rest of the medications remain unchanged.  

Echocardiogram was done on 2/14/2025 indicating a ejection fraction of 50 to 

55%, inferolateral wall hypokinesis, no significant valvular abnormalities.  The

CTA of the chest done on 2/13/2025 revealed no pulmonary embolism and diffuse 

patchy groundglass pulmonary opacities could relate to interstitial edema versus

atypical pulmonary infection and the patient also has evidence of 

cholelithiasis.  The viral screen was negative and Legionella urine antigen was 

negative at the time of admission.  Mental status is appropriate at this point.





On 2/18/2025, the patient is still hypoxic, maintained on Airvo at 60 L with an 

FiO2 of 65%.  No significant improvement in oxygenation over the past 24 hours. 

Noted the patient was started on diuretics yesterday.  He remains on IV Lasix 

and the dose has been switched to 40 mg every 24 hours.  Fluid balance -2.9 L 

over the past 24 hours.  The patient has a white cell count of 16.7 with a 

hemoglobin 11.4 and a platelet count of 158.  BUN 34 and the creatinine is up to

1.3.  Sodium levels at 141.  The patient remains on DuoNeb nebulized treatments 

around-the-clock.  Remains on IV cefepime.  Remains on IV Lasix 40 mg every 24 

hours.  Rest of the medications remain essentially unchanged.  Ultrasound of the

kidneys and bladder showed no evidence of any hydronephrosis.  The most recent 

chest x-ray is from 2/15/2025 and it shows improved aeration of the lungs with 

persistent multifocal airspace opacities.  A follow-up chest x-ray is to be 

obtained for tomorrow.Echocardiogram was done on 2/14/2025 indicating a ejection

fraction of 50 to 55%, inferolateral wall hypokinesis, no significant valvular 

abnormalities.  The CTA of the chest done on 2/13/2025 revealed no pulmonary 

embolism and diffuse patchy groundglass pulmonary opacities could relate to 

interstitial edema versus atypical pulmonary infection and the patient also has 

evidence of cholelithiasis.  The viral screen was negative and Legionella urine 

antigen was negative at the time of admission. 





On 2/19/2025, patient is being seen for a follow-up.  This patient remains 

hypoxic.  As mentioned, the patient has developed diffuse groundglass bilateral 

pulmonary filtrates.  Suspected interstitial edema and based on that the patient

was started on diuretics.  The patient remains on Lasix with a negative fluid 

balance of 2.9 L and -360 cc over the past 24 hours.  Remains on IV Lasix at a 

dose of 40 mg on a daily basis.  On today's blood work, the patient's BUN is 46 

with a creatinine of 1.2.  Sodium levels at 139 and the potassium level is at 

4.2.  The patient has a white cell count of 19 with a hemoglobin of 4.3 and a 

platelet count of 165.  He remains on Airvo.  The current settings are 60 L with

an FiO2 of 55%.  A follow-up chest x-ray was obtained today and the chest x-ray 

is showing scattered airspace opacities throughout the lung and this could 

essentially present pulmonary edema versus atypical pneumonia.  The patient has 

cardiomegaly.  The patient remains on DuoNeb nebulized treatments around-the-

clock.  The patient remains on IV cefepime.  The patient remains on IV 

hydrocortisone.  Remains on Levemir insulin 15 units at bedtime and insulin 

scale coverage.  Remains on metoprolol 25 mg p.o. daily.  He is able to sit up 

on a chair.  Using incentive spirometer.





On 2/20/2025, patient is being seen for a follow-up.  The patient is still 

dependent on Airvo.  This morning, the patient is on a flow of 55 L and FiO2 

50%.  Fluid balance -410 cc over the past 24 hours.  The patient denies having 

any significant shortness of breath or chest pain.  Nevertheless, repeat chest 

x-ray was done and the findings are essentially unchanged.  The patient 

continues to have diffuse interstitial changes bilaterally along with smaller 

lung volumes and cardiomegaly.  The possibility of acute interstitial p

neumonia/pneumonitis cannot be completely ruled out.  Other possibilities 

include acute interstitial pneumonias, eosinophilic pneumonias, acute lung 

injury.  CHF with fluid overload cannot be completely ruled out and the patient 

is currently on IV Lasix 40 mg every 24 hours.  Remains on bronchodilators.  

Remains on steroids.  No history of connective tissue disease and the peritoneal

disease markers will be also obtained.  Remains on IV cefepime.





On 2/21/2025, the patient is being seen for a follow-up.  Feels well.  

Oxygenation continues to gradually improving this morning, the patient remains 

on Airvo 45 L with an FiO2 of 45%.  Receiving Lasix 40 mg IV every 24 hours and 

producing excellent urine output.  Remains in negative fluid balance of 1.1 L 

over the past 24 hours.  Continues to have crackles in the lung bases however 

clinically the patient reports that he is improving considerably.  The white 

cell count of 19.7, hemoglobin is 11.8 and BUN is 43 with a creatinine of 1.09 

and sodium is at 137.  Started on steroids yesterday.  Remains on IV cefepime.  

The connective tissue disease markers sent yesterday came back negative 

including negative rheumatoid factor, CAMRYN and ANCA and CAMRYN screen.





Objective





- Vital Signs


Vital signs: 


                                   Vital Signs











Temp  97.9 F   02/21/25 08:45


 


Pulse  74   02/21/25 08:45


 


Resp  20   02/21/25 08:45


 


BP  126/53   02/21/25 08:45


 


Pulse Ox  97   02/21/25 08:47


 


FiO2  45   02/21/25 12:05








                                 Intake & Output











 02/20/25 02/21/25 02/21/25





 18:59 06:59 18:59


 


Intake Total 600  


 


Output Total 1500 200 750


 


Balance -900 -200 -750


 


Weight  129 kg 


 


Intake:   


 


  Oral 600  


 


Output:   


 


  Urine 1500 200 750


 


Other:   


 


  Voiding Method Indwelling Catheter Indwelling Catheter Indwelling Catheter


 


  # Bowel Movements 1  














- Exam








No acute distress, oriented 3.  The patient is currently on Airvo.  Settings 

are 45 L with an FiO2 of 45 %.  The patient is able to speak in full sentences 

without difficulty.





HEENT examination is grossly unremarkable.  Mucous membranes are moist.  No oral

lesions.





Neck supple.  Full range of motion.  No adenopathy thyromegaly or neck vein 

distention.





Cardiovascular examination reveals regular rhythm rate.  S1-S2 normal.  No S3 or

S4.  No discernible murmur noted.  Heart sounds are distant.





Lungs reveal mild scattered rhonchi.  No wheezes.  Bibasilar crackles are still 

present.  Breath sounds equal bilaterally.





Abdomen soft bowel sounds are heard.  No masses or tenderness.





Extremities are intact.  No cyanosis clubbing or edema.





Skin is without rash or lesion.





Neurologic examination is brief but nonfocal.  55





- Labs


CBC & Chem 7: 


                                 02/21/25 06:19





                                 02/21/25 06:15


Labs: 


                  Abnormal Lab Results - Last 24 Hours (Table)











  02/20/25 02/20/25 02/20/25 Range/Units





  15:11 16:02 19:49 


 


WBC     (3.8-10.6)  k/uL


 


RBC     (4.30-5.90)  m/uL


 


Hgb     (13.0-17.5)  gm/dL


 


Hct     (39.0-53.0)  %


 


Lymphocytes #     (1.0-4.8)  k/uL


 


Chloride  96 L    ()  mmol/L


 


Carbon Dioxide  35 H    (22-30)  mmol/L


 


BUN  39 H    (9-20)  mg/dL


 


Glucose  217 H    (74-99)  mg/dL


 


POC Glucose (mg/dL)   248 H  395 H  ()  mg/dL














  02/21/25 02/21/25 02/21/25 Range/Units





  05:59 06:15 06:19 


 


WBC    19.7 H  (3.8-10.6)  k/uL


 


RBC    3.79 L  (4.30-5.90)  m/uL


 


Hgb    11.8 L  (13.0-17.5)  gm/dL


 


Hct    35.3 L  (39.0-53.0)  %


 


Lymphocytes #    11.9 H  (1.0-4.8)  k/uL


 


Chloride     ()  mmol/L


 


Carbon Dioxide   32 H   (22-30)  mmol/L


 


BUN   43 H   (9-20)  mg/dL


 


Glucose   279 H   (74-99)  mg/dL


 


POC Glucose (mg/dL)  294 H    ()  mg/dL














  02/21/25 Range/Units





  11:42 


 


WBC   (3.8-10.6)  k/uL


 


RBC   (4.30-5.90)  m/uL


 


Hgb   (13.0-17.5)  gm/dL


 


Hct   (39.0-53.0)  %


 


Lymphocytes #   (1.0-4.8)  k/uL


 


Chloride   ()  mmol/L


 


Carbon Dioxide   (22-30)  mmol/L


 


BUN   (9-20)  mg/dL


 


Glucose   (74-99)  mg/dL


 


POC Glucose (mg/dL)  335 H  ()  mg/dL














Assessment and Plan


Plan: 








Acute hypoxemic respiratory failure, with diffuse bilateral groundglass 

pulmonary filtrates.  Rule out atypical pneumonia versus CHF/interstitial edema.

 The patient remains on Airvo at 45 L with an FiO2 of 45 %.  Echocardiogram 

shows a preserved LV function.  CAT scan of the chest shows no evidence of any 

pulm embolism and diffuse groundglass bilateral pulmonary filtrates.  Chest x-

ray shows unchanged the patient is scattered interstitial pulmonary filtrates 

bilaterally.  Consider interstitial edema versus atypical pneumonia.  Rule out 

also acute lung injury, acute interstitial pneumonitis, acute hypersensitive 

pneumonitis, acute eosinophilic pneumonitis.  Connective tissue disease markers 

were negative.  The patient is currently on IV steroids and diuretics and there 

is ongoing improvement in his oxygenation





Acute kidney injury, recovered





Acute Non-ST segment elevation myocardial infarction.





coronary artery disease.





History of diabetes mellitus type II currently on Levemir insulin





History of hypertension.





History of gastroesophageal reflux disease.





History of chronic lymphocytic leukemia, in remission.





History of osteoarthritis.





Plan: 





Titrate oxygen flow to maintain saturation above 90%.  Currently on Airvo.  The 

patient has been weaned down to an FiO2 of 45% with a flow of 45 L


Repeat chest x-ray in the morning


Continue Lasix 40 mg IV e every 24 hours and the patient remains negative fluid 

balance


IV fluids to KVO


Monitor renal function


Ultrasound the kidneys shows no evidence of any hydronephrosis


IV cefepime as an empiric antibiotic coverage


Continue V Solu-Medrol 60 mg every 6 hours


Check rheumatologic markers including RF, CAMRYN, ADRIEN screen and ANCA screen and 

all of those came back negative


proBNP level was elevated at time of admission.  Procalcitonin levels are 

nonelevated.  The viral screen was negative.  Legionella urine antigen was 

negative.


CT of the chest was noted


Echo was noted


Continue rest of the medications


Will continue to follow. 


Time with Patient: Greater than 30 pcp

## 2025-02-21 NOTE — P.PN
Subjective


Progress Note Date: 02/21/25


79-year-old male with past medical history significant for coronary artery 

disease, insulin-dependent diabetes mellitus, hypertension presents to the 

emergency department today due to altered mental status.  Patient is unsure of 

the events surrounding his admission so most of the history is obtained via re

cords and hospital staff.  Patient was having shortness of breath at home and 

his wife called EMS.  When EMS arrived to his house he was oriented only to 

self.  He was found to be hypoxic in the low 80s and was placed on supplemental 

oxygen.  Patient denies wearing oxygen at home.  Upon arrival to the hospital 

oxygen saturation had improved and he was AO x 3.  He also had an episode of 

vomiting.  Patient reports that he has been feeling sick recently with myalgias,

cough productive of greenish sputum, actively being treated for sinus infection.

 He denies nausea/vomiting, abdominal pain, chest pain, dyspnea, weight loss.





2/15/25 - Seen and examined at bedside.  Overnight, patient was more hypoxic, 

antibiotics were broadened.  Now requiring Airvo.





2/16/25 - Seen and examined at bedside.  No acute events overnight.  Continues 

to be on Airvo.





2/17/25 - Patient seen and examined at bedside this morning.  Overnight the 

patient became agitated, attempting to get out of his bed and attempting to 

remove his Airvo.  He was given Seroquel 25 mg and was able to calm down.  MRSA 

nares was found to be negative vancomycin was discontinued at this time.





2/18/25 - Patient seen and examined at bedside this morning.  No acute events 

overnight.  Continues to be on Airvo with FiO2 of 65%.  Kidney/bladder 

ultrasound completed today, secondary to new onset ROSSY, which showed no evidence

of hydronephrosis or nephrolithiasis, however did show cholelithiasis.  

Lisinopril decreased to 20 mg daily.  Additionally, CBC today significant WBC 

6.7, hemoglobin 11.4, hematocrit 34.5.  CMP today significant for potassium 3.1,

bicarbonate 32, BUN 34, creatinine 1.33. 





2/19/25 - Patient seen and examined at bedside this morning.  No acute events 

overnight.  Continues to be on Airvo with FiO2 55%.  SpO2 this morning 94%.  He 

is much more alert and awake than he has been previously.  States that this 

morning he is feeling significantly better than he had been in previous days, 

when he first woke up he did not feel great however in the time since then he 

feels well.  He notes having an increasing appetite, however becomes full very 

quickly.





2/20/25 - Patient seen and examined at bedside this morning.  No acute events 

overnight.  Continues to be on Airvo with FiO2 55% at 60 L.  SpO2 this morning 

98%.  He is able to sit up in the chair comfortably.  Continues to state that he

he feels better than previous days, however has moments of feeling not quite as 

well.  Potassium was found to be 3.0 on labs drawn today, replaced with 2 doses 

of 40 mEq potassium chloride, will recheck potassium levels this afternoon.  He 

has no acute complaints at this time.





2/21/25 - Patient seen and examined at bedside this morning.  No acute events 

overnight.  He continues to be on Airvo with FiO2 53% at 50 L, SpO2 this morning

97%.  He continues to be able to sit up in the chair comfortably, and notes no 

shortness of breath noted.  Potassium found to be normal range today.  Continue 

monitor CMP.  He states he continues to feel better and has no acute complaints 

at this time.





REVIEW OF SYSTEMS: Pertinent positives and negatives noted in HPI. 





Physical Exam:


General: nontoxic, no distress, appears at stated age


Derm: warm, dry, intact


Head: atraumatic, normocephalic, symmetric


Eyes: EOMI, anicteric sclera


Cardiovascular:  S1 S2 reg, no murmur, rubs, or gallops


Lungs: Diminished breath sounds bilaterally, Mild crackles audible in bilateral 

bases, on high flow nasal cannula.


Abdominal: soft, non-tender to palpataion, no appreciable organomegaly


Extremities: no gross muscle atrophy, no edema, no contractures


Neuro: Alert, Oriented, CNII-XII grossly intact, gait normal


Psych: well appearing, appropriate affect





Data Received Today:


Labs: WBCs 19.7, hemoglobin 11.8, hematocrit 35.3, platelet 178; sodium 137, 

potassium 4.0, bicarb 32, BUN 43, creatinine 1.09, calcium 8.5


-Rheumatologic markers, RF, CAMRYN, ADRIEN, ANCA all negative


Imagining: No new imaging





Assessment and plan


79-year-old male with past medical history significant for coronary artery 

disease, insulin-dependent diabetes mellitus, hypertension presents to the 

emergency department today due to altered mental status.





#Severe ARDS


#Acute hypoxic respiratory failure


#Multifocal pneumonia


#Acute NSTEMI


-Continue to wean oxygen, patient currently on Airvo; maintain SpO2 > 90%


-On IV cefepime 2 g every 8 hours (day 6 today)


-MRSA nares negative, discontinue vancomycin


-Started on Solu-Medrol 60 mg every 6 hours


-Pulmonology following


-Continue aspirin 81 mg, atorvastatin 80 mg, Plavix 75 mg daily





#Acute kidney injury, improving


#Hypokalemia, resolved


#Contraction alkalosis, improving


-Decrease lisinopril to 20 mg daily


-Decreased Lasix to 40 mg daily


-CMP today potassium 4.0


-Continue monitor CMP





#Acute onset delirium


-Started on Seroquel 25 mg daily





#Debility


-PT/OT consulted





#Diabetes


#Hyperglycemia


-Continue Levemir 15 units nightly, continue sliding scale insulin, monitor for 

hypoglycemia





#Right plantar foot ulcer


-Wound care following





#Hyperbilirubinemia, improved


-No right upper quadrant pain





#Hypertension


-Continue to monitor vital signs





Chronic:


Hypertension


History of CAD


History of CLL, in remission





DVT ppx: Heparin 5000 units subcu


Code status: Full code





F: None


E: Replete as needed


N: Heart healthy diet 


A: Ambulatory





Anticipated discharge place: Pending clinical course


Anticipated discharge time: Pending clinical course





Dictation was produced using dragon dictation software. please excuse any gramm

atical, word or spelling errors.





I have seen and evaluated the patient today.  Discussed with the resident and 

agree with the residents finding and plan as documented in the resident's note. 

Changes highlighted in blue font.





Objective





- Vital Signs


Vital signs: 


                                   Vital Signs











Temp  97.9 F   02/21/25 04:00


 


Pulse  55 L  02/21/25 04:00


 


Resp  20   02/21/25 04:00


 


BP  127/69   02/21/25 04:00


 


Pulse Ox  97   02/21/25 04:07


 


FiO2  53   02/21/25 04:07








                                 Intake & Output











 02/20/25 02/21/25 02/21/25





 18:59 06:59 18:59


 


Intake Total 600  


 


Output Total 1500 200 


 


Balance -900 -200 


 


Weight  129 kg 


 


Intake:   


 


  Oral 600  


 


Output:   


 


  Urine 1500 200 


 


Other:   


 


  Voiding Method Indwelling Catheter Indwelling Catheter 


 


  # Bowel Movements 1  














- Labs


CBC & Chem 7: 


                                 02/21/25 06:19





                                 02/21/25 06:15


Labs: 


                  Abnormal Lab Results - Last 24 Hours (Table)











  02/20/25 02/20/25 02/20/25 Range/Units





  07:11 07:11 07:11 


 


WBC  20.3 H    (3.8-10.6)  k/uL


 


RBC  3.78 L    (4.30-5.90)  m/uL


 


Hgb  11.6 L    (13.0-17.5)  gm/dL


 


Hct  35.3 L    (39.0-53.0)  %


 


Lymphocytes #  11.8 H    (1.0-4.8)  k/uL


 


Potassium   3.0 L   (3.5-5.1)  mmol/L


 


Chloride     ()  mmol/L


 


Carbon Dioxide   35 H   (22-30)  mmol/L


 


BUN   42 H   (9-20)  mg/dL


 


Glucose   165 H   (74-99)  mg/dL


 


POC Glucose (mg/dL)     ()  mg/dL


 


Magnesium    2.4 H  (1.6-2.3)  mg/dL














  02/20/25 02/20/25 02/20/25 Range/Units





  11:25 15:11 16:02 


 


WBC     (3.8-10.6)  k/uL


 


RBC     (4.30-5.90)  m/uL


 


Hgb     (13.0-17.5)  gm/dL


 


Hct     (39.0-53.0)  %


 


Lymphocytes #     (1.0-4.8)  k/uL


 


Potassium     (3.5-5.1)  mmol/L


 


Chloride   96 L   ()  mmol/L


 


Carbon Dioxide   35 H   (22-30)  mmol/L


 


BUN   39 H   (9-20)  mg/dL


 


Glucose   217 H   (74-99)  mg/dL


 


POC Glucose (mg/dL)  144 H   248 H  ()  mg/dL


 


Magnesium     (1.6-2.3)  mg/dL














  02/20/25 02/21/25 02/21/25 Range/Units





  19:49 05:59 06:15 


 


WBC     (3.8-10.6)  k/uL


 


RBC     (4.30-5.90)  m/uL


 


Hgb     (13.0-17.5)  gm/dL


 


Hct     (39.0-53.0)  %


 


Lymphocytes #     (1.0-4.8)  k/uL


 


Potassium     (3.5-5.1)  mmol/L


 


Chloride     ()  mmol/L


 


Carbon Dioxide    32 H  (22-30)  mmol/L


 


BUN    43 H  (9-20)  mg/dL


 


Glucose    279 H  (74-99)  mg/dL


 


POC Glucose (mg/dL)  395 H  294 H   ()  mg/dL


 


Magnesium     (1.6-2.3)  mg/dL














  02/21/25 Range/Units





  06:19 


 


WBC  19.7 H  (3.8-10.6)  k/uL


 


RBC  3.79 L  (4.30-5.90)  m/uL


 


Hgb  11.8 L  (13.0-17.5)  gm/dL


 


Hct  35.3 L  (39.0-53.0)  %


 


Lymphocytes #   (1.0-4.8)  k/uL


 


Potassium   (3.5-5.1)  mmol/L


 


Chloride   ()  mmol/L


 


Carbon Dioxide   (22-30)  mmol/L


 


BUN   (9-20)  mg/dL


 


Glucose   (74-99)  mg/dL


 


POC Glucose (mg/dL)   ()  mg/dL


 


Magnesium   (1.6-2.3)  mg/dL

## 2025-02-22 LAB
ANION GAP SERPL CALC-SCNC: 8 MMOL/L
BASOPHILS # BLD AUTO: 0.2 K/UL (ref 0–0.2)
BASOPHILS NFR BLD AUTO: 1 %
BUN SERPL-SCNC: 48 MG/DL (ref 9–20)
CALCIUM SPEC-MCNC: 8.7 MG/DL (ref 8.4–10.2)
CHLORIDE SERPL-SCNC: 95 MMOL/L (ref 98–107)
CO2 SERPL-SCNC: 33 MMOL/L (ref 22–30)
EOSINOPHIL # BLD AUTO: 0 K/UL (ref 0–0.7)
EOSINOPHIL NFR BLD AUTO: 0 %
ERYTHROCYTE [DISTWIDTH] IN BLOOD BY AUTOMATED COUNT: 3.89 M/UL (ref 4.3–5.9)
ERYTHROCYTE [DISTWIDTH] IN BLOOD: 13.8 % (ref 11.5–15.5)
GLUCOSE BLD-MCNC: 249 MG/DL (ref 70–110)
GLUCOSE BLD-MCNC: 263 MG/DL (ref 70–110)
GLUCOSE BLD-MCNC: 271 MG/DL (ref 70–110)
GLUCOSE BLD-MCNC: 278 MG/DL (ref 70–110)
GLUCOSE BLD-MCNC: 364 MG/DL (ref 70–110)
GLUCOSE SERPL-MCNC: 231 MG/DL (ref 74–99)
HCT VFR BLD AUTO: 37 % (ref 39–53)
HGB BLD-MCNC: 12.1 GM/DL (ref 13–17.5)
LYMPHOCYTES # SPEC AUTO: 17.7 K/UL (ref 1–4.8)
LYMPHOCYTES NFR SPEC AUTO: 56 %
MCH RBC QN AUTO: 31 PG (ref 25–35)
MCHC RBC AUTO-ENTMCNC: 32.6 G/DL (ref 31–37)
MCV RBC AUTO: 95 FL (ref 80–100)
MONOCYTES # BLD AUTO: 0.9 K/UL (ref 0–1)
MONOCYTES NFR BLD AUTO: 3 %
NEUTROPHILS # BLD AUTO: 12.4 K/UL (ref 1.3–7.7)
NEUTROPHILS NFR BLD AUTO: 39 %
PLATELET # BLD AUTO: 222 K/UL (ref 150–450)
POTASSIUM SERPL-SCNC: 3.8 MMOL/L (ref 3.5–5.1)
SODIUM SERPL-SCNC: 136 MMOL/L (ref 137–145)
WBC # BLD AUTO: 31.8 K/UL (ref 3.8–10.6)

## 2025-02-22 NOTE — P.PN
Subjective


Progress Note Date: 02/22/25


79-year-old male with past medical history significant for coronary artery 

disease, insulin-dependent diabetes mellitus, hypertension presents to the 

emergency department today due to altered mental status.  Patient is unsure of 

the events surrounding his admission so most of the history is obtained via re

cords and hospital staff.  Patient was having shortness of breath at home and 

his wife called EMS.  When EMS arrived to his house he was oriented only to 

self.  He was found to be hypoxic in the low 80s and was placed on supplemental 

oxygen.  Patient denies wearing oxygen at home.  Upon arrival to the hospital 

oxygen saturation had improved and he was AO x 3.  He also had an episode of 

vomiting.  Patient reports that he has been feeling sick recently with myalgias,

cough productive of greenish sputum, actively being treated for sinus infection.

 He denies nausea/vomiting, abdominal pain, chest pain, dyspnea, weight loss.





2/15/25 - Seen and examined at bedside.  Overnight, patient was more hypoxic, 

antibiotics were broadened.  Now requiring Airvo.





2/16/25 - Seen and examined at bedside.  No acute events overnight.  Continues 

to be on Airvo.





2/17/25 - Patient seen and examined at bedside this morning.  Overnight the 

patient became agitated, attempting to get out of his bed and attempting to 

remove his Airvo.  He was given Seroquel 25 mg and was able to calm down.  MRSA 

nares was found to be negative vancomycin was discontinued at this time.





2/18/25 - Patient seen and examined at bedside this morning.  No acute events 

overnight.  Continues to be on Airvo with FiO2 of 65%.  Kidney/bladder 

ultrasound completed today, secondary to new onset ROSSY, which showed no evidence

of hydronephrosis or nephrolithiasis, however did show cholelithiasis.  

Lisinopril decreased to 20 mg daily.  Additionally, CBC today significant WBC 

6.7, hemoglobin 11.4, hematocrit 34.5.  CMP today significant for potassium 3.1,

bicarbonate 32, BUN 34, creatinine 1.33. 





2/19/25 - Patient seen and examined at bedside this morning.  No acute events 

overnight.  Continues to be on Airvo with FiO2 55%.  SpO2 this morning 94%.  He 

is much more alert and awake than he has been previously.  States that this 

morning he is feeling significantly better than he had been in previous days, 

when he first woke up he did not feel great however in the time since then he 

feels well.  He notes having an increasing appetite, however becomes full very 

quickly.





2/20/25 - Patient seen and examined at bedside this morning.  No acute events 

overnight.  Continues to be on Airvo with FiO2 55% at 60 L.  SpO2 this morning 

98%.  He is able to sit up in the chair comfortably.  Continues to state that he

he feels better than previous days, however has moments of feeling not quite as 

well.  Potassium was found to be 3.0 on labs drawn today, replaced with 2 doses 

of 40 mEq potassium chloride, will recheck potassium levels this afternoon.  He 

has no acute complaints at this time.





2/21/25 - Patient seen and examined at bedside this morning.  No acute events 

overnight.  He continues to be on Airvo with FiO2 53% at 50 L, SpO2 this morning

97%.  He continues to be able to sit up in the chair comfortably, and notes no 

shortness of breath noted.  Potassium found to be normal range today.  Continue 

monitor CMP.  He states he continues to feel better and has no acute complaints 

at this time.





2/22/25 - Patient seen and examined at bedside this morning.  No acute events 

overnight.  He continues to be on Airvo with FiO2 40% at 40 L.  He continues to 

be able to stop in the chair comfortably notes no acute shortness of breath.  He

continues on cefepime IV every 8 hours (day 3).  He was noted to have symptoms 

of vertigo yesterday, and was started on Antivert 25 mg twice daily.





REVIEW OF SYSTEMS: Pertinent positives and negatives noted in HPI. 





Physical Exam:


General: nontoxic, no distress, appears at stated age


Derm: warm, dry, intact


Head: atraumatic, normocephalic, symmetric


Eyes: EOMI, anicteric sclera


Cardiovascular:  S1 S2 reg, no murmur, rubs, or gallops


Lungs: Diminished breath sounds bilaterally, Mild crackles audible in bilateral 

bases, on high flow nasal cannula.


Abdominal: soft, non-tender to palpataion, no appreciable organomegaly


Extremities: no gross muscle atrophy, no edema, no contractures


Neuro: Alert, Oriented, CNII-XII grossly intact, gait normal


Psych: well appearing, appropriate affect





Data Received Today:


Labs: WBCs 31.8, hemoglobin 12.1, medic at 37, platelet 222; sodium 136, 

potassium 3.8, chloride 95, bicarb 33, BUN 48, creatinine 1.10, calcium 8.7


Imagining: No new imaging





Assessment and plan


79-year-old male with past medical history significant for coronary artery 

disease, insulin-dependent diabetes mellitus, hypertension presents to the 

emergency department today due to altered mental status.





#Severe ARDS


#Acute hypoxic respiratory failure


#Multifocal pneumonia


#Acute NSTEMI


-Continue to wean oxygen, patient currently on Airvo; maintain SpO2 > 90%


-On IV cefepime 2 g every 8 hours (day 6 today)


-MRSA nares negative, discontinue vancomycin


-Started on Solu-Medrol 60 mg every 6 hours


-Pulmonology following


-Continue aspirin 81 mg, atorvastatin 80 mg, Plavix 75 mg daily





#Acute kidney injury, improving


#Hypokalemia, resolved


#Contraction alkalosis, improving


#Hypochloremia


#Hyponatremia


-Decrease lisinopril to 20 mg daily


-Decreased Lasix to 40 mg daily


-CMP today potassium 3.8


-Continue monitor BMP





#Acute onset delirium


-Started on Seroquel 25 mg daily





#Debility


-PT/OT consulted





#Diabetes


#Hyperglycemia


-Continue Levemir 20 units nightly, continue sliding scale insulin, monitor for 

hypoglycemia


-Initiated on NovoLog 5 units 3 times daily


-Continue to monitor glucose daily, and make adjustments as necessary





#Right plantar foot ulcer


-Wound care following





#Hyperbilirubinemia, improved


-No right upper quadrant pain





#Hypertension


-Continue to monitor vital signs





#Vertigo/dizziness


-Started on Antivert 25 mg twice daily


-Continue monitor signs symptoms





Chronic:


Hypertension


History of CAD


History of CLL, in remission





DVT ppx: Heparin 5000 units subcu every 8 hours


Code status: Full code





F: None


E: Replete as needed


N: Heart healthy diet 


A: Ambulatory





Anticipated discharge place: Pending clinical course


Anticipated discharge time: Pending clinical course





Dictation was produced using dragon dictation software. please excuse any 

grammatical, word or spelling errors.





I have seen and evaluated the patient today.  Discussed with the resident and 

agree with the residents finding and plan as documented in the resident's note. 

Changes highlighted in blue font.








Objective





- Vital Signs


Vital signs: 


                                   Vital Signs











Temp  97.7 F   02/21/25 20:00


 


Pulse  55 L  02/22/25 04:00


 


Resp  18   02/22/25 04:00


 


BP  126/68   02/22/25 04:00


 


Pulse Ox  97   02/21/25 08:47


 


FiO2  50   02/22/25 04:16








                                 Intake & Output











 02/21/25 02/22/25 02/22/25





 18:59 06:59 18:59


 


Intake Total 698  


 


Output Total 1350  


 


Balance -652  


 


Weight  123 kg 


 


Intake:   


 


  Intake, IV Titration 100  





  Amount   


 


    Cefepime 2 gm In Sodium 100  





    Chloride 0.9% 100 ml @ 25   





    mls/hr IVPB Q8H [facility] Rx#:   





    [id number]   


 


  Oral 598  


 


Output:   


 


  Urine 1350  


 


Other:   


 


  Voiding Method Indwelling Catheter Indwelling Catheter 














- Labs


CBC & Chem 7: 


                                 02/22/25 07:36





                                 02/22/25 07:36


Labs: 


                  Abnormal Lab Results - Last 24 Hours (Table)











  02/21/25 02/21/25 02/21/25 Range/Units





  06:19 11:42 16:40 


 


Lymphocytes #  11.9 H    (1.0-4.8)  k/uL


 


POC Glucose (mg/dL)   335 H  382 H  ()  mg/dL














  02/21/25 02/22/25 Range/Units





  20:08 06:01 


 


Lymphocytes #    (1.0-4.8)  k/uL


 


POC Glucose (mg/dL)  457 H  263 H  ()  mg/dL

## 2025-02-22 NOTE — P.PN
Subjective


Progress Note Date: 02/22/25








79-year-old male who presented to the emergency department, on February 13, 

complaining of nausea, vomiting, diarrhea, and mental status changes.  The 

patient was brought into the emergency department, by EMS, and apparently he was

quite confused, and disoriented, when they initially evaluated him.  He a

pparently was having GI episodes, including vomiting, nausea, and diarrhea.  He 

denies having any chest pain or shortness of breath although his saturations 

were in the low 80s.  He was placed on oxygen therapy, and brought into the 

hospital.  When he was in the ER, he was apparently alert and oriented x 3.  He 

was feeling a bit short of breath.  We were consulted today, because the 

patient's respiratory status has declined, and he is currently on Airvo, with 

settings of 60 L/min, and an FiO2 of 75%.  The patient was thought to have a 

non-ST segment elevation myocardial infarction, and pneumonia.  He was on IV 

heparin, and saline at 20 cc an hour, as well as vancomycin, and cefepime.  His 

procalcitonin level was 0.21.  Current laboratory data includes a white count of

16.9, hemoglobin 11.9, hematocrit 35.5, and a platelet count of 155,000.  A 

blood gas done yesterday shows a pO2 of 62, pCO2 42, and a pH of 7.41.  This 

apparently was on 100% oxygen.  Sodium 135, potassium 3.3, chloride 95, CO2 30, 

anion gap 10, BUN 19, creatinine 1.06.  Glucose 296.  Hemoglobin A1c was 6.9.  

Albumin was 3.5.  N-terminal proBNP was 2750.  Blood cultures were negative.  

Chest x-ray showed bilateral multifocal airspace opacities, which have improved.





Progress note dated February 16, 2025.





79-year-old male seen in consultation yesterday.  Please see my note above.  The

patient is seen today in room 369.  He continues on Airvo, 60 L/min and FiO2 of 

75%.  In addition, the patient continues on cefepime and vancomycin, his 

procalcitonin level was normal at 0.21.  He is also on IV heparin.  He states he

feels about the same today as he did yesterday, no better, and no worse.  White 

count 18.7, hemoglobin 9.3, hematocrit 33.4, platelet count 156,000.  PTT is 

56.4.  Sodium 136, potassium 3.5, chlorides 100, CO2 29, BUN 24, creatinine 

0.93.  Glucose 171.  Calcium 8.6.  Bilirubin 1.5.  Albumin 3.2.





On 2/17/2025, the patient is being seen for a follow-up.  The patient remains 

quite hypoxic.  On today's evaluation, the patient remains on Airvo at 60 L with

FiO2 of 70%.  The chest x-ray from 2/14/2025 showed improved aeration of both 

lungs with persistent multifocal airspace opacities.  The patient's blood work 

showed a procalcitonin level of 0.2 at the time of admission x 2 and a proBNP le

chris was 2750.  On today's blood work, sodium levels at 135, potassium level is 

3.4, BUN 23 with a creatinine of 0.8.  LFTs are essentially within normal 

limits.  The patient remains on IV cefepime.  The patient will be started on IV 

Lasix.  The patient is also on DuoNeb nebulized treatments around-the-clock, IV 

hydrocortisone, Levemir insulin 15 units at bedtime and the patient is on 

metoprolol 25 mg p.o. twice a day.  Rest of the medications remain unchanged.  

Echocardiogram was done on 2/14/2025 indicating a ejection fraction of 50 to 

55%, inferolateral wall hypokinesis, no significant valvular abnormalities.  The

CTA of the chest done on 2/13/2025 revealed no pulmonary embolism and diffuse 

patchy groundglass pulmonary opacities could relate to interstitial edema versus

atypical pulmonary infection and the patient also has evidence of 

cholelithiasis.  The viral screen was negative and Legionella urine antigen was 

negative at the time of admission.  Mental status is appropriate at this point.





On 2/18/2025, the patient is still hypoxic, maintained on Airvo at 60 L with an 

FiO2 of 65%.  No significant improvement in oxygenation over the past 24 hours. 

Noted the patient was started on diuretics yesterday.  He remains on IV Lasix 

and the dose has been switched to 40 mg every 24 hours.  Fluid balance -2.9 L 

over the past 24 hours.  The patient has a white cell count of 16.7 with a 

hemoglobin 11.4 and a platelet count of 158.  BUN 34 and the creatinine is up to

1.3.  Sodium levels at 141.  The patient remains on DuoNeb nebulized treatments 

around-the-clock.  Remains on IV cefepime.  Remains on IV Lasix 40 mg every 24 

hours.  Rest of the medications remain essentially unchanged.  Ultrasound of the

kidneys and bladder showed no evidence of any hydronephrosis.  The most recent 

chest x-ray is from 2/15/2025 and it shows improved aeration of the lungs with 

persistent multifocal airspace opacities.  A follow-up chest x-ray is to be 

obtained for tomorrow.Echocardiogram was done on 2/14/2025 indicating a ejection

fraction of 50 to 55%, inferolateral wall hypokinesis, no significant valvular 

abnormalities.  The CTA of the chest done on 2/13/2025 revealed no pulmonary 

embolism and diffuse patchy groundglass pulmonary opacities could relate to 

interstitial edema versus atypical pulmonary infection and the patient also has 

evidence of cholelithiasis.  The viral screen was negative and Legionella urine 

antigen was negative at the time of admission. 





On 2/19/2025, patient is being seen for a follow-up.  This patient remains 

hypoxic.  As mentioned, the patient has developed diffuse groundglass bilateral 

pulmonary filtrates.  Suspected interstitial edema and based on that the patient

was started on diuretics.  The patient remains on Lasix with a negative fluid 

balance of 2.9 L and -360 cc over the past 24 hours.  Remains on IV Lasix at a 

dose of 40 mg on a daily basis.  On today's blood work, the patient's BUN is 46 

with a creatinine of 1.2.  Sodium levels at 139 and the potassium level is at 

4.2.  The patient has a white cell count of 19 with a hemoglobin of 4.3 and a 

platelet count of 165.  He remains on Airvo.  The current settings are 60 L with

an FiO2 of 55%.  A follow-up chest x-ray was obtained today and the chest x-ray 

is showing scattered airspace opacities throughout the lung and this could 

essentially present pulmonary edema versus atypical pneumonia.  The patient has 

cardiomegaly.  The patient remains on DuoNeb nebulized treatments around-the-

clock.  The patient remains on IV cefepime.  The patient remains on IV 

hydrocortisone.  Remains on Levemir insulin 15 units at bedtime and insulin 

scale coverage.  Remains on metoprolol 25 mg p.o. daily.  He is able to sit up 

on a chair.  Using incentive spirometer.





On 2/20/2025, patient is being seen for a follow-up.  The patient is still 

dependent on Airvo.  This morning, the patient is on a flow of 55 L and FiO2 

50%.  Fluid balance -410 cc over the past 24 hours.  The patient denies having 

any significant shortness of breath or chest pain.  Nevertheless, repeat chest 

x-ray was done and the findings are essentially unchanged.  The patient 

continues to have diffuse interstitial changes bilaterally along with smaller 

lung volumes and cardiomegaly.  The possibility of acute interstitial p

neumonia/pneumonitis cannot be completely ruled out.  Other possibilities 

include acute interstitial pneumonias, eosinophilic pneumonias, acute lung 

injury.  CHF with fluid overload cannot be completely ruled out and the patient 

is currently on IV Lasix 40 mg every 24 hours.  Remains on bronchodilators.  

Remains on steroids.  No history of connective tissue disease and the peritoneal

disease markers will be also obtained.  Remains on IV cefepime.





On 2/21/2025, the patient is being seen for a follow-up.  Feels well.  

Oxygenation continues to gradually improving this morning, the patient remains 

on Airvo 45 L with an FiO2 of 45%.  Receiving Lasix 40 mg IV every 24 hours and 

producing excellent urine output.  Remains in negative fluid balance of 1.1 L 

over the past 24 hours.  Continues to have crackles in the lung bases however 

clinically the patient reports that he is improving considerably.  The white 

cell count of 19.7, hemoglobin is 11.8 and BUN is 43 with a creatinine of 1.09 

and sodium is at 137.  Started on steroids yesterday.  Remains on IV cefepime.  

The connective tissue disease markers sent yesterday came back negative 

including negative rheumatoid factor, CAMRYN and ANCA and CAMRYN screen.





On today's evaluation of 2/22/2025, the patient is feeling well and denies 

having any significant shortness of breath at rest.  He remains on Airvo 40 L 

with an FiO2 of 40%.  He remains on IV Lasix and is producing excellent urine 

output.  Fluid balance -1.1 L over the past 24 hours.  He is able to sit up in a

chair.  No nausea.  No vomiting.  No chest pain or abdominal pain.  His white 

cell count is elevated at 31.8 and this is higher compared to yesterday.  BUN 48

with a creatinine of 1.1 and sodium is at 136.  Glucose at 278.  Otherwise, he 

remains on the same treatment with oxygen therapy including Airvo, DuoNeb 

updrafts, IV Lasix 40 mg every 24 hours, Lantus insulin 20 units daily along 

with NovoLog send scale coverage the patient remains on IV Solu-Medrol. 





Objective





- Vital Signs


Vital signs: 


                                   Vital Signs











Temp  97.5 F L  02/22/25 07:50


 


Pulse  62   02/22/25 07:50


 


Resp  18   02/22/25 07:50


 


BP  111/68   02/22/25 07:50


 


Pulse Ox  96   02/22/25 08:12


 


FiO2  40   02/22/25 08:12








                                 Intake & Output











 02/21/25 02/22/25 02/22/25





 18:59 06:59 18:59


 


Intake Total 698  490


 


Output Total 1350  500


 


Balance -652  -10


 


Weight  123 kg 


 


Intake:   


 


  IV   10


 


    Invasive Line 5   10


 


  Intake, IV Titration 100  





  Amount   


 


    Cefepime 2 gm In Sodium 100  





    Chloride 0.9% 100 ml @ 25   





    mls/hr IVPB Q8H [facility] Rx#:   





    [id number]   


 


  Oral 598  480


 


Output:   


 


  Urine 1350  500


 


Other:   


 


  Voiding Method Indwelling Catheter Indwelling Catheter Indwelling Catheter














- Exam








No acute distress, oriented 3.  The patient is currently on Airvo.  Settings 

are 40 L and FiO2 40% %.  The patient is able to speak in full sentences without

difficulty.





HEENT examination is grossly unremarkable.  Mucous membranes are moist.  No oral

lesions.





Neck supple.  Full range of motion.  No adenopathy thyromegaly or neck vein 

distention.





Cardiovascular examination reveals regular rhythm rate.  S1-S2 normal.  No S3 or

S4.  No discernible murmur noted.  Heart sounds are distant.





Lungs reveal mild scattered rhonchi.  No wheezes.  Bibasilar crackles are still 

present.  Breath sounds equal bilaterally.





Abdomen soft bowel sounds are heard.  No masses or tenderness.





Extremities are intact.  No cyanosis clubbing or edema.





Skin is without rash or lesion.





Neurologic examination is brief but nonfocal.  





- Labs


CBC & Chem 7: 


                                 02/22/25 07:36





                                 02/22/25 07:36


Labs: 


                  Abnormal Lab Results - Last 24 Hours (Table)











  02/21/25 02/21/25 02/21/25 Range/Units





  11:42 16:40 20:08 


 


WBC     (3.8-10.6)  k/uL


 


RBC     (4.30-5.90)  m/uL


 


Hgb     (13.0-17.5)  gm/dL


 


Hct     (39.0-53.0)  %


 


Neutrophils #     (1.3-7.7)  k/uL


 


Lymphocytes #     (1.0-4.8)  k/uL


 


Sodium     (137-145)  mmol/L


 


Chloride     ()  mmol/L


 


Carbon Dioxide     (22-30)  mmol/L


 


BUN     (9-20)  mg/dL


 


Glucose     (74-99)  mg/dL


 


POC Glucose (mg/dL)  335 H  382 H  457 H  ()  mg/dL














  02/22/25 02/22/25 02/22/25 Range/Units





  06:01 07:36 07:36 


 


WBC   31.8 H   (3.8-10.6)  k/uL


 


RBC   3.89 L   (4.30-5.90)  m/uL


 


Hgb   12.1 L   (13.0-17.5)  gm/dL


 


Hct   37.0 L   (39.0-53.0)  %


 


Neutrophils #   12.4 H   (1.3-7.7)  k/uL


 


Lymphocytes #   17.7 H   (1.0-4.8)  k/uL


 


Sodium    136 L  (137-145)  mmol/L


 


Chloride    95 L  ()  mmol/L


 


Carbon Dioxide    33 H  (22-30)  mmol/L


 


BUN    48 H  (9-20)  mg/dL


 


Glucose    231 H  (74-99)  mg/dL


 


POC Glucose (mg/dL)  263 H    ()  mg/dL














  02/22/25 02/22/25 Range/Units





  07:58 11:08 


 


WBC    (3.8-10.6)  k/uL


 


RBC    (4.30-5.90)  m/uL


 


Hgb    (13.0-17.5)  gm/dL


 


Hct    (39.0-53.0)  %


 


Neutrophils #    (1.3-7.7)  k/uL


 


Lymphocytes #    (1.0-4.8)  k/uL


 


Sodium    (137-145)  mmol/L


 


Chloride    ()  mmol/L


 


Carbon Dioxide    (22-30)  mmol/L


 


BUN    (9-20)  mg/dL


 


Glucose    (74-99)  mg/dL


 


POC Glucose (mg/dL)  278 H  249 H  ()  mg/dL














Assessment and Plan


Plan: 








Acute hypoxemic respiratory failure, with diffuse bilateral groundglass pulm

onary filtrates.  Rule out atypical pneumonia versus CHF/interstitial edema.  

The patient remains on Airvo at 40 L with an FiO2 of 40 %.  Echocardiogram shows

a preserved LV function.  CAT scan of the chest shows no evidence of any pulm 

embolism and diffuse groundglass bilateral pulmonary filtrates.  Chest x-ray 

shows unchanged the patient is scattered interstitial pulmonary filtrates 

bilaterally.  Consider interstitial edema versus atypical pneumonia.  Rule out 

also acute lung injury, acute interstitial pneumonitis, acute hypersensitive 

pneumonitis, acute eosinophilic pneumonitis.  Connective tissue disease markers 

were negative.  The patient is currently on IV steroids and diuretics and there 

is ongoing improvement in his oxygenation.  He is responding.





Acute kidney injury, recovered





Acute Non-ST segment elevation myocardial infarction.





coronary artery disease.





History of diabetes mellitus type II currently on Levemir insulin





History of hypertension.





History of gastroesophageal reflux disease.





History of chronic lymphocytic leukemia, in remission.





History of osteoarthritis.





Plan: 





Titrate oxygen flow to maintain saturation above 90%.  Currently on Airvo.  The 

patient has been weaned down to an FiO2 of 40 L with an FiO2 of 40%


Continue Lasix 40 mg IV e every 24 hours and the patient remains negative fluid 

balance


IV fluids to KVO


Monitor renal function


Ultrasound the kidneys shows no evidence of any hydronephrosis


IV cefepime as an empiric antibiotic coverage


Continue Solu-Medrol 60 mg every 6 hours


Check rheumatologic markers including RF, CAMRYN, ADRIEN screen and ANCA screen and 

all of those came back negative


proBNP level was elevated at time of admission.  Procalcitonin levels are 

nonelevated.  The viral screen was negative.  Legionella urine antigen was 

negative.


CT of the chest was noted


Echo was noted


Continue rest of the medications


Will continue to follow.

## 2025-02-23 LAB
ANION GAP SERPL CALC-SCNC: 3 MMOL/L
BUN SERPL-SCNC: 40 MG/DL (ref 9–20)
CALCIUM SPEC-MCNC: 8.4 MG/DL (ref 8.4–10.2)
CELLS COUNTED: 200
CHLORIDE SERPL-SCNC: 94 MMOL/L (ref 98–107)
CO2 SERPL-SCNC: 37 MMOL/L (ref 22–30)
ERYTHROCYTE [DISTWIDTH] IN BLOOD BY AUTOMATED COUNT: 3.78 M/UL (ref 4.3–5.9)
ERYTHROCYTE [DISTWIDTH] IN BLOOD: 14.2 % (ref 11.5–15.5)
GLUCOSE BLD-MCNC: 163 MG/DL (ref 70–110)
GLUCOSE BLD-MCNC: 191 MG/DL (ref 70–110)
GLUCOSE BLD-MCNC: 256 MG/DL (ref 70–110)
GLUCOSE BLD-MCNC: 314 MG/DL (ref 70–110)
GLUCOSE SERPL-MCNC: 164 MG/DL (ref 74–99)
HCT VFR BLD AUTO: 34.6 % (ref 39–53)
HGB BLD-MCNC: 11.8 GM/DL (ref 13–17.5)
LYMPHOCYTES # BLD MANUAL: 14.63 K/UL (ref 1–4.8)
MAGNESIUM SPEC-SCNC: 2.4 MG/DL (ref 1.6–2.3)
MCH RBC QN AUTO: 31.2 PG (ref 25–35)
MCHC RBC AUTO-ENTMCNC: 34.1 G/DL (ref 31–37)
MCV RBC AUTO: 91.5 FL (ref 80–100)
MONOCYTES # BLD MANUAL: 0.8 K/UL (ref 0–1)
NEUTROPHILS NFR BLD MANUAL: 43 %
NEUTS SEG # BLD MANUAL: 11.7 K/UL (ref 1.3–7.7)
PLATELET # BLD AUTO: 216 K/UL (ref 150–450)
POTASSIUM SERPL-SCNC: 3.8 MMOL/L (ref 3.5–5.1)
SODIUM SERPL-SCNC: 134 MMOL/L (ref 137–145)
WBC # BLD AUTO: 26.6 K/UL (ref 3.8–10.6)

## 2025-02-23 RX ADMIN — WHITE PETROLATUM,ZINC OXIDE PRN APPLIC: 57; 17 PASTE TOPICAL at 10:20

## 2025-02-23 RX ADMIN — CEFEPIME HYDROCHLORIDE SCH MLS/HR: 2 INJECTION, POWDER, FOR SOLUTION INTRAVENOUS at 12:23

## 2025-02-23 NOTE — P.PN
Subjective


Progress Note Date: 02/23/25





Patient was seen and examined. Feeling well. No complaints. Now on 6L HFNC 

saturating high 90s. Maintained on Lasix 40 mg IV QD. Negative 820 cc fluid 

balance over the past 24H. CBC and BMP significant for WBC 26.6, RBC 3.78, Hg 

11.8, Hct 34.6, Na 134, Cl 94, bicarb 37, BUN 40, glu 164. Mag 2.4.





General: non toxic, no distress, appears at stated age


Derm: warm, dry


Head: atraumatic, normocephalic, symmetric


Eyes: EOMI, no lid lag, anicteric sclera


Mouth: no lip lesion, mucus membranes moist


Cardiovascular: S1S2 reg, no murmur


Lungs: Decreased BS bilateral, no rhonchi, no rales , no accessory muscle use


Ext: no gross muscle atrophy, no edema, no contractures


Neuro:  no focal neuro deficits


Psych: Alert, oriented, appropriate affect 





Based on my assessment of this patient, this patient meets a high complexity 

level of care.





Acute hypoxic respiratory failure and sepsis due to multifocal PNA and ARDS: 

Supplemental O2 to maintain O2 sat > 92%. DuoNeb Q4H PRN SOB. Cefepime 2g IV 

BID. SoluMedrol 60 mg IV Q6H. Pulmonary on board.


NSTEMI: Status post 48H heparin infusion. ASA 81 mg PO QD. Lipitor 80 mg PO QD. 

Plavix 75 mg PO QD. Metoprolol as below. Outpatient stress test per Cardiology.


Prerenal azotemia with hypochloremic hyponatremia and metabolic alkalosis: 

Likely contraction alkalosis from Lasix use. Monitor daily renal function.


DM with hyperglycemia: ISS with Accuchecks ACHS. Hypoglycemic precautions. 

Levemir 20 units QHS. Lispro 5 units TID.


Right foot wound: Wound care on board.


Hypertension: Lisinopril 20 mg PO QD. Metoprolol 25 mg PO QD.


History of CLL: In remission. Outpatient follow up.





CODE STATUS: FULL CODE


DVT Prophylaxis: Heparin SQ.


GI Prophylaxis: Protonix 40 mg PO QD.


Designated medical POA if patient is not able to make medical decisions for 

themselves:





I have reviewed the following consultant notes: Pulmonary


I have reviewed the results of the following tests: CBC, BMP. Mag.


I have ordered the following tests: BMP. Mag.


I have discussed the care of this patient with the following independent 

historian:


I have independently interpreted the following test below:


I have discussed the management of this patient with the following physician:





Objective





- Vital Signs


Vital signs: 


                                   Vital Signs











Temp  97.5 F L  02/23/25 07:34


 


Pulse  58 L  02/23/25 15:48


 


Resp  16   02/23/25 15:48


 


BP  122/68   02/23/25 15:48


 


Pulse Ox  99   02/23/25 15:48


 


FiO2  37   02/22/25 15:46








                                 Intake & Output











 02/22/25 02/23/25 02/23/25





 18:59 06:59 18:59


 


Intake Total 980  1138


 


Output Total 9118 610 9555


 


Balance -320 -500 -812


 


Weight  124.5 kg 


 


Intake:   


 


  IV 10  


 


    Invasive Line 5 10  


 


  Oral 970  1138


 


Output:   


 


  Urine [phone]


 


Other:   


 


  Voiding Method Indwelling Catheter Indwelling Catheter Indwelling Catheter


 


  # Bowel Movements  1 0














- Labs


CBC & Chem 7: 


                                 02/23/25 06:39





                                 02/23/25 06:39


Labs: 


                  Abnormal Lab Results - Last 24 Hours (Table)











  02/22/25 02/22/25 02/23/25 Range/Units





  16:10 20:12 06:01 


 


WBC     (3.8-10.6)  k/uL


 


RBC     (4.30-5.90)  m/uL


 


Hgb     (13.0-17.5)  gm/dL


 


Hct     (39.0-53.0)  %


 


Neutrophils # (Manual)     (1.3-7.7)  k/uL


 


Lymphocytes # (Manual)     (1.0-4.8)  k/uL


 


Sodium     (137-145)  mmol/L


 


Chloride     ()  mmol/L


 


Carbon Dioxide     (22-30)  mmol/L


 


BUN     (9-20)  mg/dL


 


Glucose     (74-99)  mg/dL


 


POC Glucose (mg/dL)  364 H  271 H  191 H  ()  mg/dL


 


Magnesium     (1.6-2.3)  mg/dL














  02/23/25 02/23/25 02/23/25 Range/Units





  06:39 06:39 11:55 


 


WBC  26.6 H    (3.8-10.6)  k/uL


 


RBC  3.78 L    (4.30-5.90)  m/uL


 


Hgb  11.8 L    (13.0-17.5)  gm/dL


 


Hct  34.6 L    (39.0-53.0)  %


 


Neutrophils # (Manual)  11.70 H    (1.3-7.7)  k/uL


 


Lymphocytes # (Manual)  14.63 H    (1.0-4.8)  k/uL


 


Sodium   134 L   (137-145)  mmol/L


 


Chloride   94 L   ()  mmol/L


 


Carbon Dioxide   37 H   (22-30)  mmol/L


 


BUN   40 H   (9-20)  mg/dL


 


Glucose   164 H   (74-99)  mg/dL


 


POC Glucose (mg/dL)    163 H  ()  mg/dL


 


Magnesium   2.4 H   (1.6-2.3)  mg/dL

## 2025-02-23 NOTE — P.PN
Subjective


Progress Note Date: 02/23/25








79-year-old male who presented to the emergency department, on February 13, 

complaining of nausea, vomiting, diarrhea, and mental status changes.  The 

patient was brought into the emergency department, by EMS, and apparently he was

quite confused, and disoriented, when they initially evaluated him.  He a

pparently was having GI episodes, including vomiting, nausea, and diarrhea.  He 

denies having any chest pain or shortness of breath although his saturations 

were in the low 80s.  He was placed on oxygen therapy, and brought into the 

hospital.  When he was in the ER, he was apparently alert and oriented x 3.  He 

was feeling a bit short of breath.  We were consulted today, because the 

patient's respiratory status has declined, and he is currently on Airvo, with 

settings of 60 L/min, and an FiO2 of 75%.  The patient was thought to have a 

non-ST segment elevation myocardial infarction, and pneumonia.  He was on IV 

heparin, and saline at 20 cc an hour, as well as vancomycin, and cefepime.  His 

procalcitonin level was 0.21.  Current laboratory data includes a white count of

16.9, hemoglobin 11.9, hematocrit 35.5, and a platelet count of 155,000.  A 

blood gas done yesterday shows a pO2 of 62, pCO2 42, and a pH of 7.41.  This 

apparently was on 100% oxygen.  Sodium 135, potassium 3.3, chloride 95, CO2 30, 

anion gap 10, BUN 19, creatinine 1.06.  Glucose 296.  Hemoglobin A1c was 6.9.  

Albumin was 3.5.  N-terminal proBNP was 2750.  Blood cultures were negative.  

Chest x-ray showed bilateral multifocal airspace opacities, which have improved.





Progress note dated February 16, 2025.





79-year-old male seen in consultation yesterday.  Please see my note above.  The

patient is seen today in room 369.  He continues on Airvo, 60 L/min and FiO2 of 

75%.  In addition, the patient continues on cefepime and vancomycin, his 

procalcitonin level was normal at 0.21.  He is also on IV heparin.  He states he

feels about the same today as he did yesterday, no better, and no worse.  White 

count 18.7, hemoglobin 9.3, hematocrit 33.4, platelet count 156,000.  PTT is 

56.4.  Sodium 136, potassium 3.5, chlorides 100, CO2 29, BUN 24, creatinine 

0.93.  Glucose 171.  Calcium 8.6.  Bilirubin 1.5.  Albumin 3.2.





On 2/17/2025, the patient is being seen for a follow-up.  The patient remains 

quite hypoxic.  On today's evaluation, the patient remains on Airvo at 60 L with

FiO2 of 70%.  The chest x-ray from 2/14/2025 showed improved aeration of both 

lungs with persistent multifocal airspace opacities.  The patient's blood work 

showed a procalcitonin level of 0.2 at the time of admission x 2 and a proBNP le

chris was 2750.  On today's blood work, sodium levels at 135, potassium level is 

3.4, BUN 23 with a creatinine of 0.8.  LFTs are essentially within normal 

limits.  The patient remains on IV cefepime.  The patient will be started on IV 

Lasix.  The patient is also on DuoNeb nebulized treatments around-the-clock, IV 

hydrocortisone, Levemir insulin 15 units at bedtime and the patient is on 

metoprolol 25 mg p.o. twice a day.  Rest of the medications remain unchanged.  

Echocardiogram was done on 2/14/2025 indicating a ejection fraction of 50 to 

55%, inferolateral wall hypokinesis, no significant valvular abnormalities.  The

CTA of the chest done on 2/13/2025 revealed no pulmonary embolism and diffuse 

patchy groundglass pulmonary opacities could relate to interstitial edema versus

atypical pulmonary infection and the patient also has evidence of 

cholelithiasis.  The viral screen was negative and Legionella urine antigen was 

negative at the time of admission.  Mental status is appropriate at this point.





On 2/18/2025, the patient is still hypoxic, maintained on Airvo at 60 L with an 

FiO2 of 65%.  No significant improvement in oxygenation over the past 24 hours. 

Noted the patient was started on diuretics yesterday.  He remains on IV Lasix 

and the dose has been switched to 40 mg every 24 hours.  Fluid balance -2.9 L 

over the past 24 hours.  The patient has a white cell count of 16.7 with a 

hemoglobin 11.4 and a platelet count of 158.  BUN 34 and the creatinine is up to

1.3.  Sodium levels at 141.  The patient remains on DuoNeb nebulized treatments 

around-the-clock.  Remains on IV cefepime.  Remains on IV Lasix 40 mg every 24 

hours.  Rest of the medications remain essentially unchanged.  Ultrasound of the

kidneys and bladder showed no evidence of any hydronephrosis.  The most recent 

chest x-ray is from 2/15/2025 and it shows improved aeration of the lungs with 

persistent multifocal airspace opacities.  A follow-up chest x-ray is to be 

obtained for tomorrow.Echocardiogram was done on 2/14/2025 indicating a ejection

fraction of 50 to 55%, inferolateral wall hypokinesis, no significant valvular 

abnormalities.  The CTA of the chest done on 2/13/2025 revealed no pulmonary 

embolism and diffuse patchy groundglass pulmonary opacities could relate to 

interstitial edema versus atypical pulmonary infection and the patient also has 

evidence of cholelithiasis.  The viral screen was negative and Legionella urine 

antigen was negative at the time of admission. 





On 2/19/2025, patient is being seen for a follow-up.  This patient remains 

hypoxic.  As mentioned, the patient has developed diffuse groundglass bilateral 

pulmonary filtrates.  Suspected interstitial edema and based on that the patient

was started on diuretics.  The patient remains on Lasix with a negative fluid 

balance of 2.9 L and -360 cc over the past 24 hours.  Remains on IV Lasix at a 

dose of 40 mg on a daily basis.  On today's blood work, the patient's BUN is 46 

with a creatinine of 1.2.  Sodium levels at 139 and the potassium level is at 

4.2.  The patient has a white cell count of 19 with a hemoglobin of 4.3 and a 

platelet count of 165.  He remains on Airvo.  The current settings are 60 L with

an FiO2 of 55%.  A follow-up chest x-ray was obtained today and the chest x-ray 

is showing scattered airspace opacities throughout the lung and this could 

essentially present pulmonary edema versus atypical pneumonia.  The patient has 

cardiomegaly.  The patient remains on DuoNeb nebulized treatments around-the-

clock.  The patient remains on IV cefepime.  The patient remains on IV 

hydrocortisone.  Remains on Levemir insulin 15 units at bedtime and insulin 

scale coverage.  Remains on metoprolol 25 mg p.o. daily.  He is able to sit up 

on a chair.  Using incentive spirometer.





On 2/20/2025, patient is being seen for a follow-up.  The patient is still 

dependent on Airvo.  This morning, the patient is on a flow of 55 L and FiO2 

50%.  Fluid balance -410 cc over the past 24 hours.  The patient denies having 

any significant shortness of breath or chest pain.  Nevertheless, repeat chest 

x-ray was done and the findings are essentially unchanged.  The patient 

continues to have diffuse interstitial changes bilaterally along with smaller 

lung volumes and cardiomegaly.  The possibility of acute interstitial p

neumonia/pneumonitis cannot be completely ruled out.  Other possibilities 

include acute interstitial pneumonias, eosinophilic pneumonias, acute lung 

injury.  CHF with fluid overload cannot be completely ruled out and the patient 

is currently on IV Lasix 40 mg every 24 hours.  Remains on bronchodilators.  

Remains on steroids.  No history of connective tissue disease and the peritoneal

disease markers will be also obtained.  Remains on IV cefepime.





On 2/21/2025, the patient is being seen for a follow-up.  Feels well.  

Oxygenation continues to gradually improving this morning, the patient remains 

on Airvo 45 L with an FiO2 of 45%.  Receiving Lasix 40 mg IV every 24 hours and 

producing excellent urine output.  Remains in negative fluid balance of 1.1 L 

over the past 24 hours.  Continues to have crackles in the lung bases however 

clinically the patient reports that he is improving considerably.  The white 

cell count of 19.7, hemoglobin is 11.8 and BUN is 43 with a creatinine of 1.09 

and sodium is at 137.  Started on steroids yesterday.  Remains on IV cefepime.  

The connective tissue disease markers sent yesterday came back negative 

including negative rheumatoid factor, CAMRYN and ANCA and CAMRYN screen.





On today's evaluation of 2/22/2025, the patient is feeling well and denies 

having any significant shortness of breath at rest.  He remains on Airvo 40 L 

with an FiO2 of 40%.  He remains on IV Lasix and is producing excellent urine 

output.  Fluid balance -1.1 L over the past 24 hours.  He is able to sit up in a

chair.  No nausea.  No vomiting.  No chest pain or abdominal pain.  His white 

cell count is elevated at 31.8 and this is higher compared to yesterday.  BUN 48

with a creatinine of 1.1 and sodium is at 136.  Glucose at 278.  Otherwise, he 

remains on the same treatment with oxygen therapy including Airvo, DuoNeb 

updrafts, IV Lasix 40 mg every 24 hours, Lantus insulin 20 units daily along 

with NovoLog send scale coverage the patient remains on IV Solu-Medrol. 





On 2/23/2025, the patient is calm and comfortable.  Oxygenation is improved and 

the patient is currently on 80 reduction by nasal cannula with a pulse ox of 

99%.  Continues to diurese well with IV Lasix.  He is a negative fluid balance 

of 800 cc over the past 24 hours.  Producing adequate amount of urine output.  

The white cell count of 26 with a hemoglobin of 11.8 and a platelet count of 

216.  BUN is 40 with a creatinine 1.2 and a sodium levels at 134 and a potassium

level of 3.8.  Continues to be on DuoNeb nebulized treatments around-the-clock. 

Continues to be on IV cefepime.  Remains on Lantus insulin 20 units daily and 

NovoLog 5 units with meals.  Remains on IV Solu-Medrol.  Remains on a combinati

on of aspirin and Plavix.  Lasix 40 mg IV every 24 hours.  No other new 

complaints otherwise for now.  Resting comfortably in bed.





Objective





- Vital Signs


Vital signs: 


                                   Vital Signs











Temp  97.5 F L  02/23/25 07:34


 


Pulse  55 L  02/23/25 07:34


 


Resp  16   02/23/25 07:34


 


BP  127/67   02/23/25 07:34


 


Pulse Ox  95   02/23/25 09:23


 


FiO2  37   02/22/25 15:46








                                 Intake & Output











 02/22/25 02/23/25 02/23/25





 18:59 06:59 18:59


 


Intake Total 980  480


 


Output Total 6915 403 8882


 


Balance -320 -500 -820


 


Weight  124.5 kg 


 


Intake:   


 


  IV 10  


 


    Invasive Line 5 10  


 


  Oral 970  480


 


Output:   


 


  Urine [phone]


 


Other:   


 


  Voiding Method Indwelling Catheter Indwelling Catheter Indwelling Catheter


 


  # Bowel Movements  1 0














- Exam








No acute distress, oriented 3.  The patient is currently off Airvo and the 

patient is currently on 8 L of oxygen by nasal cannula





HEENT examination is grossly unremarkable.  Mucous membranes are moist.  No oral

lesions.





Neck supple.  Full range of motion.  No adenopathy thyromegaly or neck vein 

distention.





Cardiovascular examination reveals regular rhythm rate.  S1-S2 normal.  No S3 or

S4.  No discernible murmur noted.  Heart sounds are distant.





Lungs reveal mild scattered rhonchi.  No wheezes.  Bibasilar crackles are still 

present.  Breath sounds equal bilaterally.





Abdomen soft bowel sounds are heard.  No masses or tenderness.





Extremities are intact.  No cyanosis clubbing or edema.





Skin is without rash or lesion.





Neurologic examination is brief but nonfocal.  





- Labs


CBC & Chem 7: 


                                 02/23/25 06:39





                                 02/23/25 06:39


Labs: 


                  Abnormal Lab Results - Last 24 Hours (Table)











  02/22/25 02/22/25 02/22/25 Range/Units





  11:08 16:10 20:12 


 


WBC     (3.8-10.6)  k/uL


 


RBC     (4.30-5.90)  m/uL


 


Hgb     (13.0-17.5)  gm/dL


 


Hct     (39.0-53.0)  %


 


Neutrophils # (Manual)     (1.3-7.7)  k/uL


 


Lymphocytes # (Manual)     (1.0-4.8)  k/uL


 


Sodium     (137-145)  mmol/L


 


Chloride     ()  mmol/L


 


Carbon Dioxide     (22-30)  mmol/L


 


BUN     (9-20)  mg/dL


 


Glucose     (74-99)  mg/dL


 


POC Glucose (mg/dL)  249 H  364 H  271 H  ()  mg/dL


 


Magnesium     (1.6-2.3)  mg/dL














  02/23/25 02/23/25 02/23/25 Range/Units





  06:01 06:39 06:39 


 


WBC   26.6 H   (3.8-10.6)  k/uL


 


RBC   3.78 L   (4.30-5.90)  m/uL


 


Hgb   11.8 L   (13.0-17.5)  gm/dL


 


Hct   34.6 L   (39.0-53.0)  %


 


Neutrophils # (Manual)   11.70 H   (1.3-7.7)  k/uL


 


Lymphocytes # (Manual)   14.63 H   (1.0-4.8)  k/uL


 


Sodium    134 L  (137-145)  mmol/L


 


Chloride    94 L  ()  mmol/L


 


Carbon Dioxide    37 H  (22-30)  mmol/L


 


BUN    40 H  (9-20)  mg/dL


 


Glucose    164 H  (74-99)  mg/dL


 


POC Glucose (mg/dL)  191 H    ()  mg/dL


 


Magnesium    2.4 H  (1.6-2.3)  mg/dL














Assessment and Plan


Plan: 








Acute hypoxemic respiratory failure, with diffuse bilateral groundglass 

pulmonary filtrates.  Rule out atypical pneumonia versus CHF/interstitial edema.

 The patient oxygenation improved and the patient is currently off Airvo, 

maintained on oxygen at 8 L/min nasal cannula..  Echocardiogram shows a 

preserved LV function.  CAT scan of the chest shows no evidence of any pulm 

embolism and diffuse groundglass bilateral pulmonary filtrates.  Chest x-ray 

shows unchanged the patient is scattered interstitial pulmonary filtrates 

bilaterally.  Consider interstitial edema versus atypical pneumonia.  Rule out 

also acute lung injury, acute interstitial pneumonitis, acute hypersensitive 

pneumonitis, acute eosinophilic pneumonitis.  Connective tissue disease markers 

were negative.  The patient is currently on IV steroids and diuretics and there 

is ongoing improvement in his oxygenation.  He is responding.  The patient was 

taken off the Airvo and the patient is currently on 8 L of O2 nasal cannula.





Acute kidney injury, recovered





Acute Non-ST segment elevation myocardial infarction.





coronary artery disease.





History of diabetes mellitus type II currently on Levemir insulin





History of hypertension.





History of gastroesophageal reflux disease.





History of chronic lymphocytic leukemia, in remission.





History of osteoarthritis.





Plan: 





Titrate oxygen flow to maintain saturation above 90%. 


Airvo has been discontinued and the patient is currently on 8 L of oxygen by 

nasal cannula


Continue Lasix 40 mg IV e every 24 hours and the patient remains negative fluid 

balance


IV fluids to KVO


Monitor renal function


Ultrasound the kidneys shows no evidence of any hydronephrosis


IV cefepime as an empiric antibiotic coverage


Continue Solu-Medrol 60 mg every 6 hours


Check rheumatologic markers including RF, CAMRYN, ADRIEN screen and ANCA screen and 

all of those came back negative


proBNP level was elevated at time of admission.  Procalcitonin levels are 

nonelevated.  The viral screen was negative.  Legionella urine antigen was 

negative.


CT of the chest was noted


Echo was noted


Continue rest of the medications


Will continue to follow. 


Time with Patient: Greater than 30

## 2025-02-24 LAB
GLUCOSE BLD-MCNC: 291 MG/DL (ref 70–110)
GLUCOSE BLD-MCNC: 315 MG/DL (ref 70–110)
GLUCOSE BLD-MCNC: 413 MG/DL (ref 70–110)
GLUCOSE BLD-MCNC: 451 MG/DL (ref 70–110)

## 2025-02-24 RX ADMIN — METHYLPREDNISOLONE SODIUM SUCCINATE SCH MG: 40 INJECTION, POWDER, FOR SOLUTION INTRAMUSCULAR; INTRAVENOUS at 20:39

## 2025-02-24 RX ADMIN — FLUTICASONE PROPIONATE SCH SPRAY: 50 SPRAY, METERED NASAL at 17:34

## 2025-02-24 NOTE — P.PN
Subjective


Progress Note Date: 02/24/25





On today's evaluation of 2/22/2025, the patient is feeling well and denies 

having any significant shortness of breath at rest.  He remains on Airvo 40 L 

with an FiO2 of 40%.  He remains on IV Lasix and is producing excellent urine 

output.  Fluid balance -1.1 L over the past 24 hours.  He is able to sit up in a

chair.  No nausea.  No vomiting.  No chest pain or abdominal pain.  His white 

cell count is elevated at 31.8 and this is higher compared to yesterday.  BUN 48

with a creatinine of 1.1 and sodium is at 136.  Glucose at 278.  Otherwise, he 

remains on the same treatment with oxygen therapy including Airvo, DuoNeb 

updrafts, IV Lasix 40 mg every 24 hours, Lantus insulin 20 units daily along 

with NovoLog send scale coverage the patient remains on IV Solu-Medrol. 





On 2/23/2025, the patient is calm and comfortable.  Oxygenation is improved and 

the patient is currently on 80 reduction by nasal cannula with a pulse ox of 

99%.  Continues to diurese well with IV Lasix.  He is a negative fluid balance 

of 800 cc over the past 24 hours.  Producing adequate amount of urine output.  

The white cell count of 26 with a hemoglobin of 11.8 and a platelet count of 

216.  BUN is 40 with a creatinine 1.2 and a sodium levels at 134 and a potassium

level of 3.8.  Continues to be on DuoNeb nebulized treatments around-the-clock. 

Continues to be on IV cefepime.  Remains on Lantus insulin 20 units daily and 

NovoLog 5 units with meals.  Remains on IV Solu-Medrol.  Remains on a 

combination of aspirin and Plavix.  Lasix 40 mg IV every 24 hours.  No other new

complaints otherwise for now.  Resting comfortably in bed.





The patient is seen today February 24, 2025 in follow-up on the selective care 

unit.  He is currently sitting up at the bedside.  Awake and alert in no acute 

distress.  Maintaining good O2 saturations in the 90s on 2 L/min per nasal 

cannula.  No worsening shortness of breath, cough or congestion.  Glucose 451.  

He remains on Lantus and Humalog.  He is continued on DuoNeb inhalations, Solu-

Medrol and Singulair.  He remains on heparin for DVT prophylaxis.  Continued on 

Lasix daily.  Currently in a -1.1 L balance.  Currently on cefepime.





Objective





- Vital Signs


Vital signs: 


                                   Vital Signs











Temp  97.8 F   02/24/25 16:11


 


Pulse  77   02/24/25 16:11


 


Resp  18   02/24/25 16:11


 


BP  111/69   02/24/25 16:11


 


Pulse Ox  94 L  02/24/25 16:11


 


FiO2  37   02/22/25 15:46








                                 Intake & Output











 02/23/25 02/24/25 02/24/25





 18:59 06:59 18:59


 


Intake Total 1138 218 480


 


Output Total 1950 550 300


 


Balance -812 -332 180


 


Weight  87.4 kg 87.4 kg


 


Intake:   


 


  Intake, IV Titration  100 





  Amount   


 


    Cefepime 2 gm In Sodium  100 





    Chloride 0.9% 100 ml @ 25   





    mls/hr IVPB Q12H [facility] Rx#   





    :[id number]   


 


  Oral 1138 118 480


 


Output:   


 


  Urine 1950 550 300


 


Other:   


 


  Voiding Method Indwelling Catheter Indwelling Catheter Indwelling Catheter


 


  # Bowel Movements 0  














- Exam





GENERAL EXAM: Alert, active, 79-year-old male, on 2 L nasal cannula, up in a 

chair, comfortable in no apparent distress.


HEAD: Normocephalic.


EYES: Normal reaction of pupils, equal size.


NOSE: Clear with pink turbinates.


THROAT: No erythema or exudates.


NECK: No masses, no JVD.


CHEST: No chest wall deformity.


LUNGS: Equal air entry with few scattered rhonchi.


CVS: S1 and S2 normal with no audible murmur, regular rhythm.


ABDOMEN: No hepatosplenomegaly, normal bowel sounds, no guarding or rigidity.


SPINE: No scoliosis or deformity


SKIN: No rashes


CENTRAL NERVOUS SYSTEM: No focal deficits, tone is normal in all 4 extremities.


EXTREMITIES: There is no peripheral edema.  No clubbing, no cyanosis.  

Peripheral pulses are intact.





- Labs


CBC & Chem 7: 


                                 02/23/25 06:39





                                 02/23/25 06:39


Labs: 


                  Abnormal Lab Results - Last 24 Hours (Table)











  02/23/25 02/24/25 02/24/25 Range/Units





  20:04 06:08 11:39 


 


POC Glucose (mg/dL)  314 H  315 H  413 H  ()  mg/dL














  02/24/25 Range/Units





  15:49 


 


POC Glucose (mg/dL)  451 H  ()  mg/dL














Assessment and Plan


Assessment: 





Acute hypoxemic respiratory failure, with diffuse bilateral groundglass 

pulmonary filtrates.  Rule out atypical pneumonia versus CHF/interstitial edema.

 The patient oxygenation improved and the patient is currently off Airvo, 

maintained on oxygen at 8 L/min nasal cannula..  Echocardiogram shows a 

preserved LV function.  CAT scan of the chest shows no evidence of any pulm 

embolism and diffuse groundglass bilateral pulmonary filtrates.  Chest x-ray 

shows unchanged the patient is scattered interstitial pulmonary filtrates 

bilaterally.  Consider interstitial edema versus atypical pneumonia.  Rule out 

also acute lung injury, acute interstitial pneumonitis, acute hypersensitive 

pneumonitis, acute eosinophilic pneumonitis.  Connective tissue disease markers 

were negative.  The patient is currently on IV steroids and diuretics and there 

is ongoing improvement in his oxygenation.  He is responding.  The patient was 

taken off the Airvo and the patient is currently on 2 L of O2 nasal cannula.





Acute kidney injury, recovered





Acute Non-ST segment elevation myocardial infarction.





coronary artery disease.





History of diabetes mellitus type II currently on Levemir insulin





History of hypertension.





History of gastroesophageal reflux disease.





History of chronic lymphocytic leukemia, in remission.





History of osteoarthritis.





Plan:





The patient was seen and evaluated


Labs and medications reviewed


Decrease Solu-Medrol to 40 every 12 hours 


Continue bronchodilators 


Heparin for DVT prophylaxis


Insulin being adjusted


[facility]





I have personally seen and examined the patient, performed the documentation and

the assessment and plan as written.  Number of minutes spent on the visit: 10





Dictation was produced using Dragon dictation software.  Please excuse any 

grammatical, word or spelling errors.


Time with Patient: Less than 30

## 2025-02-24 NOTE — P.PN
Subjective


Progress Note Date: 02/24/25





Patient was seen and examined. Feeling well. No complaints. Now on 2L NC 

saturating high 90s. Maintained on Lasix 40 mg IV QD. Negative 1142 cc fluid 

balance over the past 24H. POC glucose 163-413 over the past 24H.





General: non toxic, no distress, appears at stated age


Derm: warm, dry


Head: atraumatic, normocephalic, symmetric


Eyes: EOMI, no lid lag, anicteric sclera


Mouth: no lip lesion, mucus membranes moist


Cardiovascular: S1S2 reg, no murmur


Lungs: Decreased BS bilateral, no rhonchi, no rales , no accessory muscle use


Ext: no gross muscle atrophy, no edema, no contractures


Neuro:  no focal neuro deficits


Psych: Alert, oriented, appropriate affect 





Based on my assessment of this patient, this patient meets a high complexity 

level of care.





Acute hypoxic respiratory failure and sepsis due to multifocal PNA and ARDS: 

Supplemental O2 to maintain O2 sat > 92%. DuoNeb Q4H PRN SOB. Cefepime 2g IV 

BID. SoluMedrol 60 mg IV Q6H. Pulmonary on board.


NSTEMI: Status post 48H heparin infusion. ASA 81 mg PO QD. Lipitor 80 mg PO QD. 

Plavix 75 mg PO QD. Metoprolol as below. Outpatient stress test per Cardiology.


Prerenal azotemia with hypochloremic hyponatremia and metabolic alkalosis: 

Likely contraction alkalosis from Lasix use. Monitor daily renal function.


DM with hyperglycemia: ISS with Accuchecks ACHS. Hypoglycemic precautions. 

Levemir 20 units QHS. Lispro 5 units TID.


Right foot wound: Wound care on board.


Hypertension: Lisinopril 20 mg PO QD. Metoprolol 25 mg PO QD.


History of CLL: In remission. Outpatient follow up.





CODE STATUS: FULL CODE


DVT Prophylaxis: Heparin SQ.


GI Prophylaxis: Protonix 40 mg PO QD.


Designated medical POA if patient is not able to make medical decisions for 

themselves:





I have reviewed the following consultant notes: 


I have reviewed the results of the following tests: POC glucose.


I have ordered the following tests: BMP in the AM.


I have discussed the care of this patient with the following independent 

historian: RN. Case management, plans for SNF when accepted, PT/OT asked to re-

evaluate.


I have independently interpreted the following test below:


I have discussed the management of this patient with the following physician:





Objective





- Vital Signs


Vital signs: 


                                   Vital Signs











Temp  97.5 F L  02/24/25 10:12


 


Pulse  89   02/24/25 12:19


 


Resp  18   02/24/25 12:19


 


BP  145/77   02/24/25 12:19


 


Pulse Ox  95   02/24/25 12:19


 


FiO2  37   02/22/25 15:46








                                 Intake & Output











 02/23/25 02/24/25 02/24/25





 18:59 06:59 18:59


 


Intake Total 1138 218 480


 


Output Total 1950 550 300


 


Balance -812 -332 180


 


Weight  87.4 kg 87.4 kg


 


Intake:   


 


  Intake, IV Titration  100 





  Amount   


 


    Cefepime 2 gm In Sodium  100 





    Chloride 0.9% 100 ml @ 25   





    mls/hr IVPB Q12H [facility] Rx#   





    :[id number]   


 


  Oral 1138 118 480


 


Output:   


 


  Urine 1950 550 300


 


Other:   


 


  Voiding Method Indwelling Catheter Indwelling Catheter Indwelling Catheter


 


  # Bowel Movements 0  














- Labs


CBC & Chem 7: 


                                 02/23/25 06:39





                                 02/23/25 06:39


Labs: 


                  Abnormal Lab Results - Last 24 Hours (Table)











  02/23/25 02/23/25 02/24/25 Range/Units





  16:47 20:04 06:08 


 


POC Glucose (mg/dL)  256 H  314 H  315 H  ()  mg/dL














  02/24/25 Range/Units





  11:39 


 


POC Glucose (mg/dL)  413 H  ()  mg/dL

## 2025-02-25 LAB
ANION GAP SERPL CALC-SCNC: 6 MMOL/L
BUN SERPL-SCNC: 35 MG/DL (ref 9–20)
CALCIUM SPEC-MCNC: 8.8 MG/DL (ref 8.4–10.2)
CHLORIDE SERPL-SCNC: 91 MMOL/L (ref 98–107)
CO2 SERPL-SCNC: 36 MMOL/L (ref 22–30)
GLUCOSE BLD-MCNC: 201 MG/DL (ref 70–110)
GLUCOSE BLD-MCNC: 219 MG/DL (ref 70–110)
GLUCOSE BLD-MCNC: 226 MG/DL (ref 70–110)
GLUCOSE BLD-MCNC: 268 MG/DL (ref 70–110)
GLUCOSE SERPL-MCNC: 197 MG/DL (ref 74–99)
POTASSIUM SERPL-SCNC: 3.4 MMOL/L (ref 3.5–5.1)
SODIUM SERPL-SCNC: 133 MMOL/L (ref 137–145)

## 2025-02-25 RX ADMIN — POTASSIUM CHLORIDE STA MEQ: 20 TABLET, EXTENDED RELEASE ORAL at 11:13

## 2025-02-25 NOTE — P.PN
Subjective


Progress Note Date: 02/25/25





On today's evaluation of 2/22/2025, the patient is feeling well and denies 

having any significant shortness of breath at rest.  He remains on Airvo 40 L 

with an FiO2 of 40%.  He remains on IV Lasix and is producing excellent urine 

output.  Fluid balance -1.1 L over the past 24 hours.  He is able to sit up in a

chair.  No nausea.  No vomiting.  No chest pain or abdominal pain.  His white 

cell count is elevated at 31.8 and this is higher compared to yesterday.  BUN 48

with a creatinine of 1.1 and sodium is at 136.  Glucose at 278.  Otherwise, he 

remains on the same treatment with oxygen therapy including Airvo, DuoNeb 

updrafts, IV Lasix 40 mg every 24 hours, Lantus insulin 20 units daily along 

with NovoLog send scale coverage the patient remains on IV Solu-Medrol. 





On 2/23/2025, the patient is calm and comfortable.  Oxygenation is improved and 

the patient is currently on 80 reduction by nasal cannula with a pulse ox of 

99%.  Continues to diurese well with IV Lasix.  He is a negative fluid balance 

of 800 cc over the past 24 hours.  Producing adequate amount of urine output.  

The white cell count of 26 with a hemoglobin of 11.8 and a platelet count of 

216.  BUN is 40 with a creatinine 1.2 and a sodium levels at 134 and a potassium

level of 3.8.  Continues to be on DuoNeb nebulized treatments around-the-clock. 

Continues to be on IV cefepime.  Remains on Lantus insulin 20 units daily and 

NovoLog 5 units with meals.  Remains on IV Solu-Medrol.  Remains on a 

combination of aspirin and Plavix.  Lasix 40 mg IV every 24 hours.  No other new

complaints otherwise for now.  Resting comfortably in bed.





The patient is seen today February 24, 2025 in follow-up on the selective care 

unit.  He is currently sitting up at the bedside.  Awake and alert in no acute 

distress.  Maintaining good O2 saturations in the 90s on 2 L/min per nasal 

cannula.  No worsening shortness of breath, cough or congestion.  Glucose 451.  

He remains on Lantus and Humalog.  He is continued on DuoNeb inhalations, Solu-

Medrol and Singulair.  He remains on heparin for DVT prophylaxis.  Continued on 

Lasix daily.  Currently in a -1.1 L balance.  Currently on cefepime.





The patient is seen today February 25, 2025 in follow-up on the selective care 

unit.  He is currently sitting up in a chair at the bedside.  Awake and alert in

no acute distress.  Maintaining good O2 saturations in the 90s on room air.  He 

denies any worsening shortness of breath, cough or congestion.  He is afebrile. 

Hemodynamically stable.  Sodium 133.  Potassium 3.4.  Bicarb 36.  BUN 2035.  

Creatinine 1.2.  Glucose 197.  He remains on DuoNeb and elations, Solu-Medrol 

and Singulair.  Remains on IV diuretics.  Remains on cefepime.  Heparin for DVT 

prophylaxis.





Objective





- Vital Signs


Vital signs: 


                                   Vital Signs











Temp  98.0 F   02/25/25 12:00


 


Pulse  60   02/25/25 14:00


 


Resp  18   02/25/25 14:00


 


BP  133/69   02/25/25 12:00


 


Pulse Ox  94 L  02/25/25 12:00


 


FiO2  37   02/22/25 15:46








                                 Intake & Output











 02/24/25 02/25/25 02/25/25





 18:59 06:59 18:59


 


Intake Total 1200  360


 


Output Total 


 


Balance 971 -969 -309


 


Weight 87.4 kg 87 kg 


 


Intake:   


 


  Oral 1200  360


 


Output:   


 


  Urine 


 


Other:   


 


  Voiding Method Indwelling Catheter Urinal Urinal


 


  # Voids 1  1


 


  # Bowel Movements 1  1














- Exam





GENERAL EXAM: Alert, pleasant 79-year-old male, on room air oxygen, up in a 

chair, comfortable in no apparent distress.


HEAD: Normocephalic.


EYES: Normal reaction of pupils, equal size.


NOSE: Clear with pink turbinates.


THROAT: No erythema or exudates.


NECK: No masses, no JVD.


CHEST: No chest wall deformity.


LUNGS: Equal air entry with few scattered rhonchi.


CVS: S1 and S2 normal with no audible murmur, regular rhythm.


ABDOMEN: No hepatosplenomegaly, normal bowel sounds, no guarding or rigidity.


SPINE: No scoliosis or deformity


SKIN: No rashes


CENTRAL NERVOUS SYSTEM: No focal deficits, tone is normal in all 4 extremities.


EXTREMITIES: There is no peripheral edema.  No clubbing, no cyanosis.  

Peripheral pulses are intact.





- Labs


CBC & Chem 7: 


                                 02/23/25 06:39





                                 02/25/25 06:20


Labs: 


                  Abnormal Lab Results - Last 24 Hours (Table)











  02/24/25 02/24/25 02/25/25 Range/Units





  15:49 19:54 05:56 


 


Sodium     (137-145)  mmol/L


 


Potassium     (3.5-5.1)  mmol/L


 


Chloride     ()  mmol/L


 


Carbon Dioxide     (22-30)  mmol/L


 


BUN     (9-20)  mg/dL


 


Glucose     (74-99)  mg/dL


 


POC Glucose (mg/dL)  451 H  291 H  219 H  ()  mg/dL


 


Magnesium     (1.6-2.3)  mg/dL














  02/25/25 02/25/25 02/25/25 Range/Units





  06:20 10:21 11:13 


 


Sodium  133 L    (137-145)  mmol/L


 


Potassium  3.4 L    (3.5-5.1)  mmol/L


 


Chloride  91 L    ()  mmol/L


 


Carbon Dioxide  36 H    (22-30)  mmol/L


 


BUN  35 H    (9-20)  mg/dL


 


Glucose  197 H    (74-99)  mg/dL


 


POC Glucose (mg/dL)    201 H  ()  mg/dL


 


Magnesium   2.4 H   (1.6-2.3)  mg/dL














Assessment and Plan


Assessment: 





Acute hypoxemic respiratory failure, with diffuse bilateral groundglass 

pulmonary filtrates.  Rule out atypical pneumonia versus CHF/interstitial edema.

 The patient oxygenation improved and the patient is currently off Airvo, 

maintained on oxygen at 8 L/min nasal cannula..  Echocardiogram shows a 

preserved LV function.  CAT scan of the chest shows no evidence of any pulm 

embolism and diffuse groundglass bilateral pulmonary filtrates.  Chest x-ray 

shows unchanged the patient is scattered interstitial pulmonary filtrates 

bilaterally.  Consider interstitial edema versus atypical pneumonia.  Rule out 

also acute lung injury, acute interstitial pneumonitis, acute hypersensitive 

pneumonitis, acute eosinophilic pneumonitis.  Connective tissue disease markers 

were negative.  Currently on IV steroids and diuretics and there is ongoing 

improvement in his oxygenation is now back on room air. 





Acute kidney injury, recovered





Acute Non-ST segment elevation myocardial infarction.





coronary artery disease.





History of diabetes mellitus type II currently on Levemir insulin





History of hypertension.





History of gastroesophageal reflux disease.





History of chronic lymphocytic leukemia, in remission.





History of osteoarthritis.





Plan:





The patient was seen and evaluated


Labs and medications reviewed


Currently stable and on room air


Could be transitioned to a prednisone taper


Plan is for [facility] 





I have personally seen and examined the patient, performed the documentation and

the assessment and plan as written.  Number of minutes spent on the visit: 10





Dictation was produced using Dragon dictation software.  Please excuse any 

grammatical, word or spelling errors.

## 2025-02-26 LAB
ANION GAP SERPL CALC-SCNC: 5 MMOL/L
BASOPHILS # BLD AUTO: 0.2 K/UL (ref 0–0.2)
BASOPHILS NFR BLD AUTO: 0 %
BUN SERPL-SCNC: 34 MG/DL (ref 9–20)
CALCIUM SPEC-MCNC: 8.4 MG/DL (ref 8.4–10.2)
CHLORIDE SERPL-SCNC: 96 MMOL/L (ref 98–107)
CO2 SERPL-SCNC: 36 MMOL/L (ref 22–30)
EOSINOPHIL # BLD AUTO: 0 K/UL (ref 0–0.7)
EOSINOPHIL NFR BLD AUTO: 0 %
ERYTHROCYTE [DISTWIDTH] IN BLOOD BY AUTOMATED COUNT: 4.29 M/UL (ref 4.3–5.9)
ERYTHROCYTE [DISTWIDTH] IN BLOOD: 14 % (ref 11.5–15.5)
GLUCOSE BLD-MCNC: 144 MG/DL (ref 70–110)
GLUCOSE BLD-MCNC: 155 MG/DL (ref 70–110)
GLUCOSE BLD-MCNC: 220 MG/DL (ref 70–110)
GLUCOSE BLD-MCNC: 247 MG/DL (ref 70–110)
GLUCOSE SERPL-MCNC: 146 MG/DL (ref 74–99)
HCT VFR BLD AUTO: 40 % (ref 39–53)
HGB BLD-MCNC: 13 GM/DL (ref 13–17.5)
LYMPHOCYTES # SPEC AUTO: 21.6 K/UL (ref 1–4.8)
LYMPHOCYTES NFR SPEC AUTO: 63 %
MCH RBC QN AUTO: 30.2 PG (ref 25–35)
MCHC RBC AUTO-ENTMCNC: 32.4 G/DL (ref 31–37)
MCV RBC AUTO: 93.2 FL (ref 80–100)
MONOCYTES # BLD AUTO: 1 K/UL (ref 0–1)
MONOCYTES NFR BLD AUTO: 3 %
NEUTROPHILS # BLD AUTO: 10.9 K/UL (ref 1.3–7.7)
NEUTROPHILS NFR BLD AUTO: 32 %
PLATELET # BLD AUTO: 275 K/UL (ref 150–450)
POTASSIUM SERPL-SCNC: 3.8 MMOL/L (ref 3.5–5.1)
SODIUM SERPL-SCNC: 137 MMOL/L (ref 137–145)
WBC # BLD AUTO: 34.3 K/UL (ref 3.8–10.6)

## 2025-02-27 VITALS — RESPIRATION RATE: 16 BRPM | SYSTOLIC BLOOD PRESSURE: 121 MMHG | HEART RATE: 64 BPM | DIASTOLIC BLOOD PRESSURE: 64 MMHG

## 2025-02-27 VITALS — TEMPERATURE: 98.1 F

## 2025-02-27 LAB
GLUCOSE BLD-MCNC: 114 MG/DL (ref 70–110)
GLUCOSE BLD-MCNC: 164 MG/DL (ref 70–110)

## 2025-02-27 RX ADMIN — FUROSEMIDE SCH MG: 40 TABLET ORAL at 08:59

## 2025-04-21 ENCOUNTER — HOSPITAL ENCOUNTER (EMERGENCY)
Dept: HOSPITAL 47 - EC | Age: 80
Discharge: HOME | End: 2025-04-21
Payer: MEDICARE

## 2025-04-21 VITALS
SYSTOLIC BLOOD PRESSURE: 109 MMHG | DIASTOLIC BLOOD PRESSURE: 61 MMHG | HEART RATE: 80 BPM | TEMPERATURE: 97.4 F | RESPIRATION RATE: 18 BRPM

## 2025-04-21 DIAGNOSIS — D64.9: Primary | ICD-10-CM

## 2025-04-21 DIAGNOSIS — Z88.0: ICD-10-CM

## 2025-04-21 DIAGNOSIS — Z88.1: ICD-10-CM

## 2025-04-21 LAB
ALBUMIN SERPL-MCNC: 3.3 G/DL (ref 3.5–5)
ALP SERPL-CCNC: 130 U/L (ref 38–126)
ALT SERPL-CCNC: 20 U/L (ref 4–49)
ANION GAP SERPL CALC-SCNC: 7 MMOL/L
APTT BLD: 22.5 SEC (ref 22–30)
AST SERPL-CCNC: 23 U/L (ref 17–59)
BUN SERPL-SCNC: 23 MG/DL (ref 9–20)
CALCIUM SPEC-MCNC: 9.5 MG/DL (ref 8.4–10.2)
CELLS COUNTED: 100
CHLORIDE SERPL-SCNC: 107 MMOL/L (ref 98–107)
CO2 SERPL-SCNC: 26 MMOL/L (ref 22–30)
EOSINOPHIL # BLD MANUAL: 0.13 K/UL (ref 0–0.7)
ERYTHROCYTE [DISTWIDTH] IN BLOOD BY AUTOMATED COUNT: 2.37 10*6/UL (ref 4.4–5.6)
ERYTHROCYTE [DISTWIDTH] IN BLOOD: 16.6 % (ref 11.5–14.5)
GLUCOSE SERPL-MCNC: 116 MG/DL (ref 74–99)
HCT VFR BLD AUTO: 22.7 % (ref 39.6–50)
HGB BLD-MCNC: 7.7 G/DL (ref 13–17)
INR PPP: 1.1 (ref ?–1.2)
LYMPHOCYTES # BLD MANUAL: 8.48 K/UL (ref 1–4.8)
MAGNESIUM SPEC-SCNC: 1.9 MG/DL (ref 1.6–2.3)
MCH RBC QN AUTO: 32.5 PG (ref 27–32)
MCHC RBC AUTO-ENTMCNC: 33.9 G/DL (ref 32–37)
MCV RBC AUTO: 95.8 FL (ref 80–97)
METAMYELOCYTES # BLD: 0.26 K/UL
MONOCYTES # BLD MANUAL: 0.26 K/UL (ref 0–1)
MYELOCYTES # BLD MANUAL: 0.39 K/UL
NEUTROPHILS NFR BLD MANUAL: 27 %
NEUTS SEG # BLD MANUAL: 3.52 K/UL (ref 1.3–7.7)
PLATELET # BLD AUTO: 246 10*3/UL (ref 140–440)
POTASSIUM SERPL-SCNC: 4.2 MMOL/L (ref 3.5–5.1)
PROT SERPL-MCNC: 5.4 G/DL (ref 6.3–8.2)
PT BLD: 11.7 SEC (ref 10–12.5)
SODIUM SERPL-SCNC: 140 MMOL/L (ref 137–145)
WBC # BLD AUTO: 13.04 10*3/UL (ref 4.5–10)

## 2025-04-21 PROCEDURE — 80053 COMPREHEN METABOLIC PANEL: CPT

## 2025-04-21 PROCEDURE — 36415 COLL VENOUS BLD VENIPUNCTURE: CPT

## 2025-04-21 PROCEDURE — 86901 BLOOD TYPING SEROLOGIC RH(D): CPT

## 2025-04-21 PROCEDURE — 99283 EMERGENCY DEPT VISIT LOW MDM: CPT

## 2025-04-21 PROCEDURE — 86900 BLOOD TYPING SEROLOGIC ABO: CPT

## 2025-04-21 PROCEDURE — 85610 PROTHROMBIN TIME: CPT

## 2025-04-21 PROCEDURE — 83735 ASSAY OF MAGNESIUM: CPT

## 2025-04-21 PROCEDURE — 85730 THROMBOPLASTIN TIME PARTIAL: CPT

## 2025-04-21 PROCEDURE — 86850 RBC ANTIBODY SCREEN: CPT

## 2025-04-21 PROCEDURE — 85025 COMPLETE CBC W/AUTO DIFF WBC: CPT
